# Patient Record
Sex: FEMALE | Race: WHITE | HISPANIC OR LATINO | Employment: OTHER | URBAN - METROPOLITAN AREA
[De-identification: names, ages, dates, MRNs, and addresses within clinical notes are randomized per-mention and may not be internally consistent; named-entity substitution may affect disease eponyms.]

---

## 2017-01-04 DIAGNOSIS — I25.10 CORONARY ARTERY DISEASE INVOLVING NATIVE CORONARY ARTERY WITHOUT ANGINA PECTORIS, UNSPECIFIED WHETHER NATIVE OR TRANSPLANTED HEART: ICD-10-CM

## 2017-01-04 DIAGNOSIS — E78.5 HYPERLIPIDEMIA LDL GOAL <70: ICD-10-CM

## 2017-01-04 DIAGNOSIS — Z95.1 S/P CABG X 1: ICD-10-CM

## 2017-01-04 DIAGNOSIS — I77.9 PERIPHERAL ARTERIAL OCCLUSIVE DISEASE: ICD-10-CM

## 2017-01-04 DIAGNOSIS — I10 ESSENTIAL HYPERTENSION: ICD-10-CM

## 2017-01-04 NOTE — TELEPHONE ENCOUNTER
----- Message from Blaire Arias sent at 1/4/2017  3:21 PM CST -----  Contact: [phone]/ self   Patient needs a refill of furosemide (LASIX) 40 MG tablet sent to [facility] on [address]. Please advise.

## 2017-01-05 RX ORDER — FUROSEMIDE 40 MG/1
40 TABLET ORAL DAILY
Qty: 90 TABLET | Refills: 1 | Status: SHIPPED | OUTPATIENT
Start: 2017-01-05 | End: 2017-01-06 | Stop reason: SDUPTHER

## 2017-01-06 DIAGNOSIS — Z95.1 S/P CABG X 1: ICD-10-CM

## 2017-01-06 DIAGNOSIS — I25.10 CORONARY ARTERY DISEASE INVOLVING NATIVE CORONARY ARTERY WITHOUT ANGINA PECTORIS, UNSPECIFIED WHETHER NATIVE OR TRANSPLANTED HEART: ICD-10-CM

## 2017-01-06 DIAGNOSIS — I10 ESSENTIAL HYPERTENSION: ICD-10-CM

## 2017-01-06 DIAGNOSIS — I77.9 PERIPHERAL ARTERIAL OCCLUSIVE DISEASE: ICD-10-CM

## 2017-01-06 DIAGNOSIS — E78.5 HYPERLIPIDEMIA LDL GOAL <70: ICD-10-CM

## 2017-01-06 RX ORDER — FUROSEMIDE 40 MG/1
40 TABLET ORAL DAILY
Qty: 30 TABLET | Refills: 0 | Status: SHIPPED | OUTPATIENT
Start: 2017-01-06 | End: 2017-03-23 | Stop reason: SDUPTHER

## 2017-03-13 ENCOUNTER — HOSPITAL ENCOUNTER (OUTPATIENT)
Dept: RADIOLOGY | Facility: HOSPITAL | Age: 82
Discharge: HOME | End: 2017-03-13
Attending: NURSE PRACTITIONER
Payer: MEDICARE

## 2017-03-13 DIAGNOSIS — R05.9 COUGH: ICD-10-CM

## 2017-03-13 DIAGNOSIS — R06.02 SOB (SHORTNESS OF BREATH): ICD-10-CM

## 2017-03-13 DIAGNOSIS — N18.6 END STAGE RENAL DISEASE: ICD-10-CM

## 2017-03-13 PROCEDURE — 71020 XR CHEST PA AND LATERAL: CPT | Mod: TC

## 2017-03-13 PROCEDURE — 71020 XR CHEST PA AND LATERAL: CPT | Mod: 26,,, | Performed by: RADIOLOGY

## 2017-03-23 ENCOUNTER — OFFICE VISIT (OUTPATIENT)
Dept: CARDIOLOGY | Facility: CLINIC | Age: 82
End: 2017-03-23
Payer: MEDICARE

## 2017-03-23 VITALS
HEIGHT: 64 IN | SYSTOLIC BLOOD PRESSURE: 127 MMHG | BODY MASS INDEX: 19.29 KG/M2 | WEIGHT: 113 LBS | HEART RATE: 56 BPM | DIASTOLIC BLOOD PRESSURE: 56 MMHG

## 2017-03-23 DIAGNOSIS — I77.9 PERIPHERAL ARTERIAL OCCLUSIVE DISEASE: ICD-10-CM

## 2017-03-23 DIAGNOSIS — E78.5 HYPERLIPIDEMIA LDL GOAL <70: ICD-10-CM

## 2017-03-23 DIAGNOSIS — I25.10 CORONARY ARTERY DISEASE INVOLVING NATIVE CORONARY ARTERY WITHOUT ANGINA PECTORIS, UNSPECIFIED WHETHER NATIVE OR TRANSPLANTED HEART: ICD-10-CM

## 2017-03-23 DIAGNOSIS — I10 ESSENTIAL HYPERTENSION: ICD-10-CM

## 2017-03-23 DIAGNOSIS — N18.6 ESRD (END STAGE RENAL DISEASE): Primary | ICD-10-CM

## 2017-03-23 DIAGNOSIS — Z95.1 S/P CABG X 1: ICD-10-CM

## 2017-03-23 PROCEDURE — 1160F RVW MEDS BY RX/DR IN RCRD: CPT | Mod: S$GLB,,, | Performed by: INTERNAL MEDICINE

## 2017-03-23 PROCEDURE — 1157F ADVNC CARE PLAN IN RCRD: CPT | Mod: S$GLB,,, | Performed by: INTERNAL MEDICINE

## 2017-03-23 PROCEDURE — 1159F MED LIST DOCD IN RCRD: CPT | Mod: S$GLB,,, | Performed by: INTERNAL MEDICINE

## 2017-03-23 PROCEDURE — 1126F AMNT PAIN NOTED NONE PRSNT: CPT | Mod: S$GLB,,, | Performed by: INTERNAL MEDICINE

## 2017-03-23 PROCEDURE — 99213 OFFICE O/P EST LOW 20 MIN: CPT | Mod: S$GLB,,, | Performed by: INTERNAL MEDICINE

## 2017-03-23 PROCEDURE — 99999 PR PBB SHADOW E&M-EST. PATIENT-LVL II: CPT | Mod: PBBFAC,,, | Performed by: INTERNAL MEDICINE

## 2017-03-23 RX ORDER — AMLODIPINE BESYLATE 10 MG/1
10 TABLET ORAL DAILY
Qty: 90 TABLET | Refills: 3 | Status: SHIPPED | OUTPATIENT
Start: 2017-03-23 | End: 2018-06-14 | Stop reason: SDUPTHER

## 2017-03-23 RX ORDER — FUROSEMIDE 40 MG/1
40 TABLET ORAL DAILY
Qty: 90 TABLET | Refills: 3 | Status: SHIPPED | OUTPATIENT
Start: 2017-03-23 | End: 2018-03-23

## 2017-03-23 RX ORDER — ATORVASTATIN CALCIUM 40 MG/1
40 TABLET, FILM COATED ORAL DAILY
COMMUNITY
End: 2017-03-23 | Stop reason: SDUPTHER

## 2017-03-23 RX ORDER — ISOSORBIDE MONONITRATE 120 MG/1
120 TABLET, EXTENDED RELEASE ORAL DAILY
Qty: 90 TABLET | Refills: 3 | Status: SHIPPED | OUTPATIENT
Start: 2017-03-23 | End: 2018-04-19 | Stop reason: SDUPTHER

## 2017-03-23 RX ORDER — CARVEDILOL 25 MG/1
25 TABLET ORAL 2 TIMES DAILY
Qty: 180 TABLET | Refills: 3 | Status: SHIPPED | OUTPATIENT
Start: 2017-03-23 | End: 2018-05-10 | Stop reason: SDUPTHER

## 2017-03-23 RX ORDER — ATORVASTATIN CALCIUM 40 MG/1
40 TABLET, FILM COATED ORAL DAILY
Qty: 90 TABLET | Refills: 3 | Status: SHIPPED | OUTPATIENT
Start: 2017-03-23 | End: 2022-06-27 | Stop reason: SDUPTHER

## 2017-03-23 NOTE — PROGRESS NOTES
Subjective:   Patient ID:  Erin Clark is a 81 y.o. female who presents for follow up of Coronary Artery Disease; Hyperlipidemia; Hypertension; and s/p CABG      HPI:         Erin Clark 81 y.o. female is here follow up and feeling well without any new complaints. She was diagnosed with 3 vessel CAD with an abnormal EF requiring surgical intervention. She had a CABG with 3 grafts without revascularization of RCA . Her EF post surgically normalized to 55%. PET scan thereafter in 2015 and 2015 revealed no ischemia. She is on HD on . HD via L AV shunt. She has PAD without claudication.           Patient Active Problem List    Diagnosis Date Noted    ESRD (end stage renal disease) 12/10/2014    Hyperlipidemia LDL goal <70 12/10/2014    Hemodialysis catheter malfunction 10/16/2014    Dyspnea 2014    S/P CABG x 1 2014    Anemia 2014    Type 2 diabetes mellitus 02/10/2014    Peripheral arterial occlusive disease 02/10/2014    Hypertension 02/10/2014    CAD (coronary artery disease) 02/10/2014     S/p CABG 14 LIMA-D1, SVG-LAD, SVG-OM1 for NSTEMI with non revascularized RCA , normalized EF      Chronic diastolic congestive heart failure 02/10/2014           Right Arm BP - Sittin/56  Reason for not completing BP on both arms: AV shunt        LABS    LAST HbA1c  Lab Results   Component Value Date    HGBA1C 6.6 (H) 2014       Lipid panel  Lab Results   Component Value Date    CHOL 154 2015    CHOL 184 2014    CHOL 192 02/10/2014     Lab Results   Component Value Date    HDL 66 2015    HDL 36 (L) 2014    HDL 38 (L) 02/10/2014     Lab Results   Component Value Date    LDLCALC 60.4 (L) 2015    LDLCALC 98.8 2014    LDLCALC 108.0 02/10/2014     Lab Results   Component Value Date    TRIG 138 2015    TRIG 246 (H) 2014    TRIG 230 (H) 02/10/2014     Lab Results   Component Value Date    CHOLHDL 42.9 2015     CHOLHDL 19.6 (L) 02/11/2014    CHOLHDL 19.8 (L) 02/10/2014            Review of Systems   Constitution: Negative for diaphoresis, weakness, night sweats, weight gain and weight loss.   HENT: Negative for congestion.    Eyes: Negative for blurred vision, discharge and double vision.   Cardiovascular: Negative for chest pain, claudication, cyanosis, dyspnea on exertion, irregular heartbeat, leg swelling, near-syncope, orthopnea, palpitations, paroxysmal nocturnal dyspnea and syncope.   Respiratory: Negative for cough, shortness of breath and wheezing.    Endocrine: Negative for cold intolerance, heat intolerance and polyphagia.   Hematologic/Lymphatic: Negative for adenopathy and bleeding problem. Does not bruise/bleed easily.   Skin: Negative for dry skin and nail changes.   Musculoskeletal: Negative for arthritis, back pain, falls, joint pain, myalgias and neck pain.   Gastrointestinal: Negative for bloating, abdominal pain, change in bowel habit and constipation.   Genitourinary: Negative for bladder incontinence, dysuria, flank pain, genital sores and missed menses.   Neurological: Negative for aphonia, brief paralysis, difficulty with concentration and dizziness.   Psychiatric/Behavioral: Negative for altered mental status and memory loss. The patient does not have insomnia.    Allergic/Immunologic: Negative for environmental allergies.       Objective:   Physical Exam   Constitutional: She is oriented to person, place, and time. She appears well-developed and well-nourished. She is not intubated.   HENT:   Head: Normocephalic and atraumatic.   Right Ear: External ear normal.   Left Ear: External ear normal.   Mouth/Throat: Oropharynx is clear and moist.   Eyes: Conjunctivae and EOM are normal. Pupils are equal, round, and reactive to light. Right eye exhibits no discharge. Left eye exhibits no discharge. No scleral icterus.   Neck: Normal range of motion. Neck supple. Normal carotid pulses, no hepatojugular  reflux and no JVD present. Carotid bruit is not present. No tracheal deviation present. No thyromegaly present.   Cardiovascular: Normal rate, regular rhythm, S1 normal and S2 normal.   No extrasystoles are present. PMI is not displaced.  Exam reveals no gallop, no S3, no distant heart sounds, no friction rub and no midsystolic click.    Murmur heard.   Systolic murmur is present  at the upper right sternal border  Pulses:       Carotid pulses are 2+ on the right side, and 2+ on the left side.       Radial pulses are 2+ on the right side, and 2+ on the left side.        Femoral pulses are 2+ on the right side, and 2+ on the left side.       Popliteal pulses are 2+ on the right side, and 2+ on the left side.        Dorsalis pedis pulses are 1+ on the right side, and 1+ on the left side.        Posterior tibial pulses are 1+ on the right side, and 1+ on the left side.       LUE AVF with palpable thrill    Pulmonary/Chest: Effort normal and breath sounds normal. No accessory muscle usage or stridor. No apnea, no tachypnea and no bradypnea. She is not intubated. No respiratory distress. She has no decreased breath sounds. She has no wheezes. She has no rales. She exhibits no tenderness and no bony tenderness.   Abdominal: She exhibits no distension, no pulsatile liver, no abdominal bruit, no ascites, no pulsatile midline mass and no mass. There is no tenderness. There is no rebound and no guarding.   Musculoskeletal: Normal range of motion. She exhibits no edema or tenderness.   Lymphadenopathy:     She has no cervical adenopathy.   Neurological: She is alert and oriented to person, place, and time. She has normal reflexes. No cranial nerve deficit. Coordination normal.   Skin: Skin is warm. No rash noted. No erythema. No pallor.   Psychiatric: She has a normal mood and affect. Her behavior is normal. Judgment and thought content normal.       Assessment:     1. ESRD (end stage renal disease)    2. Coronary artery  disease involving native coronary artery without angina pectoris, unspecified whether native or transplanted heart    3. Essential hypertension    4. Peripheral arterial occlusive disease    5. S/P CABG x 1    6. Hyperlipidemia LDL goal <70        Plan:         Continue with current medical plan and lifestyle changes.  Return sooner for concerns or questions. If symptoms persist go to the ED  I have reviewed all pertinent data on this patient         Medical therapy for CAD and PAD          Follow up as scheduled. Return sooner for concerns or questions  Follow up yearly          She and her son expressed verbal understanding and agreed with the plan        Patient's Medications   New Prescriptions    No medications on file   Previous Medications    ACYCLOVIR 5% (ZOVIRAX) 5 % OINTMENT    Apply topically 5 (five) times daily.    ASPIRIN (ECOTRIN) 81 MG EC TABLET    TAKE ONE TABLET BY MOUTH ONCE DAILY    ERGOCALCIFEROL (ERGOCALCIFEROL) 50,000 UNIT CAP    Take 1 capsule (50,000 Units total) by mouth every 7 days.    FAMOTIDINE (PEPCID) 20 MG TABLET    Take 1 tablet (20 mg total) by mouth once daily.    NITROGLYCERIN (NITROSTAT) 0.4 MG SL TABLET    Place 1 tablet (0.4 mg total) under the tongue every 5 (five) minutes as needed for Chest pain.   Modified Medications    Modified Medication Previous Medication    AMLODIPINE (NORVASC) 10 MG TABLET amlodipine (NORVASC) 10 MG tablet       Take 1 tablet (10 mg total) by mouth once daily.    Take 1 tablet (10 mg total) by mouth once daily.    ATORVASTATIN (LIPITOR) 40 MG TABLET atorvastatin (LIPITOR) 40 MG tablet       Take 1 tablet (40 mg total) by mouth once daily.    Take 40 mg by mouth once daily.    CARVEDILOL (COREG) 25 MG TABLET carvedilol (COREG) 25 MG tablet       Take 1 tablet (25 mg total) by mouth 2 (two) times daily.    Take 1 tablet (25 mg total) by mouth 2 (two) times daily.    FUROSEMIDE (LASIX) 40 MG TABLET furosemide (LASIX) 40 MG tablet       Take 1 tablet  (40 mg total) by mouth once daily.    Take 1 tablet (40 mg total) by mouth once daily.    ISOSORBIDE MONONITRATE (IMDUR) 120 MG 24 HR TABLET isosorbide mononitrate (IMDUR) 120 MG 24 hr tablet       Take 1 tablet (120 mg total) by mouth once daily.    Take 1 tablet (120 mg total) by mouth once daily.   Discontinued Medications

## 2017-03-23 NOTE — PATIENT INSTRUCTIONS
Heart Disease Education    The heart beats 60 to 100 times per minute, 24 hours a day. This equals almost 1000,000 times a day. It pumps blood with oxygen and nutrients to the tissues and organs of the body. But the heart is a muscle and needs its own supply of blood. Blood flow to the heart is supplied by the coronary arteries. Coronary artery disease (atherosclerosis) is a result of cholesterol, saturated fat, and calcium deposits (plaques) that build up inside the walls. This causes inflammation within the coronary arteries. These plaques narrow the artery and reduce blood flow to the heart muscle. The reduction in blood flow to the heart muscle decreases oxygen supply to the heart. If the narrowing is significant enough, the oxygen supply to one or more regions of the heart can be temporarily or permanently shut down. This can cause chest pain, and possibly death of heart tissue (heart attack).  Types of chest pain  Angina is the name for pain in the heart muscle. Angina is a warning sign of serious heart disease. When untreated it can lead to a heart attack, also known as acute myocardial infarction, or AMI. Angina occurs when there is not enough blood and oxygen flowing to the heart for the amount of work it is doing. This most often happens during physical exertion, when the heart is working hardest. It is usually relieved by rest or nitroglycerin. Angina may also occur after a large meal when extra blood is sent to the digestive organs and less goes to the heart. In the case of advanced or unstable heart disease, angina can occur at rest or awaken you from sleep. Angina usually lasts from a few minutes up to 20 minutes or more. When treated early, the effects of angina can be reversed without permanent damage to the heart. Angina is a serious condition and needs to be evaluated by a medical professional immediately.  There are two types of angina -- stable and unstable:  · Stable angina usually occurs  with a predictable level of activity. Being stable, its character, severity, and occurrence do not change much over time. It usually starts with activity, and resolves with rest or taking your medicine as instructed by your doctor. The symptoms usually do not last long.  · Unstable angina changes or gets worse over time. It is different from whatever you are used to. It may feel different or worse, begin without cause, occur with exercise or exertion, wake you up from sleep, and last longer. It may not respond in the same way as it does when you take your usual medicines for an attack. This type of angina can be a warning sign of an impending heart attack.     A heart attack is usually the result of a blood clot that suddenly forms in a coronary artery that has been narrowed with plaque. When this occurs, blood flow may be cut off to a part of the heart muscle, causing the cells to die. This weakens the pumping action of the heart, which affects the delivery of blood to all the other organs in the body including the brain. This damage is not reversible. However, early treatment can limit the amount of damage.  The pain you feel with angina and a heart attack may have a similar quality. However, it is usually different in intensity and duration. Here are some typical descriptions of a heart attack:  · It is most often experienced as a squeezing, crushing, pressure-like sensation in the center of the chest.  · It is sometimes described as something heavy sitting on my chest.  · It may feel more like a bad case of indigestion.  · The pain may spread from the chest to the arm, shoulder, throat or jaw.  · Sometimes the pain is not felt in the chest at all, but only in the arm, shoulder, throat or jaw.  · There may also be nausea, vomiting, dizziness or light-headedness, sweating and trouble breathing.  · Palpitations, or your heart beating rapidly  · A new, irregular heart beat  · Unexplained weakness  You may not be  "able to tell the difference between "bad" angina and a heart attack at home. Seek help if your symptoms are different than usual. Do not be in denial or just try to "tough it out."  Call 911  This is the fastest and safest way to get to the emergency department. The paramedics can also start treatment on the way to the hospital, saving valuable time for your heart.  · If the angina gets worse, if it continues, or if it stops and returns, call 911 immediately. Do not delay. You may be having a heart attack.  · After you call 911, take a second tablet or spray unless instructed otherwise. When repeating doses, sit down if possible, because it can make you feel lightheaded or dizzy. Wait another 5 minutes. If the angina still does not go away, take a third tablet or spray. Do not take more than 3 tablets or sprays within 15 minutes. Stay on the phone with 911 for further instruction.  · Your healthcare provider may give you slightly different instructions than those above. If so, follow them carefully.  Do not wait until symptoms become severe to call 911.  Other reasons to call 911 include:  · Trouble breathing  · Feeling lightheaded, faint, or dizzy  · Rapid heart beat  · Slower than usual heart rate compared to your normal  · Angina with weakness, dizziness, fainting, heavy sweating, nausea, or vomiting  · Extreme drowsiness, confusion  · Weakness of an arm or leg or one side of the face  · Difficulty with speech or vision  When to seek medical care  Remember, the signs and symptoms of a heart attack are not always like they are on TV. Sometimes they are not so obvious. You may only feel weak, or just not right. If it is not clear or if you have any doubt, call for advice.  · Seek help if there is a change in the type of pain, if it feels different, or if your symptoms are mild.  · Do not drive yourself. Have someone else drive you. If no one can drive, call 911.  · Do not delay. Fast diagnosis and treatment can " "prevent or limit the amount of heart damage during a heart attack.  · Do not go to your doctor's office or a clinic as they may not be able to provide all the testing and treatment required for this condition.  · If your doctor has given you medicine to take when symptoms occur, take them but don't delay getting help trying to locate medicines.  What happens in the emergency department  The emergency department is connected to your local emergency medical system (EMS) through 911. That's why during a cardiac emergency, calling 911 is the fastest way to get help. The goal of the emergency department is to rapidly screen, evaluate, and treat people.  Once you are there, an electrocardiogram (ECG or heart tracing) will be done. Blood samples may be taken to look for the presence of heart enzymes that leak from damaged heart cells and show if a heart attack is occurring. You will often be evaluated by a heart specialist (cardiologist) who decides the best course of action. In the case of severe angina or early heart attack, and depending on the circumstances, powerful "clot busting" medicines can be used to dissolve blood clots in the coronary artery. In other cases, you may be taken to a cardiac catheterization lab. Here, a tiny balloon-tipped catheter is advanced through blood vessels to the heart. There the balloon is inflated pushing open the blood vessel restoring blood flow.  Risk factors for heart disease  Risk factors for heart disease are a combination of genetic and lifestyle. Many risk factors work by either directly or indirectly damaging the blood vessels of the heart, or by increasing the risk of forming blood or cholesterol clots, which then clog up and block the arteries.     Examples of physical lifestyle risk factors:  · Cigarette smoking  · High blood pressure  · High blood cholesterol  · Use of stimulant drugs such as cocaine, crack, and amphetamines  · Eating a high-fat, high-cholesterol " meal  · Diabetes   · Obesity which increases risk for diabetes and high blood pressure  · Lack of regular physical activity     Examples of emotional lifestyle factors:  · Chronic high stress levels release stress hormones. These raise blood pressure and cholesterol level and makes blood clot more easily.  · Held-in anger, hostile or cynical attitude  · Social and emotional isolation, lack of intimacy  · Loss of relationship  · Depression  Other factors that increase the risk of heart attack that you cannot control :  · Age. The older you get beyond 40, the greater is your risk of significant coronary artery disease.  · Gender. More men than women get heart disease; but once past menopause, women who are not taking estrogen replacement have the same risk as men for a heart attack.  · Family history. If your mother, father, brother or sister has coronary artery disease, your risk of having it is higher than a person your age without this family history.  What can you do to decrease your risk  To reduce your risk of heart disease:  · Get regular checkups with your doctor.  · Take your medicines for blood pressure, cholesterol or diabetes as directed.  · Watch your diet. Eat a heart healthy diet choosing fresh foods, less salt, cholesterol, and fat  · Stop smoking. Get help if needed.  · Get regular exercise.  · Manage stress.  · Carry a list of medicines and doses in your wallet.  Date Last Reviewed: 12/30/2015  © 9942-7369 [employer] [address] [place] [address], [place] [address]. All rights reserved. This information is not intended as a substitute for professional medical care. Always follow your healthcare professional's instructions.

## 2017-11-30 PROBLEM — T82.898A HEMODIALYSIS AV FISTULA ANEURYSM: Status: ACTIVE | Noted: 2017-11-30

## 2018-01-22 ENCOUNTER — HOSPITAL ENCOUNTER (OUTPATIENT)
Dept: RADIOLOGY | Facility: HOSPITAL | Age: 83
Discharge: HOME | End: 2018-01-22
Attending: FAMILY MEDICINE
Payer: MEDICARE

## 2018-01-22 DIAGNOSIS — R05.9 COUGH: ICD-10-CM

## 2018-01-22 DIAGNOSIS — R05.9 COUGH: Primary | ICD-10-CM

## 2018-01-22 PROCEDURE — 71046 X-RAY EXAM CHEST 2 VIEWS: CPT | Mod: TC,FY

## 2018-01-22 PROCEDURE — 71046 X-RAY EXAM CHEST 2 VIEWS: CPT | Mod: 26,,, | Performed by: RADIOLOGY

## 2018-02-23 DIAGNOSIS — E78.5 HYPERLIPIDEMIA LDL GOAL <70: ICD-10-CM

## 2018-02-23 DIAGNOSIS — I10 ESSENTIAL HYPERTENSION: ICD-10-CM

## 2018-02-23 DIAGNOSIS — I77.9 PERIPHERAL ARTERIAL OCCLUSIVE DISEASE: ICD-10-CM

## 2018-02-23 DIAGNOSIS — I25.10 CORONARY ARTERY DISEASE INVOLVING NATIVE CORONARY ARTERY WITHOUT ANGINA PECTORIS, UNSPECIFIED WHETHER NATIVE OR TRANSPLANTED HEART: ICD-10-CM

## 2018-02-23 DIAGNOSIS — Z95.1 S/P CABG X 1: ICD-10-CM

## 2018-02-23 RX ORDER — FUROSEMIDE 40 MG/1
TABLET ORAL
Qty: 90 TABLET | Refills: 1 | Status: SHIPPED | OUTPATIENT
Start: 2018-02-23 | End: 2019-05-17 | Stop reason: SDUPTHER

## 2018-04-19 DIAGNOSIS — I77.9 PERIPHERAL ARTERIAL OCCLUSIVE DISEASE: ICD-10-CM

## 2018-04-19 DIAGNOSIS — E78.5 HYPERLIPIDEMIA LDL GOAL <70: ICD-10-CM

## 2018-04-19 DIAGNOSIS — I10 ESSENTIAL HYPERTENSION: ICD-10-CM

## 2018-04-19 DIAGNOSIS — I25.10 CORONARY ARTERY DISEASE INVOLVING NATIVE CORONARY ARTERY WITHOUT ANGINA PECTORIS, UNSPECIFIED WHETHER NATIVE OR TRANSPLANTED HEART: ICD-10-CM

## 2018-04-19 DIAGNOSIS — Z95.1 S/P CABG X 1: ICD-10-CM

## 2018-04-19 RX ORDER — ISOSORBIDE MONONITRATE 120 MG/1
TABLET, EXTENDED RELEASE ORAL
Qty: 90 TABLET | Refills: 3 | Status: SHIPPED | OUTPATIENT
Start: 2018-04-19 | End: 2019-07-08 | Stop reason: SDUPTHER

## 2018-05-10 DIAGNOSIS — I77.9 PERIPHERAL ARTERIAL OCCLUSIVE DISEASE: ICD-10-CM

## 2018-05-10 DIAGNOSIS — Z95.1 S/P CABG X 1: ICD-10-CM

## 2018-05-10 DIAGNOSIS — E78.5 HYPERLIPIDEMIA LDL GOAL <70: ICD-10-CM

## 2018-05-10 DIAGNOSIS — I10 ESSENTIAL HYPERTENSION: ICD-10-CM

## 2018-05-10 DIAGNOSIS — I25.10 CORONARY ARTERY DISEASE INVOLVING NATIVE CORONARY ARTERY WITHOUT ANGINA PECTORIS, UNSPECIFIED WHETHER NATIVE OR TRANSPLANTED HEART: ICD-10-CM

## 2018-05-10 RX ORDER — CARVEDILOL 25 MG/1
TABLET ORAL
Qty: 180 TABLET | Refills: 3 | Status: SHIPPED | OUTPATIENT
Start: 2018-05-10 | End: 2019-10-21 | Stop reason: SDUPTHER

## 2018-06-14 DIAGNOSIS — I10 ESSENTIAL HYPERTENSION: ICD-10-CM

## 2018-06-14 DIAGNOSIS — I25.10 CORONARY ARTERY DISEASE INVOLVING NATIVE CORONARY ARTERY WITHOUT ANGINA PECTORIS, UNSPECIFIED WHETHER NATIVE OR TRANSPLANTED HEART: ICD-10-CM

## 2018-06-14 DIAGNOSIS — I77.9 PERIPHERAL ARTERIAL OCCLUSIVE DISEASE: ICD-10-CM

## 2018-06-14 DIAGNOSIS — E78.5 HYPERLIPIDEMIA LDL GOAL <70: ICD-10-CM

## 2018-06-14 DIAGNOSIS — Z95.1 S/P CABG X 1: ICD-10-CM

## 2018-06-14 RX ORDER — AMLODIPINE BESYLATE 10 MG/1
TABLET ORAL
Qty: 90 TABLET | Refills: 3 | Status: SHIPPED | OUTPATIENT
Start: 2018-06-14 | End: 2019-08-30 | Stop reason: SDUPTHER

## 2018-07-15 LAB — HBA1C MFR BLD: 5.7 % (ref 4–6)

## 2018-09-19 LAB
CHOLEST SERPL-MSCNC: 223 MG/DL (ref 0–200)
HDLC SERPL-MCNC: 52 MG/DL (ref 35–70)
LDLC SERPL CALC-MCNC: 120 MG/DL (ref 0–100)
TRIGL SERPL-MCNC: 356 MG/DL (ref 40–160)

## 2018-11-14 LAB
MICROALBUMIN URINE RANDOM: 82
MICROALBUMIN/CREATININE RATIO: 122 (ref 20–275)

## 2019-03-06 ENCOUNTER — CLINICAL SUPPORT (OUTPATIENT)
Dept: FAMILY MEDICINE | Facility: CLINIC | Age: 84
End: 2019-03-06
Attending: FAMILY MEDICINE
Payer: MEDICARE

## 2019-03-06 ENCOUNTER — OFFICE VISIT (OUTPATIENT)
Dept: FAMILY MEDICINE | Facility: CLINIC | Age: 84
End: 2019-03-06
Payer: MEDICARE

## 2019-03-06 ENCOUNTER — TELEPHONE (OUTPATIENT)
Dept: ADMINISTRATIVE | Facility: HOSPITAL | Age: 84
End: 2019-03-06

## 2019-03-06 ENCOUNTER — LAB VISIT (OUTPATIENT)
Dept: LAB | Facility: HOSPITAL | Age: 84
End: 2019-03-06
Attending: FAMILY MEDICINE
Payer: MEDICARE

## 2019-03-06 VITALS
HEIGHT: 59 IN | DIASTOLIC BLOOD PRESSURE: 60 MMHG | OXYGEN SATURATION: 95 % | BODY MASS INDEX: 24.18 KG/M2 | WEIGHT: 119.94 LBS | SYSTOLIC BLOOD PRESSURE: 136 MMHG | HEART RATE: 63 BPM

## 2019-03-06 DIAGNOSIS — E11.22 TYPE 2 DIABETES MELLITUS WITH STAGE 3 CHRONIC KIDNEY DISEASE, WITH LONG-TERM CURRENT USE OF INSULIN: ICD-10-CM

## 2019-03-06 DIAGNOSIS — N18.30 TYPE 2 DIABETES MELLITUS WITH STAGE 3 CHRONIC KIDNEY DISEASE, WITH LONG-TERM CURRENT USE OF INSULIN: ICD-10-CM

## 2019-03-06 DIAGNOSIS — Z00.00 ANNUAL PHYSICAL EXAM: Primary | ICD-10-CM

## 2019-03-06 DIAGNOSIS — Z23 NEED FOR VACCINATION AGAINST STREPTOCOCCUS PNEUMONIAE: ICD-10-CM

## 2019-03-06 DIAGNOSIS — I25.10 CORONARY ARTERY DISEASE INVOLVING NATIVE CORONARY ARTERY WITHOUT ANGINA PECTORIS, UNSPECIFIED WHETHER NATIVE OR TRANSPLANTED HEART: ICD-10-CM

## 2019-03-06 DIAGNOSIS — Z79.4 TYPE 2 DIABETES MELLITUS WITH STAGE 3 CHRONIC KIDNEY DISEASE, WITH LONG-TERM CURRENT USE OF INSULIN: ICD-10-CM

## 2019-03-06 DIAGNOSIS — E78.5 HYPERLIPIDEMIA LDL GOAL <70: ICD-10-CM

## 2019-03-06 DIAGNOSIS — Z01.419 ENCOUNTER FOR GYNECOLOGICAL EXAMINATION WITHOUT ABNORMAL FINDING: ICD-10-CM

## 2019-03-06 DIAGNOSIS — N18.6 ESRD (END STAGE RENAL DISEASE): ICD-10-CM

## 2019-03-06 DIAGNOSIS — I10 ESSENTIAL HYPERTENSION: ICD-10-CM

## 2019-03-06 LAB
ESTIMATED AVG GLUCOSE: 128 MG/DL
HBA1C MFR BLD HPLC: 6.1 %

## 2019-03-06 PROCEDURE — 36415 COLL VENOUS BLD VENIPUNCTURE: CPT | Mod: PO

## 2019-03-06 PROCEDURE — 83036 HEMOGLOBIN GLYCOSYLATED A1C: CPT

## 2019-03-06 PROCEDURE — 90670 PCV13 VACCINE IM: CPT | Mod: S$GLB,,, | Performed by: FAMILY MEDICINE

## 2019-03-06 PROCEDURE — G0009 ADMIN PNEUMOCOCCAL VACCINE: HCPCS | Mod: S$GLB,,, | Performed by: FAMILY MEDICINE

## 2019-03-06 PROCEDURE — 99999 PR PBB SHADOW E&M-EST. PATIENT-LVL IV: ICD-10-PCS | Mod: PBBFAC,,, | Performed by: FAMILY MEDICINE

## 2019-03-06 PROCEDURE — G0009 PNEUMOCOCCAL CONJUGATE VACCINE 13-VALENT LESS THAN 5YO & GREATER THAN: ICD-10-PCS | Mod: S$GLB,,, | Performed by: FAMILY MEDICINE

## 2019-03-06 PROCEDURE — 99204 OFFICE O/P NEW MOD 45 MIN: CPT | Mod: 25,S$GLB,, | Performed by: FAMILY MEDICINE

## 2019-03-06 PROCEDURE — 99999 PR PBB SHADOW E&M-EST. PATIENT-LVL IV: CPT | Mod: PBBFAC,,, | Performed by: FAMILY MEDICINE

## 2019-03-06 PROCEDURE — 99499 UNLISTED E&M SERVICE: CPT | Mod: S$GLB,,, | Performed by: FAMILY MEDICINE

## 2019-03-06 PROCEDURE — 90670 PNEUMOCOCCAL CONJUGATE VACCINE 13-VALENT LESS THAN 5YO & GREATER THAN: ICD-10-PCS | Mod: S$GLB,,, | Performed by: FAMILY MEDICINE

## 2019-03-06 PROCEDURE — 99499 RISK ADDL DX/OHS AUDIT: ICD-10-PCS | Mod: S$GLB,,, | Performed by: FAMILY MEDICINE

## 2019-03-06 PROCEDURE — 99204 PR OFFICE/OUTPT VISIT, NEW, LEVL IV, 45-59 MIN: ICD-10-PCS | Mod: 25,S$GLB,, | Performed by: FAMILY MEDICINE

## 2019-03-06 NOTE — PROGRESS NOTES
Patient Erin Clark, MRN [id number], was dependent on dialysis (ICD10 Z99.2) at the time of this visit on 3/6/19. This addendum is made to the medical record on 03/06/2019.

## 2019-03-06 NOTE — PROGRESS NOTES
Subjective:       Patient ID: Erin Clark is a 83 y.o. female.    Chief Complaint: [facility] Care    83 years old female came to the clinic for her physical examination .  Patient with chronic kidney disease currently on dialysis.  Patient on dialysis Monday and Friday.  Blood pressure today stable .  No chest pain, palpitation orthopnea or PND.  Patient previously diagnosed with coronary artery disease. Last A1c was stable .  No polyuria, polydipsia or polyphagia.      Review of Systems   Constitutional: Negative.    HENT: Negative.    Eyes: Negative.    Respiratory: Negative.    Cardiovascular: Negative.  Negative for chest pain, palpitations and leg swelling.   Gastrointestinal: Negative.    Endocrine: Negative for polydipsia, polyphagia and polyuria.   Genitourinary: Negative.    Musculoskeletal: Negative.    Skin: Negative.    Neurological: Negative.    Psychiatric/Behavioral: Negative.        Objective:      Physical Exam   Constitutional: She is oriented to person, place, and time. She appears well-developed and well-nourished. No distress.   HENT:   Head: Normocephalic and atraumatic.   Right Ear: External ear normal.   Left Ear: External ear normal.   Nose: Nose normal.   Mouth/Throat: Oropharynx is clear and moist. No oropharyngeal exudate.   Eyes: Conjunctivae and EOM are normal. Pupils are equal, round, and reactive to light. Right eye exhibits no discharge. Left eye exhibits no discharge. No scleral icterus.   Neck: Normal range of motion. Neck supple. No JVD present. No tracheal deviation present. No thyromegaly present.   Cardiovascular: Normal rate, regular rhythm, normal heart sounds and intact distal pulses. Exam reveals no gallop and no friction rub.   No murmur heard.  Pulmonary/Chest: Effort normal and breath sounds normal. No stridor. No respiratory distress. She has no wheezes. She has no rales. She exhibits no tenderness.   Abdominal: Soft. Bowel sounds are normal. She exhibits no  distension and no mass. There is no tenderness. There is no rebound and no guarding.   Musculoskeletal: Normal range of motion. She exhibits no edema or tenderness.   Feet:   Right Foot:   Protective Sensation: 10 sites tested. 10 sites sensed.   Skin Integrity: Negative for ulcer, blister, skin breakdown, erythema, warmth, callus or dry skin.   Left Foot:   Protective Sensation: 10 sites tested. 10 sites sensed.   Skin Integrity: Negative for ulcer, blister, skin breakdown, erythema, warmth, callus or dry skin.   Lymphadenopathy:     She has no cervical adenopathy.   Neurological: She is alert and oriented to person, place, and time. She has normal reflexes. No cranial nerve deficit. She exhibits normal muscle tone. Coordination normal.   Skin: Skin is warm and dry. No rash noted. She is not diaphoretic. No erythema. No pallor.   Psychiatric: She has a normal mood and affect. Her behavior is normal. Judgment and thought content normal.       Assessment:       1. Annual physical exam    2. ESRD (end stage renal disease)    3. Hyperlipidemia LDL goal <70    4. Essential hypertension    5. Coronary artery disease involving native coronary artery without angina pectoris, unspecified whether native or transplanted heart    6. Need for vaccination against Streptococcus pneumoniae    7. Type 2 diabetes mellitus with stage 3 chronic kidney disease, with long-term current use of insulin    8. Encounter for gynecological examination without abnormal finding        Plan:         Erin was seen today for establish care.    Diagnoses and all orders for this visit:    Annual physical exam    ESRD (end stage renal disease)    Hyperlipidemia LDL goal <70    Essential hypertension    Coronary artery disease involving native coronary artery without angina pectoris, unspecified whether native or transplanted heart    Need for vaccination against Streptococcus pneumoniae  -     (In Office Administered) Pneumococcal Conjugate Vaccine  (13 Valent) (IM)    Type 2 diabetes mellitus with stage 3 chronic kidney disease, with long-term current use of insulin  -     Diabetic Eye Screening Photo; Future  -     Hemoglobin A1c; Future    Encounter for gynecological examination without abnormal finding  -     Ambulatory referral to Obstetrics / Gynecology    Continue monitoring blood pressure at home, low sodium diet.  Continue monitoring blood sugar at home,ADA diet.

## 2019-03-21 ENCOUNTER — OFFICE VISIT (OUTPATIENT)
Dept: CARDIOLOGY | Facility: CLINIC | Age: 84
End: 2019-03-21
Payer: MEDICARE

## 2019-03-21 VITALS
HEIGHT: 64 IN | BODY MASS INDEX: 20.32 KG/M2 | SYSTOLIC BLOOD PRESSURE: 120 MMHG | WEIGHT: 119 LBS | DIASTOLIC BLOOD PRESSURE: 60 MMHG | HEART RATE: 62 BPM

## 2019-03-21 DIAGNOSIS — I25.10 CORONARY ARTERY DISEASE WITHOUT ANGINA PECTORIS, UNSPECIFIED VESSEL OR LESION TYPE, UNSPECIFIED WHETHER NATIVE OR TRANSPLANTED HEART: Primary | ICD-10-CM

## 2019-03-21 DIAGNOSIS — Z79.4 TYPE 2 DIABETES MELLITUS WITH STAGE 3 CHRONIC KIDNEY DISEASE, WITH LONG-TERM CURRENT USE OF INSULIN: ICD-10-CM

## 2019-03-21 DIAGNOSIS — I50.32 CHRONIC DIASTOLIC CONGESTIVE HEART FAILURE: ICD-10-CM

## 2019-03-21 DIAGNOSIS — N18.30 TYPE 2 DIABETES MELLITUS WITH STAGE 3 CHRONIC KIDNEY DISEASE, WITH LONG-TERM CURRENT USE OF INSULIN: ICD-10-CM

## 2019-03-21 DIAGNOSIS — E11.22 TYPE 2 DIABETES MELLITUS WITH STAGE 3 CHRONIC KIDNEY DISEASE, WITH LONG-TERM CURRENT USE OF INSULIN: ICD-10-CM

## 2019-03-21 DIAGNOSIS — N18.6 ESRD (END STAGE RENAL DISEASE): ICD-10-CM

## 2019-03-21 DIAGNOSIS — E78.5 HYPERLIPIDEMIA LDL GOAL <70: ICD-10-CM

## 2019-03-21 DIAGNOSIS — I10 ESSENTIAL HYPERTENSION: ICD-10-CM

## 2019-03-21 DIAGNOSIS — I77.9 PERIPHERAL ARTERIAL OCCLUSIVE DISEASE: ICD-10-CM

## 2019-03-21 PROCEDURE — 99999 PR PBB SHADOW E&M-EST. PATIENT-LVL III: CPT | Mod: PBBFAC,,, | Performed by: INTERNAL MEDICINE

## 2019-03-21 PROCEDURE — 93000 ELECTROCARDIOGRAM COMPLETE: CPT | Mod: S$GLB,,, | Performed by: INTERNAL MEDICINE

## 2019-03-21 PROCEDURE — 99499 RISK ADDL DX/OHS AUDIT: ICD-10-PCS | Mod: S$GLB,,, | Performed by: INTERNAL MEDICINE

## 2019-03-21 PROCEDURE — 99499 UNLISTED E&M SERVICE: CPT | Mod: S$GLB,,, | Performed by: INTERNAL MEDICINE

## 2019-03-21 PROCEDURE — 1101F PR PT FALLS ASSESS DOC 0-1 FALLS W/OUT INJ PAST YR: ICD-10-PCS | Mod: CPTII,S$GLB,, | Performed by: INTERNAL MEDICINE

## 2019-03-21 PROCEDURE — 99999 PR PBB SHADOW E&M-EST. PATIENT-LVL III: ICD-10-PCS | Mod: PBBFAC,,, | Performed by: INTERNAL MEDICINE

## 2019-03-21 PROCEDURE — 93000 EKG 12-LEAD: ICD-10-PCS | Mod: S$GLB,,, | Performed by: INTERNAL MEDICINE

## 2019-03-21 PROCEDURE — 99215 OFFICE O/P EST HI 40 MIN: CPT | Mod: S$GLB,,, | Performed by: INTERNAL MEDICINE

## 2019-03-21 PROCEDURE — 99215 PR OFFICE/OUTPT VISIT, EST, LEVL V, 40-54 MIN: ICD-10-PCS | Mod: S$GLB,,, | Performed by: INTERNAL MEDICINE

## 2019-03-21 PROCEDURE — 1101F PT FALLS ASSESS-DOCD LE1/YR: CPT | Mod: CPTII,S$GLB,, | Performed by: INTERNAL MEDICINE

## 2019-03-21 NOTE — PROGRESS NOTES
Subjective:   Patient ID:  Erin Clark is a 83 y.o. female who presents for follow up of Coronary Artery Disease; s/p CABG; Hypertension; and Hyperlipidemia      HPI:         Erin Clark 83 y.o. female is here follow up and feeling well without any new complaints. She was diagnosed with 3 vessel CAD with an abnormal EF requiring surgical intervention. She had a CABG with 3 grafts without revascularization of RCA . Her EF post surgically normalized to 55%. PET scan thereafter in 2015 and 2015 revealed no ischemia. She is on HD on . HD via L AV shunt. She has PAD without claudication.         ECG 3/2019: NSR with anterior infarct      LUE AVF with aneurysmal changes but no issues with HD.         Patient Active Problem List    Diagnosis Date Noted    Hypoxia 2019    Chest pain     Cough     Hypervolemia     Pneumonia of both lungs due to infectious organism     Hemodialysis AV fistula aneurysm 2017    ESRD (end stage renal disease) 12/10/2014    Hyperlipidemia LDL goal <70 12/10/2014    Dyspnea 2014    S/P CABG x 1 2014    Anemia 2014    Type 2 diabetes mellitus 02/10/2014    Peripheral arterial occlusive disease 02/10/2014    Hypertension 02/10/2014    CAD (coronary artery disease) 02/10/2014     S/p CABG 14 LIMA-D1, SVG-LAD, SVG-OM1 for NSTEMI with non revascularized RCA , normalized EF      Chronic diastolic congestive heart failure 02/10/2014           Right Arm BP - Sittin/60  Reason for not completing BP on both arms: AV shunt        LABS    LAST HbA1c  Lab Results   Component Value Date    HGBA1C 5.7 (H) 2019       Lipid panel  Lab Results   Component Value Date    CHOL 223 (A) 2018    CHOL 154 2015    CHOL 184 2014     Lab Results   Component Value Date    HDL 52 2018    HDL 66 2015    HDL 36 (L) 2014     Lab Results   Component Value Date    LDLCALC 120 (A) 2018    LDLCALC 60.4  (L) 12/08/2015    LDLCALC 98.8 02/11/2014     Lab Results   Component Value Date    TRIG 356 (A) 09/19/2018    TRIG 138 12/08/2015    TRIG 246 (H) 02/11/2014     Lab Results   Component Value Date    CHOLHDL 42.9 12/08/2015    CHOLHDL 19.6 (L) 02/11/2014    CHOLHDL 19.8 (L) 02/10/2014            Review of Systems   Constitution: Negative for diaphoresis, weakness, night sweats, weight gain and weight loss.   HENT: Negative for congestion.    Eyes: Negative for blurred vision, discharge and double vision.   Cardiovascular: Negative for chest pain, claudication, cyanosis, dyspnea on exertion, irregular heartbeat, leg swelling, near-syncope, orthopnea, palpitations, paroxysmal nocturnal dyspnea and syncope.   Respiratory: Negative for cough, shortness of breath and wheezing.    Endocrine: Negative for cold intolerance, heat intolerance and polyphagia.   Hematologic/Lymphatic: Negative for adenopathy and bleeding problem. Does not bruise/bleed easily.   Skin: Negative for dry skin and nail changes.   Musculoskeletal: Negative for arthritis, back pain, falls, joint pain, myalgias and neck pain.   Gastrointestinal: Negative for bloating, abdominal pain, change in bowel habit and constipation.   Genitourinary: Negative for bladder incontinence, dysuria, flank pain, genital sores and missed menses.   Neurological: Negative for aphonia, brief paralysis, difficulty with concentration and dizziness.   Psychiatric/Behavioral: Negative for altered mental status and memory loss. The patient does not have insomnia.    Allergic/Immunologic: Negative for environmental allergies.       Objective:   Physical Exam   Constitutional: She is oriented to person, place, and time. She appears well-developed and well-nourished. She is not intubated.   HENT:   Head: Normocephalic and atraumatic.   Right Ear: External ear normal.   Left Ear: External ear normal.   Mouth/Throat: Oropharynx is clear and moist.   Eyes: Conjunctivae and EOM are  normal. Pupils are equal, round, and reactive to light. Right eye exhibits no discharge. Left eye exhibits no discharge. No scleral icterus.   Neck: Normal range of motion. Neck supple. Normal carotid pulses, no hepatojugular reflux and no JVD present. Carotid bruit is not present. No tracheal deviation present. No thyromegaly present.   Cardiovascular: Normal rate, regular rhythm, S1 normal and S2 normal.  No extrasystoles are present. PMI is not displaced. Exam reveals no gallop, no S3, no distant heart sounds, no friction rub and no midsystolic click.   Murmur heard.   Systolic murmur is present at the upper right sternal border.  Pulses:       Carotid pulses are 2+ on the right side, and 2+ on the left side.       Radial pulses are 2+ on the right side, and 2+ on the left side.        Femoral pulses are 2+ on the right side, and 2+ on the left side.       Popliteal pulses are 2+ on the right side, and 2+ on the left side.        Dorsalis pedis pulses are 1+ on the right side, and 1+ on the left side.        Posterior tibial pulses are 1+ on the right side, and 1+ on the left side.       LUE AVF with palpable thrill    Pulmonary/Chest: Effort normal and breath sounds normal. No accessory muscle usage or stridor. No apnea, no tachypnea and no bradypnea. She is not intubated. No respiratory distress. She has no decreased breath sounds. She has no wheezes. She has no rales. She exhibits no tenderness and no bony tenderness.   Abdominal: She exhibits no distension, no pulsatile liver, no abdominal bruit, no ascites, no pulsatile midline mass and no mass. There is no tenderness. There is no rebound and no guarding.   Musculoskeletal: Normal range of motion. She exhibits no edema or tenderness.   Lymphadenopathy:     She has no cervical adenopathy.   Neurological: She is alert and oriented to person, place, and time. She has normal reflexes. No cranial nerve deficit. Coordination normal.   Skin: Skin is warm. No rash  noted. No erythema. No pallor.   Psychiatric: She has a normal mood and affect. Her behavior is normal. Judgment and thought content normal.       Assessment:     1. Coronary artery disease without angina pectoris, unspecified vessel or lesion type, unspecified whether native or transplanted heart    2. Chronic diastolic congestive heart failure    3. Hyperlipidemia LDL goal <70    4. ESRD (end stage renal disease)    5. Type 2 diabetes mellitus with stage 3 chronic kidney disease, with long-term current use of insulin    6. Peripheral arterial occlusive disease    7. Essential hypertension        Plan:         If interested we can do fistula evaluation with CO2  To assess and treat any central venous stenosis then deal with aneurysmal changes  As of now she does not have any issues with HD        Continue with current medical plan and lifestyle changes.  Return sooner for concerns or questions. If symptoms persist go to the ED  I have reviewed all pertinent data on this patient         Medical therapy for CAD and PAD  Encouraged compliance with medications            Follow up as scheduled. Return sooner for concerns or questions  Follow up yearly          She and her son expressed verbal understanding and agreed with the plan        I have reviewed the patient's medical history in detail and updated the computerized patient record.    Orders Placed This Encounter   Procedures    Lipid panel     Standing Status:   Future     Standing Expiration Date:   5/19/2020    IN OFFICE EKG 12-LEAD (to Muse)     Order Specific Question:   Diagnosis     Answer:   CAD (coronary artery disease) [655125]       Follow up as scheduled. Return sooner for concerns or questions              Patient's Medications   New Prescriptions    LEVOFLOXACIN (LEVAQUIN) 500 MG TABLET    Take 1 tablet (500 mg total) by mouth every 48 hours. for 4 doses    VITAMIN RENAL FORMULA, B-COMPLEX-VITAMIN C-FOLIC ACID, (NEPHROCAP) 1 MG CAP    Take 1  capsule by mouth once daily.   Previous Medications    AMLODIPINE (NORVASC) 10 MG TABLET    TAKE ONE TABLET BY MOUTH ONCE DAILY    ASPIRIN (ECOTRIN) 81 MG EC TABLET    TAKE ONE TABLET BY MOUTH ONCE DAILY    ATORVASTATIN (LIPITOR) 40 MG TABLET    Take 1 tablet (40 mg total) by mouth once daily.    CARVEDILOL (COREG) 25 MG TABLET    TAKE ONE TABLET BY MOUTH TWICE DAILY    ERGOCALCIFEROL (ERGOCALCIFEROL) 50,000 UNIT CAP    Take 1 capsule (50,000 Units total) by mouth every 7 days.    FAMOTIDINE (PEPCID) 20 MG TABLET    Take 1 tablet (20 mg total) by mouth once daily.    FUROSEMIDE (LASIX) 40 MG TABLET    TAKE ONE TABLET BY MOUTH ONCE DAILY    ISOSORBIDE MONONITRATE (IMDUR) 120 MG 24 HR TABLET    TAKE ONE TABLET BY MOUTH ONCE DAILY    NITROGLYCERIN (NITROSTAT) 0.4 MG SL TABLET    Place 1 tablet (0.4 mg total) under the tongue every 5 (five) minutes as needed for Chest pain.   Modified Medications    No medications on file   Discontinued Medications    No medications on file

## 2019-04-05 ENCOUNTER — HOSPITAL ENCOUNTER (INPATIENT)
Facility: HOSPITAL | Age: 84
LOS: 1 days | Discharge: HOME | DRG: 189 | End: 2019-04-06
Attending: EMERGENCY MEDICINE | Admitting: INTERNAL MEDICINE
Payer: MEDICARE

## 2019-04-05 DIAGNOSIS — E87.70 HYPERVOLEMIA, UNSPECIFIED HYPERVOLEMIA TYPE: ICD-10-CM

## 2019-04-05 DIAGNOSIS — R09.02 HYPOXIA: Primary | ICD-10-CM

## 2019-04-05 DIAGNOSIS — R06.02 SOB (SHORTNESS OF BREATH): ICD-10-CM

## 2019-04-05 DIAGNOSIS — J18.9 PNEUMONIA OF BOTH LUNGS DUE TO INFECTIOUS ORGANISM, UNSPECIFIED PART OF LUNG: ICD-10-CM

## 2019-04-05 DIAGNOSIS — N18.6 ANEMIA DUE TO CHRONIC KIDNEY DISEASE, ON CHRONIC DIALYSIS: ICD-10-CM

## 2019-04-05 DIAGNOSIS — Z95.1 S/P CABG X 1: ICD-10-CM

## 2019-04-05 DIAGNOSIS — N18.6 ESRD (END STAGE RENAL DISEASE): ICD-10-CM

## 2019-04-05 DIAGNOSIS — R07.9 CHEST PAIN: ICD-10-CM

## 2019-04-05 DIAGNOSIS — R05.9 COUGH: ICD-10-CM

## 2019-04-05 DIAGNOSIS — R07.9 CHEST PAIN, UNSPECIFIED TYPE: ICD-10-CM

## 2019-04-05 DIAGNOSIS — D63.1 ANEMIA DUE TO CHRONIC KIDNEY DISEASE, ON CHRONIC DIALYSIS: ICD-10-CM

## 2019-04-05 DIAGNOSIS — Z99.2 ANEMIA DUE TO CHRONIC KIDNEY DISEASE, ON CHRONIC DIALYSIS: ICD-10-CM

## 2019-04-05 LAB
ALBUMIN SERPL BCP-MCNC: 3.8 G/DL (ref 3.5–5.2)
ALP SERPL-CCNC: 94 U/L (ref 55–135)
ALT SERPL W/O P-5'-P-CCNC: 11 U/L (ref 10–44)
ANION GAP SERPL CALC-SCNC: 14 MMOL/L (ref 8–16)
AORTIC ROOT ANNULUS: 3.1 CM
AST SERPL-CCNC: 18 U/L (ref 10–40)
AV INDEX (PROSTH): 0.64
AV MEAN GRADIENT: 8.49 MMHG
AV PEAK GRADIENT: 12.67 MMHG
AV VALVE AREA: 1.41 CM2
AV VELOCITY RATIO: 0.7
BACTERIA #/AREA URNS HPF: ABNORMAL /HPF
BASOPHILS # BLD AUTO: 0.02 K/UL (ref 0–0.2)
BASOPHILS NFR BLD: 0.1 % (ref 0–1.9)
BILIRUB SERPL-MCNC: 1.4 MG/DL (ref 0.1–1)
BILIRUB UR QL STRIP: NEGATIVE
BNP SERPL-MCNC: 1204 PG/ML (ref 0–99)
BSA FOR ECHO PROCEDURE: 1.56 M2
BUN SERPL-MCNC: 29 MG/DL (ref 8–23)
CALCIUM SERPL-MCNC: 9.1 MG/DL (ref 8.7–10.5)
CHLORIDE SERPL-SCNC: 100 MMOL/L (ref 95–110)
CLARITY UR: CLEAR
CO2 SERPL-SCNC: 23 MMOL/L (ref 23–29)
COLOR UR: YELLOW
CREAT SERPL-MCNC: 2.3 MG/DL (ref 0.5–1.4)
CV ECHO LV RWT: 0.37 CM
DIFFERENTIAL METHOD: ABNORMAL
DOP CALC AO PEAK VEL: 1.78 M/S
DOP CALC AO VTI: 47.45 CM
DOP CALC LVOT AREA: 2.19 CM2
DOP CALC LVOT DIAMETER: 1.67 CM
DOP CALC LVOT PEAK VEL: 1.24 M/S
DOP CALC LVOT STROKE VOLUME: 66.77 CM3
DOP CALCLVOT PEAK VEL VTI: 30.5 CM
E WAVE DECELERATION TIME: 186.2 MSEC
E/A RATIO: 1.4
ECHO LV POSTERIOR WALL: 0.89 CM (ref 0.6–1.1)
EOSINOPHIL # BLD AUTO: 0.2 K/UL (ref 0–0.5)
EOSINOPHIL NFR BLD: 1.4 % (ref 0–8)
ERYTHROCYTE [DISTWIDTH] IN BLOOD BY AUTOMATED COUNT: 13.3 % (ref 11.5–14.5)
EST. GFR  (AFRICAN AMERICAN): 22 ML/MIN/1.73 M^2
EST. GFR  (NON AFRICAN AMERICAN): 19 ML/MIN/1.73 M^2
ESTIMATED AVG GLUCOSE: 117 MG/DL (ref 68–131)
FERRITIN SERPL-MCNC: 2053 NG/ML (ref 20–300)
FOLATE SERPL-MCNC: 19.7 NG/ML (ref 4–24)
FRACTIONAL SHORTENING: 36 % (ref 28–44)
GLUCOSE SERPL-MCNC: 230 MG/DL (ref 70–110)
GLUCOSE UR QL STRIP: NEGATIVE
HBA1C MFR BLD HPLC: 5.7 % (ref 4–5.6)
HCT VFR BLD AUTO: 31.8 % (ref 37–48.5)
HGB BLD-MCNC: 10.4 G/DL (ref 12–16)
HGB UR QL STRIP: NEGATIVE
HYALINE CASTS #/AREA URNS LPF: 0 /LPF
INTERVENTRICULAR SEPTUM: 0.9 CM (ref 0.6–1.1)
IRON SERPL-MCNC: 22 UG/DL (ref 30–160)
KETONES UR QL STRIP: NEGATIVE
LA MAJOR: 4.89 CM
LA MINOR: 4.56 CM
LA WIDTH: 3.51 CM
LACTATE SERPL-SCNC: 1.5 MMOL/L (ref 0.5–2.2)
LEFT ATRIUM SIZE: 4.42 CM
LEFT ATRIUM VOLUME INDEX: 39.7 ML/M2
LEFT ATRIUM VOLUME: 62.23 CM3
LEFT INTERNAL DIMENSION IN SYSTOLE: 3.09 CM (ref 2.1–4)
LEFT VENTRICLE DIASTOLIC VOLUME INDEX: 70.18 ML/M2
LEFT VENTRICLE DIASTOLIC VOLUME: 110.09 ML
LEFT VENTRICLE MASS INDEX: 95.1 G/M2
LEFT VENTRICLE SYSTOLIC VOLUME INDEX: 24 ML/M2
LEFT VENTRICLE SYSTOLIC VOLUME: 37.63 ML
LEFT VENTRICULAR INTERNAL DIMENSION IN DIASTOLE: 4.85 CM (ref 3.5–6)
LEFT VENTRICULAR MASS: 149.25 G
LEUKOCYTE ESTERASE UR QL STRIP: ABNORMAL
LV LATERAL E/E' RATIO: 26
LYMPHOCYTES # BLD AUTO: 1.1 K/UL (ref 1–4.8)
LYMPHOCYTES NFR BLD: 6.6 % (ref 18–48)
MAGNESIUM SERPL-MCNC: 1.9 MG/DL (ref 1.6–2.6)
MCH RBC QN AUTO: 29.6 PG (ref 27–31)
MCHC RBC AUTO-ENTMCNC: 32.7 G/DL (ref 32–36)
MCV RBC AUTO: 91 FL (ref 82–98)
MICROSCOPIC COMMENT: ABNORMAL
MONOCYTES # BLD AUTO: 0.9 K/UL (ref 0.3–1)
MONOCYTES NFR BLD: 5.8 % (ref 4–15)
MV PEAK A VEL: 1.3 M/S
MV PEAK E VEL: 1.82 M/S
NEUTROPHILS # BLD AUTO: 13.8 K/UL (ref 1.8–7.7)
NEUTROPHILS NFR BLD: 85.8 % (ref 38–73)
NITRITE UR QL STRIP: POSITIVE
PH UR STRIP: 7 [PH] (ref 5–8)
PHOSPHATE SERPL-MCNC: 3.3 MG/DL (ref 2.7–4.5)
PISA TR MAX VEL: 3.36 M/S
PLATELET # BLD AUTO: 311 K/UL (ref 150–350)
PMV BLD AUTO: 8.8 FL (ref 9.2–12.9)
POCT GLUCOSE: 168 MG/DL (ref 70–110)
POCT GLUCOSE: 77 MG/DL (ref 70–110)
POTASSIUM SERPL-SCNC: 3.8 MMOL/L (ref 3.5–5.1)
PROCALCITONIN SERPL IA-MCNC: 0.04 NG/ML
PROT SERPL-MCNC: 7.6 G/DL (ref 6–8.4)
PROT UR QL STRIP: ABNORMAL
PULM VEIN S/D RATIO: 0.47
PV PEAK D VEL: 0.75 M/S
PV PEAK S VEL: 0.35 M/S
RA MAJOR: 4.25 CM
RA PRESSURE: 8 MMHG
RBC # BLD AUTO: 3.51 M/UL (ref 4–5.4)
RBC #/AREA URNS HPF: 0 /HPF (ref 0–4)
RIGHT VENTRICULAR END-DIASTOLIC DIMENSION: 2.64 CM
SATURATED IRON: 10 % (ref 20–50)
SODIUM SERPL-SCNC: 137 MMOL/L (ref 136–145)
SP GR UR STRIP: 1.01 (ref 1–1.03)
SQUAMOUS #/AREA URNS HPF: 0 /HPF
TDI LATERAL: 0.07
TOTAL IRON BINDING CAPACITY: 221 UG/DL (ref 250–450)
TR MAX PG: 45.16 MMHG
TRANSFERRIN SERPL-MCNC: 149 MG/DL (ref 200–375)
TROPONIN I SERPL DL<=0.01 NG/ML-MCNC: 0.03 NG/ML (ref 0–0.03)
TROPONIN I SERPL DL<=0.01 NG/ML-MCNC: 0.08 NG/ML (ref 0–0.03)
TROPONIN I SERPL DL<=0.01 NG/ML-MCNC: 0.33 NG/ML (ref 0–0.03)
TROPONIN I SERPL DL<=0.01 NG/ML-MCNC: 0.37 NG/ML (ref 0–0.03)
TSH SERPL DL<=0.005 MIU/L-ACNC: 1.92 UIU/ML (ref 0.4–4)
TV REST PULMONARY ARTERY PRESSURE: 53 MMHG
URN SPEC COLLECT METH UR: ABNORMAL
UROBILINOGEN UR STRIP-ACNC: 1 EU/DL
VIT B12 SERPL-MCNC: 1316 PG/ML (ref 210–950)
WBC # BLD AUTO: 16.12 K/UL (ref 3.9–12.7)
WBC #/AREA URNS HPF: 7 /HPF (ref 0–5)
YEAST URNS QL MICRO: ABNORMAL

## 2019-04-05 PROCEDURE — 11000001 HC ACUTE MED/SURG PRIVATE ROOM

## 2019-04-05 PROCEDURE — 63600175 PHARM REV CODE 636 W HCPCS: Performed by: INTERNAL MEDICINE

## 2019-04-05 PROCEDURE — 87899 AGENT NOS ASSAY W/OPTIC: CPT

## 2019-04-05 PROCEDURE — 93005 ELECTROCARDIOGRAM TRACING: CPT

## 2019-04-05 PROCEDURE — 83540 ASSAY OF IRON: CPT

## 2019-04-05 PROCEDURE — 80053 COMPREHEN METABOLIC PANEL: CPT

## 2019-04-05 PROCEDURE — 84484 ASSAY OF TROPONIN QUANT: CPT | Mod: 91

## 2019-04-05 PROCEDURE — 25000003 PHARM REV CODE 250: Performed by: INTERNAL MEDICINE

## 2019-04-05 PROCEDURE — 87449 NOS EACH ORGANISM AG IA: CPT

## 2019-04-05 PROCEDURE — 36415 COLL VENOUS BLD VENIPUNCTURE: CPT

## 2019-04-05 PROCEDURE — 81000 URINALYSIS NONAUTO W/SCOPE: CPT

## 2019-04-05 PROCEDURE — 96365 THER/PROPH/DIAG IV INF INIT: CPT

## 2019-04-05 PROCEDURE — 82728 ASSAY OF FERRITIN: CPT

## 2019-04-05 PROCEDURE — 25000003 PHARM REV CODE 250: Performed by: STUDENT IN AN ORGANIZED HEALTH CARE EDUCATION/TRAINING PROGRAM

## 2019-04-05 PROCEDURE — 82607 VITAMIN B-12: CPT

## 2019-04-05 PROCEDURE — 96367 TX/PROPH/DG ADDL SEQ IV INF: CPT

## 2019-04-05 PROCEDURE — 96376 TX/PRO/DX INJ SAME DRUG ADON: CPT

## 2019-04-05 PROCEDURE — 83036 HEMOGLOBIN GLYCOSYLATED A1C: CPT

## 2019-04-05 PROCEDURE — 80100016 HC MAINTENANCE HEMODIALYSIS

## 2019-04-05 PROCEDURE — 99285 EMERGENCY DEPT VISIT HI MDM: CPT | Mod: 25

## 2019-04-05 PROCEDURE — 87040 BLOOD CULTURE FOR BACTERIA: CPT

## 2019-04-05 PROCEDURE — 83605 ASSAY OF LACTIC ACID: CPT

## 2019-04-05 PROCEDURE — 84100 ASSAY OF PHOSPHORUS: CPT

## 2019-04-05 PROCEDURE — 96375 TX/PRO/DX INJ NEW DRUG ADDON: CPT

## 2019-04-05 PROCEDURE — 84443 ASSAY THYROID STIM HORMONE: CPT

## 2019-04-05 PROCEDURE — 83735 ASSAY OF MAGNESIUM: CPT

## 2019-04-05 PROCEDURE — 94761 N-INVAS EAR/PLS OXIMETRY MLT: CPT

## 2019-04-05 PROCEDURE — 27000221 HC OXYGEN, UP TO 24 HOURS

## 2019-04-05 PROCEDURE — 84484 ASSAY OF TROPONIN QUANT: CPT

## 2019-04-05 PROCEDURE — 82746 ASSAY OF FOLIC ACID SERUM: CPT

## 2019-04-05 PROCEDURE — 83880 ASSAY OF NATRIURETIC PEPTIDE: CPT

## 2019-04-05 PROCEDURE — 25000003 PHARM REV CODE 250: Performed by: EMERGENCY MEDICINE

## 2019-04-05 PROCEDURE — 63600175 PHARM REV CODE 636 W HCPCS: Performed by: EMERGENCY MEDICINE

## 2019-04-05 PROCEDURE — 84145 PROCALCITONIN (PCT): CPT

## 2019-04-05 PROCEDURE — 85025 COMPLETE CBC W/AUTO DIFF WBC: CPT

## 2019-04-05 RX ORDER — ASPIRIN 81 MG/1
81 TABLET ORAL DAILY
Status: DISCONTINUED | OUTPATIENT
Start: 2019-04-05 | End: 2019-04-06 | Stop reason: HOSPADM

## 2019-04-05 RX ORDER — SODIUM CHLORIDE 9 MG/ML
INJECTION, SOLUTION INTRAVENOUS ONCE
Status: DISCONTINUED | OUTPATIENT
Start: 2019-04-05 | End: 2019-04-06 | Stop reason: HOSPADM

## 2019-04-05 RX ORDER — SODIUM CHLORIDE 9 MG/ML
INJECTION, SOLUTION INTRAVENOUS
Status: DISCONTINUED | OUTPATIENT
Start: 2019-04-05 | End: 2019-04-05 | Stop reason: SDUPTHER

## 2019-04-05 RX ORDER — GLUCAGON 1 MG
1 KIT INJECTION
Status: DISCONTINUED | OUTPATIENT
Start: 2019-04-05 | End: 2019-04-06 | Stop reason: HOSPADM

## 2019-04-05 RX ORDER — ISOSORBIDE MONONITRATE 60 MG/1
120 TABLET, EXTENDED RELEASE ORAL DAILY
Status: DISCONTINUED | OUTPATIENT
Start: 2019-04-05 | End: 2019-04-06 | Stop reason: HOSPADM

## 2019-04-05 RX ORDER — IBUPROFEN 200 MG
24 TABLET ORAL
Status: DISCONTINUED | OUTPATIENT
Start: 2019-04-05 | End: 2019-04-06 | Stop reason: HOSPADM

## 2019-04-05 RX ORDER — SODIUM CHLORIDE 0.9 % (FLUSH) 0.9 %
10 SYRINGE (ML) INJECTION
Status: DISCONTINUED | OUTPATIENT
Start: 2019-04-05 | End: 2019-04-06 | Stop reason: HOSPADM

## 2019-04-05 RX ORDER — FUROSEMIDE 10 MG/ML
80 INJECTION INTRAMUSCULAR; INTRAVENOUS 2 TIMES DAILY
Status: DISCONTINUED | OUTPATIENT
Start: 2019-04-05 | End: 2019-04-06 | Stop reason: HOSPADM

## 2019-04-05 RX ORDER — DEXTROSE 50 % IN WATER (D50W) INTRAVENOUS SYRINGE
12.5
Status: DISCONTINUED | OUTPATIENT
Start: 2019-04-05 | End: 2019-04-06 | Stop reason: HOSPADM

## 2019-04-05 RX ORDER — LEVOFLOXACIN 5 MG/ML
750 INJECTION, SOLUTION INTRAVENOUS
Status: DISCONTINUED | OUTPATIENT
Start: 2019-04-05 | End: 2019-04-05

## 2019-04-05 RX ORDER — OSELTAMIVIR PHOSPHATE 30 MG/1
30 CAPSULE ORAL ONCE
Status: COMPLETED | OUTPATIENT
Start: 2019-04-05 | End: 2019-04-05

## 2019-04-05 RX ORDER — CODEINE PHOSPHATE AND GUAIFENESIN 10; 100 MG/5ML; MG/5ML
5 SOLUTION ORAL EVERY 4 HOURS PRN
Status: DISCONTINUED | OUTPATIENT
Start: 2019-04-05 | End: 2019-04-06 | Stop reason: HOSPADM

## 2019-04-05 RX ORDER — FUROSEMIDE 10 MG/ML
60 INJECTION INTRAMUSCULAR; INTRAVENOUS
Status: COMPLETED | OUTPATIENT
Start: 2019-04-05 | End: 2019-04-05

## 2019-04-05 RX ORDER — ALBUTEROL SULFATE 2.5 MG/.5ML
2.5 SOLUTION RESPIRATORY (INHALATION) EVERY 4 HOURS PRN
Status: DISCONTINUED | OUTPATIENT
Start: 2019-04-05 | End: 2019-04-06 | Stop reason: HOSPADM

## 2019-04-05 RX ORDER — IBUPROFEN 200 MG
16 TABLET ORAL
Status: DISCONTINUED | OUTPATIENT
Start: 2019-04-05 | End: 2019-04-06 | Stop reason: HOSPADM

## 2019-04-05 RX ORDER — HEPARIN SODIUM 5000 [USP'U]/ML
5000 INJECTION, SOLUTION INTRAVENOUS; SUBCUTANEOUS EVERY 12 HOURS
Status: DISCONTINUED | OUTPATIENT
Start: 2019-04-05 | End: 2019-04-06 | Stop reason: HOSPADM

## 2019-04-05 RX ORDER — CARVEDILOL 25 MG/1
25 TABLET ORAL 2 TIMES DAILY
Status: DISCONTINUED | OUTPATIENT
Start: 2019-04-05 | End: 2019-04-06 | Stop reason: HOSPADM

## 2019-04-05 RX ORDER — AMLODIPINE BESYLATE 5 MG/1
10 TABLET ORAL DAILY
Status: DISCONTINUED | OUTPATIENT
Start: 2019-04-05 | End: 2019-04-06 | Stop reason: HOSPADM

## 2019-04-05 RX ORDER — ATORVASTATIN CALCIUM 40 MG/1
40 TABLET, FILM COATED ORAL DAILY
Status: DISCONTINUED | OUTPATIENT
Start: 2019-04-05 | End: 2019-04-06 | Stop reason: HOSPADM

## 2019-04-05 RX ORDER — DEXTROSE 50 % IN WATER (D50W) INTRAVENOUS SYRINGE
25
Status: DISCONTINUED | OUTPATIENT
Start: 2019-04-05 | End: 2019-04-06 | Stop reason: HOSPADM

## 2019-04-05 RX ADMIN — CEFTRIAXONE 1 G: 1 INJECTION, SOLUTION INTRAVENOUS at 04:04

## 2019-04-05 RX ADMIN — HEPARIN SODIUM 5000 UNITS: 5000 INJECTION, SOLUTION INTRAVENOUS; SUBCUTANEOUS at 08:04

## 2019-04-05 RX ADMIN — FUROSEMIDE 80 MG: 10 INJECTION, SOLUTION INTRAVENOUS at 08:04

## 2019-04-05 RX ADMIN — FUROSEMIDE 60 MG: 10 INJECTION, SOLUTION INTRAMUSCULAR; INTRAVENOUS at 04:04

## 2019-04-05 RX ADMIN — OSELTAMIVIR PHOSPHATE 30 MG: 30 CAPSULE ORAL at 04:04

## 2019-04-05 RX ADMIN — AZITHROMYCIN MONOHYDRATE 500 MG: 500 INJECTION, POWDER, LYOPHILIZED, FOR SOLUTION INTRAVENOUS at 05:04

## 2019-04-05 RX ADMIN — NITROGLYCERIN 1 INCH: 20 OINTMENT TOPICAL at 02:04

## 2019-04-05 RX ADMIN — FUROSEMIDE 80 MG: 10 INJECTION, SOLUTION INTRAVENOUS at 07:04

## 2019-04-05 RX ADMIN — CARVEDILOL 25 MG: 25 TABLET, FILM COATED ORAL at 08:04

## 2019-04-05 RX ADMIN — GUAIFENESIN AND CODEINE PHOSPHATE 5 ML: 100; 10 SOLUTION ORAL at 01:04

## 2019-04-05 NOTE — ED NOTES
Spoke to Dr. Mercer about pt's chest pain. MD ordered to give Imdur and hold other morning meds until after dialysis.

## 2019-04-05 NOTE — H&P
"[facility] Medicine H&P Note     Admitting Team: [facility] Hospitalist Team B  Attending Physician: Yasmine Kidd MD  Resident: Sylvie  Intern: Kit     Date of Admit: 4/5/2019    Chief Complaint     SOB and chest pain for 4 hours    Subjective:      History of Present Illness:  Erin Clark is a 83 year old female, with a PMHx of CAD (3v CABG in 2014), as well as ESRD (HD MF), HTN, HLD, and GERD, who presented to [facility] ED on 4/5/2019 with the primary complaint of shortness of breath and chest pain for 4 hours.    Ms. Clark was in her USOH - lives with , ambulates without assistance, independent ADLs, can move around the house without dyspnea - until 11 days ago when patient began experiencing a persistent dry cough. The cough progressively worsened until night PTA when she also began to experience an "aching" left-sided chest pain associated with shortness of breath. She denies any radiation to shoulders, back, arm, or jaw. There was no associated nausea, diaphoresis, or dizziness. The pain was constant and patient could not catch her breath so she reported to ED. Sometime before arrival, her chest pain spontaneously resolved without medication but she remained dyspneic.    The cough has remained non-productive. She denies any fevers, chills, N/V, abdominal pain, focal weakness or numbness, palpitations, orthopnea or LE swelling. She reports a "cold-like" illness in the family recently, but she denies any sore throat, rhinorrhea, or myalgias. She has been compliant with her prescribed medication and HD. She was last dialyzed on Monday, 4/1.     Past Medical History:  Past Medical History:   Diagnosis Date    CHF (congestive heart failure)     Coronary artery disease     Diabetes mellitus     Encounter for blood transfusion     ESRD (end stage renal disease) 03/2014    dialysis M-F    GERD (gastroesophageal reflux disease)     High cholesterol     Hypertension        Past Surgical " History:  Past Surgical History:   Procedure Laterality Date    AV FISTULA PLACEMENT Left 9/2014    bilateral fem-pop      CENTRAL VENOUS CATHETER TUNNELED INSERTION DOUBLE LUMEN Right 2/2014    [id number]-EM Left 8/12/2014    Per[facility] at [facility] OR    CORONARY ARTERY BYPASS GRAFT  2/14/2014     x 3 vessels    CORONARY ARTERY BYPASS GRAFT (CABG) N/A 2/14/2014    Performed by Sergio Odonnell III, MD at [facility] OR    INSERTION-CATHETER-PERM-A-CATH N/A 4/21/2014    Performed by George Wesley MD at [facility] OR    REMOVAL-CATHETER Right 10/23/2014    Performed by [facility]is Mary [facility] at [facility] OR    VASCULAR SURGERY         Allergies:  Review of patient's allergies indicates:   Allergen Reactions    Aspirin Nausea Only and Swelling     Able to tolerated in small doses         Home Medications:  Prior to Admission medications    Medication Sig Start Date End Date Taking? Authorizing Provider   amLODIPine (NORVASC) 10 MG tablet TAKE ONE TABLET BY MOUTH ONCE DAILY 6/14/18   Eulogio Ware MD   aspirin (ECOTRIN) 81 MG EC tablet TAKE ONE TABLET BY MOUTH ONCE DAILY 4/2/15   AUBREE Moreno ANP   atorvastatin (LIPITOR) 40 MG tablet Take 1 tablet (40 mg total) by mouth once daily. 3/23/17   Eulogio Ware MD   carvedilol (COREG) 25 MG tablet TAKE ONE TABLET BY MOUTH TWICE DAILY 5/10/18   Eulogio Ware MD   ergocalciferol (ERGOCALCIFEROL) 50,000 unit Cap Take 1 capsule (50,000 Units total) by mouth every 7 days. 3/7/14   AUBREE Moreno ANP   famotidine (PEPCID) 20 MG tablet Take 1 tablet (20 mg total) by mouth once daily. 3/7/14 3/6/19  AUBREE Moreno ANP   furosemide (LASIX) 40 MG tablet TAKE ONE TABLET BY MOUTH ONCE DAILY 2/23/18   Eulogio Ware MD   isosorbide mononitrate (IMDUR) 120 MG 24 hr tablet TAKE ONE TABLET BY MOUTH ONCE DAILY 4/19/18   Eulogio Ware MD   nitroGLYCERIN (NITROSTAT) 0.4 MG SL tablet Place 1 tablet (0.4 mg total) under the tongue  "every 5 (five) minutes as needed for Chest pain. 12/3/15 1/2/16  Eulogio Ware MD       Family History:  Family History   Problem Relation Age of Onset    Hypertension Mother     Heart disease Mother     Heart attack Mother        Social History:  Social History     Tobacco Use    Smoking status: Never Smoker    Smokeless tobacco: Never Used   Substance Use Topics    Alcohol use: No    Drug use: No       Review of Systems:  Pertinent items are noted in HPI. All other systems are reviewed and are negative.    Health Maintaince :   Primary Care Physician: Dr. Giang  Cardiologist Dr. Ware  Nephrologist Dr. Cohen    Immunizations:   TDap unsure  Flu UTD   Pna UTD    Cancer Screening:  PAP: Not UTD but scheduled  MMG: UTD  Colonoscopy: UTD     Objective:   Last 24 Hour Vital Signs:  BP  Min: 156/98  Max: 194/72  Temp  Av.8 °F (37.1 °C)  Min: 98.6 °F (37 °C)  Max: 98.9 °F (37.2 °C)  Pulse  Av.3  Min: 84  Max: 93  Resp  Av.2  Min: 26  Max: 38  SpO2  Av.5 %  Min: 86 %  Max: 98 %  Height  Av' 4" (162.6 cm)  Min: 5' 4" (162.6 cm)  Max: 5' 4" (162.6 cm)  Weight  Av kg (119 lb)  Min: 54 kg (119 lb)  Max: 54 kg (119 lb)  Body mass index is 20.43 kg/m².  No intake/output data recorded.    Physical Examination:  General: no acute distress, calm, cooperative, appears stated age  Head: normocephalic, atraumatic   Eyes: EOMI, PERRL, anicteric sclera  Ears, Nose, Throat: no rhinorrhea, oropharynx clear without erythema or exudate, moist/pale mucous membranes   Neck: supple, FROM without pain or stiffness, trachea midline, no thyromegaly, no carotid bruits, JVP ~ 10cm  Cardiovascular: regular rate, regular rhythm, audible S1/S2 without murmurs, rubs, or extra heart sounds, radial, DP, and PT pulses 1+ and symmetric  Pulmonary: normal work of breathing without accessory muscle use, symmetric chest rise, coarse inspiratory breath sounds in middle/lower lung fields, L>R, frequent " cough  Abdomen: soft, non-tender, non-distended, no rebound or guarding, normoactive BS  MSKL: full passive and active ROM  Lymphatic: no LAD appreciated  Skin: without cyanosis, clubbing, or edema   Neurologic: A&Ox4, no focal deficits, CNII-XII grossly intact, SILT throughout, 5/5 strength in BUE/BLE    Laboratory:  Most Recent Data:  CBC:   Lab Results   Component Value Date    WBC 16.12 (H) 04/05/2019    HGB 10.4 (L) 04/05/2019    HCT 31.8 (L) 04/05/2019     04/05/2019    MCV 91 04/05/2019    RDW 13.3 04/05/2019     WBC Differential: 85.8 % N, 0 % Bands, 6.6 % L, 5.8 % M, 1.4 % Eo, 0.1 % Baso    BMP:   Lab Results   Component Value Date     04/05/2019    K 3.8 04/05/2019     04/05/2019    CO2 23 04/05/2019    BUN 29 (H) 04/05/2019    CREATININE 2.3 (H) 04/05/2019     (H) 04/05/2019    CALCIUM 9.1 04/05/2019    MG 2.5 04/25/2014    PHOS 4.2 04/25/2014     LFTs:   Lab Results   Component Value Date    PROT 7.6 04/05/2019    ALBUMIN 3.8 04/05/2019    BILITOT 1.4 (H) 04/05/2019    AST 18 04/05/2019    ALKPHOS 94 04/05/2019    ALT 11 04/05/2019     Coags:   Lab Results   Component Value Date    INR 1.2 03/09/2014     FLP:   Lab Results   Component Value Date    CHOL 223 (A) 09/19/2018    HDL 52 09/19/2018    LDLCALC 120 (A) 09/19/2018    TRIG 356 (A) 09/19/2018    CHOLHDL 42.9 12/08/2015     DM:   Lab Results   Component Value Date    HGBA1C 6.1 (H) 03/06/2019    HGBA1C 5.7 07/15/2018    HGBA1C 6.6 (H) 02/13/2014    LDLCALC 120 (A) 09/19/2018    CREATININE 2.3 (H) 04/05/2019     Thyroid:   Lab Results   Component Value Date    TSH 2.539 03/01/2014     Anemia:   Lab Results   Component Value Date    IRON 34 03/20/2014    TIBC 207 (L) 03/20/2014    FERRITIN 552 (H) 03/20/2014    [id number] 644 03/07/2014    FOLATE 12.3 03/07/2014     Cardiac:   Lab Results   Component Value Date    TROPONINI 0.030 (H) 04/05/2019    BNP 1,204 (H) 04/05/2019     Urinalysis:   Lab Results   Component Value Date     LABURIN ESCHERICHIA COLI  10,000 - 49,999 cfu/ml 04/15/2014    COLORU Yellow 04/15/2014    SPECGRAV 1.015 04/15/2014    NITRITE Negative 04/15/2014    KETONESU Negative 04/15/2014    UROBILINOGEN Negative 04/15/2014    WBCUA 100 (H) 04/15/2014       Trended Lab Data:  Recent Labs   Lab 04/05/19  0237   WBC 16.12*   HGB 10.4*   HCT 31.8*      MCV 91   RDW 13.3      K 3.8      CO2 23   BUN 29*   CREATININE 2.3*   *   PROT 7.6   ALBUMIN 3.8   BILITOT 1.4*   AST 18   ALKPHOS 94   ALT 11       Trended Cardiac Data:  Recent Labs   Lab 04/05/19 0237   TROPONINI 0.030*   BNP 1,204*       Microbiology Data:  4/5   Bcx pending    Other Results:  EKG (my interpretation):   SR with 1st degree AV block  ST depression in inferior leads    Radiology:  Imaging Results          X-Ray Chest PA And Lateral (Final result)  Result time 04/05/19 03:28:04    Final result by Ladarius Flores MD (04/05/19 03:28:04)                 Impression:      Coarse prominent bilateral interstitial markings which appear increased in extent and conspicuity compared to prior examinations.  Additionally, there are new superimposed patchy alveolar opacities projecting over the right midlung zone and bilateral lung bases.  Findings are nonspecific although could relate to multifocal infection in the appropriate clinical settings.  Additional considerations would include interstitial edema.  Clinical correlation with patient history and symptoms is advised.  Continued imaging follow-up is recommended to ensure resolution.      Electronically signed by: Ladarius Flores MD  Date:    04/05/2019  Time:    03:28             Narrative:    EXAMINATION:  XR CHEST PA AND LATERAL    CLINICAL HISTORY:  Shortness of breath    TECHNIQUE:  PA and lateral views of the chest were performed.    COMPARISON:  Chest radiograph 01/22/2018, 03/13/2017    FINDINGS:  There is postsurgical change of prior median sternotomy.  Cardiomediastinal  "silhouette is mildly enlarged, similar to prior examination.  There is atherosclerotic calcification of the thoracic aorta.  Lungs demonstrate prominent bilateral interstitial markings which appear somewhat increased in extent in conspicuity compared to prior examinations.  There are additional patchy opacities projecting over the right midlung zone as well as the bilateral lung bases, right greater than left.  No large pleural effusion identified.  No evidence of pneumothorax.  The osseous structures demonstrate stable degenerative changes.                                 Assessment:     Erin Clark is a 83 female, with a PMHx of CAD (CABG 2014), ESRD (HD on MW), HTN, HLD, and GERD, who presented with worsening cough, SOB and chest pain for 4 hours PTA. Patient admitted with concern for possible pneumonia and fluid overload to be dialyzed and treated with empiric antibiotics.     Plan:     #Acute hypoxic respiratory failure 2/2 to PNA vs fluid overload  Patient presented to ED with several hours of worsening dry cough and shortness of breath. On arrival, SpO2 mid 80s on RA, which improved to mid 90s on 3L. CXR with prominent b/l interstitial markings with patchy opacities over bilateral lung bases.   - WBC 16k, LA WNL, procalcitonin 0.04, BNP 1204   - etiology likely mostly fluid overload with possible viral vs bacterial pneumonia  - given rocephin, azithromycin and lasix 60 mg IV in ED  - admit to floor, continue with CAP coverage with lasix 80 mg IV daily   - f/u blood cultures  - nephrology consulted for possible HD while IP today  - wean supplemental O2 as tolerated  - no recent ECHO in chart  TTE ordered for today    #Atypical chest pain  Constant left sided "achey" chest pain that began at minimal exertion and spontaneously resolved.  - ECG with SR and 1st AV block and ST depressions in inferior leads, will trend  - Trop 0.03, will trend x1, but likely due to ESRD  - admit to tele    #CKD5 on HD M&F  -ESRD " since 2014, receives HD on Monday and Friday  -Cr 2.3 BUN 29 which is baseline  -K+ 3.8 on admit  -AV fistula with palpable thrill  -nephrology consulted    #CAD  3 vessel CABG in 2014  - Follows with Dr. Ware  - Continue lipitor 40 mg, ASA 81 mg    #HTN  SBP 190s on arrival  - Low suspicion for possible sepsis, as picture more c/w volume overload  - resume home meds Norvasc, coreg, and imdur    #HLD  Lipid panel ordered for AM  - cont lipitor as above    #GERD  No acute issues  - cont home pepcid    Glucose intolerance  A1c from 1 month ago 6.1  - SSI while IP  - needs better glucose control s/p discharge    Normocytic anemia  H/H 10.4/31.8 MCV 91  - Likely AoCI and ESRD  - Iron profile, ferritin, B12 and folate pending    #HCM  - f/u lipids, TSH, Anemia panel, A1c    Code: full  PPX: SQH  Diet: Renal    Dispo: Home pending improvement in respiratory status and infectious workup.      Abimael Cortes MD  [facility] Internal Medicine HO-1    [facility] Medicine Hospitalist Pager numbers:   [facility] Hospitalist Medicine Team A (Marti/Bruno): [phone]  [facility] Hospitalist Medicine Team B (Davion/Areli):  [phone]

## 2019-04-05 NOTE — ED TRIAGE NOTES
Patient presents to the ED with complaints of a mostly dry cough for 11 days, SOB and left sided chest pain that started tonight. Patient is a dialysis patient. Her schedule is M and  and she is compliant with sessions. Family members have recently been sick. Patient denies N/V/D or fever at home. Patient was told she is on some medications that could be making her cough. Patients O2 sat is 89% on room air. She denies home oxygen.   and son at bedside.     Review of patient's allergies indicates:   Allergen Reactions    Aspirin Nausea Only and Swelling     Able to tolerated in small doses          Patient has verified the spelling of their name and  on armband.   APPEARANCE: Patient is alert, calm, oriented x 4, and does not appear distressed.  SKIN: Skin is normal for race, warm, and dry. Normal skin turgor and mucous membranes moist. +dialysis graft noted to left upper arm. Pulse and thrill present  CARDIAC: Normal rate and rhythm, no murmur heard. +left sided chest pain that is felt at rest. Pain was a 7/10 and is now a 4/10 before interventions  RESPIRATORY:Normal rate and effort. Breath sounds clear bilaterally throughout chest. +dry cough +SOB , Patient is 98% on 3L NC  GASTRO: Bowel sounds normal, abdomen is soft, no tenderness, and no abdominal distention.  MUSCLE: Full ROM. No bony tenderness or soft tissue tenderness. No obvious deformity. Patient able to ambulate independently but does have a walker at home. No recent falls  PERIPHERAL VASCULAR: peripheral pulses present. Normal cap refill. No edema. Warm to touch.  EYE: PERRL, both eyes: pupils brisk and reactive to light. Normal size.  ENT: EARS: no obvious drainage. NOSE: no active bleeding. THROAT: no redness or swelling.

## 2019-04-05 NOTE — ED PROVIDER NOTES
Encounter Date: 4/5/2019    SCRIBE #1 NOTE: I, Live Hamilton, am scribing for, and in the presence of,  Dr. Graham. I have scribed the entire note.     I, Dr. Candace Graham MD, personally performed the services described in this documentation. All medical record entries made by the scribe were at my direction and in my presence.  I have reviewed the chart and agree that the record reflects my personal performance and is accurate and complete. Candace Graham MD.    History     Chief Complaint   Patient presents with    Cough     Patient reports having a productive cough x 11 days. States tonight having left sided chest pain with shortness of breath and continued cough.     Chest Pain    Shortness of Breath     CHIEF COMPLAINT: Patient presents with: Shortness of breath    HISTORY OF PRESENT ILLNESS: Erin Clark who is a 83 y.o. presents to the emergency department today with complaint of dry cough with onset x 11 days. The patient reports left sided chest pain and shortness breath with onset yesterday. She denies fever, nausea, or vomiting. Goes to dialysis 2 times per week (M/F) and she reports compliance with her dialysis sessions.      ALLERGIES REVIEWED  MEDICATIONS REVIEWED  PMH/PSH/SOC/FH REVIEWED     The history is provided by the patient.    Nursing/Ancillary staff note reviewed.            Review of patient's allergies indicates:   Allergen Reactions    Aspirin Nausea Only and Swelling     Able to tolerated in small doses       Past Medical History:   Diagnosis Date    CHF (congestive heart failure)     Coronary artery disease     Diabetes mellitus     Encounter for blood transfusion     ESRD (end stage renal disease) 03/2014    dialysis M-F    GERD (gastroesophageal reflux disease)     High cholesterol     Hypertension      Past Surgical History:   Procedure Laterality Date    AV FISTULA PLACEMENT Left 9/2014    bilateral fem-pop      CENTRAL VENOUS CATHETER TUNNELED INSERTION DOUBLE  LUMEN Right 2/2014    [id number] Left 8/12/2014    Performed by [facility]is [facility] at [facility] OR    CORONARY ARTERY BYPASS GRAFT  2/14/2014     x 3 vessels    CORONARY ARTERY BYPASS GRAFT (CABG) N/A 2/14/2014    Performed by Sergio Odonnell III, MD at [facility] OR    INSERTION-CATHETER-PERM-A-CATH N/A 4/21/2014    Performed by George Wesley MD at [facility] OR    REMOVAL-CATHETER Right 10/23/2014    Performed by Doris Mary [facility] at [facility] OR    VASCULAR SURGERY       Family History   Problem Relation Age of Onset    Hypertension Mother     Heart disease Mother     Heart attack Mother      Social History     Tobacco Use    Smoking status: Never Smoker    Smokeless tobacco: Never Used   Substance Use Topics    Alcohol use: No    Drug use: No     Review of Systems   Constitutional: Negative for activity change, appetite change, chills, diaphoresis and fever.   HENT: Negative for congestion, drooling, ear pain, mouth sores, rhinorrhea, sinus pain, sore throat and trouble swallowing.    Eyes: Negative for pain and discharge.   Respiratory: Positive for cough and shortness of breath. Negative for chest tightness, wheezing and stridor.    Cardiovascular: Positive for chest pain. Negative for palpitations and leg swelling.   Gastrointestinal: Negative for abdominal distention, abdominal pain, blood in stool, constipation, diarrhea, nausea and vomiting.   Genitourinary: Negative for difficulty urinating, dysuria, flank pain, frequency, hematuria and urgency.   Musculoskeletal: Negative for arthralgias, back pain and myalgias.   Skin: Negative for pallor, rash and wound.   Neurological: Negative for dizziness, syncope, weakness, light-headedness and numbness.   All other systems reviewed and are negative.      Physical Exam     Initial Vitals [04/05/19 0208]   BP Pulse Resp Temp SpO2   (!) 194/72 93 (!) 26 98.9 °F (37.2 °C) (!) 86 %      MAP       --         Physical Exam    Nursing note and vitals  reviewed.  Constitutional: She appears well-developed and well-nourished.   Speaking in short sentences   HENT:   Head: Normocephalic and atraumatic.   Right Ear: External ear normal.   Left Ear: External ear normal.   Nose: Nose normal.   Mouth/Throat: Oropharynx is clear and moist.   Eyes: Conjunctivae and EOM are normal. Pupils are equal, round, and reactive to light. No scleral icterus.   Neck: Normal range of motion. Neck supple. No JVD present.   Cardiovascular: Normal rate, regular rhythm, normal heart sounds and intact distal pulses. Exam reveals no gallop and no friction rub.    No murmur heard.  Positive thrill   Pulmonary/Chest: No stridor. She is in respiratory distress. She has wheezes (at bases and coarse BS ). She exhibits no tenderness.   Increased work of breathing      Abdominal: Soft. Bowel sounds are normal. She exhibits no distension and no mass. There is no tenderness. There is no rebound and no guarding.   Musculoskeletal: Normal range of motion. She exhibits no edema or tenderness.   Back is nontender to palpation.   Dialysis site with +thrill.   Neurological: She is alert and oriented to person, place, and time. She has normal strength. No cranial nerve deficit.   Skin: Skin is warm and dry. Capillary refill takes less than 2 seconds. No rash noted. No pallor.   Dialysis access in left arm   Psychiatric: She has a normal mood and affect. Thought content normal.         ED Course   Procedures  Labs Reviewed   CBC W/ AUTO DIFFERENTIAL - Abnormal; Notable for the following components:       Result Value    WBC 16.12 (*)     RBC 3.51 (*)     Hemoglobin 10.4 (*)     Hematocrit 31.8 (*)     MPV 8.8 (*)     Gran # (ANC) 13.8 (*)     Gran% 85.8 (*)     Lymph% 6.6 (*)     All other components within normal limits   COMPREHENSIVE METABOLIC PANEL - Abnormal; Notable for the following components:    Glucose 230 (*)     BUN, Bld 29 (*)     Creatinine 2.3 (*)     Total Bilirubin 1.4 (*)     eGFR if   22 (*)     eGFR if non  19 (*)     All other components within normal limits   TROPONIN I - Abnormal; Notable for the following components:    Troponin I 0.030 (*)     All other components within normal limits   B-TYPE NATRIURETIC PEPTIDE - Abnormal; Notable for the following components:    BNP 1,204 (*)     All other components within normal limits   CULTURE, BLOOD   CULTURE, BLOOD   LACTIC ACID, PLASMA   PROCALCITONIN   PROCALCITONIN     EKG Readings: (Independently Interpreted)   Rhythm: Normal Sinus Rhythm. Heart Rate: 88.   No ST elevations  Normal Axis       Imaging Results          X-Ray Chest PA And Lateral (Final result)  Result time 04/05/19 03:28:04    Final result by Ladarius Flores MD (04/05/19 03:28:04)                 Impression:      Coarse prominent bilateral interstitial markings which appear increased in extent and conspicuity compared to prior examinations.  Additionally, there are new superimposed patchy alveolar opacities projecting over the right midlung zone and bilateral lung bases.  Findings are nonspecific although could relate to multifocal infection in the appropriate clinical settings.  Additional considerations would include interstitial edema.  Clinical correlation with patient history and symptoms is advised.  Continued imaging follow-up is recommended to ensure resolution.      Electronically signed by: Ladarius Flores MD  Date:    04/05/2019  Time:    03:28             Narrative:    EXAMINATION:  XR CHEST PA AND LATERAL    CLINICAL HISTORY:  Shortness of breath    TECHNIQUE:  PA and lateral views of the chest were performed.    COMPARISON:  Chest radiograph 01/22/2018, 03/13/2017    FINDINGS:  There is postsurgical change of prior median sternotomy.  Cardiomediastinal silhouette is mildly enlarged, similar to prior examination.  There is atherosclerotic calcification of the thoracic aorta.  Lungs demonstrate prominent bilateral interstitial  markings which appear somewhat increased in extent in conspicuity compared to prior examinations.  There are additional patchy opacities projecting over the right midlung zone as well as the bilateral lung bases, right greater than left.  No large pleural effusion identified.  No evidence of pneumothorax.  The osseous structures demonstrate stable degenerative changes.                                 Medical Decision Making:   History:   Old Medical Records: I decided to obtain old medical records.  Initial Assessment:    Erin Clark 83 y.o. presents to the ED today with shortness of breath. She was afebrile on presentation did not appear septic. Given the high likelihood of pneumonia with her presentation blood cultures and lactic acid drawn. No hypotensive no need for IVFs.   Will obtain labs, EKG, CXR to further evaluate. Treat symptoms appropriately and reassess.     Differential Diagnosis:   Pulmonary infectious process, COPD, asthma, pulmonary embolus and congestive heart failure.  Independently Interpreted Test(s):   I have ordered and independently interpreted EKG Reading(s) - see prior notes  Clinical Tests:   Lab Tests: Ordered and Reviewed  Radiological Study: Ordered and Reviewed  Medical Tests: Ordered and Reviewed  ED Management:  The pt presents with signs and symptoms of pneumonia, hypoxia, she does indeed have pneumonia. She will be admitted for IV ABX and further management.                    ED Course as of Apr 05 0540 Fri Apr 05, 2019   0525 I spoke with [facility] re: pts presentation and workup and they accept for admit.     [JA]      ED Course User Index  [JA] Candace Graham MD     Clinical Impression:       ICD-10-CM ICD-9-CM   1. Chest pain R07.9 786.50   2. Cough R05 786.2   3. SOB (shortness of breath) R06.02 786.05   4. Pneumonia of both lungs due to infectious organism, unspecified part of lung J18.9 483.8       Disposition:   Disposition: Admitted  Condition:  Makayla Graham MD  04/09/19 0132

## 2019-04-05 NOTE — PT/OT/SLP PROGRESS
Physical Therapy  Missed Visit Note      Patient Name:  Erin Clark   MRN:  [id number]    Patient not seen today secondary to patient being off the floor at HD at this time  . Will follow-up as available.    Isabella Lewis, PT

## 2019-04-05 NOTE — PT/OT/SLP PROGRESS
Occupational Therapy      Patient Name:  Erin Clark   MRN:  [id number]    Patient not seen today secondary to (3 attempts made by OT. Pt was in HD in AM/Pm and on 3rd attempt,  family deferred OT eval until tomorrow 22/ pt fatigued after HD and did not eat much since yesterday. ). Will follow-up 4/6.    Kayley Forman OT  4/5/2019

## 2019-04-05 NOTE — ED NOTES
At this time pt reports chest pain 4/10. Reports more SOB than chest pain at this time. Refused Imdur at this time. Pt is on 3L oxygen via nasal cannula. ECHO at bedside. Pt is slightly tachypnic but NAD. will continue to monitor.

## 2019-04-05 NOTE — CONSULTS
Progress Note  Nephrology      Consult Requested By: Yasmine Kidd MD      SUBJECTIVE:     Overnight events  Patient is a 83 y.o. female     Patient Active Problem List   Diagnosis    Type 2 diabetes mellitus    Peripheral arterial occlusive disease    Hypertension    CAD (coronary artery disease)    Chronic diastolic congestive heart failure    Anemia    S/P CABG x 1    Dyspnea    ESRD (end stage renal disease)    Hyperlipidemia LDL goal <70    Hemodialysis AV fistula aneurysm    Hypoxia    Chest pain    Cough    Hypervolemia    Pneumonia of both lungs due to infectious organism     Past Medical History:   Diagnosis Date    CHF (congestive heart failure)     Coronary artery disease     Diabetes mellitus     Encounter for blood transfusion     ESRD (end stage renal disease) 03/2014    dialysis M-F    GERD (gastroesophageal reflux disease)     High cholesterol     Hypertension               OBJECTIVE:     Vitals:    04/05/19 1115 04/05/19 1130 04/05/19 1145 04/05/19 1200   BP: 109/80 132/86 (!) 169/94 (!) 148/78   BP Location:       Patient Position:       Pulse: 81 85 82 62   Resp:       Temp:       TempSrc:       SpO2:       Weight:       Height:           Temp: 97.7 °F (36.5 °C) (04/05/19 0934)  Pulse: 62 (04/05/19 1200)  Resp: (!) 22 (04/05/19 0934)  BP: (!) 148/78 (04/05/19 1200)  SpO2: 97 % (04/05/19 0746)    Date 04/05/19 0700 - 04/06/19 0659   Shift 1329-0086 2780-8012 1182-6468 24 Hour Total   INTAKE   Shift Total(mL/kg)       OUTPUT   Urine(mL/kg/hr) 125   125   Shift Total(mL/kg) 125(2.3)   125(2.3)   Weight (kg) 54 54 54 54             Medications:   sodium chloride 0.9%   Intravenous Once    sodium chloride 0.9%   Intravenous Once    amLODIPine  10 mg Oral Daily    aspirin  81 mg Oral Daily    atorvastatin  40 mg Oral Daily    [START ON 4/6/2019] azithromycin  500 mg Intravenous Q24H    carvedilol  25 mg Oral BID    [START ON 4/6/2019] cefTRIAXone (ROCEPHIN) IVPB  1 g  Intravenous Q24H    furosemide  80 mg Intravenous BID    heparin (porcine)  5,000 Units Subcutaneous Q12H    isosorbide mononitrate  120 mg Oral Daily                 Physical Exam:  General appearance: NAD  SOB  Cough  No CP  Fever  Chills  No nausea  No vomiting  No diarrhea  No abdominal pain  Poor intake  Lungs: diminished breath sounds  Heart: pulse 62  Abdomen: soft  Extremities: edema  Skin: dry  No results found for this or any previous visit (from the past 336 hour(s)).  Recent Results (from the past 336 hour(s))   CBC auto differential    Collection Time: 04/05/19  2:37 AM   Result Value Ref Range    WBC 16.12 (H) 3.90 - 12.70 K/uL    Hemoglobin 10.4 (L) 12.0 - 16.0 g/dL    Hematocrit 31.8 (L) 37.0 - 48.5 %    Platelets 311 150 - 350 K/uL     Urinalysis  Recent Labs   Lab 04/05/19  0714   COLORU Yellow   SPECGRAV 1.010   PHUR 7.0   PROTEINUA 2+*   BACTERIA None   NITRITE Positive*   LEUKOCYTESUR 2+*   UROBILINOGEN 1.0   HYALINECASTS 0       Diagnostic Results:  X-Ray: Reviewed  US: Reviewed  Echo: Reviewed  ACCESS    ASSESSMENT/PLAN:   Pneumonia  CXR         Coarse prominent bilateral interstitial markings which appear increased in extent and conspicuity compared to prior examinations.  Additionally, there are new superimposed patchy alveolar opacities projecting over the right midlung zone and bilateral lung bases.  Findings are nonspecific although could relate to multifocal infection in the appropriate clinical settings.  Additional considerations would include interstitial edema.   Antibiotics.  ESRD.  Metabolic bone disease.  Anemia Hb 10.4.  Low iron.  Hypertension.  /86.  Dialysis in progress.  Weight pre and post HD.  Repeat CXR tomorrow.  Evaluate for dialysis tomorrow.

## 2019-04-06 VITALS
HEART RATE: 76 BPM | TEMPERATURE: 98 F | BODY MASS INDEX: 19.79 KG/M2 | OXYGEN SATURATION: 96 % | WEIGHT: 115.94 LBS | DIASTOLIC BLOOD PRESSURE: 58 MMHG | SYSTOLIC BLOOD PRESSURE: 111 MMHG | HEIGHT: 64 IN | RESPIRATION RATE: 16 BRPM

## 2019-04-06 LAB
ALBUMIN SERPL BCP-MCNC: 3.4 G/DL (ref 3.5–5.2)
ALP SERPL-CCNC: 80 U/L (ref 55–135)
ALT SERPL W/O P-5'-P-CCNC: 9 U/L (ref 10–44)
ANION GAP SERPL CALC-SCNC: 10 MMOL/L (ref 8–16)
AST SERPL-CCNC: 18 U/L (ref 10–40)
BASOPHILS # BLD AUTO: 0.02 K/UL (ref 0–0.2)
BASOPHILS NFR BLD: 0.2 % (ref 0–1.9)
BILIRUB SERPL-MCNC: 1.6 MG/DL (ref 0.1–1)
BUN SERPL-MCNC: 20 MG/DL (ref 8–23)
CALCIUM SERPL-MCNC: 9.8 MG/DL (ref 8.7–10.5)
CHLORIDE SERPL-SCNC: 102 MMOL/L (ref 95–110)
CO2 SERPL-SCNC: 22 MMOL/L (ref 23–29)
CREAT SERPL-MCNC: 2.3 MG/DL (ref 0.5–1.4)
DIFFERENTIAL METHOD: ABNORMAL
EOSINOPHIL # BLD AUTO: 0.3 K/UL (ref 0–0.5)
EOSINOPHIL NFR BLD: 2.8 % (ref 0–8)
ERYTHROCYTE [DISTWIDTH] IN BLOOD BY AUTOMATED COUNT: 13.4 % (ref 11.5–14.5)
EST. GFR  (AFRICAN AMERICAN): 22 ML/MIN/1.73 M^2
EST. GFR  (NON AFRICAN AMERICAN): 19 ML/MIN/1.73 M^2
GLUCOSE SERPL-MCNC: 101 MG/DL (ref 70–110)
HCT VFR BLD AUTO: 31.1 % (ref 37–48.5)
HGB BLD-MCNC: 10.3 G/DL (ref 12–16)
LYMPHOCYTES # BLD AUTO: 2 K/UL (ref 1–4.8)
LYMPHOCYTES NFR BLD: 18.2 % (ref 18–48)
MCH RBC QN AUTO: 30.3 PG (ref 27–31)
MCHC RBC AUTO-ENTMCNC: 33.1 G/DL (ref 32–36)
MCV RBC AUTO: 92 FL (ref 82–98)
MONOCYTES # BLD AUTO: 1 K/UL (ref 0.3–1)
MONOCYTES NFR BLD: 9.6 % (ref 4–15)
NEUTROPHILS # BLD AUTO: 7.4 K/UL (ref 1.8–7.7)
NEUTROPHILS NFR BLD: 68.5 % (ref 38–73)
PLATELET # BLD AUTO: 287 K/UL (ref 150–350)
PMV BLD AUTO: 8.9 FL (ref 9.2–12.9)
POCT GLUCOSE: 103 MG/DL (ref 70–110)
POTASSIUM SERPL-SCNC: 4.4 MMOL/L (ref 3.5–5.1)
PROT SERPL-MCNC: 7.8 G/DL (ref 6–8.4)
RBC # BLD AUTO: 3.4 M/UL (ref 4–5.4)
SODIUM SERPL-SCNC: 134 MMOL/L (ref 136–145)
TROPONIN I SERPL DL<=0.01 NG/ML-MCNC: 0.2 NG/ML (ref 0–0.03)
WBC # BLD AUTO: 10.77 K/UL (ref 3.9–12.7)

## 2019-04-06 PROCEDURE — 25000003 PHARM REV CODE 250: Performed by: INTERNAL MEDICINE

## 2019-04-06 PROCEDURE — 36415 COLL VENOUS BLD VENIPUNCTURE: CPT

## 2019-04-06 PROCEDURE — 63600175 PHARM REV CODE 636 W HCPCS: Mod: JG | Performed by: INTERNAL MEDICINE

## 2019-04-06 PROCEDURE — 80053 COMPREHEN METABOLIC PANEL: CPT

## 2019-04-06 PROCEDURE — 97116 GAIT TRAINING THERAPY: CPT

## 2019-04-06 PROCEDURE — 27000221 HC OXYGEN, UP TO 24 HOURS

## 2019-04-06 PROCEDURE — 84484 ASSAY OF TROPONIN QUANT: CPT

## 2019-04-06 PROCEDURE — 97161 PT EVAL LOW COMPLEX 20 MIN: CPT

## 2019-04-06 PROCEDURE — 85025 COMPLETE CBC W/AUTO DIFF WBC: CPT

## 2019-04-06 PROCEDURE — P9047 ALBUMIN (HUMAN), 25%, 50ML: HCPCS | Mod: JG | Performed by: INTERNAL MEDICINE

## 2019-04-06 PROCEDURE — 94761 N-INVAS EAR/PLS OXIMETRY MLT: CPT

## 2019-04-06 PROCEDURE — 63600175 PHARM REV CODE 636 W HCPCS: Performed by: INTERNAL MEDICINE

## 2019-04-06 PROCEDURE — 90935 HEMODIALYSIS ONE EVALUATION: CPT

## 2019-04-06 RX ORDER — SODIUM CHLORIDE 9 MG/ML
INJECTION, SOLUTION INTRAVENOUS
Status: DISCONTINUED | OUTPATIENT
Start: 2019-04-06 | End: 2019-04-06 | Stop reason: HOSPADM

## 2019-04-06 RX ORDER — LEVOFLOXACIN 750 MG/1
750 TABLET ORAL
Status: DISCONTINUED | OUTPATIENT
Start: 2019-04-06 | End: 2019-04-06 | Stop reason: HOSPADM

## 2019-04-06 RX ORDER — ALBUMIN HUMAN 250 G/1000ML
12.5 SOLUTION INTRAVENOUS ONCE
Status: COMPLETED | OUTPATIENT
Start: 2019-04-06 | End: 2019-04-06

## 2019-04-06 RX ORDER — CODEINE PHOSPHATE AND GUAIFENESIN 10; 100 MG/5ML; MG/5ML
5 SOLUTION ORAL EVERY 4 HOURS PRN
Qty: 1 BOTTLE | Refills: 0 | Status: SHIPPED | OUTPATIENT
Start: 2019-04-06 | End: 2019-04-06 | Stop reason: HOSPADM

## 2019-04-06 RX ORDER — CODEINE PHOSPHATE AND GUAIFENESIN 10; 100 MG/5ML; MG/5ML
5 SOLUTION ORAL
Status: DISCONTINUED | OUTPATIENT
Start: 2019-04-06 | End: 2019-04-06 | Stop reason: HOSPADM

## 2019-04-06 RX ORDER — LEVOFLOXACIN 500 MG/1
500 TABLET, FILM COATED ORAL
Qty: 4 TABLET | Refills: 0 | Status: SHIPPED | OUTPATIENT
Start: 2019-04-06 | End: 2019-04-13

## 2019-04-06 RX ORDER — SODIUM CHLORIDE 9 MG/ML
INJECTION, SOLUTION INTRAVENOUS ONCE
Status: COMPLETED | OUTPATIENT
Start: 2019-04-06 | End: 2019-04-06

## 2019-04-06 RX ADMIN — ATORVASTATIN CALCIUM 40 MG: 40 TABLET, FILM COATED ORAL at 09:04

## 2019-04-06 RX ADMIN — ERYTHROPOIETIN 5000 UNITS: 10000 INJECTION, SOLUTION INTRAVENOUS; SUBCUTANEOUS at 12:04

## 2019-04-06 RX ADMIN — GUAIFENESIN AND CODEINE PHOSPHATE 5 ML: 100; 10 SOLUTION ORAL at 12:04

## 2019-04-06 RX ADMIN — ASPIRIN 81 MG: 81 TABLET, COATED ORAL at 09:04

## 2019-04-06 RX ADMIN — NEPHROCAP 1 CAPSULE: 1 CAP ORAL at 09:04

## 2019-04-06 RX ADMIN — AZITHROMYCIN MONOHYDRATE 500 MG: 500 INJECTION, POWDER, LYOPHILIZED, FOR SOLUTION INTRAVENOUS at 06:04

## 2019-04-06 RX ADMIN — ISOSORBIDE MONONITRATE 120 MG: 60 TABLET, EXTENDED RELEASE ORAL at 09:04

## 2019-04-06 RX ADMIN — FUROSEMIDE 80 MG: 10 INJECTION, SOLUTION INTRAVENOUS at 09:04

## 2019-04-06 RX ADMIN — SODIUM CHLORIDE: 0.9 INJECTION, SOLUTION INTRAVENOUS at 02:04

## 2019-04-06 RX ADMIN — CARVEDILOL 25 MG: 25 TABLET, FILM COATED ORAL at 09:04

## 2019-04-06 RX ADMIN — AMLODIPINE BESYLATE 10 MG: 5 TABLET ORAL at 09:04

## 2019-04-06 RX ADMIN — CEFTRIAXONE 1 G: 1 INJECTION, SOLUTION INTRAVENOUS at 06:04

## 2019-04-06 RX ADMIN — HEPARIN SODIUM 5000 UNITS: 5000 INJECTION, SOLUTION INTRAVENOUS; SUBCUTANEOUS at 09:04

## 2019-04-06 RX ADMIN — ALBUMIN HUMAN 12.5 G: 0.25 SOLUTION INTRAVENOUS at 02:04

## 2019-04-06 NOTE — PLAN OF CARE
"Problem: Physical Therapy Goal  Goal: Physical Therapy Goal  Outcome: Outcome(s) achieved Date Met: 04/06/19  Pt does not demonstrate any acute PT needs at this time; education performed to improve physical activity as tolerated.   PT recommended HHPT; however pt declined stating she exercises 'okay" and performs physical activity in the home.      "

## 2019-04-06 NOTE — PROGRESS NOTES
[facility] Medicine Resident HO-I Progress Note    Subjective:      Erin Clark is a 83 female, with a PMHx of CAD (CABG ), ESRD (HD on MW), HTN, HLD, and GERD, who presented with acute hypoxic respiratory failure 2/2 fluid overload and concern for CAP.     Patient lying comfortably in bed this morning, says she feels much improved since her HD yesterday. She says she is scheduled for another session today. Denies fever, chest pain, SOB, nausea or vomiting. Tolerating po intake. Per nursing, one fever to 100.9F overnight.    Pt was on .5L NC satting 96% --> 93% on room air.      Objective:   Last 24 Hour Vital Signs:  BP  Min: 109/80  Max: 169/94  Temp  Av °F (37.2 °C)  Min: 97.7 °F (36.5 °C)  Max: 100.9 °F (38.3 °C)  Pulse  Av.1  Min: 62  Max: 95  Resp  Av  Min: 16  Max: 22  SpO2  Av.3 %  Min: 96 %  Max: 99 %  Weight  Av.6 kg (115 lb 15.4 oz)  Min: 52.6 kg (115 lb 15.4 oz)  Max: 52.6 kg (115 lb 15.4 oz)  I/O last 3 completed shifts:  In: 500 [Other:500]  Out: 3025 [Urine:225; Other:2800]    Physical Examination:  General: no acute distress, calm, cooperative, appears stated age, pleasant on interview   HEET: normocephalic, atraumatic, EOMI, PERRL, anicteric sclera, MMM   Neck: supple, FROM without pain or stiffness, trachea midline, no thyromegaly, no carotid bruits  Cardiovascular: normal rate, regular rhythm, audible S1/S2 without murmurs, rubs, or extra heart sounds, radial, DP, and PT pulses 1+ and symmetric  Pulmonary: normal work of breathing without accessory muscle use, symmetric chest rise, coarse breath sounds lower lung fields, comfortable on room air   Abdomen: soft, non-tender, non-distended, no rebound or guarding, normoactive BS  MSK: full passive and active ROM  Skin: without cyanosis, clubbing, or edema   Neurologic: A&Ox4, no focal deficits, GCS 15    Laboratory:  Laboratory Data Reviewed: yes  Pertinent Findings:  Recent Labs   Lab 19  0237 19  0439    WBC 16.12* 10.77   HGB 10.4* 10.3*   HCT 31.8* 31.1*    287   MCV 91 92   RDW 13.3 13.4    134*   K 3.8 4.4    102   CO2 23 22*   BUN 29* 20   CREATININE 2.3* 2.3*   * 101   PROT 7.6 7.8   ALBUMIN 3.8 3.4*   BILITOT 1.4* 1.6*   AST 18 18   ALKPHOS 94 80   ALT 11 9*       Microbiology Data Reviewed: yes  Pertinent Findings:  4/5 BCx - NGTD     Other Results:  EKG (my interpretation): no new     Radiology Data Reviewed: yes  Pertinent Findings:  4/5 CXR - Coarse prominent bilateral interstitial markings which appear increased in extent and conspicuity compared to prior examinations.  Additionally, there are new superimposed patchy alveolar opacities projecting over the right midlung zone and bilateral lung bases.      Current Medications:     Infusions:       Scheduled:   sodium chloride 0.9%   Intravenous Once    sodium chloride 0.9%   Intravenous Once    amLODIPine  10 mg Oral Daily    aspirin  81 mg Oral Daily    atorvastatin  40 mg Oral Daily    azithromycin  500 mg Intravenous Q24H    carvedilol  25 mg Oral BID    cefTRIAXone (ROCEPHIN) IVPB  1 g Intravenous Q24H    furosemide  80 mg Intravenous BID    heparin (porcine)  5,000 Units Subcutaneous Q12H    isosorbide mononitrate  120 mg Oral Daily    vitamin renal formula (B-complex-vitamin c-folic acid)  1 capsule Oral Daily        PRN:  albuterol sulfate, dextrose 50 % in water (D50W), dextrose 50 % in water (D50W), glucagon (human recombinant), glucose, glucose, guaifenesin-codeine 100-10 mg/5 ml, sodium chloride 0.9%    Antibiotics and Day Number of Therapy:  Azithromycin 500 qd, started 4/6  Rocephin 1g qd, started 4/6   tamiflu 30 qd, started 4/5    Lines and Day Number of Therapy:  PIV     Assessment:     Erin Clark is a 83 female, with a PMHx of CAD (CABG 2014), ESRD (HD on MW), HTN, HLD, and GERD, who presented with acute hypoxic respiratory failure 2/2 fluid overload and concern for CAP.     Plan:     Acute hypoxic  "respiratory failure 2/2 to PNA vs fluid overload  Patient presented to ED with several hours of worsening dry cough and shortness of breath. On arrival, SpO2 mid 80s on RA, which improved to mid 90s on 3L. CXR with prominent b/l interstitial markings with patchy opacities over bilateral lung bases.   - WBC 16k, LA WNL, procalcitonin 0.04, BNP 1204   - etiology likely mostly fluid overload with possible viral vs bacterial pneumonia  - given rocephin, azithromycin and lasix 60 mg IV in ED  - continue with CAP and influenza coverage: Rocephin, Azithromycin and Tamiflu  BCx: NGTD   - will also continue diuresis with lasix 80 mg IV, net - 2.6L. Nephrology consulted, dialyzed 4/5 and plan for 4/6  - wean supplemental O2 as tolerated, pt currently >93% on room air  - 4/5 TTE: normal left ventricular systolic function (EF 55%), no wall motion abnormalities, mild LVH, grade II diastolic dysfunction     Atypical chest pain, improved  Constant left sided "achey" chest pain that began at minimal exertion and spontaneously resolved.  - ECG with SR and 1st AV block and ST depressions in inferior leads, will trend  - Initial Trop 0.03 --> trended up to 0.369  - admit to tele     CKD5 on HD M&F  -ESRD since 2014, receives HD on Monday and Friday  -Cr 2.3 BUN 29 which is baseline  -K+ 3.8 on admit  -AV fistula with palpable thrill  -nephrology consulted, dialyzed 4/5 and plan for 4/6     CAD  3 vessel CABG in 2014  - Follows with Dr. 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 Follow up micro. Possible discharge today after HD vs. Tomorrow.        [facility] Medicine Hospitalist Pager numbers:   [facility] Hospitalist Medicine Team A (Marti/Bruno): [phone]  [facility] Hospitalist Medicine Team B (Davion/Areli):  [phone]

## 2019-04-06 NOTE — PT/OT/SLP EVAL
Physical Therapy Evaluation and Discharge Note    Patient Name:  Erin Clark   MRN:  [id number]    Recommendations:     Discharge Recommendations:  home   Discharge Equipment Recommendations: none   Barriers to discharge: None    Assessment:     Erin Clark is a 83 y.o. female admitted with a medical diagnosis of Hypoxia. .  At this time, patient is functioning at their prior level of function and does not require further acute PT services. Pt does not demonstrate any acute PT needs at this time; education performed to improve physical activity as tolerated. Pt is SBA <> SPV c/ functional bed mobility and gait for 45ft in room c/ RW.     Recent Surgery: * No surgery found *      Plan:     During this hospitalization, patient does not require further acute PT services.  Please re-consult if situation changes.      Subjective     Chief Complaint: NA  Patient/Family Comments/goals: Agreed to PT Eval  Pain/Comfort:  · Pain Rating 1: 0/10  · Pain Rating Post-Intervention 1: 0/10    Patients cultural, spiritual, Druze conflicts given the current situation:      Living Environment:  Lives c/ spouse and her brother in 1SH; no step to enter;  stays c/ patient at home during the day and is able to assist as needed.  Prior to admission, patients level of function was Mod I; ambulates c/ RW PRN; per brother, pt will hold on to the cart/spouse in the mall to steady herself when walking; pt states she likes to works in her garden.  Equipment used at home: none, walker, rolling, wheelchair(Does not use WC; RW PRN).  DME owned (not currently used): wheelchair.  Upon discharge, patient will have assistance from family.    Objective:     Communicated with RN prior to session.  Patient found supine with peripheral IV, telemetry, bed alarm upon PT entry to room.    General Precautions: Standard, fall   Orthopedic Precautions:N/A   Braces: N/A     Exams:  · Cognitive Exam:  Patient is oriented to Person, Place, Time and  Situation  · Gross Motor Coordination:  WFL  · Postural Exam:  Patient presented with the following abnormalities:    · -       Rounded shoulders  · -       Forward head  · -       Posterior pelvic tilt  · Skin Integrity/Edema:      · -       Skin integrity: LUE fistula  · RLE ROM: WFL  · RLE Strength: WFL  · LLE ROM: WFL  · LLE Strength: WFL    Functional Mobility:  · Bed Mobility:     · Rolling Right: SPV  · Scooting: SPV  · Supine to Sit: SPV  · Transfers:     · Sit to Stand:  stand by assistance with rolling walker  · Bed to Chair: stand by assistance with  rolling walker  using  Stand Pivot  · Gait: 45ft c/ RW at A  · Balance: dynamic gait: fair+    AM-PAC 6 CLICK MOBILITY  Total Score:18       Therapeutic Activities and Exercises:  Pt Eval completed; Pt instructed in functional gait training in room on 0.5L02 at [facility] c/ RW; pt demo reciprocal gait pattern and slow soham; educated pt on need to use RW at home to improve functional independence and safety. Transferred to chair c/ chair alarm and RN notified.    AM-PAC 6 CLICK MOBILITY  Total Score:18     Patient left up in chair with all lines intact, call button in reach and chair alarm on.    GOALS:   Multidisciplinary Problems     Physical Therapy Goals     Not on file          Multidisciplinary Problems (Resolved)        Problem: Physical Therapy Goal    Goal Priority Disciplines Outcome Goal Variances Interventions   Physical Therapy Goal   (Resolved)     PT, PT/OT Outcome(s) achieved                     History:     Past Medical History:   Diagnosis Date    CHF (congestive heart failure)     Coronary artery disease     Diabetes mellitus     Encounter for blood transfusion     ESRD (end stage renal disease) 03/2014    dialysis M-F    GERD (gastroesophageal reflux disease)     High cholesterol     Hypertension        Past Surgical History:   Procedure Laterality Date    AV FISTULA PLACEMENT Left 9/2014    bilateral fem-pop      CENTRAL VENOUS  CATHETER TUNNELED INSERTION DOUBLE LUMEN Right 2/2014    RNFUSCGNOVVR-BHRPXGH-UG Left 8/12/2014    [facility]    CORONARY ARTERY BYPASS GRAFT  2/14/2014     x 3 vessels    CORONARY ARTERY BYPASS GRAFT (CABG) N/A 2/14/2014    Performed by Sergio Odonnell III, MD at [facility] OR    INSERTION-CATHETER-PERM-A-CATH N/A 4/21/2014    Performed by George Wesley MD at [facility] OR    REMOVAL-CATHETER Right 10/23/2014    [facility]formed by [facility] at [facility] OR    VASCULAR SURGERY         Time Tracking:     PT Received On: 04/06/19  PT Start Time: 1018     PT Stop Time: 1043  PT Total Time (min): 25 min     Billable Minutes: Evaluation 10 and Gait Training 15      Isabella Lewis, PT  04/06/2019

## 2019-04-06 NOTE — PLAN OF CARE
Patient to d/c after HD,  Progress notes reviewed. No HOme oxygen indicated at this time.  No HH or DME orders noted.

## 2019-04-06 NOTE — PROGRESS NOTES
Progress Note  Nephrology      Consult Requested By: Yasmine Kidd MD      SUBJECTIVE:     Overnight events  Patient is a 83 y.o. female     Patient Active Problem List   Diagnosis    Type 2 diabetes mellitus    Peripheral arterial occlusive disease    Hypertension    CAD (coronary artery disease)    Chronic diastolic congestive heart failure    Anemia    S/P CABG x 1    Dyspnea    ESRD (end stage renal disease)    Hyperlipidemia LDL goal <70    Hemodialysis AV fistula aneurysm    Hypoxia    Chest pain    Cough    Hypervolemia    Pneumonia of both lungs due to infectious organism     Past Medical History:   Diagnosis Date    CHF (congestive heart failure)     Coronary artery disease     Diabetes mellitus     Encounter for blood transfusion     ESRD (end stage renal disease) 03/2014    dialysis M-F    GERD (gastroesophageal reflux disease)     High cholesterol     Hypertension               OBJECTIVE:     Vitals:    04/06/19 0425 04/06/19 0800 04/06/19 0821 04/06/19 0910   BP: (!) 142/67  (!) 132/58    BP Location:       Patient Position: Lying      Pulse: 68 73 66    Resp: 18  16    Temp: 98.2 °F (36.8 °C)  98.3 °F (36.8 °C)    TempSrc: Oral      SpO2: 96%   97%   Weight: 52.6 kg (115 lb 15.4 oz)      Height:           Temp: 98.3 °F (36.8 °C) (04/06/19 0821)  Pulse: 66 (04/06/19 0821)  Resp: 16 (04/06/19 0821)  BP: (!) 132/58 (04/06/19 0821)  SpO2: 97 % (04/06/19 0910)              Medications:   sodium chloride 0.9%   Intravenous Once    sodium chloride 0.9%   Intravenous Once    sodium chloride 0.9%   Intravenous Once    amLODIPine  10 mg Oral Daily    aspirin  81 mg Oral Daily    atorvastatin  40 mg Oral Daily    azithromycin  500 mg Intravenous Q24H    carvedilol  25 mg Oral BID    cefTRIAXone (ROCEPHIN) IVPB  1 g Intravenous Q24H    epoetin roberto (PROCRIT) injection  5,000 Units Intravenous Once    furosemide  80 mg Intravenous BID    heparin (porcine)  5,000 Units  Subcutaneous Q12H    isosorbide mononitrate  120 mg Oral Daily    vitamin renal formula (B-complex-vitamin c-folic acid)  1 capsule Oral Daily                 Physical Exam:  Cough  Weak  Lungs: diminished breath sounds  Heart: pulse 65  Abdomen: soft  Extremities: no edema  Skin: dry      Laboratory:  ABG  Labs reviewed  No results found for this or any previous visit (from the past 336 hour(s)).  Recent Results (from the past 336 hour(s))   CBC with Automated Differential    Collection Time: 04/06/19  4:38 AM   Result Value Ref Range    WBC 10.77 3.90 - 12.70 K/uL    Hemoglobin 10.3 (L) 12.0 - 16.0 g/dL    Hematocrit 31.1 (L) 37.0 - 48.5 %    Platelets 287 150 - 350 K/uL   CBC auto differential    Collection Time: 04/05/19  2:37 AM   Result Value Ref Range    WBC 16.12 (H) 3.90 - 12.70 K/uL    Hemoglobin 10.4 (L) 12.0 - 16.0 g/dL    Hematocrit 31.8 (L) 37.0 - 48.5 %    Platelets 311 150 - 350 K/uL     Urinalysis  No results for input(s): COLORU, CLARITYU, SPECGRAV, PHUR, PROTEINUA, GLUCOSEU, BILIRUBINCON, BLOODU, WBCU, RBCU, BACTERIA, MUCUS, NITRITE, LEUKOCYTESUR, UROBILINOGEN, HYALINECASTS in the last 24 hours.    Diagnostic Results:  X-Ray: Reviewed  US: Reviewed  Echo: Reviewed  ACCESS    ASSESSMENT/PLAN:   Pneumonia  ESRD  Metabolic bone disease  Anemia  Poor nutrition  BP 98/51  Dialysis in progress

## 2019-04-06 NOTE — PT/OT/SLP PROGRESS
Occupational Therapy  Missed Visit    Patient Name:  Erin Clark   MRN:  [id number]    Patient not seen today secondary to 1st attempt BS testing in progress; 2nd attempt JEM in HD Will follow-up .    Parth Sanchez OT  4/6/2019

## 2019-04-06 NOTE — PLAN OF CARE
Problem: Adult Inpatient Plan of Care  Goal: Plan of Care Review  Outcome: Outcome(s) achieved Date Met: 04/06/19 04/06/19 1605   Plan of Care Review   Plan of Care Reviewed With patient;family   Progress improving   Patient AAO x3, VSS. On tele, no ectopy on tele. HD today tolerated well. Blood glucose monitored ac/hs and covered per sliding scale.Heparin VTE..Patient able to turn self in bed. Bed alarm remains on, call light in reach, non skid socks when out of bed.Family at the bedside.

## 2019-04-06 NOTE — DISCHARGE SUMMARY
"[facility] Medicine Discharge Summary    Primary Team: [facility]   Attending Physician: Yasmine Kidd MD  Resident: Robbin  Intern: Soraida    Date of Admit: 4/5/2019  Date of Discharge: 4/6/2019    Discharge to: home  Condition: stable     Discharge Diagnoses     Patient Active Problem List   Diagnosis    Type 2 diabetes mellitus    Peripheral arterial occlusive disease    Hypertension    CAD (coronary artery disease)    Chronic diastolic congestive heart failure    Anemia    S/P CABG x 1    Dyspnea    ESRD (end stage renal disease)    Hyperlipidemia LDL goal <70    Hemodialysis AV fistula aneurysm    Hypoxia    Chest pain    Cough    Hypervolemia    Pneumonia of both lungs due to infectious organism       Consultants and Procedures     Consultants:  O-Nephrology    Procedures:   HD     Brief History of Present Illness      Erin Clark is a 83 year old female, with a PMHx of CAD (3v CABG in 2014), as well as ESRD (HD MF), HTN, HLD, and GERD, who presented to [facility] ED on 4/5/2019 with the primary complaint of shortness of breath and chest pain for 4 hours.     Ms. Clark was in her USOH - lives with , ambulates without assistance, independent ADLs, can move around the house without dyspnea - until 11 days ago when patient began experiencing a persistent dry cough. The cough progressively worsened until night PTA when she also began to experience an "aching" left-sided chest pain associated with shortness of breath. She denies any radiation to shoulders, back, arm, or jaw. There was no associated nausea, diaphoresis, or dizziness. The pain was constant and patient could not catch her breath so she reported to ED. Sometime before arrival, her chest pain spontaneously resolved without medication but she remained dyspneic.     The cough has remained non-productive. She denies any fevers, chills, N/V, abdominal pain, focal weakness or numbness, " "palpitations, orthopnea or LE swelling. She reports a "cold-like" illness in the family recently, but she denies any sore throat, rhinorrhea, or myalgias. She has been compliant with her prescribed medication and HD. She was last dialyzed on Monday, 4/1.     For the full HPI please refer to the History & Physical from this admission.    Hospital Course By Problem with Pertinent Findings     Acute hypoxic respiratory failure 2/2 to PNA vs fluid overload  Patient presented to ED with several hours of worsening dry cough and shortness of breath. On arrival, SpO2 mid 80s on RA, which improved to mid 90s on 3L. CXR with prominent b/l interstitial markings with patchy opacities over bilateral lung bases.   - WBC 16k, LA WNL, procalcitonin 0.04, BNP 1204   - etiology likely mostly fluid overload with possible viral vs bacterial pneumonia  - given rocephin, azithromycin and lasix 60 mg IV in ED  - continue with CAP and influenza coverage: Rocephin, Azithromycin and Tamiflu  BCx: NGTD, will discharge with Levofloxacin to finish abx course.  - will also continue diuresis with lasix 80 mg IV, net - 2.6L. Nephrology consulted, dialyzed 4/5 and 4/6.  - pt currently >93% on room air; will not require supplemental oxygen on discharge  - 4/5 TTE: normal left ventricular systolic function (EF 55%), no wall motion abnormalities, mild LVH, grade II diastolic dysfunction     Atypical chest pain, improved  Constant left sided "achey" chest pain that began at minimal exertion and spontaneously resolved.  - ECG with SR and 1st AV block and ST depressions in inferior leads, will trend  - Initial Trop 0.03 --> trended up to 0.369  - admit to tele     CKD5 on HD M&F  -ESRD since 2014, receives HD on Monday and Friday  -Cr 2.3 BUN 29 which is baseline  -K+ 3.8 on admit  -AV fistula with palpable thrill  -nephrology consulted, dialyzed 4/5 and plan for 4/6     CAD  3 vessel CABG in 2014  - Follows with Dr. 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 for 4 doses     vitamin renal formula (B-complex-vitamin c-folic acid) 1 mg Cap  Commonly known as:  NEPHROCAP  Take 1 capsule by mouth once daily.  Start taking on:  4/7/2019        CONTINUE taking these medications    amLODIPine 10 MG tablet  Commonly known as:  NORVASC  TAKE ONE TABLET BY MOUTH ONCE DAILY     aspirin 81 MG EC tablet  Commonly known as:  ECOTRIN  TAKE ONE TABLET BY MOUTH ONCE DAILY     atorvastatin 40 MG tablet  Commonly known as:  LIPITOR  Take 1 tablet (40 mg total) by mouth once daily.     carvedilol 25 MG tablet  Commonly known as:  COREG  TAKE ONE TABLET BY MOUTH TWICE DAILY     ergocalciferol 50,000 unit Cap  Commonly known as:  ERGOCALCIFEROL  Take 1 capsule (50,000 Units total) by mouth every 7 days.     famotidine 20 MG tablet  Commonly known as:  PEPCID  Take 1 tablet (20 mg total) by mouth once daily.     furosemide 40 MG tablet  Commonly known as:  LASIX  TAKE ONE TABLET BY MOUTH ONCE DAILY     isosorbide mononitrate 120 MG 24 hr tablet  Commonly known as:  IMDUR  TAKE ONE TABLET BY MOUTH ONCE DAILY     nitroGLYCERIN 0.4 MG SL tablet  Commonly known as:  NITROSTAT  Place 1 tablet (0.4 mg total) under the tongue every 5 (five) minutes as needed for Chest pain.           Where to Get Your  Medications      These medications were sent to [id number] - [place] - [address]  [address] [address]    Phone:  [phone]   · levoFLOXacin 500 MG tablet  · vitamin renal formula (B-complex-vitamin c-folic acid) 1 mg Cap         Discharge Information:   Diet:  Renal - low potassium, low phosphorous    Physical Activity:  Regular, as tolerated    Instructions:  1. Take all medications as prescribed  2. Keep all follow-up appointments  3. Return to the hospital or call your primary care physicians if any worsening symptoms such as shortness of breath, cough, LE swelling, chest pain occur.    Follow-Up Appointments:  Dr. Golden   [address]  [place] [address]  In 5 weeks      Edwina Veronica  [facility] Internal Medicine, [facility]

## 2019-04-07 ENCOUNTER — NURSE TRIAGE (OUTPATIENT)
Dept: ADMINISTRATIVE | Facility: CLINIC | Age: 84
End: 2019-04-07

## 2019-04-07 NOTE — TELEPHONE ENCOUNTER
Pt daughter wanted to know which cough medication was given to her mother yesterday at the hospital, I told her what it was and stated that it was a controlled substance. She wanted to know which medication she could take over the counter for cough, I advised her to contact a local pharmacist and he would be able to tall her which medication would be best for a dialysis pt, caller agreed

## 2019-04-08 LAB — L PNEUMO AG UR QL IA: NOT DETECTED

## 2019-04-09 LAB — S PNEUM AG UR QL: NOT DETECTED

## 2019-04-09 NOTE — PATIENT INSTRUCTIONS
Heart Disease Education    The heart beats 60 to 100 times per minute, 24 hours a day. This equals almost 1000,000 times a day. It pumps blood with oxygen and nutrients to the tissues and organs of the body. But the heart is a muscle and needs its own supply of blood. Blood flow to the heart is supplied by the coronary arteries. Coronary artery disease (atherosclerosis) is a result of cholesterol, saturated fat, and calcium deposits (plaques) that build up inside the walls. This causes inflammation within the coronary arteries. These plaques narrow the artery and reduce blood flow to the heart muscle. The reduction in blood flow to the heart muscle decreases oxygen supply to the heart. If the narrowing is significant enough, the oxygen supply to one or more regions of the heart can be temporarily or permanently shut down. This can cause chest pain, and possibly death of heart tissue (heart attack).  Types of chest pain  Angina is the name for pain in the heart muscle. Angina is a warning sign of serious heart disease. When untreated it can lead to a heart attack, also known as acute myocardial infarction, or AMI. Angina occurs when there is not enough blood and oxygen flowing to the heart for the amount of work it is doing. This most often happens during physical exertion, when the heart is working hardest. It is usually relieved by rest or nitroglycerin. Angina may also occur after a large meal when extra blood is sent to the digestive organs and less goes to the heart. In the case of advanced or unstable heart disease, angina can occur at rest or awaken you from sleep. Angina usually lasts from a few minutes up to 20 minutes or more. When treated early, the effects of angina can be reversed without permanent damage to the heart. Angina is a serious condition and needs to be evaluated by a medical professional immediately.  There are two types of angina -- stable and unstable:  · Stable angina usually occurs  with a predictable level of activity. Being stable, its character, severity, and occurrence do not change much over time. It usually starts with activity, and resolves with rest or taking your medicine as instructed by your doctor. The symptoms usually do not last long.  · Unstable angina changes or gets worse over time. It is different from whatever you are used to. It may feel different or worse, begin without cause, occur with exercise or exertion, wake you up from sleep, and last longer. It may not respond in the same way as it does when you take your usual medicines for an attack. This type of angina can be a warning sign of an impending heart attack.     A heart attack is usually the result of a blood clot that suddenly forms in a coronary artery that has been narrowed with plaque. When this occurs, blood flow may be cut off to a part of the heart muscle, causing the cells to die. This weakens the pumping action of the heart, which affects the delivery of blood to all the other organs in the body including the brain. This damage is not reversible. However, early treatment can limit the amount of damage.  The pain you feel with angina and a heart attack may have a similar quality. However, it is usually different in intensity and duration. Here are some typical descriptions of a heart attack:  · It is most often experienced as a squeezing, crushing, pressure-like sensation in the center of the chest.  · It is sometimes described as something heavy sitting on my chest.  · It may feel more like a bad case of indigestion.  · The pain may spread from the chest to the arm, shoulder, throat or jaw.  · Sometimes the pain is not felt in the chest at all, but only in the arm, shoulder, throat or jaw.  · There may also be nausea, vomiting, dizziness or light-headedness, sweating and trouble breathing.  · Palpitations, or your heart beating rapidly  · A new, irregular heart beat  · Unexplained weakness  You may not be  "able to tell the difference between "bad" angina and a heart attack at home. Seek help if your symptoms are different than usual. Do not be in denial or just try to "tough it out."  Call 911  This is the fastest and safest way to get to the emergency department. The paramedics can also start treatment on the way to the hospital, saving valuable time for your heart.  · If the angina gets worse, if it continues, or if it stops and returns, call 911 immediately. Do not delay. You may be having a heart attack.  · After you call 911, take a second tablet or spray unless instructed otherwise. When repeating doses, sit down if possible, because it can make you feel lightheaded or dizzy. Wait another 5 minutes. If the angina still does not go away, take a third tablet or spray. Do not take more than 3 tablets or sprays within 15 minutes. Stay on the phone with 911 for further instruction.  · Your healthcare provider may give you slightly different instructions than those above. If so, follow them carefully.  Do not wait until symptoms become severe to call 911.  Other reasons to call 911 include:  · Trouble breathing  · Feeling lightheaded, faint, or dizzy  · Rapid heart beat  · Slower than usual heart rate compared to your normal  · Angina with weakness, dizziness, fainting, heavy sweating, nausea, or vomiting  · Extreme drowsiness, confusion  · Weakness of an arm or leg or one side of the face  · Difficulty with speech or vision  When to seek medical care  Remember, the signs and symptoms of a heart attack are not always like they are on TV. Sometimes they are not so obvious. You may only feel weak, or just not right. If it is not clear or if you have any doubt, call for advice.  · Seek help if there is a change in the type of pain, if it feels different, or if your symptoms are mild.  · Do not drive yourself. Have someone else drive you. If no one can drive, call 911.  · Do not delay. Fast diagnosis and treatment can " "prevent or limit the amount of heart damage during a heart attack.  · Do not go to your doctor's office or a clinic as they may not be able to provide all the testing and treatment required for this condition.  · If your doctor has given you medicine to take when symptoms occur, take them but don't delay getting help trying to locate medicines.  What happens in the emergency department  The emergency department is connected to your local emergency medical system (EMS) through 911. That's why during a cardiac emergency, calling 911 is the fastest way to get help. The goal of the emergency department is to rapidly screen, evaluate, and treat people.  Once you are there, an electrocardiogram (ECG or heart tracing) will be done. Blood samples may be taken to look for the presence of heart enzymes that leak from damaged heart cells and show if a heart attack is occurring. You will often be evaluated by a heart specialist (cardiologist) who decides the best course of action. In the case of severe angina or early heart attack, and depending on the circumstances, powerful "clot busting" medicines can be used to dissolve blood clots in the coronary artery. In other cases, you may be taken to a cardiac catheterization lab. Here, a tiny balloon-tipped catheter is advanced through blood vessels to the heart. There the balloon is inflated pushing open the blood vessel restoring blood flow.  Risk factors for heart disease  Risk factors for heart disease are a combination of genetic and lifestyle. Many risk factors work by either directly or indirectly damaging the blood vessels of the heart, or by increasing the risk of forming blood or cholesterol clots, which then clog up and block the arteries.     Examples of physical lifestyle risk factors:  · Cigarette smoking  · High blood pressure  · High blood cholesterol  · Use of stimulant drugs such as cocaine, crack, and amphetamines  · Eating a high-fat, high-cholesterol " meal  · Diabetes   · Obesity which increases risk for diabetes and high blood pressure  · Lack of regular physical activity     Examples of emotional lifestyle factors:  · Chronic high stress levels release stress hormones. These raise blood pressure and cholesterol level and makes blood clot more easily.  · Held-in anger, hostile or cynical attitude  · Social and emotional isolation, lack of intimacy  · Loss of relationship  · Depression  Other factors that increase the risk of heart attack that you cannot control :  · Age. The older you get beyond 40, the greater is your risk of significant coronary artery disease.  · Gender. More men than women get heart disease; but once past menopause, women who are not taking estrogen replacement have the same risk as men for a heart attack.  · Family history. If your mother, father, brother or sister has coronary artery disease, your risk of having it is higher than a person your age without this family history.  What can you do to decrease your risk  To reduce your risk of heart disease:  · Get regular checkups with your doctor.  · Take your medicines for blood pressure, cholesterol or diabetes as directed.  · Watch your diet. Eat a heart healthy diet choosing fresh foods, less salt, cholesterol, and fat  · Stop smoking. Get help if needed.  · Get regular exercise.  · Manage stress.  · Carry a list of medicines and doses in your wallet.  Date Last Reviewed: 12/30/2015  © 6116-6910 [employer]. [address] [place] [address], [place] [address]. All rights reserved. This information is not intended as a substitute for professional medical care. Always follow your healthcare professional's instructions.

## 2019-04-10 LAB
BACTERIA BLD CULT: NORMAL
BACTERIA BLD CULT: NORMAL

## 2019-04-11 NOTE — PHYSICIAN QUERY
"PT Name: Erin Clark  MR #: [id number]    Physician Query Form - Heart  Condition Clarification     CDS/: Sandra Liu               Contact information:[email or web address]  This form is a permanent document in the medical record.     Query Date: April 11, 2019    By submitting this query, we are merely seeking further clarification of documentation. Please utilize your independent clinical judgment when addressing the question(s) below.    The medical record contains the following   Indicators     Supporting Clinical Findings Location in Medical Record   x BNP BNP = 1204   Labs 4/5   x EF · The estimated ejection fraction is 55%   Echo report 4/5   x Radiology findings Coarse prominent bilateral interstitial markings which appear increased in extent and conspicuity compared to prior examinations.  Additionally, there are new superimposed patchy alveolar opacities projecting over the right midlung zone and bilateral lung bases.  Findings are nonspecific although could relate to multifocal infection in the appropriate clinical settings.  Additional considerations would include interstitial edema.  Clinical correlation with patient history and symptoms is advised.  Continued imaging follow-up is recommended to ensure resolution.   CXR 4/5   x Echo Results · Normal left ventricular systolic function. The estimated ejection fraction is 55%  · No wall motion abnormalities.  · Mild left ventricular enlargement.  · Eccentric left ventricular hypertrophy.  · Normal right ventricular systolic function.  · Grade II (moderate) left ventricular diastolic dysfunction consistent with pseudonormalization.  · Elevated left atrial pressure.  · Mild left atrial enlargement.  · Mild mitral regurgitation.  · Mild tricuspid regurgitation.  · Pulmonary hypertension present.  · The estimated PA systolic pressure is 53 mm Hg  · Intermediate central venous pressure (8 mm Hg). Echo report 4/5    "Ascites" documented     x " ""SOB" or "QUEZADA" documented SOB and chest pain for 4 hours    H&P 4/5   x "Hypoxia" documented Acute Hypoxic Respiratory Failure Secondary to Pneumonia and Volume Overload Secondary to CKD V    H&P 4/5   x Heart Failure documented Chronic diastolic congestive heart failure    Nephro CN 4/5   x "Edema" documented Skin: without cyanosis, clubbing, or edema    H&P 4/5   x Diuretics/Meds furosemide (LASIX) 40 MG tablet TAKE ONE TABLET BY MOUTH ONCE DAILY     Furosemide IV H&P (Home Med List)      MAR 4/5-4/6    Treatment:      Other:      Heart failure (HF) can be acute, chronic or both. It is generally further specificed as systolic, diastolic, or combined. Lastly, it is important to identify an underlying etiology if known or suspected.     Common clues to acute exacerbation:  Rapidly progressive symptoms (w/in 2 weeks of presentation), using IV diuretics to treat, using supplemental O2, pulmonary edema on Xray, MI w/in 4 weeks, and/or BNP >500    Systolic Heart Failure: is defined as chart documentation of a left ventricular ejection fraction (LVEF) less than 40%     Diastolic Heart Failure: is defined as a left ventricular ejection fraction (LVEF) greater than 40%   +      Evidence of diastolic dysfunction on echocardiography OR    Right heart catheterization wedge pressure above 12 mm Hg OR    Left heart catheterization left ventricular end diastolic pressure 18 mm Hg or above.    References: *American Heart Association    The clinical guidelines noted below are only system guidelines, and do not replace the providers clinical judgment.     Provider, please specify the diagnosis associated with above clinical findings  [  X ] Acute on Chronic Diastolic Heart Failure -    Pre-existing diastoic HF diagnosis.  EF > 40%  and acute HF symptoms documented                                 [   ] Chronic Diastolic Heart Failure - Pre-existing diastolic HF diagnosis.  EF > 40%  without  acute HF symptoms documented  [   ] " Other Type of Heart Failure (please specify type): _________________________  [   ] Other (please specify): ___________________________________  [   ] Clinically Undetermined                          Please document in your progress notes daily for the duration of treatment until resolved and include in your discharge summary.

## 2019-04-17 ENCOUNTER — OFFICE VISIT (OUTPATIENT)
Dept: OBSTETRICS AND GYNECOLOGY | Facility: CLINIC | Age: 84
End: 2019-04-17
Payer: MEDICARE

## 2019-04-17 VITALS
WEIGHT: 117.75 LBS | SYSTOLIC BLOOD PRESSURE: 158 MMHG | BODY MASS INDEX: 20.21 KG/M2 | DIASTOLIC BLOOD PRESSURE: 50 MMHG

## 2019-04-17 DIAGNOSIS — Z01.419 ENCOUNTER FOR GYNECOLOGICAL EXAMINATION WITHOUT ABNORMAL FINDING: Primary | ICD-10-CM

## 2019-04-17 PROCEDURE — G0101 CA SCREEN;PELVIC/BREAST EXAM: HCPCS | Mod: S$GLB,,, | Performed by: OBSTETRICS & GYNECOLOGY

## 2019-04-17 PROCEDURE — 99999 PR PBB SHADOW E&M-EST. PATIENT-LVL III: ICD-10-PCS | Mod: PBBFAC,,, | Performed by: OBSTETRICS & GYNECOLOGY

## 2019-04-17 PROCEDURE — G0101 PR CA SCREEN;PELVIC/BREAST EXAM: ICD-10-PCS | Mod: S$GLB,,, | Performed by: OBSTETRICS & GYNECOLOGY

## 2019-04-17 PROCEDURE — 99999 PR PBB SHADOW E&M-EST. PATIENT-LVL III: CPT | Mod: PBBFAC,,, | Performed by: OBSTETRICS & GYNECOLOGY

## 2019-04-17 NOTE — PROGRESS NOTES
82 yo postmenopausal Kinyarwanda speaking female who presents for routine gyn visit.  No gyn complaints.  No postmenopausal bleeding.  No h/o abl pap smears.  Reports having pap with Dr. Lehman last year and pap was normal.  No h/o abl mammograms.  Per patient, completes mammograms at [facility].  Reports colonoscopy in 2015 wnl  DEXA in 2018 wnl    ROS:  GENERAL: Denies weight gain or weight loss. Feeling well overall.   SKIN: Denies rash or lesions.   CHEST: Denies chest pain or shortness of breath.   CARDIOVASCULAR: Denies palpitations or left sided chest pain.   ABDOMEN: No abdominal pain, constipation, diarrhea, nausea, vomiting or rectal bleeding.   URINARY: No frequency, dysuria, hematuria, or burning on urination.  REPRODUCTIVE: no complaints   BREASTS: denies pain, lumps, or nipple discharge.   HEMATOLOGIC: No easy bruisability or excessive bleeding.   MUSCULOSKELETAL: Denies joint pain or swelling.   NEUROLOGIC: Denies syncope or weakness.   PSYCHIATRIC: Denies depression, anxiety or mood swings.         PE:   Vitals: BP (!) 158/50   Wt 53.4 kg (117 lb 11.6 oz)   BMI 20.21 kg/m²   APPEARANCE: Well nourished, well developed, in no acute distress.  SKIN: Normal skin turgor, no lesions.  CHEST: Lungs clear to auscultation. Well healed vertical scar from previous cardiac surgery  HEART: Regular rate and rhythm, 2/6 MAXIMO murmurs, no rubs or gallops.  ABDOMEN: Soft. No tenderness or masses. No hepatosplenomegaly. No hernias.  BREASTS: Symmetrical, no skin changes or visible lesions. No palpable masses, nipple discharge or adenopathy bilaterally.  PELVIC: Normal external female genitalia without lesions. Normal hair distribution. Adequate perineal body, normal urethral meatus. Atrophic vagina  without lesions or discharge. Cervix pink and without lesions. No significant cystocele or rectocele. Bimanual exam showed uterus normal size, shape, position, mobile and nontender. Adnexa without masses or tenderness. Urethra and  bladder normal.    AP  Routine gyn  -s/p normal breast exam: mammogram ordered with [facility]  -s/p normal pelvic exam:   -Pap not indicated  -STD testing: declined  -colonoscopy: uptodate  -DEXA: nataliia Elizondo MD

## 2019-05-17 DIAGNOSIS — I10 ESSENTIAL HYPERTENSION: ICD-10-CM

## 2019-05-17 DIAGNOSIS — I25.10 CORONARY ARTERY DISEASE INVOLVING NATIVE CORONARY ARTERY WITHOUT ANGINA PECTORIS, UNSPECIFIED WHETHER NATIVE OR TRANSPLANTED HEART: ICD-10-CM

## 2019-05-17 DIAGNOSIS — Z95.1 S/P CABG X 1: ICD-10-CM

## 2019-05-17 DIAGNOSIS — E78.5 HYPERLIPIDEMIA LDL GOAL <70: ICD-10-CM

## 2019-05-17 DIAGNOSIS — I77.9 PERIPHERAL ARTERIAL OCCLUSIVE DISEASE: ICD-10-CM

## 2019-05-17 RX ORDER — FUROSEMIDE 40 MG/1
TABLET ORAL
Qty: 90 TABLET | Refills: 3 | Status: SHIPPED | OUTPATIENT
Start: 2019-05-17 | End: 2019-05-17 | Stop reason: SDUPTHER

## 2019-05-17 RX ORDER — FUROSEMIDE 40 MG/1
40 TABLET ORAL DAILY
Qty: 90 TABLET | Refills: 4 | Status: SHIPPED | OUTPATIENT
Start: 2019-05-17 | End: 2020-08-12

## 2019-05-17 NOTE — TELEPHONE ENCOUNTER
----- Message from Marilyn Lehman sent at 5/17/2019  2:38 PM CDT -----  Contact: Leif (son)  Type:  RX Refill Request    Who Called: Leif  Refill or New Rx: refill  RX Name and Strength: furosemide (LASIX) 40 MG tablet  How is the patient currently taking it? (ex. 1XDay): 1Xday  Is this a 30 day or 90 day RX:  90 day  Preferred Pharmacy with phone number: [id number] - [place], [place] - [address] [place] [address]  Local or Mail Order: Local  Ordering Provider: Jeanie  Would the patient rather a call back or a response via [facility]? Call back  Best Call Back Number: [phone]  Additional Information: Patient would like refills put on file with the pharmacy for this maintenance medication

## 2019-07-08 DIAGNOSIS — E78.5 HYPERLIPIDEMIA LDL GOAL <70: ICD-10-CM

## 2019-07-08 DIAGNOSIS — I77.9 PERIPHERAL ARTERIAL OCCLUSIVE DISEASE: ICD-10-CM

## 2019-07-08 DIAGNOSIS — I25.10 CORONARY ARTERY DISEASE INVOLVING NATIVE CORONARY ARTERY WITHOUT ANGINA PECTORIS, UNSPECIFIED WHETHER NATIVE OR TRANSPLANTED HEART: ICD-10-CM

## 2019-07-08 DIAGNOSIS — I10 ESSENTIAL HYPERTENSION: ICD-10-CM

## 2019-07-08 DIAGNOSIS — Z95.1 S/P CABG X 1: ICD-10-CM

## 2019-07-08 RX ORDER — ISOSORBIDE MONONITRATE 120 MG/1
TABLET, EXTENDED RELEASE ORAL
Qty: 90 TABLET | Refills: 3 | Status: SHIPPED | OUTPATIENT
Start: 2019-07-08 | End: 2020-12-18

## 2019-08-30 DIAGNOSIS — I77.9 PERIPHERAL ARTERIAL OCCLUSIVE DISEASE: ICD-10-CM

## 2019-08-30 DIAGNOSIS — Z95.1 S/P CABG X 1: ICD-10-CM

## 2019-08-30 DIAGNOSIS — E78.5 HYPERLIPIDEMIA LDL GOAL <70: ICD-10-CM

## 2019-08-30 DIAGNOSIS — I10 ESSENTIAL HYPERTENSION: ICD-10-CM

## 2019-08-30 DIAGNOSIS — I25.10 CORONARY ARTERY DISEASE INVOLVING NATIVE CORONARY ARTERY WITHOUT ANGINA PECTORIS, UNSPECIFIED WHETHER NATIVE OR TRANSPLANTED HEART: ICD-10-CM

## 2019-08-30 RX ORDER — AMLODIPINE BESYLATE 10 MG/1
TABLET ORAL
Qty: 90 TABLET | Refills: 3 | Status: ON HOLD | OUTPATIENT
Start: 2019-08-30 | End: 2021-05-31 | Stop reason: CLARIF

## 2019-09-12 ENCOUNTER — OFFICE VISIT (OUTPATIENT)
Dept: FAMILY MEDICINE | Facility: CLINIC | Age: 84
End: 2019-09-12
Payer: MEDICARE

## 2019-09-12 VITALS
OXYGEN SATURATION: 98 % | BODY MASS INDEX: 20.4 KG/M2 | SYSTOLIC BLOOD PRESSURE: 136 MMHG | DIASTOLIC BLOOD PRESSURE: 58 MMHG | HEIGHT: 64 IN | HEART RATE: 57 BPM | WEIGHT: 119.5 LBS

## 2019-09-12 DIAGNOSIS — E11.22 TYPE 2 DIABETES MELLITUS WITH CHRONIC KIDNEY DISEASE ON CHRONIC DIALYSIS, WITH LONG-TERM CURRENT USE OF INSULIN: Primary | ICD-10-CM

## 2019-09-12 DIAGNOSIS — Z79.4 TYPE 2 DIABETES MELLITUS WITH CHRONIC KIDNEY DISEASE ON CHRONIC DIALYSIS, WITH LONG-TERM CURRENT USE OF INSULIN: Primary | ICD-10-CM

## 2019-09-12 DIAGNOSIS — N18.6 TYPE 2 DIABETES MELLITUS WITH CHRONIC KIDNEY DISEASE ON CHRONIC DIALYSIS, WITH LONG-TERM CURRENT USE OF INSULIN: Primary | ICD-10-CM

## 2019-09-12 DIAGNOSIS — Z99.2 TYPE 2 DIABETES MELLITUS WITH CHRONIC KIDNEY DISEASE ON CHRONIC DIALYSIS, WITH LONG-TERM CURRENT USE OF INSULIN: Primary | ICD-10-CM

## 2019-09-12 DIAGNOSIS — R05.8 ALLERGIC COUGH: ICD-10-CM

## 2019-09-12 PROCEDURE — 1101F PR PT FALLS ASSESS DOC 0-1 FALLS W/OUT INJ PAST YR: ICD-10-PCS | Mod: CPTII,S$GLB,, | Performed by: FAMILY MEDICINE

## 2019-09-12 PROCEDURE — 96372 PR INJECTION,THERAP/PROPH/DIAG2ST, IM OR SUBCUT: ICD-10-PCS | Mod: S$GLB,,, | Performed by: FAMILY MEDICINE

## 2019-09-12 PROCEDURE — 1101F PT FALLS ASSESS-DOCD LE1/YR: CPT | Mod: CPTII,S$GLB,, | Performed by: FAMILY MEDICINE

## 2019-09-12 PROCEDURE — 99499 RISK ADDL DX/OHS AUDIT: ICD-10-PCS | Mod: S$GLB,,, | Performed by: FAMILY MEDICINE

## 2019-09-12 PROCEDURE — 99999 PR PBB SHADOW E&M-EST. PATIENT-LVL III: CPT | Mod: PBBFAC,,, | Performed by: FAMILY MEDICINE

## 2019-09-12 PROCEDURE — 99214 OFFICE O/P EST MOD 30 MIN: CPT | Mod: 25,S$GLB,, | Performed by: FAMILY MEDICINE

## 2019-09-12 PROCEDURE — 99499 UNLISTED E&M SERVICE: CPT | Mod: S$GLB,,, | Performed by: FAMILY MEDICINE

## 2019-09-12 PROCEDURE — 99214 PR OFFICE/OUTPT VISIT, EST, LEVL IV, 30-39 MIN: ICD-10-PCS | Mod: 25,S$GLB,, | Performed by: FAMILY MEDICINE

## 2019-09-12 PROCEDURE — 99999 PR PBB SHADOW E&M-EST. PATIENT-LVL III: ICD-10-PCS | Mod: PBBFAC,,, | Performed by: FAMILY MEDICINE

## 2019-09-12 PROCEDURE — 96372 THER/PROPH/DIAG INJ SC/IM: CPT | Mod: S$GLB,,, | Performed by: FAMILY MEDICINE

## 2019-09-12 RX ORDER — TRIAMCINOLONE ACETONIDE 40 MG/ML
40 INJECTION, SUSPENSION INTRA-ARTICULAR; INTRAMUSCULAR
Status: COMPLETED | OUTPATIENT
Start: 2019-09-12 | End: 2019-09-12

## 2019-09-12 RX ORDER — PROMETHAZINE HYDROCHLORIDE AND DEXTROMETHORPHAN HYDROBROMIDE 6.25; 15 MG/5ML; MG/5ML
5 SYRUP ORAL EVERY 8 HOURS PRN
Qty: 240 ML | Refills: 0 | Status: SHIPPED | OUTPATIENT
Start: 2019-09-12 | End: 2019-09-22

## 2019-09-12 RX ADMIN — TRIAMCINOLONE ACETONIDE 40 MG: 40 INJECTION, SUSPENSION INTRA-ARTICULAR; INTRAMUSCULAR at 03:09

## 2019-09-12 NOTE — PROGRESS NOTES
Subjective:       Patient ID: Erin Clark is a 84 y.o. female.    Chief Complaint: Follow-up and Hypertension    84 years old female previously diagnosed with chronic kidney disease currently on dialysis.  Patient is doing fine.  Last A1c was normal.  No polyuria, polydipsia or polyphagia .  Patient with cough associated with allergies for the last year.  Patient requests a medicine to help her with the cough.  She was using Tessalon Perles with no significant improvement.    Review of Systems   Constitutional: Negative.    HENT: Negative.    Eyes: Negative.    Respiratory: Negative.    Cardiovascular: Negative.  Negative for chest pain, palpitations and leg swelling.   Gastrointestinal: Negative.    Endocrine: Negative for polydipsia, polyphagia and polyuria.   Genitourinary: Negative.    Musculoskeletal: Negative.    Skin: Negative.    Neurological: Negative.    Psychiatric/Behavioral: Negative.        Objective:      Physical Exam   Constitutional: She is oriented to person, place, and time. She appears well-developed and well-nourished. No distress.   HENT:   Head: Normocephalic and atraumatic.   Right Ear: External ear normal.   Left Ear: External ear normal.   Nose: Nose normal.   Mouth/Throat: Oropharynx is clear and moist. No oropharyngeal exudate.   Eyes: Pupils are equal, round, and reactive to light. Conjunctivae and EOM are normal. Right eye exhibits no discharge. Left eye exhibits no discharge. No scleral icterus.   Neck: Normal range of motion. Neck supple. No JVD present. No tracheal deviation present. No thyromegaly present.   Cardiovascular: Normal rate, regular rhythm, normal heart sounds and intact distal pulses. Exam reveals no gallop and no friction rub.   No murmur heard.  Pulmonary/Chest: Effort normal and breath sounds normal. No stridor. No respiratory distress. She has no wheezes. She has no rales. She exhibits no tenderness.   Abdominal: Soft. Bowel sounds are normal. She exhibits no  distension and no mass. There is no tenderness. There is no rebound and no guarding.   Musculoskeletal: Normal range of motion. She exhibits no edema or tenderness.   Lymphadenopathy:     She has no cervical adenopathy.   Neurological: She is alert and oriented to person, place, and time. She has normal reflexes. No cranial nerve deficit. She exhibits normal muscle tone. Coordination normal.   Skin: Skin is warm and dry. No rash noted. She is not diaphoretic. No erythema. No pallor.   Psychiatric: She has a normal mood and affect. Her behavior is normal. Judgment and thought content normal.       Assessment:       1.    2.        Plan:         Erin was seen today for follow-up and hypertension.    Diagnoses and all orders for this visit:    Type 2 diabetes mellitus with chronic kidney disease on chronic dialysis, with long-term current use of insulin    Allergic cough  -     promethazine-dextromethorphan (PROMETHAZINE-DM) 6.25-15 mg/5 mL Syrp; Take 5 mLs by mouth every 8 (eight) hours as needed.  -     triamcinolone acetonide injection 40 mg    Continue monitoring blood sugar at home,ADA diet.

## 2019-10-21 DIAGNOSIS — Z95.1 S/P CABG X 1: ICD-10-CM

## 2019-10-21 DIAGNOSIS — E78.5 HYPERLIPIDEMIA LDL GOAL <70: ICD-10-CM

## 2019-10-21 DIAGNOSIS — I77.9 PERIPHERAL ARTERIAL OCCLUSIVE DISEASE: ICD-10-CM

## 2019-10-21 DIAGNOSIS — I25.10 CORONARY ARTERY DISEASE INVOLVING NATIVE CORONARY ARTERY WITHOUT ANGINA PECTORIS, UNSPECIFIED WHETHER NATIVE OR TRANSPLANTED HEART: ICD-10-CM

## 2019-10-21 DIAGNOSIS — I10 ESSENTIAL HYPERTENSION: ICD-10-CM

## 2019-10-22 RX ORDER — CARVEDILOL 25 MG/1
TABLET ORAL
Qty: 180 TABLET | Refills: 3 | Status: ON HOLD | OUTPATIENT
Start: 2019-10-22 | End: 2021-05-31 | Stop reason: HOSPADM

## 2019-12-16 ENCOUNTER — HOSPITAL ENCOUNTER (OUTPATIENT)
Dept: RADIOLOGY | Facility: HOSPITAL | Age: 84
Discharge: HOME | End: 2019-12-16
Attending: NURSE PRACTITIONER
Payer: MEDICARE

## 2019-12-16 DIAGNOSIS — R05.9 COUGH: ICD-10-CM

## 2019-12-16 PROCEDURE — 71046 X-RAY EXAM CHEST 2 VIEWS: CPT | Mod: TC,FY

## 2019-12-16 PROCEDURE — 71046 X-RAY EXAM CHEST 2 VIEWS: CPT | Mod: 26,,, | Performed by: RADIOLOGY

## 2019-12-16 PROCEDURE — 71046 XR CHEST PA AND LATERAL: ICD-10-PCS | Mod: 26,,, | Performed by: RADIOLOGY

## 2020-03-20 ENCOUNTER — TELEPHONE (OUTPATIENT)
Dept: FAMILY MEDICINE | Facility: CLINIC | Age: 85
End: 2020-03-20

## 2020-03-20 DIAGNOSIS — R05.9 COUGH: Primary | ICD-10-CM

## 2020-03-20 DIAGNOSIS — J30.1 SEASONAL ALLERGIC RHINITIS DUE TO POLLEN: ICD-10-CM

## 2020-03-20 RX ORDER — PROMETHAZINE HYDROCHLORIDE AND DEXTROMETHORPHAN HYDROBROMIDE 6.25; 15 MG/5ML; MG/5ML
5 SYRUP ORAL 4 TIMES DAILY PRN
Qty: 180 ML | Refills: 0 | Status: SHIPPED | OUTPATIENT
Start: 2020-03-20 | End: 2020-04-30

## 2020-03-20 RX ORDER — PREDNISONE 5 MG/1
5 TABLET ORAL 2 TIMES DAILY
Qty: 6 TABLET | Refills: 0 | Status: SHIPPED | OUTPATIENT
Start: 2020-03-20 | End: 2020-03-23

## 2020-03-20 NOTE — TELEPHONE ENCOUNTER
Spoke to patient and advised to go to urgent care, patient complain of cough , sob, and swollen legs, denied fever, and that she had the cough since 2 months ago, and she said she wanted to see MD.

## 2020-03-20 NOTE — TELEPHONE ENCOUNTER
----- Message from Rosemarie Haq sent at 3/20/2020 12:06 PM CDT -----  Contact: PT  Pt needs a call back ASAP about setting up appointment. Pt son called saying she has been out of breath and foot has been swollen   # [phone]

## 2020-04-30 DIAGNOSIS — R05.9 COUGH: ICD-10-CM

## 2020-04-30 RX ORDER — PROMETHAZINE HYDROCHLORIDE AND DEXTROMETHORPHAN HYDROBROMIDE 6.25; 15 MG/5ML; MG/5ML
SYRUP ORAL
Qty: 180 ML | Refills: 0 | Status: SHIPPED | OUTPATIENT
Start: 2020-04-30 | End: 2020-05-13

## 2020-05-01 NOTE — PLAN OF CARE
Spoke to patient son, she will come on Monday between [id number] at [facility] for covid screening.

## 2020-05-04 ENCOUNTER — LAB VISIT (OUTPATIENT)
Dept: LAB | Facility: HOSPITAL | Age: 85
End: 2020-05-04
Attending: SURGERY
Payer: MEDICARE

## 2020-05-04 DIAGNOSIS — N18.6 ESRD (END STAGE RENAL DISEASE): ICD-10-CM

## 2020-05-04 PROCEDURE — U0002 COVID-19 LAB TEST NON-CDC: HCPCS

## 2020-05-05 ENCOUNTER — ANESTHESIA (OUTPATIENT)
Dept: SURGERY | Facility: HOSPITAL | Age: 85
End: 2020-05-05
Payer: MEDICARE

## 2020-05-05 ENCOUNTER — TELEPHONE (OUTPATIENT)
Dept: FAMILY MEDICINE | Facility: CLINIC | Age: 85
End: 2020-05-05

## 2020-05-05 ENCOUNTER — ANESTHESIA EVENT (OUTPATIENT)
Dept: SURGERY | Facility: HOSPITAL | Age: 85
End: 2020-05-05
Payer: MEDICARE

## 2020-05-05 ENCOUNTER — HOSPITAL ENCOUNTER (OUTPATIENT)
Facility: HOSPITAL | Age: 85
Discharge: HOME | End: 2020-05-05
Attending: SURGERY | Admitting: SURGERY
Payer: MEDICARE

## 2020-05-05 VITALS — DIASTOLIC BLOOD PRESSURE: 58 MMHG | SYSTOLIC BLOOD PRESSURE: 117 MMHG | OXYGEN SATURATION: 100 % | HEART RATE: 92 BPM

## 2020-05-05 VITALS
BODY MASS INDEX: 20.49 KG/M2 | TEMPERATURE: 98 F | DIASTOLIC BLOOD PRESSURE: 67 MMHG | HEART RATE: 67 BPM | HEIGHT: 64 IN | WEIGHT: 120 LBS | SYSTOLIC BLOOD PRESSURE: 137 MMHG | RESPIRATION RATE: 16 BRPM | OXYGEN SATURATION: 95 %

## 2020-05-05 DIAGNOSIS — I25.10 CORONARY ARTERY DISEASE INVOLVING NATIVE CORONARY ARTERY WITHOUT ANGINA PECTORIS, UNSPECIFIED WHETHER NATIVE OR TRANSPLANTED HEART: ICD-10-CM

## 2020-05-05 DIAGNOSIS — Z79.4 TYPE 2 DIABETES MELLITUS WITH CHRONIC KIDNEY DISEASE ON CHRONIC DIALYSIS, WITH LONG-TERM CURRENT USE OF INSULIN: ICD-10-CM

## 2020-05-05 DIAGNOSIS — N18.6 TYPE 2 DIABETES MELLITUS WITH CHRONIC KIDNEY DISEASE ON CHRONIC DIALYSIS, WITH LONG-TERM CURRENT USE OF INSULIN: ICD-10-CM

## 2020-05-05 DIAGNOSIS — N18.6 ESRD (END STAGE RENAL DISEASE): ICD-10-CM

## 2020-05-05 DIAGNOSIS — Z99.2 TYPE 2 DIABETES MELLITUS WITH CHRONIC KIDNEY DISEASE ON CHRONIC DIALYSIS, WITH LONG-TERM CURRENT USE OF INSULIN: ICD-10-CM

## 2020-05-05 DIAGNOSIS — I10 ESSENTIAL HYPERTENSION: ICD-10-CM

## 2020-05-05 DIAGNOSIS — T82.898A ANEURYSM OF ARTERIOVENOUS DIALYSIS FISTULA, INITIAL ENCOUNTER: Primary | ICD-10-CM

## 2020-05-05 DIAGNOSIS — E11.22 TYPE 2 DIABETES MELLITUS WITH CHRONIC KIDNEY DISEASE ON CHRONIC DIALYSIS, WITH LONG-TERM CURRENT USE OF INSULIN: ICD-10-CM

## 2020-05-05 DIAGNOSIS — I50.32 CHRONIC DIASTOLIC CONGESTIVE HEART FAILURE: ICD-10-CM

## 2020-05-05 DIAGNOSIS — I10 HYPERTENSION: ICD-10-CM

## 2020-05-05 DIAGNOSIS — I50.42 CHRONIC COMBINED SYSTOLIC AND DIASTOLIC CONGESTIVE HEART FAILURE: ICD-10-CM

## 2020-05-05 DIAGNOSIS — R06.02 SOB (SHORTNESS OF BREATH): Primary | ICD-10-CM

## 2020-05-05 LAB
ANION GAP SERPL CALC-SCNC: 15 MMOL/L (ref 8–16)
BUN SERPL-MCNC: 14 MG/DL (ref 8–23)
CALCIUM SERPL-MCNC: 9.3 MG/DL (ref 8.7–10.5)
CHLORIDE SERPL-SCNC: 99 MMOL/L (ref 95–110)
CO2 SERPL-SCNC: 25 MMOL/L (ref 23–29)
CREAT SERPL-MCNC: 2.1 MG/DL (ref 0.5–1.4)
EST. GFR  (AFRICAN AMERICAN): 25 ML/MIN/1.73 M^2
EST. GFR  (NON AFRICAN AMERICAN): 21 ML/MIN/1.73 M^2
GLUCOSE SERPL-MCNC: 154 MG/DL (ref 70–110)
POTASSIUM SERPL-SCNC: 3.2 MMOL/L (ref 3.5–5.1)
SARS-COV-2 RNA RESP QL NAA+PROBE: NOT DETECTED
SODIUM SERPL-SCNC: 139 MMOL/L (ref 136–145)

## 2020-05-05 PROCEDURE — 36000707: Performed by: SURGERY

## 2020-05-05 PROCEDURE — 76942 ECHO GUIDE FOR BIOPSY: CPT | Performed by: HEALTH CARE PROVIDER

## 2020-05-05 PROCEDURE — 37000008 HC ANESTHESIA 1ST 15 MINUTES: Performed by: SURGERY

## 2020-05-05 PROCEDURE — 63600175 PHARM REV CODE 636 W HCPCS: Performed by: HEALTH CARE PROVIDER

## 2020-05-05 PROCEDURE — 63600175 PHARM REV CODE 636 W HCPCS: Performed by: STUDENT IN AN ORGANIZED HEALTH CARE EDUCATION/TRAINING PROGRAM

## 2020-05-05 PROCEDURE — 36000706: Performed by: SURGERY

## 2020-05-05 PROCEDURE — 93010 EKG 12-LEAD: ICD-10-PCS | Mod: ,,, | Performed by: INTERNAL MEDICINE

## 2020-05-05 PROCEDURE — 25000003 PHARM REV CODE 250: Performed by: STUDENT IN AN ORGANIZED HEALTH CARE EDUCATION/TRAINING PROGRAM

## 2020-05-05 PROCEDURE — 93005 ELECTROCARDIOGRAM TRACING: CPT

## 2020-05-05 PROCEDURE — 88304 TISSUE EXAM BY PATHOLOGIST: CPT | Mod: 26,,, | Performed by: PATHOLOGY

## 2020-05-05 PROCEDURE — C1757 CATH, THROMBECTOMY/EMBOLECT: HCPCS | Performed by: SURGERY

## 2020-05-05 PROCEDURE — 71000015 HC POSTOP RECOV 1ST HR: Performed by: SURGERY

## 2020-05-05 PROCEDURE — 93010 ELECTROCARDIOGRAM REPORT: CPT | Mod: ,,, | Performed by: INTERNAL MEDICINE

## 2020-05-05 PROCEDURE — 63600175 PHARM REV CODE 636 W HCPCS: Performed by: SURGERY

## 2020-05-05 PROCEDURE — 64415 NJX AA&/STRD BRCH PLXS IMG: CPT | Performed by: HEALTH CARE PROVIDER

## 2020-05-05 PROCEDURE — 71000039 HC RECOVERY, EACH ADD'L HOUR: Performed by: SURGERY

## 2020-05-05 PROCEDURE — 88304 PR  SURG PATH,LEVEL III: ICD-10-PCS | Mod: 26,,, | Performed by: PATHOLOGY

## 2020-05-05 PROCEDURE — 88304 TISSUE EXAM BY PATHOLOGIST: CPT | Performed by: PATHOLOGY

## 2020-05-05 PROCEDURE — 80048 BASIC METABOLIC PNL TOTAL CA: CPT

## 2020-05-05 PROCEDURE — 25000003 PHARM REV CODE 250: Performed by: SURGERY

## 2020-05-05 PROCEDURE — 36415 COLL VENOUS BLD VENIPUNCTURE: CPT

## 2020-05-05 PROCEDURE — 37000009 HC ANESTHESIA EA ADD 15 MINS: Performed by: SURGERY

## 2020-05-05 PROCEDURE — 71000033 HC RECOVERY, INTIAL HOUR: Performed by: SURGERY

## 2020-05-05 RX ORDER — MIDAZOLAM HYDROCHLORIDE 1 MG/ML
INJECTION, SOLUTION INTRAMUSCULAR; INTRAVENOUS
Status: DISCONTINUED | OUTPATIENT
Start: 2020-05-05 | End: 2020-05-05

## 2020-05-05 RX ORDER — LIDOCAINE HCL/PF 100 MG/5ML
SYRINGE (ML) INTRAVENOUS
Status: DISCONTINUED | OUTPATIENT
Start: 2020-05-05 | End: 2020-05-05

## 2020-05-05 RX ORDER — CEFAZOLIN SODIUM 1 G/3ML
INJECTION, POWDER, FOR SOLUTION INTRAMUSCULAR; INTRAVENOUS
Status: DISCONTINUED | OUTPATIENT
Start: 2020-05-05 | End: 2020-05-05

## 2020-05-05 RX ORDER — PROPOFOL 10 MG/ML
INJECTION, EMULSION INTRAVENOUS CONTINUOUS PRN
Status: DISCONTINUED | OUTPATIENT
Start: 2020-05-05 | End: 2020-05-05

## 2020-05-05 RX ORDER — SODIUM CHLORIDE 9 MG/ML
INJECTION, SOLUTION INTRAVENOUS CONTINUOUS PRN
Status: DISCONTINUED | OUTPATIENT
Start: 2020-05-05 | End: 2020-05-05

## 2020-05-05 RX ORDER — HYDROCODONE BITARTRATE AND ACETAMINOPHEN 5; 325 MG/1; MG/1
1 TABLET ORAL EVERY 4 HOURS PRN
Status: DISCONTINUED | OUTPATIENT
Start: 2020-05-05 | End: 2020-05-05 | Stop reason: HOSPADM

## 2020-05-05 RX ORDER — HEPARIN SODIUM 5000 [USP'U]/ML
INJECTION, SOLUTION INTRAVENOUS; SUBCUTANEOUS
Status: DISCONTINUED | OUTPATIENT
Start: 2020-05-05 | End: 2020-05-05 | Stop reason: HOSPADM

## 2020-05-05 RX ORDER — BACITRACIN 50000 [IU]/1
INJECTION, POWDER, FOR SOLUTION INTRAMUSCULAR
Status: DISCONTINUED | OUTPATIENT
Start: 2020-05-05 | End: 2020-05-05 | Stop reason: HOSPADM

## 2020-05-05 RX ORDER — PHENYLEPHRINE HYDROCHLORIDE 10 MG/ML
INJECTION INTRAVENOUS
Status: DISCONTINUED | OUTPATIENT
Start: 2020-05-05 | End: 2020-05-05

## 2020-05-05 RX ORDER — ACETAMINOPHEN 10 MG/ML
INJECTION, SOLUTION INTRAVENOUS
Status: DISCONTINUED | OUTPATIENT
Start: 2020-05-05 | End: 2020-05-05

## 2020-05-05 RX ORDER — HYDROCODONE BITARTRATE AND ACETAMINOPHEN 5; 325 MG/1; MG/1
1 TABLET ORAL EVERY 6 HOURS PRN
Qty: 20 TABLET | Refills: 0 | Status: ON HOLD | OUTPATIENT
Start: 2020-05-05 | End: 2020-07-27 | Stop reason: HOSPADM

## 2020-05-05 RX ORDER — FENTANYL CITRATE 50 UG/ML
INJECTION, SOLUTION INTRAMUSCULAR; INTRAVENOUS
Status: DISCONTINUED | OUTPATIENT
Start: 2020-05-05 | End: 2020-05-05

## 2020-05-05 RX ORDER — MUPIROCIN 20 MG/G
OINTMENT TOPICAL
Status: DISCONTINUED | OUTPATIENT
Start: 2020-05-05 | End: 2020-05-05 | Stop reason: HOSPADM

## 2020-05-05 RX ADMIN — LIDOCAINE HYDROCHLORIDE 100 MG: 20 INJECTION, SOLUTION INTRAVENOUS at 11:05

## 2020-05-05 RX ADMIN — FENTANYL CITRATE 25 MCG: 50 INJECTION, SOLUTION INTRAMUSCULAR; INTRAVENOUS at 11:05

## 2020-05-05 RX ADMIN — PHENYLEPHRINE HYDROCHLORIDE 50 MCG: 10 INJECTION INTRAVENOUS at 12:05

## 2020-05-05 RX ADMIN — PHENYLEPHRINE HYDROCHLORIDE 100 MCG: 10 INJECTION INTRAVENOUS at 12:05

## 2020-05-05 RX ADMIN — SODIUM CHLORIDE: 0.9 INJECTION, SOLUTION INTRAVENOUS at 11:05

## 2020-05-05 RX ADMIN — PROPOFOL 50 MCG/KG/MIN: 10 INJECTION, EMULSION INTRAVENOUS at 11:05

## 2020-05-05 RX ADMIN — MIDAZOLAM 1 MG: 1 INJECTION INTRAMUSCULAR; INTRAVENOUS at 10:05

## 2020-05-05 RX ADMIN — CEFAZOLIN 2 G: 330 INJECTION, POWDER, FOR SOLUTION INTRAMUSCULAR; INTRAVENOUS at 11:05

## 2020-05-05 RX ADMIN — ACETAMINOPHEN 1000 MG: 10 INJECTION, SOLUTION INTRAVENOUS at 11:05

## 2020-05-05 NOTE — DISCHARGE INSTRUCTIONS
Follow up with Dr. Mary in 1 week     Keep dressing clean, dry and intact for 2 days. Dressing can be removed in 2 days.     May resume dialysis on schedule.    ANESTHESIA  -For the first 24 hours after surgery:  Do not drive, use heavy equipment, make important decisions, or drink alcohol  -It is normal to feel sleepy for several hours.  Rest until you are more awake.  -Have someone stay with you, if needed.  They can watch for problems and help keep you safe.  -Some possible post anesthesia side effects include: nausea and vomiting, sore throat and hoarseness, sleepiness, and dizziness.    PAIN  -If you have pain after surgery, pain medicine will help you feel better.  Take it as directed, before pain becomes severe.  Most pain relievers taken by mouth need at least 20-30 minutes to start working.  -Do not drive or drink alcohol while taking pain medicine.  -Pain medication can upset your stomach.  Taking them with a little food may help.  -Other ways to help control pain: elevation, ice, and relaxation  -Call your surgeon if still having unmanageable pain an hour after taking pain medicine.  -Pain medicine can cause constipation.  Taking an over-the counter stool softener while on prescription pain medicine and drinking plenty of fluids can prevent this side effect.  -Call your surgeon if you have severe side effects like: breathing problems, trouble waking up, dizziness, confusion, or severe constipation.    NAUSEA  -Some people have nausea after surgery.  This is often because of anesthesia, pain, pain medicine, or the stress of surgery.  -Do not push yourself to eat.  Start off with clear liquids and soup.  Slowly move to solid foods.  Don't eat fatty, rich, spicy foods at first.  Eat smaller amounts.  -If you develop persistent nausea and vomiting please notify your surgeon immediately.    BLEEDING  -Different types of surgery require different types of care and dressing changes.  It is important to follow  all instructions and advice from your surgeon.  Change dressing as directed.  Call your surgeon for any concerns regarding postop bleeding.    SIGNS OF INFECTION  -Signs of infection include: fever, swelling, drainage, and redness  -Notify your surgeon if you have a fever of 100.4 F (38.0 C) or higher.  -Notify your surgeon if you notice redness, swelling, increased pain, pus, or a foul smell at the incision site.

## 2020-05-05 NOTE — ANESTHESIA PROCEDURE NOTES
Peripheral Block    Patient location during procedure: pre-op   Block not for primary anesthetic.  Reason for block: at surgeon's request and post-op pain management   Post-op Pain Location: left arm  Start time: 5/5/2020 10:39 AM  Timeout: 5/5/2020 10:27 AM   End time: 5/5/2020 10:43 AM    Staffing  Authorizing Provider: Eloy Castillo MD  Performing Provider: Lele Esposito MD    Preanesthetic Checklist  Completed: patient identified, site marked, surgical consent, pre-op evaluation, timeout performed, IV checked, risks and benefits discussed and monitors and equipment checked  Peripheral Block  Patient position: supine  Prep: ChloraPrep  Patient monitoring: heart rate, cardiac monitor, continuous pulse ox, continuous capnometry and frequent blood pressure checks  Block type: supraclavicular  Laterality: left  Injection technique: single shot  Needle  Needle type: Stimuplex   Needle gauge: 22 G  Needle length: 2 in  Needle localization: anatomical landmarks and ultrasound guidance   -ultrasound image captured on disc.  Assessment  Injection assessment: negative aspiration, negative parasthesia and local visualized surrounding nerve  Paresthesia pain: none  Heart rate change: no  Slow fractionated injection: yes  Additional Notes  VSS.  [facility] RN monitoring vitals throughout procedure.  Patient tolerated procedure well.     30CC 1.5%lidocaine with 1:200,000 epi

## 2020-05-05 NOTE — TRANSFER OF CARE
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 Transported from OR on 2-3 L/min O2 by NC with adequate spontaneous ventilation    Post pain: adequate analgesia    Post assessment: no apparent anesthetic complications    Post vital signs: stable    Level of consciousness: awake, alert and oriented    Nausea/Vomiting: no nausea/vomiting    Complications: none    Transfer of care protocol was followed      Last vitals:   Visit Vitals  /65   Pulse 94   Temp 37 °C (98.6 °F) (Skin)   Resp 16   Ht 5' 4" (1.626 m)   Wt 54.4 kg (120 lb)   SpO2 97%   Breastfeeding? No   BMI 20.60 kg/m²     "

## 2020-05-05 NOTE — PLAN OF CARE
1027: timeout competed with Dr. Castillo and Dr. Esposito for left supraclavicular nerve block; consents x 2 verified: 1mg versed ivp given by Dr. Castillo    1039: nerve block started; vss    1043: nerve block finished; vss; pt tolerated well

## 2020-05-05 NOTE — TELEPHONE ENCOUNTER
Shortness of breath could be from multiple factors including pulmonary problems or her current CHF.      I ordered a follow-up with Cardiology for possible optimization of the CHF treatment.    Follow-up with pulmonary specialist for qualification for oxygen.    Please contact the patient with appointments.    Schedule follow-up with me.

## 2020-05-05 NOTE — ANESTHESIA POSTPROCEDURE EVALUATION
Anesthesia Post Evaluation    Patient: Erin Clark    Procedure(s) Performed: Procedure(s) (LRB):  PTA, Repair left upper arm anuerysm AV FISTULA (Left)    Final Anesthesia Type: MAC    Patient location during evaluation: PACU  Patient participation: Yes- Able to Participate  Level of consciousness: awake and alert  Post-procedure vital signs: reviewed and stable  Pain management: adequate  Airway patency: patent    PONV status at discharge: No PONV  Anesthetic complications: no      Cardiovascular status: blood pressure returned to baseline  Respiratory status: unassisted  Hydration status: euvolemic            Vitals Value Taken Time   /56 5/5/2020  1:31 PM   Temp 37 °C (98.6 °F) 5/5/2020  9:04 AM   Pulse 62 5/5/2020  1:31 PM   Resp 24 5/5/2020  1:31 PM   SpO2 98 % 5/5/2020  1:31 PM   Vitals shown include unvalidated device data.      No case tracking events are documented in the log.      Pain/Mauro Score: Mauro Score: 9 (5/5/2020 12:50 PM)

## 2020-05-05 NOTE — TELEPHONE ENCOUNTER
----- Message from Mariah Miranda sent at 5/5/2020  4:12 PM CDT -----  Contact: [phone]/ Daughter, Michelle  Patient requesting to speak with you regarding getting the patient a oxygen tank because she has to gasp for air during the night. Please advise.

## 2020-05-05 NOTE — H&P
History of Present Illness:  Patient is a 84 y.o. female presents with            Chief Complaint   Patient presents with    Vascular Access Problem       pt c/o possible aneursym s/p left upper arm AV Fistula; pt being dialyzed M, F                Review of patient's allergies indicates:   Allergen Reactions    Aspirin Nausea Only and Swelling       Able to tolerated in small doses            Current Medications          Current Outpatient Medications   Medication Sig Dispense Refill    amLODIPine (NORVASC) 10 MG tablet TAKE 1 TABLET BY MOUTH ONCE DAILY 90 tablet 3    aspirin (ECOTRIN) 81 MG EC tablet TAKE ONE TABLET BY MOUTH ONCE DAILY 30 tablet 6    atorvastatin (LIPITOR) 40 MG tablet Take 1 tablet (40 mg total) by mouth once daily. 90 tablet 3    benzonatate (TESSALON) 100 MG capsule Take 100 mg by mouth 3 (three) times daily as needed.        carvedilol (COREG) 25 MG tablet TAKE 1 TABLET BY MOUTH TWICE DAILY 180 tablet 3    ergocalciferol (ERGOCALCIFEROL) 50,000 unit Cap Take 1 capsule (50,000 Units total) by mouth every 7 days. 30 capsule 3    famotidine (PEPCID) 20 MG tablet Take 1 tablet (20 mg total) by mouth once daily. 30 tablet 11    furosemide (LASIX) 40 MG tablet Take 1 tablet (40 mg total) by mouth once daily. 90 tablet 4    isosorbide mononitrate (IMDUR) 120 MG 24 hr tablet TAKE 1 TABLET BY MOUTH ONCE DAILY 90 tablet 3    nitroGLYCERIN (NITROSTAT) 0.4 MG SL tablet Place 1 tablet (0.4 mg total) under the tongue every 5 (five) minutes as needed for Chest pain. 20 tablet 12    vitamin renal formula, B-complex-vitamin c-folic acid, (NEPHROCAP) 1 mg Cap Take 1 capsule by mouth once daily. 30 capsule 3      No current facility-administered medications for this visit.             Past Medical History:   Diagnosis Date    CHF (congestive heart failure)      Coronary artery disease      Diabetes mellitus      Encounter for blood transfusion      ESRD (end stage renal disease) 03/2014      "dialysis M-F    GERD (gastroesophageal reflux disease)      High cholesterol      Hypertension              Past Surgical History:   Procedure Laterality Date    AV FISTULA PLACEMENT Left 9/2014    bilateral fem-pop        CENTRAL VENOUS CATHETER TUNNELED INSERTION DOUBLE LUMEN Right 2/2014    CORONARY ARTERY BYPASS GRAFT   2/14/2014      x 3 vessels    VASCULAR SURGERY                Family History   Problem Relation Age of Onset    Hypertension Mother      Heart disease Mother      Heart attack Mother        Social History           Tobacco Use    Smoking status: Never Smoker    Smokeless tobacco: Never Used   Substance Use Topics    Alcohol use: No    Drug use: No         Review of Systems:     Review of Systems   Constitutional: Negative.    HENT: Negative.    Eyes: Negative.    Respiratory: Negative.    Cardiovascular: Negative.    Gastrointestinal: Negative.    Genitourinary: Negative.    Musculoskeletal: Negative.    Skin: Negative.    Neurological: Negative.    Psychiatric/Behavioral: Negative.          OBJECTIVE:      Vital Signs (Most Recent)  Pulse: 91 (04/20/20 1419)  Resp: 17 (04/20/20 1419)  BP: (!) 176/79 (04/20/20 1419)  5' 4" (1.626 m)  54 kg (119 lb)      Physical Exam:     Physical Exam   Constitutional: She is oriented to person, place, and time. She appears well-developed and well-nourished.   HENT:   Head: Normocephalic.   Eyes: Pupils are equal, round, and reactive to light. Conjunctivae and EOM are normal.   Neck: Normal range of motion. Neck supple.   Cardiovascular: Normal rate, regular rhythm, normal heart sounds and intact distal pulses.   Pulmonary/Chest: Effort normal and breath sounds normal.   Abdominal: Soft. Bowel sounds are normal.   Musculoskeletal: Normal range of motion.   Neurological: She is alert and oriented to person, place, and time. She has normal reflexes.   Skin: Skin is warm and dry.         Laboratory        Diagnostic Results:        ASSESSMENT/PLAN: "      Av fistula aneurysm , crf, htn, dm      PLAN:Plan   Repair av fistula left arm today.

## 2020-05-05 NOTE — TELEPHONE ENCOUNTER
Spoke with daughter shwetha and she reports mother is having breathing problems at night and throughout the day. Daughter is requesting home O2. Informed daughter that mother will have to have test done PFT in order to qualify for home O2 with insurance. Informed will send message to the Dr and call back with recommendations. Daughter voices understanding.

## 2020-05-05 NOTE — ANESTHESIA PREPROCEDURE EVALUATION
05/05/2020  Erin Clark is a 84 y.o., female ESRD, CHF DMII, GERD, HTn presents for AV fitula left arm reconstruction. Pt currently on HD.         Chief Complaint   Patient presents with    Vascular Access Problem       pt c/o possible aneursym s/p left upper arm AV Fistula; pt being dialyzed M, F                    Review of patient's allergies indicates:   Allergen Reactions    Aspirin Nausea Only and Swelling       Able to tolerated in small doses            Current Medications               Current Outpatient Medications   Medication Sig Dispense Refill    amLODIPine (NORVASC) 10 MG tablet TAKE 1 TABLET BY MOUTH ONCE DAILY 90 tablet 3    aspirin (ECOTRIN) 81 MG EC tablet TAKE ONE TABLET BY MOUTH ONCE DAILY 30 tablet 6    atorvastatin (LIPITOR) 40 MG tablet Take 1 tablet (40 mg total) by mouth once daily. 90 tablet 3    benzonatate (TESSALON) 100 MG capsule Take 100 mg by mouth 3 (three) times daily as needed.        carvedilol (COREG) 25 MG tablet TAKE 1 TABLET BY MOUTH TWICE DAILY 180 tablet 3    ergocalciferol (ERGOCALCIFEROL) 50,000 unit Cap Take 1 capsule (50,000 Units total) by mouth every 7 days. 30 capsule 3    famotidine (PEPCID) 20 MG tablet Take 1 tablet (20 mg total) by mouth once daily. 30 tablet 11    furosemide (LASIX) 40 MG tablet Take 1 tablet (40 mg total) by mouth once daily. 90 tablet 4    isosorbide mononitrate (IMDUR) 120 MG 24 hr tablet TAKE 1 TABLET BY MOUTH ONCE DAILY 90 tablet 3    nitroGLYCERIN (NITROSTAT) 0.4 MG SL tablet Place 1 tablet (0.4 mg total) under the tongue every 5 (five) minutes as needed for Chest pain. 20 tablet 12    vitamin renal formula, B-complex-vitamin c-folic acid, (NEPHROCAP) 1 mg Cap Take 1 capsule by mouth once daily. 30 capsule 3      No current facility-administered medications for this visit.                  Past Medical History:    Diagnosis Date    CHF (congestive heart failure)      Coronary artery disease      Diabetes mellitus      Encounter for blood transfusion      ESRD (end stage renal disease) 03/2014     dialysis M-F    GERD (gastroesophageal reflux disease)      High cholesterol      Hypertension                  Past Surgical History:   Procedure Laterality Date    AV FISTULA PLACEMENT Left 9/2014    bilateral fem-pop        CENTRAL VENOUS CATHETER TUNNELED INSERTION DOUBLE LUMEN Right 2/2014    CORONARY ARTERY BYPASS GRAFT   2/14/2014      x 3 vessels    VASCULAR SURGERY                    Family History   Problem Relation Age of Onset    Hypertension Mother      Heart disease Mother      Heart attack Mother        Social History              Tobacco Use    Smoking status: Never Smoker    Smokeless tobacco: Never Used   Substance Use Topics    Alcohol use: No    Drug use: No        Anesthesia Evaluation    I have reviewed the Patient Summary Reports.    I have reviewed the Nursing Notes.   I have reviewed the Medications.     Review of Systems  Anesthesia Hx:  No problems with previous Anesthesia  History of prior surgery of interest to airway management or planning: Denies Family Hx of Anesthesia complications.   Denies Personal Hx of Anesthesia complications.   Hematology/Oncology:  Hematology Normal   Oncology Normal     EENT/Dental:EENT/Dental Normal   Cardiovascular:   Exercise tolerance: good Hypertension CAD   CHF    Pulmonary:   Shortness of breath    Renal/:   Chronic Renal Disease, ESRD    Hepatic/GI:   GERD    Musculoskeletal:  Musculoskeletal Normal    Neurological:  Neurology Normal    Endocrine:   Diabetes    Dermatological:  Skin Normal    Psych:  Psychiatric Normal              Anesthesia Plan  Type of Anesthesia, risks & benefits discussed:  Anesthesia Type:  general, MAC, regional  Patient's Preference:   Intra-op Monitoring Plan:   Intra-op Monitoring Plan Comments:   Post Op Pain Control  Plan: multimodal analgesia  Post Op Pain Control Plan Comments:   Induction:   IV  Beta Blocker:  Patient is on a Beta-Blocker and has received one dose within the past 24 hours (No further documentation required).       Informed Consent: Patient understands risks and agrees with Anesthesia plan.  Questions answered. Anesthesia consent signed with patient.  ASA Score: 3     Day of Surgery Review of History & Physical:  There are no significant changes.      Anesthesia Plan Notes: Pending same day labs.         Ready For Surgery From Anesthesia Perspective.

## 2020-05-05 NOTE — BRIEF OP NOTE
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]  Chronic diastolic congestive heart failure [I50.32]  ESRD (end stage renal disease) [N18.6]  Type 2 diabetes mellitus with chronic kidney disease on chronic dialysis, with long-term current use of insulin [E11.22, N18.6, Z99.2, Z79.4]    Post-op Diagnosis: Post-Op Diagnosis Codes:     * Essential hypertension [I10]     * Aneurysm of arteriovenous dialysis fistula, initial encounter [T82.747U]     * Chronic diastolic congestive heart failure [I50.32]     * ESRD (end stage renal disease) [N18.6]     * Type 2 diabetes mellitus with chronic kidney disease on chronic dialysis, with long-term current use of insulin [E11.22, N18.6, Z99.2, Z79.4]    Procedure(s) (LRB):  PTA, Repair left upper arm anuerysm AV FISTULA (Left)    Anesthesia: Regional    Procedure in Detail/Findings:  After satisfactory IV sedation and regional block left arm was prepped and draped normal sterile manner using ChloraPrep stockinette was applied time-out was called patient was properly recognized extremity was confirmed incision was made at the area of the AV fistula junction at the takeoff of in up and down fashion was taken down to the deep subcutaneous tissue subcutaneous bleeders were clamped and bovied dissections were carried down deep and proximal control of the AV fistula was performed at the arterial anastomosis then incision was carried down on top of the aneurysm and outflow of the AV fistula was performed excess wall of the AV fistula aneurysm was then excised repair was accomplished using a running 5 0 Prolene suture hemostasis was perfectly maintained wound was thoroughly irrigated antibiotic solution patient had good preop to completion of procedure wound was then closed using running 3 0 Vicryl  subcutaneous tissue skin was closed using running 4 nylon suture Xeroform 4 x 4 Kerlix and Ace bandage dressing was applied instrument counts sponge count counts correct patient tolerated well estimated blood loss was 100 cc specimen removed was AV fistula aneurysm wall no intraop complication patient had good distal pulses was sent to recovery room in stable condition.    Estimated Blood Loss:  100 cc         Specimens (From admission, onward)     Start     Ordered    05/05/20 1219  Specimen to Pathology, Surgery General Surgery  Once     Question:  Procedure Type:  Answer:  General Surgery    05/05/20 1222              Implants: * No implants in log *           Disposition: PACU - hemodynamically stable.           Condition: Good    Attestation:  I performed the procedure.           Discharge Note    Admit Date: 5/5/2020    Attending Physician: Doris Mary MD     Discharge Physician: Doris Mary MD    Final Diagnosis: Post-Op Diagnosis Codes:     * Essential hypertension [I10]     * Aneurysm of arteriovenous dialysis fistula, initial encounter [T82.898A]     * Chronic diastolic congestive heart failure [I50.32]     * ESRD (end stage renal disease) [N18.6]     * Type 2 diabetes mellitus with chronic kidney disease on chronic dialysis, with long-term current use of insulin [E11.22, N18.6, Z99.2, Z79.4]    Disposition: Home or Self Care       Patient Instructions:   Current Discharge Medication List      START taking these medications    Details   HYDROcodone-acetaminophen (NORCO) 5-325 mg per tablet Take 1 tablet by mouth every 6 (six) hours as needed for Pain.  Qty: 20 tablet, Refills: 0    Comments: Quantity prescribed more than 7 day supply? Yes, quantity medically necessary         CONTINUE these medications which have NOT CHANGED    Details   amLODIPine (NORVASC) 10 MG tablet TAKE 1 TABLET BY MOUTH ONCE DAILY  Qty: 90 tablet, Refills: 3    Associated Diagnoses: Coronary artery disease  involving native coronary artery without angina pectoris, unspecified whether native or transplanted heart; Essential hypertension; Peripheral arterial occlusive disease; S/P CABG x 1; Hyperlipidemia LDL goal <70      carvedilol (COREG) 25 MG tablet TAKE 1 TABLET BY MOUTH TWICE DAILY  Qty: 180 tablet, Refills: 3    Associated Diagnoses: Coronary artery disease involving native coronary artery without angina pectoris, unspecified whether native or transplanted heart; Essential hypertension; Peripheral arterial occlusive disease; S/P CABG x 1; Hyperlipidemia LDL goal <70      aspirin (ECOTRIN) 81 MG EC tablet TAKE ONE TABLET BY MOUTH ONCE DAILY  Qty: 30 tablet, Refills: 6      atorvastatin (LIPITOR) 40 MG tablet Take 1 tablet (40 mg total) by mouth once daily.  Qty: 90 tablet, Refills: 3      benzonatate (TESSALON) 100 MG capsule Take 100 mg by mouth 3 (three) times daily as needed.      ergocalciferol (ERGOCALCIFEROL) 50,000 unit Cap Take 1 capsule (50,000 Units total) by mouth every 7 days.  Qty: 30 capsule, Refills: 3      famotidine (PEPCID) 20 MG tablet Take 1 tablet (20 mg total) by mouth once daily.  Qty: 30 tablet, Refills: 11      furosemide (LASIX) 40 MG tablet Take 1 tablet (40 mg total) by mouth once daily.  Qty: 90 tablet, Refills: 4    Associated Diagnoses: Coronary artery disease involving native coronary artery without angina pectoris, unspecified whether native or transplanted heart; Essential hypertension; Peripheral arterial occlusive disease; S/P CABG x 1; Hyperlipidemia LDL goal <70      isosorbide mononitrate (IMDUR) 120 MG 24 hr tablet TAKE 1 TABLET BY MOUTH ONCE DAILY  Qty: 90 tablet, Refills: 3    Associated Diagnoses: Coronary artery disease involving native coronary artery without angina pectoris, unspecified whether native or transplanted heart; Essential hypertension; Peripheral arterial occlusive disease; S/P CABG x 1; Hyperlipidemia LDL goal <70      nitroGLYCERIN (NITROSTAT) 0.4 MG SL  tablet Place 1 tablet (0.4 mg total) under the tongue every 5 (five) minutes as needed for Chest pain.  Qty: 20 tablet, Refills: 12      promethazine-dextromethorphan (PROMETHAZINE-DM) 6.25-15 mg/5 mL Syrp TAKE 5 ML BY MOUTH  4 TIMES DAILY AS NEEDED  Qty: 180 mL, Refills: 0    Associated Diagnoses: Cough      vitamin renal formula, B-complex-vitamin c-folic acid, (NEPHROCAP) 1 mg Cap Take 1 capsule by mouth once daily.  Qty: 30 capsule, Refills: 3             Discharge Procedure Orders (renal diet, no heavy lifting , keep dressing dry    Discharge Procedure Orders (must include Diet, Follow-up, Activity)   COVID-19 Routine Screening   Standing Status: Future Number of Occurrences: 1 Standing Exp. Date: 06/30/21     Order Specific Question Answer Comments   Is the patient symptomatic? No      Diet general     Keep surgical extremity elevated     Lifting restrictions     Call MD for:  temperature >100.4     Call MD for:  persistent nausea and vomiting     Call MD for:  severe uncontrolled pain     Call MD for:  redness, tenderness, or signs of infection (pain, swelling, redness, odor or green/yellow discharge around incision site)     Leave dressing on - Keep it clean, dry, and intact until clinic visit        Discharge Date: 5/5/2020

## 2020-05-06 ENCOUNTER — TELEPHONE (OUTPATIENT)
Dept: FAMILY MEDICINE | Facility: CLINIC | Age: 85
End: 2020-05-06

## 2020-05-06 NOTE — TELEPHONE ENCOUNTER
Attempted to call Michelle with appointments, no answer left detailed voicemail to call the office back

## 2020-05-06 NOTE — TELEPHONE ENCOUNTER
appoitnemts were scheduled, attempted to reach mrs. Martinez, no answer left voicemail to call us back.

## 2020-05-06 NOTE — TELEPHONE ENCOUNTER
----- Message from Reggie Elizondo sent at 5/6/2020  2:40 PM CDT -----  Contact: [phone]/ Daughter, Michelle  Patient is returning a phone call.  Who left a message for the patient: Ivana Draper  Does patient know what this is regarding:  Appoimtment  Comments:

## 2020-05-06 NOTE — TELEPHONE ENCOUNTER
----- Message from Brianda Lynch sent at 5/6/2020 12:35 PM CDT -----  Contact: [phone]/ Daughter, Michelle  States she called 3 times yesterday, spoke with you and was supposed to get a call back this morning and hasn't. Please advise.

## 2020-05-07 ENCOUNTER — OFFICE VISIT (OUTPATIENT)
Dept: PULMONOLOGY | Facility: CLINIC | Age: 85
End: 2020-05-07
Payer: MEDICARE

## 2020-05-07 DIAGNOSIS — G47.30 SLEEP APNEA, UNSPECIFIED TYPE: Primary | ICD-10-CM

## 2020-05-07 DIAGNOSIS — R06.02 SOB (SHORTNESS OF BREATH): ICD-10-CM

## 2020-05-07 LAB
FINAL PATHOLOGIC DIAGNOSIS: NORMAL
GROSS: NORMAL

## 2020-05-07 PROCEDURE — 99443 PR PHYSICIAN TELEPHONE EVALUATION 21-30 MIN: CPT | Mod: 95,,, | Performed by: NURSE PRACTITIONER

## 2020-05-07 PROCEDURE — 99443 PR PHYSICIAN TELEPHONE EVALUATION 21-30 MIN: ICD-10-PCS | Mod: 95,,, | Performed by: NURSE PRACTITIONER

## 2020-05-07 NOTE — PROGRESS NOTES
Established Patient - Audio Only Telehealth Visit     The patient location is: Home in [place]  The chief complaint leading to consultation is: shortness of breath  Visit type: Virtual visit with audio only (telephone)  Total time spent with patient: 21 mins       The reason for the audio only service rather than synchronous audio and video virtual visit was related to technical difficulties or patient preference/necessity.     Each patient to whom I provide medical services by telemedicine is:  (1) informed of the relationship between the physician and patient and the respective role of any other health care provider with respect to management of the patient; and (2) notified that they may decline to receive medical services by telemedicine and may withdraw from such care at any time. Patient verbally consented to receive this service via voice-only telephone call.    HPI:    Ms. Clark is 85-year-old female with history of end-stage renal disease on dialysis Mondays and Fridays, chronic diastolic congestive heart failure, hypertension, type 2 diabetes and status post CABG presenting for audio visit in relation to shortness of breath.  Patient was referred to Pulmonary Services by PCP.  Patient's daughter was also on the audio visit as she is a caregiver for her mother.  She denies past medical history of asthma or COPD.  She denies smoking history.  Discloses her shortness of breath has been present for months to years.  She denies cough or wheezing.  Denies fever or chills.  Daughter reports witnessed apnea and snoring.  Patient endorses excessive daytime fatigue. Has appt with nephrology tomorrow. Explains has appt with Cariology on Tuesday.      Assessment and plan:      Shortness of breath:    Etiology of patient's shortness of breath is unclear at this time.  This is a chronic condition. Differentials include related to heart disease, kidney disease, sleep apnea, deconditioning, lung disease or multifactorial  component. After discussion with both patient and daughter, they would like to follow with both Nephrology and Cardiology before coming into pulmonary lab to have further workup for lung disease.  I agree with their decision because of her history of chronic diastolic heart failure and end-stage renal disease.  Will place ambulatory referral to sleep medicine for possible home sleep study evaluation.    Patient's daughter is to follow-up in 1 week after following with Nephrology and Cardiology.  At that time, we will further evaluate possible workup for lung disease.    Sleep Apnea:      Daughter reports witnessed apnea and snoring.  Patient explains having excessive daytime sleepiness.  Patient has history of chronic diastolic heart disease.    - Ambulatory referral to sleep medicine for possible home sleep study.     This service was not originating from a related E/M service provided within the previous 7 days nor will  to an E/M service or procedure within the next 24 hours or my soonest available appointment.  Prevailing standard of care was able to be met in this audio-only visit.

## 2020-05-08 ENCOUNTER — PATIENT OUTREACH (OUTPATIENT)
Dept: ADMINISTRATIVE | Facility: OTHER | Age: 85
End: 2020-05-08

## 2020-05-08 DIAGNOSIS — E11.9 DIABETES MELLITUS WITHOUT COMPLICATION: Primary | ICD-10-CM

## 2020-05-08 NOTE — PROGRESS NOTES
Patient's chart was reviewed.   Requested updates within Care Everywhere.  Immunizations reconciled.    Order placed for A1c.  Health Maintenance was updated.

## 2020-05-12 ENCOUNTER — OFFICE VISIT (OUTPATIENT)
Dept: CARDIOLOGY | Facility: CLINIC | Age: 85
End: 2020-05-12
Payer: MEDICARE

## 2020-05-12 DIAGNOSIS — T82.898S ANEURYSM OF ARTERIOVENOUS DIALYSIS FISTULA, SEQUELA: ICD-10-CM

## 2020-05-12 DIAGNOSIS — I10 ESSENTIAL HYPERTENSION: ICD-10-CM

## 2020-05-12 DIAGNOSIS — D63.1 ANEMIA DUE TO CHRONIC KIDNEY DISEASE, ON CHRONIC DIALYSIS: ICD-10-CM

## 2020-05-12 DIAGNOSIS — R07.9 CHEST PAIN, UNSPECIFIED TYPE: ICD-10-CM

## 2020-05-12 DIAGNOSIS — Z99.2 TYPE 2 DIABETES MELLITUS WITH CHRONIC KIDNEY DISEASE ON CHRONIC DIALYSIS, WITH LONG-TERM CURRENT USE OF INSULIN: ICD-10-CM

## 2020-05-12 DIAGNOSIS — E78.5 HYPERLIPIDEMIA LDL GOAL <70: ICD-10-CM

## 2020-05-12 DIAGNOSIS — N18.6 ANEMIA DUE TO CHRONIC KIDNEY DISEASE, ON CHRONIC DIALYSIS: ICD-10-CM

## 2020-05-12 DIAGNOSIS — Z99.2 ANEMIA DUE TO CHRONIC KIDNEY DISEASE, ON CHRONIC DIALYSIS: ICD-10-CM

## 2020-05-12 DIAGNOSIS — Z79.4 TYPE 2 DIABETES MELLITUS WITH CHRONIC KIDNEY DISEASE ON CHRONIC DIALYSIS, WITH LONG-TERM CURRENT USE OF INSULIN: ICD-10-CM

## 2020-05-12 DIAGNOSIS — E11.22 TYPE 2 DIABETES MELLITUS WITH CHRONIC KIDNEY DISEASE ON CHRONIC DIALYSIS, WITH LONG-TERM CURRENT USE OF INSULIN: ICD-10-CM

## 2020-05-12 DIAGNOSIS — R07.9 CHRONIC CHEST PAIN: ICD-10-CM

## 2020-05-12 DIAGNOSIS — R05.9 COUGH: ICD-10-CM

## 2020-05-12 DIAGNOSIS — T82.898D ANEURYSM OF ARTERIOVENOUS DIALYSIS FISTULA, SUBSEQUENT ENCOUNTER: ICD-10-CM

## 2020-05-12 DIAGNOSIS — N18.6 TYPE 2 DIABETES MELLITUS WITH CHRONIC KIDNEY DISEASE ON CHRONIC DIALYSIS, WITH LONG-TERM CURRENT USE OF INSULIN: ICD-10-CM

## 2020-05-12 DIAGNOSIS — Z95.1 S/P CABG X 1: ICD-10-CM

## 2020-05-12 DIAGNOSIS — I25.10 CORONARY ARTERY DISEASE INVOLVING NATIVE HEART, ANGINA PRESENCE UNSPECIFIED, UNSPECIFIED VESSEL OR LESION TYPE: ICD-10-CM

## 2020-05-12 DIAGNOSIS — G47.30 SLEEP APNEA, UNSPECIFIED TYPE: ICD-10-CM

## 2020-05-12 DIAGNOSIS — I50.32 CHRONIC DIASTOLIC CONGESTIVE HEART FAILURE: ICD-10-CM

## 2020-05-12 DIAGNOSIS — I77.9 PERIPHERAL ARTERIAL OCCLUSIVE DISEASE: ICD-10-CM

## 2020-05-12 DIAGNOSIS — N18.6 ESRD (END STAGE RENAL DISEASE): ICD-10-CM

## 2020-05-12 DIAGNOSIS — I50.42 CHRONIC COMBINED SYSTOLIC AND DIASTOLIC CONGESTIVE HEART FAILURE: Primary | ICD-10-CM

## 2020-05-12 DIAGNOSIS — G89.29 CHRONIC CHEST PAIN: ICD-10-CM

## 2020-05-12 PROCEDURE — 1101F PT FALLS ASSESS-DOCD LE1/YR: CPT | Mod: CPTII,95,, | Performed by: INTERNAL MEDICINE

## 2020-05-12 PROCEDURE — 1159F PR MEDICATION LIST DOCUMENTED IN MEDICAL RECORD: ICD-10-PCS | Mod: 95,,, | Performed by: INTERNAL MEDICINE

## 2020-05-12 PROCEDURE — 99214 PR OFFICE/OUTPT VISIT, EST, LEVL IV, 30-39 MIN: ICD-10-PCS | Mod: 95,,, | Performed by: INTERNAL MEDICINE

## 2020-05-12 PROCEDURE — 1101F PR PT FALLS ASSESS DOC 0-1 FALLS W/OUT INJ PAST YR: ICD-10-PCS | Mod: CPTII,95,, | Performed by: INTERNAL MEDICINE

## 2020-05-12 PROCEDURE — 1159F MED LIST DOCD IN RCRD: CPT | Mod: 95,,, | Performed by: INTERNAL MEDICINE

## 2020-05-12 PROCEDURE — 99214 OFFICE O/P EST MOD 30 MIN: CPT | Mod: 95,,, | Performed by: INTERNAL MEDICINE

## 2020-05-12 PROCEDURE — 99499 UNLISTED E&M SERVICE: CPT | Mod: 95,,, | Performed by: INTERNAL MEDICINE

## 2020-05-12 PROCEDURE — 99499 RISK ADDL DX/OHS AUDIT: ICD-10-PCS | Mod: 95,,, | Performed by: INTERNAL MEDICINE

## 2020-05-12 NOTE — LETTER
May 20, 2020      Bayron Golden MD  [address] [facility] [address]           [place] - Cardiology  [address], [facility] [address]  [facility] [address]  Phone: [phone]          Patient: Erin Clark   MR Number: [id number]   YOB: 1935   Date of Visit: 5/12/2020       Dear Dr. Bayron Golden:    Thank you for referring Erin Clark to me for evaluation. Attached you will find relevant portions of my assessment and plan of care.    If you have questions, please do not hesitate to call me. I look forward to following Erin Clark along with you.    Sincerely,    Brian Sanchez MD    Enclosure  CC:  No Recipients    If you would like to receive this communication electronically, please contact [email or web address] or [phone] to request more information on [facility] access.    For providers and/or their staff who would like to refer a patient to [facility], please contact us through our one-stop-shop provider referral line, [facility], at [phone].    If you feel you have received this communication in error or would no longer like to receive these types of communications, please e-mail [email or web address]

## 2020-05-13 ENCOUNTER — OFFICE VISIT (OUTPATIENT)
Dept: FAMILY MEDICINE | Facility: CLINIC | Age: 85
End: 2020-05-13
Payer: MEDICARE

## 2020-05-13 DIAGNOSIS — Z79.4 TYPE 2 DIABETES MELLITUS WITH CHRONIC KIDNEY DISEASE ON CHRONIC DIALYSIS, WITH LONG-TERM CURRENT USE OF INSULIN: Primary | ICD-10-CM

## 2020-05-13 DIAGNOSIS — E11.22 TYPE 2 DIABETES MELLITUS WITH CHRONIC KIDNEY DISEASE ON CHRONIC DIALYSIS, WITH LONG-TERM CURRENT USE OF INSULIN: Primary | ICD-10-CM

## 2020-05-13 DIAGNOSIS — Z99.2 TYPE 2 DIABETES MELLITUS WITH CHRONIC KIDNEY DISEASE ON CHRONIC DIALYSIS, WITH LONG-TERM CURRENT USE OF INSULIN: Primary | ICD-10-CM

## 2020-05-13 DIAGNOSIS — N18.6 ESRD (END STAGE RENAL DISEASE): ICD-10-CM

## 2020-05-13 DIAGNOSIS — I50.42 CHRONIC COMBINED SYSTOLIC AND DIASTOLIC CONGESTIVE HEART FAILURE: ICD-10-CM

## 2020-05-13 DIAGNOSIS — N18.6 TYPE 2 DIABETES MELLITUS WITH CHRONIC KIDNEY DISEASE ON CHRONIC DIALYSIS, WITH LONG-TERM CURRENT USE OF INSULIN: Primary | ICD-10-CM

## 2020-05-13 PROCEDURE — 99442 PR PHYSICIAN TELEPHONE EVALUATION 11-20 MIN: CPT | Mod: 95,,, | Performed by: FAMILY MEDICINE

## 2020-05-13 PROCEDURE — 99442 PR PHYSICIAN TELEPHONE EVALUATION 11-20 MIN: ICD-10-PCS | Mod: 95,,, | Performed by: FAMILY MEDICINE

## 2020-05-13 NOTE — PROGRESS NOTES
Established Patient - Audio Only Telehealth Visit     The patient location is:  [place]  The chief complaint leading to consultation is:  Diabetes/hypertension/kidney disease  Visit type: Virtual visit with audio only (telephone)  Total time spent with patient: 11 min       The reason for the audio only service rather than synchronous audio and video virtual visit was related to technical difficulties or patient preference/necessity.     Each patient to whom I provide medical services by telemedicine is:  (1) informed of the relationship between the physician and patient and the respective role of any other health care provider with respect to management of the patient; and (2) notified that they may decline to receive medical services by telemedicine and may withdraw from such care at any time. Patient verbally consented to receive this service via voice-only telephone call.       HPI:  85 years old female who was evaluated by telemedicine.  Patient currently in hemodialysis.  Blood sugar and blood pressure is being good during hemodialysis.  No heart failure flare ups.  No chest pain, palpitation, orthopnea or PND.  Patient with good compliance with medical regimen.     Assessment and plan:  Diagnoses and all orders for this visit:    Type 2 diabetes mellitus with chronic kidney disease on chronic dialysis, with long-term current use of insulin    Chronic combined systolic and diastolic congestive heart failure    ESRD (end stage renal disease)       Continue monitoring blood sugar at home,ADA diet.  Renal diet.                     This service was not originating from a related E/M service provided within the previous 7 days nor will  to an E/M service or procedure within the next 24 hours or my soonest available appointment.  Prevailing standard of care was able to be met in this audio-only visit.

## 2020-05-14 NOTE — PROGRESS NOTES
Subjective:    Patient ID:  Erin Clark is a 85 y.o. female who presents for evaluation of Coronary Artery Disease and Congestive Heart Failure      HPI    The patient location is: [place], home  The chief complaint leading to consultation is: Chest pain, SOB  Visit type: audiovisual  Total time spent with patient: 45 mins  Each patient to whom he or she provides medical services by telemedicine is:  (1) informed of the relationship between the physician and patient and the respective role of any other health care provider with respect to management of the patient; and (2) notified that he or she may decline to receive medical services by telemedicine and may withdraw from such care at any time.    Notes:      84 y/o Hungarian speaking female who has seen Dr Ware in the past. She has a hx of 3V CAD with abnomal EF s/p CABG x 3 with RCA , normalization of EF to 55%, PET with no ischemia 2015, ESRD on HD, PAD without claudication. Has been having substernal CP, non radiating, intermittently related to exertion. Intermittent QUEZADA. Denies orthopnea, PND, syncope, palps, LE edema,claudication. Compliant with meds.     Review of Systems   Constitution: Negative for malaise/fatigue.   HENT: Negative for congestion.    Eyes: Negative for blurred vision.   Cardiovascular: Positive for chest pain and dyspnea on exertion. Negative for claudication, cyanosis, irregular heartbeat, leg swelling, near-syncope, orthopnea, palpitations, paroxysmal nocturnal dyspnea and syncope.   Respiratory: Positive for shortness of breath.    Endocrine: Negative for polyuria.   Hematologic/Lymphatic: Negative for bleeding problem.   Skin: Negative for itching and rash.   Musculoskeletal: Negative for joint swelling, muscle cramps and muscle weakness.   Gastrointestinal: Negative for abdominal pain, hematemesis, hematochezia, melena, nausea and vomiting.   Genitourinary: Negative for dysuria and hematuria.   Neurological: Negative for dizziness,  focal weakness, headaches, light-headedness, loss of balance and weakness.   Psychiatric/Behavioral: Negative for depression. The patient is not nervous/anxious.         Objective:    Physical Exam   Constitutional: She is oriented to person, place, and time. She appears well-developed and well-nourished.   HENT:   Head: Normocephalic and atraumatic.   Eyes: EOM are normal.   Neck: No JVD present.   Pulmonary/Chest: Effort normal.   Neurological: She is alert and oriented to person, place, and time.   Psychiatric: She has a normal mood and affect. Her behavior is normal. Judgment and thought content normal.         Assessment:       1. Chronic combined systolic and diastolic congestive heart failure    2. Chronic chest pain    3. Coronary artery disease involving native heart, angina presence unspecified, unspecified vessel or lesion type    4. Chest pain, unspecified type    5. S/P CABG x 1    6. Essential hypertension    7. Hyperlipidemia LDL goal <70    8. Peripheral arterial occlusive disease    9. Aneurysm of arteriovenous dialysis fistula, subsequent encounter    10. Chronic diastolic congestive heart failure    11. Aneurysm of arteriovenous dialysis fistula, sequela    12. Cough    13. ESRD (end stage renal disease)    14. Anemia due to chronic kidney disease, on chronic dialysis    15. 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 Sleep apnea, unspecified type      85 year old patient with hx and presentation as above. Has risk factors for having CAD and will evaluate symptoms with noninvasive cardiac stress imaging and 2DE. Recommended that if CP worsens present to ED for evaluation. Risk factor modification. Discussed the etiology, evaluation, and management of chest pain, CAD, CABG, HTN, HLD, PAD, CHF, ESRD. Discussed the importance of med compliance, heart healthy diet, and regular exercise.        Plan:       -Nuclear SPECT  -2DE  with CFD  -f/u in 1 month

## 2020-06-17 ENCOUNTER — OFFICE VISIT (OUTPATIENT)
Dept: FAMILY MEDICINE | Facility: CLINIC | Age: 85
End: 2020-06-17
Payer: MEDICARE

## 2020-06-17 DIAGNOSIS — Z20.822 SUSPECTED COVID-19 VIRUS INFECTION: ICD-10-CM

## 2020-06-17 DIAGNOSIS — J41.1 MUCOPURULENT CHRONIC BRONCHITIS: ICD-10-CM

## 2020-06-17 DIAGNOSIS — R05.9 COUGH: ICD-10-CM

## 2020-06-17 DIAGNOSIS — N18.6 ESRD (END STAGE RENAL DISEASE): Primary | ICD-10-CM

## 2020-06-17 PROCEDURE — 99442 PR PHYSICIAN TELEPHONE EVALUATION 11-20 MIN: ICD-10-PCS | Mod: 95,,, | Performed by: FAMILY MEDICINE

## 2020-06-17 PROCEDURE — 99442 PR PHYSICIAN TELEPHONE EVALUATION 11-20 MIN: CPT | Mod: 95,,, | Performed by: FAMILY MEDICINE

## 2020-06-17 RX ORDER — ALBUTEROL SULFATE 90 UG/1
2 AEROSOL, METERED RESPIRATORY (INHALATION) EVERY 6 HOURS PRN
Qty: 18 G | Refills: 0 | Status: SHIPPED | OUTPATIENT
Start: 2020-06-17 | End: 2020-06-18

## 2020-06-17 RX ORDER — PROMETHAZINE HYDROCHLORIDE AND DEXTROMETHORPHAN HYDROBROMIDE 6.25; 15 MG/5ML; MG/5ML
5 SYRUP ORAL 4 TIMES DAILY PRN
Qty: 180 ML | Refills: 0 | Status: SHIPPED | OUTPATIENT
Start: 2020-06-17 | End: 2021-02-11

## 2020-06-17 RX ORDER — MOXIFLOXACIN HYDROCHLORIDE 400 MG/1
400 TABLET ORAL DAILY
Qty: 5 TABLET | Refills: 0 | Status: SHIPPED | OUTPATIENT
Start: 2020-06-17 | End: 2020-06-22

## 2020-06-17 NOTE — PROGRESS NOTES
Established Patient - Audio Only Telehealth Visit     The patient location is:  [place]  The chief complaint leading to consultation is:  Cough and wheezing for the last week.  Visit type: Virtual visit with audio only (telephone)  Total time spent with patient: 12 min       The reason for the audio only service rather than synchronous audio and video virtual visit was related to technical difficulties or patient preference/necessity.     Each patient to whom I provide medical services by telemedicine is:  (1) informed of the relationship between the physician and patient and the respective role of any other health care provider with respect to management of the patient; and (2) notified that they may decline to receive medical services by telemedicine and may withdraw from such care at any time. Patient verbally consented to receive this service via voice-only telephone call.       HPI:  85 years old female who was evaluated by telemedicine with cough and congestion for the last week.  The cough is productive with yellowish sputum associated with episodic wheezing.  No fever or chills.  No shortness of breath.  Patient currently on hemodialysis Monday and Friday.     Assessment and plan:  Diagnoses and all orders for this visit:    ESRD (end stage renal disease)    Cough  -     promethazine-dextromethorphan (PROMETHAZINE-DM) 6.25-15 mg/5 mL Syrp; Take 5 mLs by mouth 4 (four) times daily as needed.  -     albuterol (VENTOLIN HFA) 90 mcg/actuation inhaler; Inhale 2 puffs into the lungs every 6 (six) hours as needed for Wheezing. Rescue    Mucopurulent chronic bronchitis  -     moxifloxacin (AVELOX) 400 mg tablet; Take 1 tablet (400 mg total) by mouth once daily. for 5 days  -     X-Ray Chest PA And Lateral; Future  -     albuterol (VENTOLIN HFA) 90 mcg/actuation inhaler; Inhale 2 puffs into the lungs every 6 (six) hours as needed for Wheezing. Rescue    Suspected Covid-19 Virus Infection  -     COVID-19 Routine  Screening; Future                            This service was not originating from a related E/M service provided within the previous 7 days nor will  to an E/M service or procedure within the next 24 hours or my soonest available appointment.  Prevailing standard of care was able to be met in this audio-only visit.

## 2020-06-18 ENCOUNTER — TELEPHONE (OUTPATIENT)
Dept: FAMILY MEDICINE | Facility: CLINIC | Age: 85
End: 2020-06-18

## 2020-06-18 ENCOUNTER — LAB VISIT (OUTPATIENT)
Dept: FAMILY MEDICINE | Facility: CLINIC | Age: 85
End: 2020-06-18
Payer: MEDICARE

## 2020-06-18 ENCOUNTER — HOSPITAL ENCOUNTER (OUTPATIENT)
Dept: RADIOLOGY | Facility: HOSPITAL | Age: 85
Discharge: HOME | End: 2020-06-18
Attending: FAMILY MEDICINE
Payer: MEDICARE

## 2020-06-18 DIAGNOSIS — J41.1 MUCOPURULENT CHRONIC BRONCHITIS: ICD-10-CM

## 2020-06-18 DIAGNOSIS — Z20.822 SUSPECTED COVID-19 VIRUS INFECTION: ICD-10-CM

## 2020-06-18 DIAGNOSIS — J41.1 MUCOPURULENT CHRONIC BRONCHITIS: Primary | ICD-10-CM

## 2020-06-18 PROCEDURE — 71046 XR CHEST PA AND LATERAL: ICD-10-PCS | Mod: 26,,, | Performed by: RADIOLOGY

## 2020-06-18 PROCEDURE — 71046 X-RAY EXAM CHEST 2 VIEWS: CPT | Mod: TC,FY

## 2020-06-18 PROCEDURE — 71046 X-RAY EXAM CHEST 2 VIEWS: CPT | Mod: 26,,, | Performed by: RADIOLOGY

## 2020-06-18 PROCEDURE — U0003 INFECTIOUS AGENT DETECTION BY NUCLEIC ACID (DNA OR RNA); SEVERE ACUTE RESPIRATORY SYNDROME CORONAVIRUS 2 (SARS-COV-2) (CORONAVIRUS DISEASE [COVID-19]), AMPLIFIED PROBE TECHNIQUE, MAKING USE OF HIGH THROUGHPUT TECHNOLOGIES AS DESCRIBED BY CMS-2020-01-R: HCPCS

## 2020-06-18 RX ORDER — ALBUTEROL SULFATE 90 UG/1
2 AEROSOL, METERED RESPIRATORY (INHALATION) EVERY 6 HOURS PRN
Qty: 18 G | Refills: 0 | Status: SHIPPED | OUTPATIENT
Start: 2020-06-18

## 2020-06-18 NOTE — TELEPHONE ENCOUNTER
----- Message from Mariah Miranda sent at 6/18/2020  9:28 AM CDT -----  Regarding: Medication Substitute  Contact: Vinita with [facility]  Vinita with [facility] called in regards to patient medication albuterol (VENTOLIN HFA) 90 mcg/actuation inhaler. She says it is not covered by the patient insurance and wanted to know if they could dispense the Pro Air instead. Their number is [phone]. Please advise.

## 2020-06-18 NOTE — TELEPHONE ENCOUNTER
I spoke to patients brother and let him know that patient needed a covid test and a chest xray do to a cough. The brother said that patient and  were not in and that he would relay the message to the patient but he said that she really did not need those test since she had done a covid test 3 weeks ago.He said he would try to get the patient to call me back.

## 2020-06-19 LAB — SARS-COV-2 RNA RESP QL NAA+PROBE: NOT DETECTED

## 2020-07-23 ENCOUNTER — TELEPHONE (OUTPATIENT)
Dept: FAMILY MEDICINE | Facility: CLINIC | Age: 85
End: 2020-07-23

## 2020-07-23 NOTE — TELEPHONE ENCOUNTER
Patient complain of having abdominal pain with sob.i advised to go to urgent care or emergency room. Patient voices understanding.

## 2020-07-24 ENCOUNTER — HOSPITAL ENCOUNTER (INPATIENT)
Facility: HOSPITAL | Age: 85
LOS: 4 days | Discharge: HOME | DRG: 683 | End: 2020-07-28
Attending: EMERGENCY MEDICINE | Admitting: INTERNAL MEDICINE
Payer: MEDICARE

## 2020-07-24 ENCOUNTER — TELEPHONE (OUTPATIENT)
Dept: FAMILY MEDICINE | Facility: CLINIC | Age: 85
End: 2020-07-24

## 2020-07-24 DIAGNOSIS — R09.02 HYPOXIA: ICD-10-CM

## 2020-07-24 DIAGNOSIS — N18.6 ESRD (END STAGE RENAL DISEASE): ICD-10-CM

## 2020-07-24 DIAGNOSIS — R06.02 SOB (SHORTNESS OF BREATH): Primary | ICD-10-CM

## 2020-07-24 DIAGNOSIS — I50.32 CHRONIC DIASTOLIC CONGESTIVE HEART FAILURE: ICD-10-CM

## 2020-07-24 DIAGNOSIS — J18.9 PNEUMONIA OF BOTH LUNGS DUE TO INFECTIOUS ORGANISM, UNSPECIFIED PART OF LUNG: ICD-10-CM

## 2020-07-24 DIAGNOSIS — I25.10 CORONARY ARTERY DISEASE INVOLVING NATIVE CORONARY ARTERY WITHOUT ANGINA PECTORIS, UNSPECIFIED WHETHER NATIVE OR TRANSPLANTED HEART: ICD-10-CM

## 2020-07-24 DIAGNOSIS — R07.9 CHEST PAIN: ICD-10-CM

## 2020-07-24 LAB
ALBUMIN SERPL BCP-MCNC: 3.5 G/DL (ref 3.5–5.2)
ALP SERPL-CCNC: 110 U/L (ref 55–135)
ALT SERPL W/O P-5'-P-CCNC: 12 U/L (ref 10–44)
ANION GAP SERPL CALC-SCNC: 11 MMOL/L (ref 8–16)
AST SERPL-CCNC: 20 U/L (ref 10–40)
BACTERIA #/AREA URNS HPF: NORMAL /HPF
BASOPHILS # BLD AUTO: 0.04 K/UL (ref 0–0.2)
BASOPHILS NFR BLD: 0.4 % (ref 0–1.9)
BILIRUB SERPL-MCNC: 1.3 MG/DL (ref 0.1–1)
BILIRUB UR QL STRIP: NEGATIVE
BNP SERPL-MCNC: 1702 PG/ML (ref 0–99)
BUN SERPL-MCNC: 31 MG/DL (ref 8–23)
CALCIUM SERPL-MCNC: 9.5 MG/DL (ref 8.7–10.5)
CHLORIDE SERPL-SCNC: 105 MMOL/L (ref 95–110)
CLARITY UR: CLEAR
CO2 SERPL-SCNC: 22 MMOL/L (ref 23–29)
COLOR UR: YELLOW
CREAT SERPL-MCNC: 2.9 MG/DL (ref 0.5–1.4)
CRP SERPL-MCNC: 52.2 MG/L (ref 0–8.2)
DIFFERENTIAL METHOD: ABNORMAL
EOSINOPHIL # BLD AUTO: 0.2 K/UL (ref 0–0.5)
EOSINOPHIL NFR BLD: 1.3 % (ref 0–8)
ERYTHROCYTE [DISTWIDTH] IN BLOOD BY AUTOMATED COUNT: 14.3 % (ref 11.5–14.5)
EST. GFR  (AFRICAN AMERICAN): 17 ML/MIN/1.73 M^2
EST. GFR  (NON AFRICAN AMERICAN): 14 ML/MIN/1.73 M^2
GLUCOSE SERPL-MCNC: 142 MG/DL (ref 70–110)
GLUCOSE UR QL STRIP: ABNORMAL
HCT VFR BLD AUTO: 35.6 % (ref 37–48.5)
HGB BLD-MCNC: 11.3 G/DL (ref 12–16)
HGB UR QL STRIP: ABNORMAL
HYALINE CASTS #/AREA URNS LPF: 0 /LPF
IMM GRANULOCYTES # BLD AUTO: 0.04 K/UL (ref 0–0.04)
IMM GRANULOCYTES NFR BLD AUTO: 0.4 % (ref 0–0.5)
KETONES UR QL STRIP: NEGATIVE
LDH SERPL L TO P-CCNC: 234 U/L (ref 110–260)
LEUKOCYTE ESTERASE UR QL STRIP: NEGATIVE
LIPASE SERPL-CCNC: 28 U/L (ref 4–60)
LYMPHOCYTES # BLD AUTO: 1 K/UL (ref 1–4.8)
LYMPHOCYTES NFR BLD: 8.5 % (ref 18–48)
MCH RBC QN AUTO: 27.8 PG (ref 27–31)
MCHC RBC AUTO-ENTMCNC: 31.7 G/DL (ref 32–36)
MCV RBC AUTO: 88 FL (ref 82–98)
MICROSCOPIC COMMENT: NORMAL
MONOCYTES # BLD AUTO: 0.5 K/UL (ref 0.3–1)
MONOCYTES NFR BLD: 4.8 % (ref 4–15)
NEUTROPHILS # BLD AUTO: 9.4 K/UL (ref 1.8–7.7)
NEUTROPHILS NFR BLD: 84.6 % (ref 38–73)
NITRITE UR QL STRIP: NEGATIVE
NRBC BLD-RTO: 0 /100 WBC
PH UR STRIP: 6 [PH] (ref 5–8)
PLATELET # BLD AUTO: 342 K/UL (ref 150–350)
PMV BLD AUTO: 9 FL (ref 9.2–12.9)
POCT GLUCOSE: 174 MG/DL (ref 70–110)
POTASSIUM SERPL-SCNC: 4 MMOL/L (ref 3.5–5.1)
PROT SERPL-MCNC: 7.9 G/DL (ref 6–8.4)
PROT UR QL STRIP: ABNORMAL
RBC # BLD AUTO: 4.06 M/UL (ref 4–5.4)
RBC #/AREA URNS HPF: 3 /HPF (ref 0–4)
SARS-COV-2 RDRP RESP QL NAA+PROBE: NEGATIVE
SODIUM SERPL-SCNC: 138 MMOL/L (ref 136–145)
SP GR UR STRIP: 1.01 (ref 1–1.03)
SQUAMOUS #/AREA URNS HPF: 3 /HPF
TROPONIN I SERPL DL<=0.01 NG/ML-MCNC: 0.03 NG/ML (ref 0–0.03)
TROPONIN I SERPL DL<=0.01 NG/ML-MCNC: 0.12 NG/ML (ref 0–0.03)
URN SPEC COLLECT METH UR: ABNORMAL
UROBILINOGEN UR STRIP-ACNC: NEGATIVE EU/DL
WBC # BLD AUTO: 11.14 K/UL (ref 3.9–12.7)
WBC #/AREA URNS HPF: 2 /HPF (ref 0–5)

## 2020-07-24 PROCEDURE — 90935 HEMODIALYSIS ONE EVALUATION: CPT

## 2020-07-24 PROCEDURE — 86140 C-REACTIVE PROTEIN: CPT

## 2020-07-24 PROCEDURE — 85652 RBC SED RATE AUTOMATED: CPT

## 2020-07-24 PROCEDURE — 63600175 PHARM REV CODE 636 W HCPCS: Performed by: STUDENT IN AN ORGANIZED HEALTH CARE EDUCATION/TRAINING PROGRAM

## 2020-07-24 PROCEDURE — 85730 THROMBOPLASTIN TIME PARTIAL: CPT

## 2020-07-24 PROCEDURE — 84145 PROCALCITONIN (PCT): CPT

## 2020-07-24 PROCEDURE — 25000242 PHARM REV CODE 250 ALT 637 W/ HCPCS: Performed by: EMERGENCY MEDICINE

## 2020-07-24 PROCEDURE — 11000001 HC ACUTE MED/SURG PRIVATE ROOM

## 2020-07-24 PROCEDURE — G0378 HOSPITAL OBSERVATION PER HR: HCPCS

## 2020-07-24 PROCEDURE — 25500020 PHARM REV CODE 255: Performed by: EMERGENCY MEDICINE

## 2020-07-24 PROCEDURE — 83880 ASSAY OF NATRIURETIC PEPTIDE: CPT

## 2020-07-24 PROCEDURE — 36415 COLL VENOUS BLD VENIPUNCTURE: CPT

## 2020-07-24 PROCEDURE — G0257 UNSCHED DIALYSIS ESRD PT HOS: HCPCS

## 2020-07-24 PROCEDURE — 86706 HEP B SURFACE ANTIBODY: CPT

## 2020-07-24 PROCEDURE — 83036 HEMOGLOBIN GLYCOSYLATED A1C: CPT

## 2020-07-24 PROCEDURE — 85610 PROTHROMBIN TIME: CPT

## 2020-07-24 PROCEDURE — 93005 ELECTROCARDIOGRAM TRACING: CPT

## 2020-07-24 PROCEDURE — 80053 COMPREHEN METABOLIC PANEL: CPT

## 2020-07-24 PROCEDURE — 63600175 PHARM REV CODE 636 W HCPCS

## 2020-07-24 PROCEDURE — 99900035 HC TECH TIME PER 15 MIN (STAT)

## 2020-07-24 PROCEDURE — 99285 EMERGENCY DEPT VISIT HI MDM: CPT | Mod: 25

## 2020-07-24 PROCEDURE — 84484 ASSAY OF TROPONIN QUANT: CPT | Mod: 91

## 2020-07-24 PROCEDURE — 94640 AIRWAY INHALATION TREATMENT: CPT

## 2020-07-24 PROCEDURE — 87340 HEPATITIS B SURFACE AG IA: CPT

## 2020-07-24 PROCEDURE — 83690 ASSAY OF LIPASE: CPT

## 2020-07-24 PROCEDURE — 85379 FIBRIN DEGRADATION QUANT: CPT

## 2020-07-24 PROCEDURE — 84484 ASSAY OF TROPONIN QUANT: CPT

## 2020-07-24 PROCEDURE — 96372 THER/PROPH/DIAG INJ SC/IM: CPT

## 2020-07-24 PROCEDURE — 83615 LACTATE (LD) (LDH) ENZYME: CPT

## 2020-07-24 PROCEDURE — 86704 HEP B CORE ANTIBODY TOTAL: CPT

## 2020-07-24 PROCEDURE — 25000003 PHARM REV CODE 250: Performed by: INTERNAL MEDICINE

## 2020-07-24 PROCEDURE — 81000 URINALYSIS NONAUTO W/SCOPE: CPT

## 2020-07-24 PROCEDURE — 27000221 HC OXYGEN, UP TO 24 HOURS

## 2020-07-24 PROCEDURE — 82728 ASSAY OF FERRITIN: CPT

## 2020-07-24 PROCEDURE — 83540 ASSAY OF IRON: CPT

## 2020-07-24 PROCEDURE — 84443 ASSAY THYROID STIM HORMONE: CPT

## 2020-07-24 PROCEDURE — U0002 COVID-19 LAB TEST NON-CDC: HCPCS

## 2020-07-24 PROCEDURE — 25000003 PHARM REV CODE 250: Performed by: STUDENT IN AN ORGANIZED HEALTH CARE EDUCATION/TRAINING PROGRAM

## 2020-07-24 PROCEDURE — 85025 COMPLETE CBC W/AUTO DIFF WBC: CPT

## 2020-07-24 RX ORDER — ATORVASTATIN CALCIUM 40 MG/1
40 TABLET, FILM COATED ORAL DAILY
Status: DISCONTINUED | OUTPATIENT
Start: 2020-07-24 | End: 2020-07-28 | Stop reason: HOSPADM

## 2020-07-24 RX ORDER — SODIUM CHLORIDE 9 MG/ML
INJECTION, SOLUTION INTRAVENOUS
Status: DISCONTINUED | OUTPATIENT
Start: 2020-07-24 | End: 2020-07-28 | Stop reason: HOSPADM

## 2020-07-24 RX ORDER — CARVEDILOL 25 MG/1
25 TABLET ORAL 2 TIMES DAILY
Status: DISCONTINUED | OUTPATIENT
Start: 2020-07-24 | End: 2020-07-28 | Stop reason: HOSPADM

## 2020-07-24 RX ORDER — SODIUM CHLORIDE 9 MG/ML
INJECTION, SOLUTION INTRAVENOUS ONCE
Status: DISCONTINUED | OUTPATIENT
Start: 2020-07-24 | End: 2020-07-28 | Stop reason: HOSPADM

## 2020-07-24 RX ORDER — ISOSORBIDE MONONITRATE 30 MG/1
120 TABLET, EXTENDED RELEASE ORAL DAILY
Status: DISCONTINUED | OUTPATIENT
Start: 2020-07-24 | End: 2020-07-28 | Stop reason: HOSPADM

## 2020-07-24 RX ORDER — HEPARIN SODIUM 5000 [USP'U]/ML
5000 INJECTION, SOLUTION INTRAVENOUS; SUBCUTANEOUS EVERY 8 HOURS
Status: DISCONTINUED | OUTPATIENT
Start: 2020-07-24 | End: 2020-07-28 | Stop reason: HOSPADM

## 2020-07-24 RX ORDER — FUROSEMIDE 10 MG/ML
INJECTION INTRAMUSCULAR; INTRAVENOUS
Status: COMPLETED
Start: 2020-07-24 | End: 2020-07-24

## 2020-07-24 RX ORDER — MUPIROCIN 20 MG/G
OINTMENT TOPICAL 2 TIMES DAILY
Status: DISCONTINUED | OUTPATIENT
Start: 2020-07-24 | End: 2020-07-28 | Stop reason: HOSPADM

## 2020-07-24 RX ORDER — ASPIRIN 81 MG/1
81 TABLET ORAL DAILY
Status: DISCONTINUED | OUTPATIENT
Start: 2020-07-24 | End: 2020-07-28 | Stop reason: HOSPADM

## 2020-07-24 RX ORDER — FAMOTIDINE 20 MG/1
20 TABLET, FILM COATED ORAL DAILY
Status: DISCONTINUED | OUTPATIENT
Start: 2020-07-24 | End: 2020-07-28 | Stop reason: HOSPADM

## 2020-07-24 RX ORDER — SODIUM CHLORIDE 0.9 % (FLUSH) 0.9 %
10 SYRINGE (ML) INJECTION
Status: DISCONTINUED | OUTPATIENT
Start: 2020-07-24 | End: 2020-07-28 | Stop reason: HOSPADM

## 2020-07-24 RX ORDER — FUROSEMIDE 40 MG/1
40 TABLET ORAL DAILY
Status: DISCONTINUED | OUTPATIENT
Start: 2020-07-24 | End: 2020-07-28 | Stop reason: HOSPADM

## 2020-07-24 RX ORDER — IPRATROPIUM BROMIDE AND ALBUTEROL SULFATE 2.5; .5 MG/3ML; MG/3ML
3 SOLUTION RESPIRATORY (INHALATION)
Status: COMPLETED | OUTPATIENT
Start: 2020-07-24 | End: 2020-07-24

## 2020-07-24 RX ORDER — ERGOCALCIFEROL 1.25 MG/1
50000 CAPSULE ORAL
Status: DISCONTINUED | OUTPATIENT
Start: 2020-07-25 | End: 2020-07-28 | Stop reason: HOSPADM

## 2020-07-24 RX ORDER — AMLODIPINE BESYLATE 5 MG/1
10 TABLET ORAL DAILY
Status: DISCONTINUED | OUTPATIENT
Start: 2020-07-24 | End: 2020-07-25

## 2020-07-24 RX ADMIN — FUROSEMIDE 40 MG: 40 TABLET ORAL at 09:07

## 2020-07-24 RX ADMIN — HEPARIN SODIUM 5000 UNITS: 5000 INJECTION INTRAVENOUS; SUBCUTANEOUS at 09:07

## 2020-07-24 RX ADMIN — ASPIRIN 81 MG: 81 TABLET, COATED ORAL at 09:07

## 2020-07-24 RX ADMIN — CARVEDILOL 25 MG: 25 TABLET, FILM COATED ORAL at 09:07

## 2020-07-24 RX ADMIN — FUROSEMIDE 80 MG: 10 INJECTION, SOLUTION INTRAVENOUS at 03:07

## 2020-07-24 RX ADMIN — MUPIROCIN: 20 OINTMENT TOPICAL at 09:07

## 2020-07-24 RX ADMIN — ATORVASTATIN CALCIUM 40 MG: 40 TABLET, FILM COATED ORAL at 09:07

## 2020-07-24 RX ADMIN — IOHEXOL 100 ML: 350 INJECTION, SOLUTION INTRAVENOUS at 02:07

## 2020-07-24 RX ADMIN — AMLODIPINE BESYLATE 10 MG: 5 TABLET ORAL at 09:07

## 2020-07-24 RX ADMIN — FAMOTIDINE 20 MG: 20 TABLET ORAL at 09:07

## 2020-07-24 RX ADMIN — IPRATROPIUM BROMIDE AND ALBUTEROL SULFATE 3 ML: .5; 3 SOLUTION RESPIRATORY (INHALATION) at 02:07

## 2020-07-24 RX ADMIN — ISOSORBIDE MONONITRATE 120 MG: 30 TABLET, EXTENDED RELEASE ORAL at 09:07

## 2020-07-24 RX ADMIN — NEPHROCAP 1 CAPSULE: 1 CAP ORAL at 09:07

## 2020-07-24 RX ADMIN — NITROGLYCERIN 0.5 INCH: 20 OINTMENT TOPICAL at 03:07

## 2020-07-24 NOTE — ED PROVIDER NOTES
"Encounter Date: 7/24/2020    SCRIBE #1 NOTE: I, Susanne Miranda, am scribing for, and in the presence of,  Dr. Mobley . I have scribed the entire note.       History     Chief Complaint   Patient presents with    Chest Pain     chest pain with sob since last night , denies radiation, n/v or abd pain.pt broke out in sweat.    Shortness of Breath     Time seen by provider: 11:16 AM    This is a 85 y.o. female with a past medical history of hypertension, diabetes and CHF who presents with two days of constant substernal mid-epigastric chest discomfort. No aggravating or alleviating symptoms mentioned. Pt denies any chest pain. Sx's are described as a "weird" sensation that starts in the middle of her chest and radiates into her upper abdomen. She mentions associated shortness of breath and diaphoresis but denies any nausea, vomiting or abdominal pain at this time. Sx's are resolved at this time. Patient currently on dialysis and receives treatments on Monday/Friday. Last dialysis treatment was 7/20/2020. Pt states she did not go today due to current symptoms. Of note pt is followed by Dr. Cohen in nephrology.     The history is provided by the patient.     Review of patient's allergies indicates:   Allergen Reactions    Aspirin Nausea Only and Swelling     Able to tolerated in small doses       Past Medical History:   Diagnosis Date    CHF (congestive heart failure)     Colon polyps 06/03/2013    Coronary artery disease     Diabetes mellitus     Encounter for blood transfusion     ESRD (end stage renal disease) 03/2014    dialysis M-F    GERD (gastroesophageal reflux disease)     High cholesterol     Hypertension      Past Surgical History:   Procedure Laterality Date    AV FISTULA PLACEMENT Left 9/2014    bilateral fem-pop      CENTRAL VENOUS CATHETER TUNNELED INSERTION DOUBLE LUMEN Right 2/2014    CORONARY ARTERY BYPASS GRAFT  2/14/2014     x 3 vessels    PERCUTANEOUS TRANSLUMINAL ANGIOPLASTY OF " ARTERIOVENOUS FISTULA Left 5/5/2020    Procedure: PTA, Repair left upper arm anuerysm AV FISTULA;  Surgeon: Doris Mary MD;  Location: [facility];  Service: General;  Laterality: Left;    VASCULAR SURGERY       Family History   Problem Relation Age of Onset    Hypertension Mother     Heart disease Mother     Heart attack Mother      Social History     Tobacco Use    Smoking status: Never Smoker    Smokeless tobacco: Never Used   Substance Use Topics    Alcohol use: No    Drug use: No     Review of Systems   Constitutional: Positive for diaphoresis.   Respiratory: Positive for shortness of breath.    Cardiovascular: Positive for chest pain.   All other systems reviewed and are negative.      Physical Exam     Initial Vitals [07/24/20 0950]   BP Pulse Resp Temp SpO2   (!) 185/82 102 16 98.1 °F (36.7 °C) (!) 84 %      MAP       --         Physical Exam    Nursing note and vitals reviewed.  Constitutional: She appears well-developed and well-nourished. She is not diaphoretic. No distress.   HENT:   Head: Normocephalic and atraumatic.   Mouth/Throat: Oropharynx is clear and moist.   Eyes: EOM are normal. Pupils are equal, round, and reactive to light.   Neck: No tracheal deviation present.   Cardiovascular: Normal rate, regular rhythm, normal heart sounds and intact distal pulses.   Pulmonary/Chest: Breath sounds normal. No stridor. No respiratory distress. She has no wheezes.   Abdominal: Soft. Bowel sounds are normal. She exhibits no distension and no mass. There is no abdominal tenderness.   Musculoskeletal: Normal range of motion. No edema.      Comments: AV fistula noted to the LUE with positive thrill    Neurological: She is alert and oriented to person, place, and time. She has normal strength. No cranial nerve deficit or sensory deficit.   Skin: Skin is warm and dry. Capillary refill takes less than 2 seconds. No pallor.   Psychiatric: She has a normal mood and affect. Her behavior is normal.  Thought content normal.         ED Course   Procedures  Labs Reviewed   CBC W/ AUTO DIFFERENTIAL - Abnormal; Notable for the following components:       Result Value    Hemoglobin 11.3 (*)     Hematocrit 35.6 (*)     Mean Corpuscular Hemoglobin Conc 31.7 (*)     MPV 9.0 (*)     Gran # (ANC) 9.4 (*)     Gran% 84.6 (*)     Lymph% 8.5 (*)     All other components within normal limits   COMPREHENSIVE METABOLIC PANEL - Abnormal; Notable for the following components:    CO2 22 (*)     Glucose 142 (*)     BUN, Bld 31 (*)     Creatinine 2.9 (*)     Total Bilirubin 1.3 (*)     eGFR if  17 (*)     eGFR if non  14 (*)     All other components within normal limits   TROPONIN I - Abnormal; Notable for the following components:    Troponin I 0.033 (*)     All other components within normal limits   B-TYPE NATRIURETIC PEPTIDE - Abnormal; Notable for the following components:    BNP 1,702 (*)     All other components within normal limits   SARS-COV-2 RNA AMPLIFICATION, QUAL     EKG Readings: (Independently Interpreted)   Sinus Tachycardia at a rate of 103 bpm. Occasional PAC's. Anterior septal Q-wave. QTC of 495.       ECG Results          EKG 12-lead (In process)  Result time 07/24/20 10:29:11    In process by Interface, Lab In [facility] (07/24/20 10:29:11)                 Narrative:    Test Reason : R07.9,    Vent. Rate : 103 BPM     Atrial Rate : 103 BPM     P-R Int : 168 ms          QRS Dur : 076 ms      QT Int : 378 ms       P-R-T Axes : 045 087 093 degrees     QTc Int : 495 ms    Sinus tachycardia with Premature atrial complexes  Anterior infarct ,age undetermined  Abnormal ECG  When compared with ECG of 05-MAY-2020 09:03,  T wave inversion no longer evident in Anterior-lateral leads    Referred By: System System           Confirmed By:                             Imaging Results          X-Ray Chest AP Portable (Final result)  Result time 07/24/20 11:37:16    Final result by Omari Hatfield MD  (07/24/20 11:37:16)                 Impression:      Moderate diffuse lung opacification, possible cardiogenic/ noncardiogenic pulmonary edema, pneumonia, or aspiration.    Small pleural effusions.      Electronically signed by: Omari Hatfield MD  Date:    07/24/2020  Time:    11:37             Narrative:    EXAMINATION:  XR CHEST AP PORTABLE    CLINICAL HISTORY:  Chest Pain;    TECHNIQUE:  Single frontal view of the chest was performed.    COMPARISON:  06/18/2020    FINDINGS:  Intact median sternotomy wires.  There is moderate diffuse lung opacification with indistinct pulmonary vascularity.  No gross pneumothorax.  Probable small pleural effusions.  Heart size stable.                                 Medical Decision Making:   Independently Interpreted Test(s):   I have ordered and independently interpreted X-rays - see prior notes.  I have ordered and independently interpreted EKG Reading(s) - see prior notes  Clinical Tests:   Lab Tests: Ordered and Reviewed  Radiological Study: Ordered and Reviewed  Medical Tests: Ordered and Reviewed  ED Management:  Patient will be admitted for further investigation of her CP and dyspnea complaint. Nephrology to be consulted for arrangement o f dialysis.                    ED Course as of Jul 25 2315 Fri Jul 24, 2020   1309 Spoke with Dr. Singleton who agrees with plan at this time. Will order CT of chest including abdomen to rule out PE and abdominal process per her pain. Will arrange for patient to receive dialysis later today.     [AJ]   1323 Spoke with Dr. Haq, who will arrange for patient to receive dialysis.     [AJ]      ED Course User Index  [AJ] Susanne Miranda                Clinical Impression:       ICD-10-CM ICD-9-CM   1. SOB (shortness of breath)  R06.02 786.05     J18.9 483.8     R09.02 799.02   4. Chest pain  R07.9 786.50   5. ESRD (end stage renal disease)  N18.6 585.6   6. Coronary artery disease involving native coronary artery without angina pectoris,  unspecified whether native or transplanted heart  I25.10 414.01   7. Chronic diastolic congestive heart failure  I50.32 428.32     428.0       I, Dr. Chandrakant Mobley, personally performed the services described in this documentation. All medical record entries made by the scribe were at my direction and in my presence.  I have reviewed the chart and agree that the record reflects my personal performance and is accurate and complete    Disposition:   Disposition: Admitted  Condition: Stable     ED Disposition Condition    Admit                           Chandrakant Mobley MD  07/25/20 5446

## 2020-07-24 NOTE — ED NOTES
Present to ED with SOB and epigastric pain. Reports pain started last night while lying down. Denies n/v or abd pain. No acute distress.

## 2020-07-24 NOTE — H&P
[facility] Medicine H&P Note     Admitting Team: [facility] Hospitalist Team B  Attending Physician: Santy Singleton MD  Resident: Debbie  Intern: Jonas     Date of Admit: 7/24/2020    Chief Complaint     Shortness of breath for 2 days     Subjective:      History of Present Illness:  86 yo female with history of CHF (EF 55 2019), DM2, ESRD on HD MF, GERD, HTN, PAD, HLD complains of shortness of breath that started yesterday.    She was laying down when it started and is not affected by activity. She denies chest pain, abdominal pain, cough , sore throat, ageusia, anosmia, leg pain. Denies history of blood clots. She has normal BMs.   Endorses leg swelling     Initially, she said this was the first time she had ever experienced anything like this, but upon further questioning, she mentions that she feels short of breath after dialysis. She has been receiving dialysis for 6 years and almost always experiences dyspnea after. Albuterol has been prescribed to her, but she does not have it available to use yet.  She says she took all of her meds for today and missed today's dialysis because she came to the ED  Dialysis Monday and Friday. Occasionally Wednesdays if needed. Furosemide bid 40mg on days she doesn't go to dialysis. She produces urine.     Patient lives with  and son. Per son, patient was dyspneic and complained of abdominal pain. They were concerned because she usually only becomes short of breath on days she receives dialysis. She is not on home oxygen, but the family would like for her to have it since she gets SOB frequently.     Past Medical History:  Past Medical History:   Diagnosis Date    CHF (congestive heart failure)     Colon polyps 06/03/2013    Coronary artery disease     Diabetes mellitus     Encounter for blood transfusion     ESRD (end stage renal disease) 03/2014    dialysis M-F    GERD (gastroesophageal reflux disease)     High cholesterol     Hypertension        Past Surgical  History:  Past Surgical History:   Procedure Laterality Date    AV FISTULA PLACEMENT Left 9/2014    bilateral fem-pop      CENTRAL VENOUS CATHETER TUNNELED INSERTION DOUBLE LUMEN Right 2/2014    CORONARY ARTERY BYPASS GRAFT  2/14/2014     x 3 vessels    PERCUTANEOUS TRANSLUMINAL ANGIOPLASTY OF ARTERIOVENOUS FISTULA Left 5/5/2020    Procedure: PTA, Repair left upper arm anuerysm AV FISTULA;  Surgeon: Doris Mary MD;  Location: [facility];  Service: General;  Laterality: Left;    VASCULAR SURGERY         Allergies:  Review of patient's allergies indicates:   Allergen Reactions    Aspirin Nausea Only and Swelling     Able to tolerated in small doses         Home Medications:  Prior to Admission medications    Medication Sig Start Date End Date Taking? Authorizing Provider   albuterol (PROAIR HFA) 90 mcg/actuation inhaler Inhale 2 puffs into the lungs every 6 (six) hours as needed for Wheezing. Rescue 6/18/20   Bayron Golden MD   amLODIPine (NORVASC) 10 MG tablet TAKE 1 TABLET BY MOUTH ONCE DAILY 8/30/19   AUBREE Moreno ANP   amlodipine-benazepril 2.5-10 mg (LOTREL) 2.5-10 mg per capsule  5/11/20   Historical Provider, MD   aspirin (ECOTRIN) 81 MG EC tablet TAKE ONE TABLET BY MOUTH ONCE DAILY 4/2/15   AUBREE Moreno ANP   atorvastatin (LIPITOR) 40 MG tablet Take 1 tablet (40 mg total) by mouth once daily. 3/23/17   Eulogio Ware MD   benzonatate (TESSALON) 100 MG capsule Take 100 mg by mouth 3 (three) times daily as needed. 1/17/20   Historical Provider, MD   carvedilol (COREG) 25 MG tablet TAKE 1 TABLET BY MOUTH TWICE DAILY 10/22/19   Eulogio Ware MD   ergocalciferol (ERGOCALCIFEROL) 50,000 unit Cap Take 1 capsule (50,000 Units total) by mouth every 7 days. 3/7/14   AUBREE Moreno ANP   famotidine (PEPCID) 20 MG tablet Take 1 tablet (20 mg total) by mouth once daily. 3/7/14 9/12/19  AUBREE Moreno ANP   furosemide (LASIX) 40 MG tablet Take 1 tablet (40 mg  total) by mouth once daily. 19   Eulogio Ware MD   furosemide (LASIX) 40 MG tablet  20   Historical Provider, MD   HYDROcodone-acetaminophen (NORCO) 5-325 mg per tablet Take 1 tablet by mouth every 6 (six) hours as needed for Pain. 20   Doris Mary MD   HYDROcodone-acetaminophen (NORCO) 5-325 mg per tablet  20   Historical Provider, MD   isosorbide mononitrate (IMDUR) 120 MG 24 hr tablet TAKE 1 TABLET BY MOUTH ONCE DAILY 19   Eulogio Ware MD   nitroGLYCERIN (NITROSTAT) 0.4 MG SL tablet Place 1 tablet (0.4 mg total) under the tongue every 5 (five) minutes as needed for Chest pain. 12/3/15 9/12/19  Eulogio Ware MD   predniSONE (DELTASONE) 5 MG tablet  3/20/20   Historical Provider, MD   vitamin renal formula, B-complex-vitamin c-folic acid, (NEPHROCAP) 1 mg Cap Take 1 capsule by mouth once daily. 19   Edwina Veronica MD     amlodipine 10, atorvastatin 40, imdur 120, lasix 40 qd (bid on days she doesn't get HD)  Asprin 81  Nephrocaps renal vitamins  Vit D  Coreg 25 bid. Held sometimes for hypotension  Albuterol inhaler - doesn't have yet  Famotidine prn for GERD  Per son    Family History:  Family History   Problem Relation Age of Onset    Hypertension Mother     Heart disease Mother     Heart attack Mother        Social History:  Social History     Tobacco Use    Smoking status: Never Smoker    Smokeless tobacco: Never Used   Substance Use Topics    Alcohol use: No    Drug use: No       Review of Systems:  Negative except above    Health Maintaince :   Primary Care Physician: Dr. Golden    Immunizations:   TDap UTD    Flu UTD  Pna UTD    Cancer Screening:  Colonoscopy: never per patient     Objective:   Last 24 Hour Vital Signs:  BP  Min: 183/81  Max: 229/150  Temp  Av.1 °F (36.7 °C)  Min: 98.1 °F (36.7 °C)  Max: 98.1 °F (36.7 °C)  Pulse  Av.4  Min: 95  Max: 135  Resp  Av.5  Min: 16  Max: 34  SpO2  Av.6 %  Min: 75 %  Max: 97 %  Height   Av' (152.4 cm)  Min: 5' (152.4 cm)  Max: 5' (152.4 cm)  Weight  Av.6 kg (116 lb)  Min: 52.6 kg (116 lb)  Max: 52.6 kg (116 lb)  Body mass index is 22.65 kg/m².  No intake/output data recorded.    Physical Examination:  General: Alert and oriented, well nourished, no acute distress.  Eye: PERRL, EOMI  Neck: Supple, non-tender, no JVD, trachea midline  Lungs: crackles primarily in the R lower lobe, labored respiration  Heart: Normal rate, regular rhythm, systolic murmur 2/6, gallop or edema. JVP 12   Abdomen: Soft, non-tender, non-distended, normal bowel sounds, no masses.  Extremities: 2+ pitting edema left leg. 1+ right leg, distal pulses 2+, fistula left arm  Skin: Skin is warm, dry and pink, no rashes or lesions  Neurologic: Awake, alert, and oriented X3, upper and lower extremity strength 5/5  Psychiatric: Cooperative, appropriate mood and affect.      Laboratory:  Most Recent Data:  CBC:   Lab Results   Component Value Date    WBC 11.14 2020    HGB 11.3 (L) 2020    HCT 35.6 (L) 2020     2020    MCV 88 2020    RDW 14.3 2020       BMP:   Lab Results   Component Value Date     2020    K 4.0 2020     2020    CO2 22 (L) 2020    BUN 31 (H) 2020    CREATININE 2.9 (H) 2020     (H) 2020    CALCIUM 9.5 2020    MG 1.9 2019    PHOS 3.3 2019     LFTs:   Lab Results   Component Value Date    PROT 7.9 2020    ALBUMIN 3.5 2020    BILITOT 1.3 (H) 2020    AST 20 2020    ALKPHOS 110 2020    ALT 12 2020     Coags:   Lab Results   Component Value Date    INR 1.2 2014     FLP:   Lab Results   Component Value Date    CHOL 223 (A) 2018    HDL 52 2018    LDLCALC 120 (A) 2018    TRIG 356 (A) 2018    CHOLHDL 42.9 2015     DM:   Lab Results   Component Value Date    HGBA1C 5.7 (H) 2019    HGBA1C 6.1 (H) 2019    HGBA1C 5.7  07/15/2018    LDLCALC 120 (A) 09/19/2018    CREATININE 2.9 (H) 07/24/2020     Thyroid:   Lab Results   Component Value Date    TSH 1.917 04/05/2019     Anemia:   Lab Results   Component Value Date    IRON 22 (L) 04/05/2019    TIBC 221 (L) 04/05/2019    FERRITIN 2,053 (H) 04/05/2019    IVIMDCGM80 1316 (H) 04/05/2019    FOLATE 19.7 04/05/2019     Cardiac:   Lab Results   Component Value Date    TROPONINI 0.033 (H) 07/24/2020    BNP 1,702 (H) 07/24/2020     Urinalysis:   Lab Results   Component Value Date    LABURIN ESCHERICHIA COLI  10,000 - 49,999 cfu/ml 04/15/2014    COLORU Yellow 04/05/2019    SPECGRAV 1.010 04/05/2019    NITRITE Positive (A) 04/05/2019    KETONESU Negative 04/05/2019    UROBILINOGEN 1.0 04/05/2019    WBCUA 7 (H) 04/05/2019       Trended Lab Data:  Recent Labs   Lab 07/24/20  1024   WBC 11.14   HGB 11.3*   HCT 35.6*      MCV 88   RDW 14.3      K 4.0      CO2 22*   BUN 31*   CREATININE 2.9*   *   PROT 7.9   ALBUMIN 3.5   BILITOT 1.3*   AST 20   ALKPHOS 110   ALT 12       Trended Cardiac Data:  Recent Labs   Lab 07/24/20  1024   TROPONINI 0.033*   BNP 1,702*       Other Results:  EKG (my interpretation):  sinus rhythm with PACs. Normal axis. LVH     Radiology:  Imaging Results          CT Abdomen Pelvis With Contrast (Final result)  Result time 07/24/20 14:46:07    Final result by Calvin Joseph DO (07/24/20 14:46:07)                 Impression:      CTA chest: No evidence for central or proximal segmental pulmonary embolism.  Mid to distal segmental pulmonary arteries limited by motion.    Multifocal somewhat nodular consolidative opacities within the lungs bilaterally concerning for inflammatory/infectious process with differential to include COVID-19.  Neoplasm felt less likely.    There is superimposed patchy ground-glass mosaic attenuation lungs with enlarged arterial to bronchus ratio in the upper lobes, reflux of contrast into the hepatic veins and bilateral pleural  effusions concerning for component of vascular congestion and edema.    Please note there is prominent to enlarged mediastinal and hilar lymph nodes    CT abdomen pelvis: No acute intra-abdominal findings allowing for motion.  Colonic diverticulosis without definite diverticulitis.  Extensive vascular calcification of the aorta.  Small caliber kidneys concerning for renal atrophy with slight volume loss and hypoattenuation in the kidneys bilaterally which may represent scarring though indeterminate and distorted by motion.  Follow-up dedicated renal imaging advised.      Electronically signed by: Calvin Joseph DO  Date:    07/24/2020  Time:    14:46             Narrative:    EXAMINATION:  CTA CHEST NON CORONARY; CT ABDOMEN PELVIS WITH CONTRAST    CLINICAL HISTORY:  PE suspected, high pretest prob;; Epigastric pain;Abdominal pain, acute, nonlocalized;    TECHNIQUE:  Low dose axial images, sagittal and coronal reformations were obtained from the thoracic inlet to the lung bases following the IV administration of 100 mL of Omnipaque 350.  Contrast timing was optimized to evaluate the pulmonary arteries.  MIP images were performed.    COMPARISON:  Chest x-ray 07/24/2020    FINDINGS:  CT chest: There is multifocal patchy and confluent opacities throughout the lungs bilaterally with superimposed ground-glass attenuation.  There is bilateral pleural effusions.  Lung opacities are nonspecific concerning for possible inflammatory/infectious process with differential to include COVID-19 pneumonia underlying neoplasm felt less likely but not excluded..  Superimposed component of vascular congestion and edema to be considered with reflux of contrast into the hepatic veins and enlarged arterial to bronchus ratio in the upper lobes.    Clinical correlation and follow-up recommended.  There is no intraluminal filling defect within the central or proximal segmental pulmonary arteries to suggest pulmonary embolism.  Mid to distal  segmental pulmonary arteries distorted by motion.    There are prominent to enlarged right paratracheal and prevascular lymph nodes with prevascular space node measuring 1.7 cm.  No pneumothorax.    Remote operative change from sternotomy with coronary artery bypass grafting.    CT abdomen pelvis: Study distorted by motion.  Liver, spleen, pancreas are grossly normal in size without focal lesion allowing for motion limitation.  No calcified gallstones in the gallbladder by CT criteria    The adrenal glands are within normal limits bilaterally.    The kidneys are small in caliber measuring 7-8 cm in length bilaterally.  Multifocal regions of hypoattenuation in the kidneys with slight volume loss concerning for scarring although evaluation is overall limited by motion.    Atherosclerotic plaquing of the aorta without evidence for aneurysmal dilatation.    There is colonic diverticulosis without definite acute diverticulitis.  No signs of appendicitis with normal caliber appendix identified.  Heavy calcified plaque at the origin of the celiac axis and SMA with the inferior mesenteric artery not well seen.    No free intraperitoneal gas or fluid.  No focal dilated loop of bowel to suggest bowel obstruction although limited by lack of oral contrast.    .    Probable surgical clips left groin incidental                               CTA Chest Non-Coronary (PE Study) (Final result)  Result time 07/24/20 14:46:07    Final result by Calvin Joseph DO (07/24/20 14:46:07)                 Impression:      CTA chest: No evidence for central or proximal segmental pulmonary embolism.  Mid to distal segmental pulmonary arteries limited by motion.    Multifocal somewhat nodular consolidative opacities within the lungs bilaterally concerning for inflammatory/infectious process with differential to include COVID-19.  Neoplasm felt less likely.    There is superimposed patchy ground-glass mosaic attenuation lungs with enlarged  arterial to bronchus ratio in the upper lobes, reflux of contrast into the hepatic veins and bilateral pleural effusions concerning for component of vascular congestion and edema.    Please note there is prominent to enlarged mediastinal and hilar lymph nodes    CT abdomen pelvis: No acute intra-abdominal findings allowing for motion.  Colonic diverticulosis without definite diverticulitis.  Extensive vascular calcification of the aorta.  Small caliber kidneys concerning for renal atrophy with slight volume loss and hypoattenuation in the kidneys bilaterally which may represent scarring though indeterminate and distorted by motion.  Follow-up dedicated renal imaging advised.      Electronically signed by: Calvin Joseph DO  Date:    07/24/2020  Time:    14:46             Narrative:    EXAMINATION:  CTA CHEST NON CORONARY; CT ABDOMEN PELVIS WITH CONTRAST    CLINICAL HISTORY:  PE suspected, high pretest prob;; Epigastric pain;Abdominal pain, acute, nonlocalized;    TECHNIQUE:  Low dose axial images, sagittal and coronal reformations were obtained from the thoracic inlet to the lung bases following the IV administration of 100 mL of Omnipaque 350.  Contrast timing was optimized to evaluate the pulmonary arteries.  MIP images were performed.    COMPARISON:  Chest x-ray 07/24/2020    FINDINGS:  CT chest: There is multifocal patchy and confluent opacities throughout the lungs bilaterally with superimposed ground-glass attenuation.  There is bilateral pleural effusions.  Lung opacities are nonspecific concerning for possible inflammatory/infectious process with differential to include COVID-19 pneumonia underlying neoplasm felt less likely but not excluded..  Superimposed component of vascular congestion and edema to be considered with reflux of contrast into the hepatic veins and enlarged arterial to bronchus ratio in the upper lobes.    Clinical correlation and follow-up recommended.  There is no intraluminal filling  defect within the central or proximal segmental pulmonary arteries to suggest pulmonary embolism.  Mid to distal segmental pulmonary arteries distorted by motion.    There are prominent to enlarged right paratracheal and prevascular lymph nodes with prevascular space node measuring 1.7 cm.  No pneumothorax.    Remote operative change from sternotomy with coronary artery bypass grafting.    CT abdomen pelvis: Study distorted by motion.  Liver, spleen, pancreas are grossly normal in size without focal lesion allowing for motion limitation.  No calcified gallstones in the gallbladder by CT criteria    The adrenal glands are within normal limits bilaterally.    The kidneys are small in caliber measuring 7-8 cm in length bilaterally.  Multifocal regions of hypoattenuation in the kidneys with slight volume loss concerning for scarring although evaluation is overall limited by motion.    Atherosclerotic plaquing of the aorta without evidence for aneurysmal dilatation.    There is colonic diverticulosis without definite acute diverticulitis.  No signs of appendicitis with normal caliber appendix identified.  Heavy calcified plaque at the origin of the celiac axis and SMA with the inferior mesenteric artery not well seen.    No free intraperitoneal gas or fluid.  No focal dilated loop of bowel to suggest bowel obstruction although limited by lack of oral contrast.    .    Probable surgical clips left groin incidental                               X-Ray Chest AP Portable (Final result)  Result time 07/24/20 11:37:16    Final result by Omari Hatfield MD (07/24/20 11:37:16)                 Impression:      Moderate diffuse lung opacification, possible cardiogenic/ noncardiogenic pulmonary edema, pneumonia, or aspiration.    Small pleural effusions.      Electronically signed by: Omari Hatfield MD  Date:    07/24/2020  Time:    11:37             Narrative:    EXAMINATION:  XR CHEST AP PORTABLE    CLINICAL HISTORY:  Chest  Pain;    TECHNIQUE:  Single frontal view of the chest was performed.    COMPARISON:  06/18/2020    FINDINGS:  Intact median sternotomy wires.  There is moderate diffuse lung opacification with indistinct pulmonary vascularity.  No gross pneumothorax.  Probable small pleural effusions.  Heart size stable.                                  Assessment and Plan:   86 yo female with history of CHF (EF 55 2019), DM2, ESRD on HD MF, GERD, HTN, PAD, HLD complains of shortness of breath that started yesterday.     Dyspnea - unknown etiology  PE unlikely based on CTA.   Increased JVP. Crackles on exam. Likely fluid overloaded from missing dialysis. COVID negative, but could be possible.   - CXR small pleural effusion, moderate diffuse lung opacification  - CTA chest shows no evidence of PE. prominent enlarged mediastinal, hilar lymph nodes  -lower extremity doppler pending  - COVID negative x3. CRP, lactate, pro calcitonin, DDimer, LDH, ESR ordered  - CT abdomen: No acute intra-abdominal findings allowing for motion.  Colonic diverticulosis without definite diverticulitis  - due for F/U with pulmonary outpatient. Will schedule on discharge    ESRD on HD MF  Missed today's dialysis to come to the ER.   - HD today  - nephrology consulted  - checking CMP, mg and phos     Combined Systolic and Diastolic CHF  55% EF mild tricupsid and mitral regurgitation. PA systolic pressure 53 mmHg on Echo from 2019  - BNP 1204 and troponin 0.030 in 2019. BNP 1702 and troponin 0.033 on admission. Close to baseline  - echo pending  -due for F/U with Dr. Sanchez outpatient. Will schedule on discharge    Chronic Normocytic Anemia  - Hgb 11.3. 10.3 in 2019.   -iron study ordered    DM type 2  A1C 5.7 in 2019  - checking A1C  - no home meds for diabetes  - sliding scale  Code Status:     Full    Disposition: admit to floor. HD today  Diet: renal  DVT Prophylaxis: heparin    Reji Mcdaniel MD  [facility]    [facility]  Hospitalist Pager numbers:   [facility] Hospitalist Medicine Team A (Marti/Bruno): [phone]  [facility] Hospitalist Medicine Team B (Davion/Areli):  [phone]

## 2020-07-24 NOTE — ED NOTES
Pt up to bedside commode. SOB with exertion and audile wheezing O2 stats 74% oxygen increased to 5L O2 stats up to 84%. /150. Dr. Mobley notified.

## 2020-07-24 NOTE — TELEPHONE ENCOUNTER
Spoke with pt. Who informed me she has been having a hard time breathing . Patient has complaints of chest pain,patient did not want to go to Emergency room. Patient had son Leif Clark aside her ,who informed me his mother has been having a hard time breathing since yesterday. I advised patient son to take patient to Urgent care . Patient son voices understanding

## 2020-07-24 NOTE — TELEPHONE ENCOUNTER
----- Message from Itzel Galaes sent at 7/24/2020  8:53 AM CDT -----  Name of Who is Calling: Leif - son    What is the request in detail: Would like to schedule patient to be seen today for heartburn. Patient's son declined next available and any other provider. Please contact to further discuss and advise      Can the clinic reply by [facility]: no    What Number to Call Back if not in [facility]: [phone]

## 2020-07-24 NOTE — NURSING
O2 decreased by RT to 10L/min per high flow NC. Sats @ 100%. Appears much more comfortable. Able to rest quietly @ this time. Dialysis progressing.

## 2020-07-24 NOTE — CONSULTS
NEPHROLOGY CONSULT NOTE    HPI & INTERVAL HISTORY:    Past Medical History:   Diagnosis Date    CHF (congestive heart failure)     Colon polyps 06/03/2013    Coronary artery disease     Diabetes mellitus     Encounter for blood transfusion     ESRD (end stage renal disease) 03/2014    dialysis M-F    GERD (gastroesophageal reflux disease)     High cholesterol     Hypertension       Past Surgical History:   Procedure Laterality Date    AV FISTULA PLACEMENT Left 9/2014    bilateral fem-pop      CENTRAL VENOUS CATHETER TUNNELED INSERTION DOUBLE LUMEN Right 2/2014    CORONARY ARTERY BYPASS GRAFT  2/14/2014     x 3 vessels    PERCUTANEOUS TRANSLUMINAL ANGIOPLASTY OF ARTERIOVENOUS FISTULA Left 5/5/2020    Procedure: PTA, Repair left upper arm anuerysm AV FISTULA;  Surgeon: Doris Mary MD;  Location: [facility];  Service: General;  Laterality: Left;    VASCULAR SURGERY        Review of patient's allergies indicates:   Allergen Reactions    Aspirin Nausea Only and Swelling     Able to tolerated in small doses        (Not in a hospital admission)      Social History     Socioeconomic History    Marital status:      Spouse name: Not on file    Number of children: Not on file    Years of education: Not on file    Highest education level: Not on file   Occupational History    Not on file   Social Needs    Financial resource strain: Not on file    Food insecurity     Worry: Not on file     Inability: Not on file    Transportation needs     Medical: Not on file     Non-medical: Not on file   Tobacco Use    Smoking status: Never Smoker    Smokeless tobacco: Never Used   Substance and Sexual Activity    Alcohol use: No    Drug use: No    Sexual activity: Never   Lifestyle    Physical activity     Days per week: Not on file     Minutes per session: Not on file    Stress: Not on file   Relationships    Social connections     Talks on phone: Not on file     Gets together: Not on file      Attends Yazidism service: Not on file     Active member of club or organization: Not on file     Attends meetings of clubs or organizations: Not on file     Relationship status: Not on file   Other Topics Concern    Not on file   Social History Narrative    Not on file        MEDS   lidocaine (PF) 10 mg/ml (1%)  1 mL Intradermal Once               CONTINOUS INFUSIONS:    No intake or output data in the 24 hours ending 07/24/20 1402     HEMODYNAMICS:    Temp:  [98.1 °F (36.7 °C)] 98.1 °F (36.7 °C)  Pulse:  [] 98  Resp:  [16-24] 20  SpO2:  [84 %-97 %] 97 %  BP: (183-192)/(81-86) 187/83   Gen:   SOB  CP  No nausea  No vomiting  No diarrhea  Cards:Pulse 95  Pulmonary : coarse breath sounds,crackles bibasilar  Abdomen soft   Extremities no edema   Skin: dry   LABS   Lab Results   Component Value Date    WBC 11.14 07/24/2020    HGB 11.3 (L) 07/24/2020    HCT 35.6 (L) 07/24/2020    MCV 88 07/24/2020     07/24/2020        Recent Labs   Lab 07/24/20  1024   *   CALCIUM 9.5   ALBUMIN 3.5   PROT 7.9      K 4.0   CO2 22*      BUN 31*   CREATININE 2.9*   ALKPHOS 110   ALT 12   AST 20   BILITOT 1.3*      Lab Results   Component Value Date     (H) 02/23/2014    CALCIUM 9.5 07/24/2020    CAION 1.17 03/11/2014    PHOS 3.3 04/05/2019      Lab Results   Component Value Date    IRON 22 (L) 04/05/2019    TIBC 221 (L) 04/05/2019    FERRITIN 2,053 (H) 04/05/2019        ABG  No results for input(s): PH, PO2, PCO2, HCO3, BE in the last 168 hours.      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  Mid to distal segmental pulmonary arteries limited by motion.     Multifocal somewhat nodular consolidative opacities within the lungs bilaterally concerning for inflammatory/infectious  process with differential to include COVID-19.  Neoplasm felt less likely.     There is superimposed patchy ground-glass mosaic attenuation lungs with enlarged arterial to bronchus ratio in the upper lobes, reflux of contrast into the hepatic veins and bilateral pleural effusions concerning for component of vascular congestion and edema.     Please note there is prominent to enlarged mediastinal and hilar lymph nodes     CT abdomen pelvis: No acute intra-abdominal findings allowing for motion.  Colonic diverticulosis without definite diverticulitis.  Extensive vascular calcification of the aorta.  Small caliber kidneys concerning for renal atrophy with slight volume loss and hypoattenuation in the kidneys bilaterally which may represent scarring though indeterminate and distorted by motio  Dialysis ASAP

## 2020-07-25 LAB
ALBUMIN SERPL BCP-MCNC: 2.8 G/DL (ref 3.5–5.2)
ALP SERPL-CCNC: 91 U/L (ref 55–135)
ALT SERPL W/O P-5'-P-CCNC: 12 U/L (ref 10–44)
ANION GAP SERPL CALC-SCNC: 6 MMOL/L (ref 8–16)
AORTIC ROOT ANNULUS: 2.83 CM
APTT BLDCRRT: 39.3 SEC (ref 21–32)
AST SERPL-CCNC: 22 U/L (ref 10–40)
BASOPHILS # BLD AUTO: 0.03 K/UL (ref 0–0.2)
BASOPHILS NFR BLD: 0.2 % (ref 0–1.9)
BILIRUB SERPL-MCNC: 1.3 MG/DL (ref 0.1–1)
BSA FOR ECHO PROCEDURE: 1.49 M2
BUN SERPL-MCNC: 25 MG/DL (ref 8–23)
CALCIUM SERPL-MCNC: 8.6 MG/DL (ref 8.7–10.5)
CHLORIDE SERPL-SCNC: 105 MMOL/L (ref 95–110)
CHOLEST SERPL-MCNC: 129 MG/DL (ref 120–199)
CHOLEST/HDLC SERPL: 2 {RATIO} (ref 2–5)
CO2 SERPL-SCNC: 23 MMOL/L (ref 23–29)
CREAT SERPL-MCNC: 2.7 MG/DL (ref 0.5–1.4)
CV ECHO LV RWT: 0.33 CM
D DIMER PPP IA.FEU-MCNC: 4.41 MG/L FEU
DIFFERENTIAL METHOD: ABNORMAL
DOP CALC LVOT AREA: 2.1 CM2
DOP CALC LVOT DIAMETER: 1.64 CM
E WAVE DECELERATION TIME: 225.97 MSEC
E/A RATIO: 1.48
ECHO LV POSTERIOR WALL: 0.9 CM (ref 0.6–1.1)
EOSINOPHIL # BLD AUTO: 0 K/UL (ref 0–0.5)
EOSINOPHIL NFR BLD: 0.3 % (ref 0–8)
ERYTHROCYTE [DISTWIDTH] IN BLOOD BY AUTOMATED COUNT: 14.6 % (ref 11.5–14.5)
ERYTHROCYTE [SEDIMENTATION RATE] IN BLOOD BY WESTERGREN METHOD: 73 MM/HR (ref 0–20)
EST. GFR  (AFRICAN AMERICAN): 18 ML/MIN/1.73 M^2
EST. GFR  (NON AFRICAN AMERICAN): 16 ML/MIN/1.73 M^2
ESTIMATED AVG GLUCOSE: 103 MG/DL (ref 68–131)
FERRITIN SERPL-MCNC: 2003 NG/ML (ref 20–300)
FRACTIONAL SHORTENING: 37 % (ref 28–44)
GLUCOSE SERPL-MCNC: 165 MG/DL (ref 70–110)
HBA1C MFR BLD HPLC: 5.2 % (ref 4–5.6)
HCT VFR BLD AUTO: 30.7 % (ref 37–48.5)
HDLC SERPL-MCNC: 63 MG/DL (ref 40–75)
HDLC SERPL: 48.8 % (ref 20–50)
HGB BLD-MCNC: 9.6 G/DL (ref 12–16)
IMM GRANULOCYTES # BLD AUTO: 0.06 K/UL (ref 0–0.04)
IMM GRANULOCYTES NFR BLD AUTO: 0.4 % (ref 0–0.5)
INR PPP: 1.2 (ref 0.8–1.2)
INTERVENTRICULAR SEPTUM: 0.7 CM (ref 0.6–1.1)
IRON SERPL-MCNC: 12 UG/DL (ref 30–160)
IVRT: 62.8 MSEC
LA MAJOR: 5.18 CM
LA MINOR: 4.43 CM
LA WIDTH: 4.16 CM
LDLC SERPL CALC-MCNC: 43.8 MG/DL (ref 63–159)
LEFT ATRIUM SIZE: 4.9 CM
LEFT ATRIUM VOLUME INDEX: 55.9 ML/M2
LEFT ATRIUM VOLUME: 82.75 CM3
LEFT INTERNAL DIMENSION IN SYSTOLE: 3.47 CM (ref 2.1–4)
LEFT VENTRICLE DIASTOLIC VOLUME INDEX: 76.96 ML/M2
LEFT VENTRICLE DIASTOLIC VOLUME: 113.85 ML
LEFT VENTRICLE MASS INDEX: 108 G/M2
LEFT VENTRICLE SYSTOLIC VOLUME INDEX: 33.7 ML/M2
LEFT VENTRICLE SYSTOLIC VOLUME: 49.86 ML
LEFT VENTRICULAR INTERNAL DIMENSION IN DIASTOLE: 5.5 CM (ref 3.5–6)
LEFT VENTRICULAR MASS: 159.96 G
LV LATERAL E/E' RATIO: 26.4 M/S
LYMPHOCYTES # BLD AUTO: 1 K/UL (ref 1–4.8)
LYMPHOCYTES NFR BLD: 6.6 % (ref 18–48)
MAGNESIUM SERPL-MCNC: 2.5 MG/DL (ref 1.6–2.6)
MCH RBC QN AUTO: 28.1 PG (ref 27–31)
MCHC RBC AUTO-ENTMCNC: 31.3 G/DL (ref 32–36)
MCV RBC AUTO: 90 FL (ref 82–98)
MONOCYTES # BLD AUTO: 0.7 K/UL (ref 0.3–1)
MONOCYTES NFR BLD: 4.9 % (ref 4–15)
MV PEAK A VEL: 0.89 M/S
MV PEAK E VEL: 1.32 M/S
NEUTROPHILS # BLD AUTO: 13 K/UL (ref 1.8–7.7)
NEUTROPHILS NFR BLD: 87.6 % (ref 38–73)
NONHDLC SERPL-MCNC: 66 MG/DL
NRBC BLD-RTO: 0 /100 WBC
PHOSPHATE SERPL-MCNC: 4.4 MG/DL (ref 2.7–4.5)
PISA MRMAX VEL: 0.04 M/S
PISA RADIUS: 0.22 CM
PISA TR VN NYQUIST: 0 M/S
PLATELET # BLD AUTO: 306 K/UL (ref 150–350)
PMV BLD AUTO: 9.2 FL (ref 9.2–12.9)
POCT GLUCOSE: 100 MG/DL (ref 70–110)
POCT GLUCOSE: 126 MG/DL (ref 70–110)
POCT GLUCOSE: 140 MG/DL (ref 70–110)
POCT GLUCOSE: 142 MG/DL (ref 70–110)
POTASSIUM SERPL-SCNC: 5.2 MMOL/L (ref 3.5–5.1)
PROCALCITONIN SERPL IA-MCNC: 4.88 NG/ML
PROT SERPL-MCNC: 6.6 G/DL (ref 6–8.4)
PROTHROMBIN TIME: 12.1 SEC (ref 9–12.5)
PULM VEIN S/D RATIO: 0.45
PV PEAK D VEL: 0.44 M/S
PV PEAK S VEL: 0.2 M/S
PV PEAK VELOCITY: 1.08 CM/S
RA MAJOR: 4.62 CM
RA PRESSURE: 3 MMHG
RBC # BLD AUTO: 3.42 M/UL (ref 4–5.4)
RIGHT VENTRICULAR END-DIASTOLIC DIMENSION: 1.66 CM
SATURATED IRON: 6 % (ref 20–50)
SODIUM SERPL-SCNC: 134 MMOL/L (ref 136–145)
TDI LATERAL: 0.05 M/S
TOTAL IRON BINDING CAPACITY: 186 UG/DL (ref 250–450)
TRANSFERRIN SERPL-MCNC: 126 MG/DL (ref 200–375)
TRIGL SERPL-MCNC: 111 MG/DL (ref 30–150)
TROPONIN I SERPL DL<=0.01 NG/ML-MCNC: 0.14 NG/ML (ref 0–0.03)
TROPONIN I SERPL DL<=0.01 NG/ML-MCNC: 0.18 NG/ML (ref 0–0.03)
TSH SERPL DL<=0.005 MIU/L-ACNC: 2.24 UIU/ML (ref 0.4–4)
WBC # BLD AUTO: 14.8 K/UL (ref 3.9–12.7)

## 2020-07-25 PROCEDURE — 63600175 PHARM REV CODE 636 W HCPCS: Performed by: STUDENT IN AN ORGANIZED HEALTH CARE EDUCATION/TRAINING PROGRAM

## 2020-07-25 PROCEDURE — 96372 THER/PROPH/DIAG INJ SC/IM: CPT | Mod: 59

## 2020-07-25 PROCEDURE — 99900035 HC TECH TIME PER 15 MIN (STAT)

## 2020-07-25 PROCEDURE — 36415 COLL VENOUS BLD VENIPUNCTURE: CPT

## 2020-07-25 PROCEDURE — 90935 HEMODIALYSIS ONE EVALUATION: CPT

## 2020-07-25 PROCEDURE — 27000221 HC OXYGEN, UP TO 24 HOURS

## 2020-07-25 PROCEDURE — 96374 THER/PROPH/DIAG INJ IV PUSH: CPT

## 2020-07-25 PROCEDURE — 85025 COMPLETE CBC W/AUTO DIFF WBC: CPT

## 2020-07-25 PROCEDURE — 94640 AIRWAY INHALATION TREATMENT: CPT

## 2020-07-25 PROCEDURE — 11000001 HC ACUTE MED/SURG PRIVATE ROOM

## 2020-07-25 PROCEDURE — 96375 TX/PRO/DX INJ NEW DRUG ADDON: CPT

## 2020-07-25 PROCEDURE — 25000003 PHARM REV CODE 250: Performed by: INTERNAL MEDICINE

## 2020-07-25 PROCEDURE — 25000003 PHARM REV CODE 250: Performed by: STUDENT IN AN ORGANIZED HEALTH CARE EDUCATION/TRAINING PROGRAM

## 2020-07-25 PROCEDURE — 84100 ASSAY OF PHOSPHORUS: CPT

## 2020-07-25 PROCEDURE — 80053 COMPREHEN METABOLIC PANEL: CPT

## 2020-07-25 PROCEDURE — 84484 ASSAY OF TROPONIN QUANT: CPT

## 2020-07-25 PROCEDURE — 93005 ELECTROCARDIOGRAM TRACING: CPT

## 2020-07-25 PROCEDURE — 94761 N-INVAS EAR/PLS OXIMETRY MLT: CPT

## 2020-07-25 PROCEDURE — 63700000 PHARM REV CODE 250 ALT 637 W/O HCPCS: Performed by: INTERNAL MEDICINE

## 2020-07-25 PROCEDURE — 25000242 PHARM REV CODE 250 ALT 637 W/ HCPCS: Performed by: STUDENT IN AN ORGANIZED HEALTH CARE EDUCATION/TRAINING PROGRAM

## 2020-07-25 PROCEDURE — 80061 LIPID PANEL: CPT

## 2020-07-25 PROCEDURE — 83735 ASSAY OF MAGNESIUM: CPT

## 2020-07-25 RX ORDER — AZITHROMYCIN 250 MG/1
500 TABLET, FILM COATED ORAL DAILY
Status: DISCONTINUED | OUTPATIENT
Start: 2020-07-25 | End: 2020-07-28 | Stop reason: HOSPADM

## 2020-07-25 RX ORDER — SODIUM CHLORIDE 9 MG/ML
INJECTION, SOLUTION INTRAVENOUS ONCE
Status: COMPLETED | OUTPATIENT
Start: 2020-07-25 | End: 2020-07-25

## 2020-07-25 RX ORDER — SODIUM CHLORIDE 9 MG/ML
INJECTION, SOLUTION INTRAVENOUS
Status: DISCONTINUED | OUTPATIENT
Start: 2020-07-25 | End: 2020-07-28 | Stop reason: HOSPADM

## 2020-07-25 RX ORDER — ALBUTEROL SULFATE 2.5 MG/.5ML
10 SOLUTION RESPIRATORY (INHALATION) ONCE
Status: COMPLETED | OUTPATIENT
Start: 2020-07-25 | End: 2020-07-25

## 2020-07-25 RX ADMIN — HEPARIN SODIUM 5000 UNITS: 5000 INJECTION INTRAVENOUS; SUBCUTANEOUS at 09:07

## 2020-07-25 RX ADMIN — ERGOCALCIFEROL 50000 UNITS: 1.25 CAPSULE ORAL at 08:07

## 2020-07-25 RX ADMIN — DEXTROSE MONOHYDRATE 25 G: 500 INJECTION PARENTERAL at 08:07

## 2020-07-25 RX ADMIN — AZITHROMYCIN MONOHYDRATE 500 MG: 250 TABLET ORAL at 12:07

## 2020-07-25 RX ADMIN — ISOSORBIDE MONONITRATE 120 MG: 30 TABLET, EXTENDED RELEASE ORAL at 10:07

## 2020-07-25 RX ADMIN — FAMOTIDINE 20 MG: 20 TABLET ORAL at 08:07

## 2020-07-25 RX ADMIN — ASPIRIN 81 MG: 81 TABLET, COATED ORAL at 08:07

## 2020-07-25 RX ADMIN — CARVEDILOL 25 MG: 25 TABLET, FILM COATED ORAL at 09:07

## 2020-07-25 RX ADMIN — MUPIROCIN: 20 OINTMENT TOPICAL at 08:07

## 2020-07-25 RX ADMIN — NEPHROCAP 1 CAPSULE: 1 CAP ORAL at 08:07

## 2020-07-25 RX ADMIN — CARVEDILOL 25 MG: 25 TABLET, FILM COATED ORAL at 10:07

## 2020-07-25 RX ADMIN — SODIUM CHLORIDE: 0.9 INJECTION, SOLUTION INTRAVENOUS at 01:07

## 2020-07-25 RX ADMIN — MUPIROCIN: 20 OINTMENT TOPICAL at 09:07

## 2020-07-25 RX ADMIN — HEPARIN SODIUM 5000 UNITS: 5000 INJECTION INTRAVENOUS; SUBCUTANEOUS at 03:07

## 2020-07-25 RX ADMIN — HEPARIN SODIUM 5000 UNITS: 5000 INJECTION INTRAVENOUS; SUBCUTANEOUS at 05:07

## 2020-07-25 RX ADMIN — FUROSEMIDE 40 MG: 40 TABLET ORAL at 10:07

## 2020-07-25 RX ADMIN — CEFTRIAXONE 1 G: 1 INJECTION, SOLUTION INTRAVENOUS at 03:07

## 2020-07-25 RX ADMIN — INSULIN HUMAN 5 UNITS: 100 INJECTION, SOLUTION PARENTERAL at 08:07

## 2020-07-25 RX ADMIN — ALBUTEROL SULFATE 10 MG: 2.5 SOLUTION RESPIRATORY (INHALATION) at 09:07

## 2020-07-25 RX ADMIN — ATORVASTATIN CALCIUM 40 MG: 40 TABLET, FILM COATED ORAL at 08:07

## 2020-07-25 NOTE — PLAN OF CARE
VN cued into room to complete admit assessment, VIP model introduced, VN working alongside bedside treatment team.  Plan of care reviewed with patient. Patient verbalized understanding. Patient informed of fall risk and fall precautions, call light within reach, 2x bed rails. Patient notified to ask staff for assistance and pt verbalized complete understanding. Time allowed for questions. Will continue to monitor and intervene as needed. Chart review done.

## 2020-07-25 NOTE — PT/OT/SLP PROGRESS
Occupational Therapy  Missed Eval    Patient Name:  Erin Clark   MRN:  [id number]    Patient not seen today secondary to CT/MRI, Dialysis.  Pt at CT at 830AM & JEM at HD at 1230PM.   Will follow-up SunVY Manzanares  7/25/2020

## 2020-07-25 NOTE — PT/OT/SLP PROGRESS
Physical Therapy visit Attempts      Patient Name:  Erin Clark   MRN:  [id number]    Patient not seen today secondary to Unavailable (Comment)(1st attempt at 9:30am-pt having US testing done in rrom. 2nd attempt 11:47: MD team in room. 3rd attempt: JEM for HD). Spoke to RANDOLPH Angeles. Will follow-up as available.    Isabella Lewis PT, DPT  7/25/2020

## 2020-07-25 NOTE — PLAN OF CARE
Care plan explained & understood by pt. NSR on Tele. On Oxygen 2L NC, Sats 96%. BP this morning 97/57, rechecked 115/58, MD informed, Amlodipine discontinued. Other Meds given as per MAR. Safety maintained at all times. Call bell within reach. Bed on low position. Bed alarm on. Will continue to monitor.

## 2020-07-25 NOTE — NURSING
Spoke with Dr. Mcdaniel that pt hasn't received most of medications from ED before transferring to dialysis, pt's current VS, /70, HR 96, asked if MD wants pt to received all the medications that is missed due, MD stated okay to give all.  Asked if MD wants to change her diet from NPO to cardiac, MD stated okay to change.

## 2020-07-25 NOTE — ASSESSMENT & PLAN NOTE
Likely multifocal in origin - patient has elevated PAP on prior Echo that is likely group 2 in nature with possible group 3 given she may have sleep apnea, but this is difficult to say for certain without a RHC, she missed 1x HD session and has GGO diffusely in her lungs on CT that may represent pulmonary edema  - given her rapid improvement in oxygenation, edema is favored - she is going for an extra 500cc of UF off today    Recommend  - reassess post-HD today to see if she is able to be liberated from O2  - do not recommend in-patient bronchoscopy - patient will need outpatient pulmonary follow up for enlarged mediastinal LNs and interstitial lung process  - consider medication reconciliation and assessment of patient's adherence to her lasix on non-HD days as she admits to intermittent leg swelling; reconciliation of her dry weight with her outpatient nephrologist  - consideration for patient's age and functional status in consideration for all invasive procedures and assessments

## 2020-07-25 NOTE — PLAN OF CARE
Pt on RA with documented sats. 95. The proper method of use, as well as anticipated side effects, of this aerosol treatment are discussed and demonstrated to the patient.

## 2020-07-25 NOTE — HPI
Ms. Erin Clark is an 86 yo female-bodied patient with PMHx of CHF (EF 55% w/ G2DD, high PAP), CAD, T2DM, ESRD on HD M history of CHF (EF 55 2019), DM2, ESRD on HD MF, GERD, HTN, PAD, HLD who presented to the ED with SOB that started the day prior to admission. She reported that she was recumbent when it started and was not related to exertion. She denied any CP but reports recent nasal congestion. She denied any other constitutional symptoms such as fever, sore throat and reports no abdominal pain. She notes she does have intermittent LE edema. Ms. Clark does report that she has intermittent SOB and per her son and family she is normally SOB on HD days. She did miss her HD session because of presentation to the ED. Additionally she takes lasix 40mg BID on her non-HD days as she produces urine still.    Ms. Clark was bradycardic and requiring 10L O2 initially on presentation to the ED; however, she was weaned down to RA with SpO2 in the low 90s. Her chemistry panel was largely unremarkable given her ESRD status. CBC showed leukocytosis. COVID testing was negative.She had an CXR done which showed diffuse bilateral opacifications and pleural effusions bilaterally. CTA done showed multifocal consolidative opacities that were somewhat nodular, GGO in b/l lungs with b/l pleural effusions and findings consistent with vascular congestion and edema, there was noted reflux of contrast into the hepatic veins and prominent to enlarged mediastinal and hilar lymph nodes. The patient received HD yesterday for 2L UF and repeat CXR done showed improvement.      We are consulted for question of in-patient bronchoscopy. The patient was seen by pulmonary medicine in May 2020 for chronic SOB. She was recommended to follow up with cardiology and nephrology at that time first as well as obtain a sleep study given she has had witnessed hypopneic events and does snore at night per her family prior to further lung testing.

## 2020-07-25 NOTE — PROGRESS NOTES
IV azithromycin currently restricted to pre-operative antimicrobial prophylaxis and patients requiring mechanical ventilation. Order converted to oral azithromycin (dose, frequency, and duration unchanged) per [facility] P&T Committee.

## 2020-07-25 NOTE — CONSULTS
Pulm/CC Fellow Consult Note    Attending Physician: Santy Singleton MD      Date of Admit: 7/24/2020    Chief Complaint     Subjective:      History of Present Illness:  Ms. Erin Clark is an 84 yo female-bodied patient with PMHx of CHF (EF 55% w/ G2DD, high PAP), T2DM, ESRD on HD M history of CHF (EF 55 2019), DM2, ESRD on HD MF, GERD, HTN, PAD, HLD who presented to the ED with SOB that started the day prior to admission. She reported that she was recumbent when it started and was not related to exertion. She denied any CP but reports recent nasal congestion. She denied any other constitutional symptoms such as fever, sore throat and reports no abdominal pain. She notes she does have intermittent LE edema. Ms. Clark does report that she has intermittent SOB and per her son and family she is normally SOB on HD days. She did miss her HD session because of presentation to the ED. Additionally she takes lasix 40mg BID on her non-HD days as she produces urine still.    Ms. Clark was bradycardic and requiring 10L O2 initially on presentation to the ED; however, she was weaned down to RA with SpO2 in the low 90s. Her chemistry panel was largely unremarkable given her ESRD status. CBC showed leukocytosis. COVID testing was negative.She had an CXR done which showed diffuse bilateral opacifications and pleural effusions bilaterally. CTA done showed multifocal consolidative opacities that were somewhat nodular, GGO in b/l lungs with b/l pleural effusions and findings consistent with vascular congestion and edema, there was noted reflux of contrast into the hepatic veins and prominent to enlarged mediastinal and hilar lymph nodes. The patient received HD yesterday for 2L UF and repeat CXR done showed improvement.      We are consulted for question of in-patient bronchoscopy. The patient was seen by pulmonary medicine in May 2020 for chronic SOB. She was recommended to follow up with cardiology and nephrology at that time  first as well as obtain a sleep study given she has had witnessed hypopneic events and does snore at night per her family prior to further lung testing.       Past Medical History:  Past Medical History:   Diagnosis Date    CHF (congestive heart failure)     Colon polyps 06/03/2013    Coronary artery disease     Diabetes mellitus     Encounter for blood transfusion     ESRD (end stage renal disease) 03/2014    dialysis M-F    GERD (gastroesophageal reflux disease)     High cholesterol     Hypertension        Past Surgical History:  Past Surgical History:   Procedure Laterality Date    AV FISTULA PLACEMENT Left 9/2014    bilateral fem-pop      CENTRAL VENOUS CATHETER TUNNELED INSERTION DOUBLE LUMEN Right 2/2014    CORONARY ARTERY BYPASS GRAFT  2/14/2014     x 3 vessels    PERCUTANEOUS TRANSLUMINAL ANGIOPLASTY OF ARTERIOVENOUS FISTULA Left 5/5/2020    Procedure: PTA, Repair left upper arm anuerysm AV FISTULA;  Surgeon: Doris Mary MD;  Location: [facility];  Service: General;  Laterality: Left;    VASCULAR SURGERY         Allergies:  Review of patient's allergies indicates:   Allergen Reactions    Aspirin Nausea Only and Swelling     Able to tolerated in small doses           Family History:  Family History   Problem Relation Age of Onset    Hypertension Mother     Heart disease Mother     Heart attack Mother        Social History:  Social History     Tobacco Use    Smoking status: Never Smoker    Smokeless tobacco: Never Used   Substance Use Topics    Alcohol use: No    Drug use: No       Review of Systems:  Review of Systems   Constitutional: Negative for chills and fever.   HENT: Positive for congestion. Negative for sinus pain and sore throat.    Respiratory: Positive for shortness of breath. Negative for cough, sputum production and wheezing.    Cardiovascular: Positive for leg swelling. Negative for chest pain.   Gastrointestinal: Negative for abdominal pain, diarrhea, nausea and  vomiting.          Objective:   Last 24 Hour Vital Signs:  BP  Min: 97/57  Max: 229/150  Temp  Av.8 °F (36.6 °C)  Min: 96.5 °F (35.8 °C)  Max: 99.3 °F (37.4 °C)  Pulse  Av.3  Min: 51  Max: 135  Resp  Av.6  Min: 16  Max: 34  SpO2  Av.3 %  Min: 87 %  Max: 100 %  Weight  Av.9 kg (114 lb 6.7 oz)  Min: 51.3 kg (113 lb 1.5 oz)  Max: 52.5 kg (115 lb 11.9 oz)  Body mass index is 22.6 kg/m².  I/O last 3 completed shifts:  In: 525 [P.O.:125; Other:400]  Out: 2600 [Urine:300; Other:2300]    Physical Exam:  General: Alert and awake in NAD  HENT:  NCAT; anicteric sclera; OP clear with MMM, nasal cannula at 2L in place  Cardio:  Regular rate and rhythm with normal S1 and S2; (+)systolic murmur  Resp:  (+)rales in bases of b/l lungs, no wheezing, no fine crackles or   inspiratory crackles,  Abdom: Soft, symmetric and non-distedned  Extrem: WWP with no clubbing, cyanosis or edema  Pulses: 2+ and symmetric distally  Neuro:  AAOx3; cooperative and pleasant with no focal deficits      Assessment & Plan:     * SOB (shortness of breath)  Likely multifocal in origin - patient has elevated PAP on prior Echo that is likely group 2 in nature with possible group 3 given she may have sleep apnea, but this is difficult to say for certain without a RHC, she missed 1x HD session and has GGO diffusely in her lungs on CT that may represent pulmonary edema  - given her rapid improvement in oxygenation, edema is favored - she is going for an extra 500cc of UF off today    Recommend  - reassess post-HD today to see if she is able to be liberated from O2  - do not recommend in-patient bronchoscopy - patient will need outpatient pulmonary follow up for enlarged mediastinal LNs and interstitial lung process  - consider medication reconciliation and assessment of patient's adherence to her lasix on non-HD days as she admits to intermittent leg swelling; reconciliation of her dry weight with her outpatient nephrologist  -  consideration for patient's age and functional status in consideration for all invasive procedures and assessments      Code Status:     Full      Daylin Marshall  Fellow  Pulmonary & CCM

## 2020-07-25 NOTE — H&P
[facility] Medicine Progress Note    Primary Team: [facility] Hospitalist Team B  Attending Physician: Santy Singleton MD  Resident: Debbie  Intern: Jonas    Subjective:      States she is feeling better. Only complaint is that she is still struggling to breath. She started to feel better after HD last night. Denies fevers, chills, headache, dizziness, chest pain, N/V, diarrhea, constipation, dysuria     Objective:     Last 24 Hour Vital Signs:  BP  Min: 105/52  Max: 229/150  Temp  Av.9 °F (36.6 °C)  Min: 96.5 °F (35.8 °C)  Max: 99.3 °F (37.4 °C)  Pulse  Av.6  Min: 51  Max: 135  Resp  Av.4  Min: 16  Max: 40  SpO2  Av.6 %  Min: 75 %  Max: 100 %  Height  Av' (152.4 cm)  Min: 5' (152.4 cm)  Max: 5' (152.4 cm)  Weight  Av.1 kg (114 lb 15.1 oz)  Min: 51.3 kg (113 lb 1.5 oz)  Max: 52.6 kg (116 lb)  I/O last 3 completed shifts:  In: 525 [P.O.:125; Other:400]  Out: 2600 [Urine:300; Other:2300]    Physical Examination:  General: Alert and oriented, well nourished, no acute distress.  Eye: PERRL  Neck: Supple, non-tender  trachea midline  Lungs: clear to auscultation bilaterally, mildly labored respiration, improved from yesterday.   Heart: Normal rate, regular rhythm, systolic murmur 1/6, gallop or edema. JVP 9  Abdomen: Soft, non-tender, non-distended, normal bowel sounds, no masses.  Extremities: no pitting edema bilaterally, distal pulses 2+, fistula left arm - thrills present  Skin: Skin is warm, dry and pink, no rashes or lesions  Neurologic: Awake, alert, and oriented X3, upper and lower extremity strength 5/5  Psychiatric: Cooperative, appropriate mood and affect.      Laboratory:  Laboratory Data Reviewed: yes  Pertinent Findings:  Recent Labs   Lab 20  1024 20  0357   WBC 11.14 14.80*   HGB 11.3* 9.6*   HCT 35.6* 30.7*    306    134*   K 4.0 5.2*    105   CREATININE 2.9* 2.7*   BUN 31* 25*   CO2 22* 23   ALT 12 12   AST 20 22       Microbiology Data Reviewed:  yes  Pertinent Findings:  Microbiology Results (last 7 days)     ** No results found for the last 168 hours. **          Other Results:  EKG (my interpretation): sinus rhythm with PACs. Normal axis. LVH     Radiology Data Reviewed: yes  Pertinent Findings:  Imaging Results          US Lower Extremity Veins Bilateral (Final result)  Result time 07/24/20 23:43:19    Final result by Gissel Flores MD (07/24/20 23:43:19)                 Impression:      No evidence of deep venous thrombosis in either lower extremity.      Electronically signed by: Gissel Flores MD  Date:    07/24/2020  Time:    23:43             Narrative:    EXAMINATION:  US LOWER EXTREMITY VEINS BILATERAL    CLINICAL HISTORY:  concern for dvt; Shortness of breath    TECHNIQUE:  Duplex and color flow Doppler and dynamic compression was performed of the bilateral lower extremity veins was performed.  Please note examination is slightly limited due to artifact from arterial vascular calcifications.    COMPARISON:  Lower extremity ultrasound 02/11/2014    FINDINGS:  Right thigh veins: The common femoral, femoral, popliteal, upper greater saphenous, and deep femoral veins are patent and free of thrombus. The veins are normally compressible and have normal phasic flow and augmentation response.    Right calf veins: The visualized calf veins are patent.    Left thigh veins: The common femoral, femoral, popliteal, upper greater saphenous, and deep femoral veins are patent and free of thrombus. The veins are normally compressible and have normal phasic flow and augmentation response.    Left calf veins: The visualized calf veins are patent.    Miscellaneous: None                               CT Abdomen Pelvis With Contrast (Final result)  Result time 07/24/20 14:46:07    Final result by Calvin Joseph DO (07/24/20 14:46:07)                 Impression:      CTA chest: No evidence for central or proximal segmental pulmonary embolism.  Mid to distal segmental  pulmonary arteries limited by motion.    Multifocal somewhat nodular consolidative opacities within the lungs bilaterally concerning for inflammatory/infectious process with differential to include COVID-19.  Neoplasm felt less likely.    There is superimposed patchy ground-glass mosaic attenuation lungs with enlarged arterial to bronchus ratio in the upper lobes, reflux of contrast into the hepatic veins and bilateral pleural effusions concerning for component of vascular congestion and edema.    Please note there is prominent to enlarged mediastinal and hilar lymph nodes    CT abdomen pelvis: No acute intra-abdominal findings allowing for motion.  Colonic diverticulosis without definite diverticulitis.  Extensive vascular calcification of the aorta.  Small caliber kidneys concerning for renal atrophy with slight volume loss and hypoattenuation in the kidneys bilaterally which may represent scarring though indeterminate and distorted by motion.  Follow-up dedicated renal imaging advised.      Electronically signed by: Calvin Joseph DO  Date:    07/24/2020  Time:    14:46             Narrative:    EXAMINATION:  CTA CHEST NON CORONARY; CT ABDOMEN PELVIS WITH CONTRAST    CLINICAL HISTORY:  PE suspected, high pretest prob;; Epigastric pain;Abdominal pain, acute, nonlocalized;    TECHNIQUE:  Low dose axial images, sagittal and coronal reformations were obtained from the thoracic inlet to the lung bases following the IV administration of 100 mL of Omnipaque 350.  Contrast timing was optimized to evaluate the pulmonary arteries.  MIP images were performed.    COMPARISON:  Chest x-ray 07/24/2020    FINDINGS:  CT chest: There is multifocal patchy and confluent opacities throughout the lungs bilaterally with superimposed ground-glass attenuation.  There is bilateral pleural effusions.  Lung opacities are nonspecific concerning for possible inflammatory/infectious process with differential to include COVID-19 pneumonia  underlying neoplasm felt less likely but not excluded..  Superimposed component of vascular congestion and edema to be considered with reflux of contrast into the hepatic veins and enlarged arterial to bronchus ratio in the upper lobes.    Clinical correlation and follow-up recommended.  There is no intraluminal filling defect within the central or proximal segmental pulmonary arteries to suggest pulmonary embolism.  Mid to distal segmental pulmonary arteries distorted by motion.    There are prominent to enlarged right paratracheal and prevascular lymph nodes with prevascular space node measuring 1.7 cm.  No pneumothorax.    Remote operative change from sternotomy with coronary artery bypass grafting.    CT abdomen pelvis: Study distorted by motion.  Liver, spleen, pancreas are grossly normal in size without focal lesion allowing for motion limitation.  No calcified gallstones in the gallbladder by CT criteria    The adrenal glands are within normal limits bilaterally.    The kidneys are small in caliber measuring 7-8 cm in length bilaterally.  Multifocal regions of hypoattenuation in the kidneys with slight volume loss concerning for scarring although evaluation is overall limited by motion.    Atherosclerotic plaquing of the aorta without evidence for aneurysmal dilatation.    There is colonic diverticulosis without definite acute diverticulitis.  No signs of appendicitis with normal caliber appendix identified.  Heavy calcified plaque at the origin of the celiac axis and SMA with the inferior mesenteric artery not well seen.    No free intraperitoneal gas or fluid.  No focal dilated loop of bowel to suggest bowel obstruction although limited by lack of oral contrast.    .    Probable surgical clips left groin incidental                               CTA Chest Non-Coronary (PE Study) (Final result)  Result time 07/24/20 14:46:07    Final result by Calvin Joseph DO (07/24/20 14:46:07)                  Impression:      CTA chest: No evidence for central or proximal segmental pulmonary embolism.  Mid to distal segmental pulmonary arteries limited by motion.    Multifocal somewhat nodular consolidative opacities within the lungs bilaterally concerning for inflammatory/infectious process with differential to include COVID-19.  Neoplasm felt less likely.    There is superimposed patchy ground-glass mosaic attenuation lungs with enlarged arterial to bronchus ratio in the upper lobes, reflux of contrast into the hepatic veins and bilateral pleural effusions concerning for component of vascular congestion and edema.    Please note there is prominent to enlarged mediastinal and hilar lymph nodes    CT abdomen pelvis: No acute intra-abdominal findings allowing for motion.  Colonic diverticulosis without definite diverticulitis.  Extensive vascular calcification of the aorta.  Small caliber kidneys concerning for renal atrophy with slight volume loss and hypoattenuation in the kidneys bilaterally which may represent scarring though indeterminate and distorted by motion.  Follow-up dedicated renal imaging advised.      Electronically signed by: Calvin Joseph DO  Date:    07/24/2020  Time:    14:46             Narrative:    EXAMINATION:  CTA CHEST NON CORONARY; CT ABDOMEN PELVIS WITH CONTRAST    CLINICAL HISTORY:  PE suspected, high pretest prob;; Epigastric pain;Abdominal pain, acute, nonlocalized;    TECHNIQUE:  Low dose axial images, sagittal and coronal reformations were obtained from the thoracic inlet to the lung bases following the IV administration of 100 mL of Omnipaque 350.  Contrast timing was optimized to evaluate the pulmonary arteries.  MIP images were performed.    COMPARISON:  Chest x-ray 07/24/2020    FINDINGS:  CT chest: There is multifocal patchy and confluent opacities throughout the lungs bilaterally with superimposed ground-glass attenuation.  There is bilateral pleural effusions.  Lung opacities are  nonspecific concerning for possible inflammatory/infectious process with differential to include COVID-19 pneumonia underlying neoplasm felt less likely but not excluded..  Superimposed component of vascular congestion and edema to be considered with reflux of contrast into the hepatic veins and enlarged arterial to bronchus ratio in the upper lobes.    Clinical correlation and follow-up recommended.  There is no intraluminal filling defect within the central or proximal segmental pulmonary arteries to suggest pulmonary embolism.  Mid to distal segmental pulmonary arteries distorted by motion.    There are prominent to enlarged right paratracheal and prevascular lymph nodes with prevascular space node measuring 1.7 cm.  No pneumothorax.    Remote operative change from sternotomy with coronary artery bypass grafting.    CT abdomen pelvis: Study distorted by motion.  Liver, spleen, pancreas are grossly normal in size without focal lesion allowing for motion limitation.  No calcified gallstones in the gallbladder by CT criteria    The adrenal glands are within normal limits bilaterally.    The kidneys are small in caliber measuring 7-8 cm in length bilaterally.  Multifocal regions of hypoattenuation in the kidneys with slight volume loss concerning for scarring although evaluation is overall limited by motion.    Atherosclerotic plaquing of the aorta without evidence for aneurysmal dilatation.    There is colonic diverticulosis without definite acute diverticulitis.  No signs of appendicitis with normal caliber appendix identified.  Heavy calcified plaque at the origin of the celiac axis and SMA with the inferior mesenteric artery not well seen.    No free intraperitoneal gas or fluid.  No focal dilated loop of bowel to suggest bowel obstruction although limited by lack of oral contrast.    .    Probable surgical clips left groin incidental                               X-Ray Chest AP Portable (Final result)  Result  time 07/24/20 11:37:16    Final result by Omari Hatfield MD (07/24/20 11:37:16)                 Impression:      Moderate diffuse lung opacification, possible cardiogenic/ noncardiogenic pulmonary edema, pneumonia, or aspiration.    Small pleural effusions.      Electronically signed by: Omari Hatfield MD  Date:    07/24/2020  Time:    11:37             Narrative:    EXAMINATION:  XR CHEST AP PORTABLE    CLINICAL HISTORY:  Chest Pain;    TECHNIQUE:  Single frontal view of the chest was performed.    COMPARISON:  06/18/2020    FINDINGS:  Intact median sternotomy wires.  There is moderate diffuse lung opacification with indistinct pulmonary vascularity.  No gross pneumothorax.  Probable small pleural effusions.  Heart size stable.                                Current Medications:     Infusions:       Scheduled:   sodium chloride 0.9%   Intravenous Once    sodium chloride 0.9%   Intravenous Once    albuterol sulfate  10 mg Nebulization Once    amLODIPine  10 mg Oral Daily    aspirin  81 mg Oral Daily    atorvastatin  40 mg Oral Daily    carvediloL  25 mg Oral BID    dextrose 50%  25 g Intravenous Once    ergocalciferol  50,000 Units Oral Q7 Days    famotidine  20 mg Oral Daily    furosemide  40 mg Oral Daily    heparin (porcine)  5,000 Units Subcutaneous Q8H    insulin regular  5 Units Intravenous Once    isosorbide mononitrate  120 mg Oral Daily    mupirocin   Nasal BID    vitamin renal formula (B-complex-vitamin c-folic acid)  1 capsule Oral Daily        PRN:  sodium chloride 0.9%, sodium chloride 0.9%, sodium chloride 0.9%    Lines and Day Number of Therapy:  Left AV fistula, PIV    Assessment:     Erin Clark is a 85 y.o.female with  Patient Active Problem List    Diagnosis Date Noted    Sleep apnea 05/07/2020    Essential hypertension 05/05/2020    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    Hypoxia 04/05/2019    Chest pain     Cough     Hypervolemia     Pneumonia of both  lungs due to infectious organism     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    ESRD (end stage renal disease) 12/10/2014    Hyperlipidemia LDL goal <70 12/10/2014    SOB (shortness of breath) 05/05/2014    S/P CABG x 1 04/08/2014    Anemia 03/11/2014    Type 2 diabetes mellitus 02/10/2014    Peripheral arterial occlusive disease 02/10/2014    Hypertension 02/10/2014    CAD (coronary artery disease) 02/10/2014    Chronic diastolic congestive heart failure 02/10/2014        Plan:   86 yo female with history of CHF (EF 55 2019), DM2, ESRD on HD MF, GERD, HTN, PAD, HLD complains of shortness of breath that started the day before admission     Dyspnea - unknown etiology  PE unlikely based on CTA.   Increased JVP. Crackles on exam. Likely fluid overloaded from missing dialysis. COVID negative, but could be possible.   - CXR small pleural effusion, moderate diffuse lung opacification  - CTA chest shows no evidence of PE. prominent enlarged mediastinal, hilar lymph nodes  -lower extremity doppler negative for DVT  - COVID negative x3. CRP, lactate, pro calcitonin, DDimer, LDH, ESR ordered  - CT abdomen: No acute intra-abdominal findings allowing for motion.  Colonic diverticulosis without definite diverticulitis  - due for F/U with pulmonary outpatient. Will schedule on discharge  - reduced work of breathing today.     ESRD on HD MF  Missed today's dialysis to come to the ER.   - HD 7/24, 2300 mL taken off  - nephrology consulted  - checking CMP, mg and phos       Combined Systolic and Diastolic CHF  55% EF mild tricupsid and mitral regurgitation. PA systolic pressure 53 mmHg on Echo from 2019  - BNP 1204 and troponin 0.030 in 2019. BNP 1702 and troponin 0.033 on admission. Close to baseline  - echo pending  -due for F/U with Dr. Sanchez outpatient. Will schedule on discharge     Chronic Normocytic Anemia  - Hgb 11.3. 10.3 in 2019.   -iron study: iron 12, TIBC 186, transferrin 126, ferritin 2003     DM type  2  A1C 5.7 in 2019  - checking A1C - 5.2  - no home meds for diabetes  - sliding scale  Code Status:      Full     Disposition: admit to floor. HD today  Diet: renal  DVT Prophylaxis: heparin    Reji Mcdaniel MD  \[facility]\"" Internal Medicine HO-I    \[facility]\"" Medicine Hospitalist Pager numbers:   \[facility]\"" Hospitalist Medicine Team A (Marti/Bruno): [phone]  \[facility]\"" Hospitalist Medicine Team B (Davion/Areli):  [phone]

## 2020-07-25 NOTE — PROCEDURES
Pt seen and examined on HD, tolerating procedure well  Temp:  [96.5 °F (35.8 °C)-98.2 °F (36.8 °C)]   Pulse:  [51-69]   Resp:  [18-20]   BP: ()/(56-58)   SpO2:  [92 %-99 %]    goal  cc in 2 hours  Tolerated 1.9L net negative yesterday   SOB improving still on 2L O2

## 2020-07-25 NOTE — PLAN OF CARE
Pt came from dialysis, most of ED medications/labs weren't done at admit.  POC reviewed with the pt, verbalized understanding.  Slovenian speaking only.  VSS.  AAOx3.  No complaint of pain or any other distress noted throughout night.  PIV remained intact.  On high flow 10L oxygen via NC, O2 sat >96% throughout night.  On continuous telemetry monitor, SB to ST with HR in 50s-100s.  No ectopy noted.  Blood glucose monitored, no insulin needed.  Urine sample collected.  HD AVG access on left upper arm present bruit/thrill.  US BLE done, no DVT reported.  Echo will be done in am.  Encouraged to turn frequently.  Fall precaution explained.  Safety maintained, free of falls throughout shift.  Instructed to call for any assistance.  Will continue to monitor.

## 2020-07-26 LAB
ALBUMIN SERPL BCP-MCNC: 2.7 G/DL (ref 3.5–5.2)
ALP SERPL-CCNC: 86 U/L (ref 55–135)
ALT SERPL W/O P-5'-P-CCNC: 13 U/L (ref 10–44)
ANION GAP SERPL CALC-SCNC: 9 MMOL/L (ref 8–16)
AST SERPL-CCNC: 24 U/L (ref 10–40)
BASOPHILS # BLD AUTO: 0.05 K/UL (ref 0–0.2)
BASOPHILS NFR BLD: 0.5 % (ref 0–1.9)
BILIRUB SERPL-MCNC: 0.5 MG/DL (ref 0.1–1)
BUN SERPL-MCNC: 33 MG/DL (ref 8–23)
CALCIUM SERPL-MCNC: 8.4 MG/DL (ref 8.7–10.5)
CHLORIDE SERPL-SCNC: 103 MMOL/L (ref 95–110)
CO2 SERPL-SCNC: 19 MMOL/L (ref 23–29)
CREAT SERPL-MCNC: 3.9 MG/DL (ref 0.5–1.4)
DIFFERENTIAL METHOD: ABNORMAL
EOSINOPHIL # BLD AUTO: 0.4 K/UL (ref 0–0.5)
EOSINOPHIL NFR BLD: 4 % (ref 0–8)
ERYTHROCYTE [DISTWIDTH] IN BLOOD BY AUTOMATED COUNT: 14.5 % (ref 11.5–14.5)
EST. GFR  (AFRICAN AMERICAN): 12 ML/MIN/1.73 M^2
EST. GFR  (NON AFRICAN AMERICAN): 10 ML/MIN/1.73 M^2
GLUCOSE SERPL-MCNC: 122 MG/DL (ref 70–110)
HCT VFR BLD AUTO: 28.1 % (ref 37–48.5)
HGB BLD-MCNC: 8.7 G/DL (ref 12–16)
IMM GRANULOCYTES # BLD AUTO: 0.05 K/UL (ref 0–0.04)
IMM GRANULOCYTES NFR BLD AUTO: 0.5 % (ref 0–0.5)
LYMPHOCYTES # BLD AUTO: 1.5 K/UL (ref 1–4.8)
LYMPHOCYTES NFR BLD: 13.5 % (ref 18–48)
MAGNESIUM SERPL-MCNC: 2.3 MG/DL (ref 1.6–2.6)
MCH RBC QN AUTO: 27.6 PG (ref 27–31)
MCHC RBC AUTO-ENTMCNC: 31 G/DL (ref 32–36)
MCV RBC AUTO: 89 FL (ref 82–98)
MONOCYTES # BLD AUTO: 1 K/UL (ref 0.3–1)
MONOCYTES NFR BLD: 9.2 % (ref 4–15)
NEUTROPHILS # BLD AUTO: 7.9 K/UL (ref 1.8–7.7)
NEUTROPHILS NFR BLD: 72.3 % (ref 38–73)
NRBC BLD-RTO: 0 /100 WBC
PHOSPHATE SERPL-MCNC: 4 MG/DL (ref 2.7–4.5)
PLATELET # BLD AUTO: 271 K/UL (ref 150–350)
PMV BLD AUTO: 9.5 FL (ref 9.2–12.9)
POCT GLUCOSE: 121 MG/DL (ref 70–110)
POCT GLUCOSE: 131 MG/DL (ref 70–110)
POCT GLUCOSE: 156 MG/DL (ref 70–110)
POCT GLUCOSE: 167 MG/DL (ref 70–110)
POTASSIUM SERPL-SCNC: 4.7 MMOL/L (ref 3.5–5.1)
PROT SERPL-MCNC: 6.5 G/DL (ref 6–8.4)
RBC # BLD AUTO: 3.15 M/UL (ref 4–5.4)
SODIUM SERPL-SCNC: 131 MMOL/L (ref 136–145)
WBC # BLD AUTO: 10.84 K/UL (ref 3.9–12.7)

## 2020-07-26 PROCEDURE — 63700000 PHARM REV CODE 250 ALT 637 W/O HCPCS: Performed by: INTERNAL MEDICINE

## 2020-07-26 PROCEDURE — 85025 COMPLETE CBC W/AUTO DIFF WBC: CPT

## 2020-07-26 PROCEDURE — 63600175 PHARM REV CODE 636 W HCPCS: Performed by: STUDENT IN AN ORGANIZED HEALTH CARE EDUCATION/TRAINING PROGRAM

## 2020-07-26 PROCEDURE — 94761 N-INVAS EAR/PLS OXIMETRY MLT: CPT

## 2020-07-26 PROCEDURE — 27000221 HC OXYGEN, UP TO 24 HOURS

## 2020-07-26 PROCEDURE — 99900035 HC TECH TIME PER 15 MIN (STAT)

## 2020-07-26 PROCEDURE — 25000003 PHARM REV CODE 250: Performed by: STUDENT IN AN ORGANIZED HEALTH CARE EDUCATION/TRAINING PROGRAM

## 2020-07-26 PROCEDURE — 83735 ASSAY OF MAGNESIUM: CPT

## 2020-07-26 PROCEDURE — 84100 ASSAY OF PHOSPHORUS: CPT

## 2020-07-26 PROCEDURE — 36415 COLL VENOUS BLD VENIPUNCTURE: CPT

## 2020-07-26 PROCEDURE — 97162 PT EVAL MOD COMPLEX 30 MIN: CPT

## 2020-07-26 PROCEDURE — 11000001 HC ACUTE MED/SURG PRIVATE ROOM

## 2020-07-26 PROCEDURE — 80053 COMPREHEN METABOLIC PANEL: CPT

## 2020-07-26 RX ORDER — ACETAMINOPHEN 325 MG/1
650 TABLET ORAL EVERY 6 HOURS PRN
Status: DISCONTINUED | OUTPATIENT
Start: 2020-07-26 | End: 2020-07-28 | Stop reason: HOSPADM

## 2020-07-26 RX ORDER — TALC
6 POWDER (GRAM) TOPICAL NIGHTLY PRN
Status: DISCONTINUED | OUTPATIENT
Start: 2020-07-26 | End: 2020-07-28 | Stop reason: HOSPADM

## 2020-07-26 RX ADMIN — HEPARIN SODIUM 5000 UNITS: 5000 INJECTION INTRAVENOUS; SUBCUTANEOUS at 01:07

## 2020-07-26 RX ADMIN — FAMOTIDINE 20 MG: 20 TABLET ORAL at 08:07

## 2020-07-26 RX ADMIN — ACETAMINOPHEN 650 MG: 325 TABLET ORAL at 06:07

## 2020-07-26 RX ADMIN — ATORVASTATIN CALCIUM 40 MG: 40 TABLET, FILM COATED ORAL at 08:07

## 2020-07-26 RX ADMIN — MELATONIN 6 MG: at 09:07

## 2020-07-26 RX ADMIN — ISOSORBIDE MONONITRATE 120 MG: 30 TABLET, EXTENDED RELEASE ORAL at 08:07

## 2020-07-26 RX ADMIN — HEPARIN SODIUM 5000 UNITS: 5000 INJECTION INTRAVENOUS; SUBCUTANEOUS at 05:07

## 2020-07-26 RX ADMIN — MUPIROCIN: 20 OINTMENT TOPICAL at 08:07

## 2020-07-26 RX ADMIN — FUROSEMIDE 40 MG: 40 TABLET ORAL at 08:07

## 2020-07-26 RX ADMIN — NEPHROCAP 1 CAPSULE: 1 CAP ORAL at 08:07

## 2020-07-26 RX ADMIN — CARVEDILOL 25 MG: 25 TABLET, FILM COATED ORAL at 08:07

## 2020-07-26 RX ADMIN — HEPARIN SODIUM 5000 UNITS: 5000 INJECTION INTRAVENOUS; SUBCUTANEOUS at 09:07

## 2020-07-26 RX ADMIN — AZITHROMYCIN MONOHYDRATE 500 MG: 250 TABLET ORAL at 08:07

## 2020-07-26 RX ADMIN — ASPIRIN 81 MG: 81 TABLET, COATED ORAL at 08:07

## 2020-07-26 RX ADMIN — CEFTRIAXONE 1 G: 1 INJECTION, SOLUTION INTRAVENOUS at 01:07

## 2020-07-26 NOTE — PT/OT/SLP EVAL
Physical Therapy Evaluation    Patient Name:  Erin Clark   MRN:  [id number]    Recommendations:     Discharge Recommendations:  home health PT   Discharge Equipment Recommendations:   none  Barriers to discharge: None    Assessment:     Erin Clark is a 85 y.o. female admitted with a medical diagnosis of SOB (shortness of breath).  She presents with the following impairments/functional limitations:  weakness, gait instability, impaired cardiopulmonary response to activity, impaired endurance, impaired balance, decreased lower extremity function, impaired functional mobilty Patient finishing eating breakfast upon PT entry. Patient agreeable to participate in PT evaluation. Utilization of [employer] interpretation system due to Thai speaking. Patient transferred and ambulated well; however, patient did report dizziness with ambulation. Patient left up in chair with all needs in reach and alarm on. Patient reports good family support at home.     Rehab Prognosis: Good; patient would benefit from acute skilled PT services to address these deficits and reach maximum level of function.    Recent Surgery: * No surgery found *      Plan:     During this hospitalization, patient to be seen 5 x/week to address the identified rehab impairments via gait training, therapeutic activities, therapeutic exercises, neuromuscular re-education and progress toward the following goals:    · Plan of Care Expires:  08/25/20    Subjective     Chief Complaint: None stated  Patient/Family Comments/goals: Go home  Pain/Comfort:  · Pain Rating 1: 0/10  · Pain Rating Post-Intervention 1: 0/10    Patients cultural, spiritual, Nondenominational conflicts given the current situation: (None stated)    Living Environment:  Patient lives with her , son, and son's family in a [facility] with no steps.   Prior to admission, patients level of function was independent.  Equipment used at home: bath bench(has RW but does not use per patient).  DME owned (not  currently used): rolling walker.  Upon discharge, patient will have assistance from family.    Objective:     Communicated with RANDOLPH Chand prior to session.  Patient found HOB elevated with peripheral IV, bed alarm  upon PT entry to room.    General Precautions: Standard, fall   Orthopedic Precautions:N/A   Braces: N/A     Exams:  · Sensation:    · -       Intact  · RLE Strength: WFL  · LLE Strength: WFL    Functional Mobility:  · Bed Mobility:     · Rolling Left:  stand by assistance  · Rolling Right: stand by assistance  · Supine to Sit: stand by assistance  · Transfers:     · Sit to Stand:  stand by assistance with hand-held assist  · Bed to Chair: contact guard assistance with  hand-held assist  using  Step Transfer  · Gait: Patient ambulated 15 ft with min A and HHA. Patient limited by dizziness, requiring HHA when reported after walking ~8 ft    Therapeutic Activities and Exercises:   PT evaluation completed. Patient tolerated well. See above for assessment and recommendations.     AM-PAC 6 CLICK MOBILITY  Total Score:18     Patient left up in chair with all lines intact, call button in reach and chair alarm on.    GOALS:   Multidisciplinary Problems     Physical Therapy Goals        Problem: Physical Therapy Goal    Goal Priority Disciplines Outcome Goal Variances Interventions   Physical Therapy Goal     PT, PT/OT Ongoing, Progressing     Description: Goals to be met by: 20     Patient will increase functional independence with mobility by performin. Supine to sit with Set-up Piatt  2. Bed to chair transfer with Supervision using Rolling Walker  3. Gait  x 100 feet with Supervision using Rolling Walker.                      History:     Past Medical History:   Diagnosis Date    CHF (congestive heart failure)     Colon polyps 2013    Coronary artery disease     Diabetes mellitus     Encounter for blood transfusion     ESRD (end stage renal disease) 2014    dialysis [facility]    GERD  (gastroesophageal reflux disease)     High cholesterol     Hypertension        Past Surgical History:   Procedure Laterality Date    AV FISTULA PLACEMENT Left 9/2014    bilateral fem-pop      CENTRAL VENOUS CATHETER TUNNELED INSERTION DOUBLE LUMEN Right 2/2014    CORONARY ARTERY BYPASS GRAFT  2/14/2014     x 3 vessels    PERCUTANEOUS TRANSLUMINAL ANGIOPLASTY OF ARTERIOVENOUS FISTULA Left 5/5/2020    Procedure: PTA, Repair left upper arm anuerysm AV FISTULA;  Surgeon: Doris Mary MD;  Location: [facility];  Service: General;  Laterality: Left;    VASCULAR SURGERY         Time Tracking:     PT Received On: 07/26/20  PT Start Time: 0756     PT Stop Time: 0810  PT Total Time (min): 14 min     Billable Minutes: Evaluation 14      Aby Philip, PT  07/26/2020

## 2020-07-26 NOTE — PLAN OF CARE
07/26/20 0843   PRE-TX-O2   O2 Device (Oxygen Therapy) nasal cannula   $ Is the patient on Low Flow Oxygen? Yes   Flow (L/min) 2   SpO2 97 %   Pulse Oximetry Type Intermittent   $ Pulse Oximetry - Multiple Charge Pulse Oximetry - Multiple

## 2020-07-26 NOTE — PLAN OF CARE
Care plan explained & understood by pt. Sinus Daniele on Tele. On Oxygen, @ 2L Sats 96-98 %. Meds given as per MAR. Safety maintained at all times. Call bell within reach. Bed on low position. Bed alarm on. Will continue to monitor.    Pt c/o abdominal discomfort, gas-like discomfort as interpreted by Dana CHAIDEZ MD informed Tylenol, given.

## 2020-07-26 NOTE — PLAN OF CARE
Problem: Physical Therapy Goal  Goal: Physical Therapy Goal  Description: Goals to be met by: 20     Patient will increase functional independence with mobility by performin. Supine to sit with Set-up Perkins  2. Bed to chair transfer with Supervision using Rolling Walker  3. Gait  x 100 feet with Supervision using Rolling Walker.     Outcome: Ongoing, Progressing     PT evaluation completed with use of [employer] . Patient tolerated well. Patient ambulated 15 ft with min A and HHA (reported dizziness during ambulation). Patient left up in chair with all needs in reach, alarm on, and nsg notified. HH recommended at this time.

## 2020-07-26 NOTE — PLAN OF CARE
Problem: Adult Inpatient Plan of Care  Goal: Plan of Care Review    Patient is awake and orientedx4. Care plan explained to patient; she verbalized understanding. On 2L via nasal cannula, O2 saturation at 95%. Hooked to heart monitor running normal sinus rhythm 60-70bpm. Assisted to bathroom as needed. No pain/n/v/d during shift. Due medications given. Left arm dialysis access + for thrill/bruit. Accuchecks completed, no coverage needed per sliding scale. AVAsys in room for safety. Encouraged to turn every 2 hours as tolerated. Maintained on fall risk precaution. Bed in lowest position, bed alarm on, call light/personal items within reach and instructed to call for help when needed. Will continue to monitor.    0000H: Patient slept in chair most of the night    Outcome: Ongoing, Progressing

## 2020-07-26 NOTE — PT/OT/SLP PROGRESS
Occupational Therapy      Patient Name:  Erin Clark   MRN:  [id number]    Patient not seen today secondary to Xray. Will follow-up .    Parth Sanchez OT  7/26/2020

## 2020-07-26 NOTE — PROGRESS NOTES
[facility] Medicine Progress Note    Primary Team: [facility] Hospitalist Team B  Attending Physician: Satny Singleton MD  Resident: Debbie  Intern: Jonas    Subjective:      Comfortable and eating breakfast on interview. Says she didn't have any complaints or issues  overnight. No shortness of breath, cough, chest pain, swelling.     Objective:     Last 24 Hour Vital Signs:  BP  Min: 96/48  Max: 145/67  Temp  Av.5 °F (36.4 °C)  Min: 96.1 °F (35.6 °C)  Max: 98.6 °F (37 °C)  Pulse  Av.8  Min: 55  Max: 69  Resp  Av.3  Min: 14  Max: 20  SpO2  Av.3 %  Min: 92 %  Max: 98 %  Weight  Av.9 kg (112 lb 3.4 oz)  Min: 50.9 kg (112 lb 3.4 oz)  Max: 50.9 kg (112 lb 3.4 oz)  I/O last 3 completed shifts:  In: 1825 [P.O.:625; Other:1200]  Out: 3501 [Urine:400; Other:3100; Stool:1]    Physical Examination:  General: Alert and oriented, well nourished, no acute distress.  Eye: PERRL  Neck: Supple, non-tender  trachea midline  Lungs: clear to auscultation bilaterally, mildly labored respiration, improved from yesterday. On NC.  Heart: Normal rate, regular rhythm, systolic murmur 1/6, gallop or edema. JVP 9  Abdomen: Soft, non-tender, non-distended, normal bowel sounds, no masses.  Extremities: no pitting edema bilaterally, distal pulses 2+, fistula left arm - thrills present  Skin: Skin is warm, dry and pink, no rashes or lesions  Neurologic: Awake, alert, and oriented X3, upper and lower extremity strength 5/5  Psychiatric: Cooperative, appropriate mood and affect.      Laboratory:  Laboratory Data Reviewed: yes  Pertinent Findings:  Recent Labs   Lab 20  1024 20  0357 20  0335   WBC 11.14 14.80* 10.84   HGB 11.3* 9.6* 8.7*   HCT 35.6* 30.7* 28.1*    306 271    134* 131*   K 4.0 5.2* 4.7    105 103   CREATININE 2.9* 2.7* 3.9*   BUN 31* 25* 33*   CO2 22* 23 19*   ALT 12 12 13   AST 20 22 24       Microbiology Data Reviewed: yes  Pertinent Findings:  Microbiology Results (last  7 days)     ** No results found for the last 168 hours. **          Other Results:  EKG (my interpretation): sinus rhythm with PACs. Normal axis. LVH     Radiology Data Reviewed: yes  Pertinent Findings:  Imaging Results          US Lower Extremity Veins Bilateral (Final result)  Result time 07/24/20 23:43:19    Final result by Gissel Flores MD (07/24/20 23:43:19)                 Impression:      No evidence of deep venous thrombosis in either lower extremity.      Electronically signed by: Gissel Flores MD  Date:    07/24/2020  Time:    23:43             Narrative:    EXAMINATION:  US LOWER EXTREMITY VEINS BILATERAL    CLINICAL HISTORY:  concern for dvt; Shortness of breath    TECHNIQUE:  Duplex and color flow Doppler and dynamic compression was performed of the bilateral lower extremity veins was performed.  Please note examination is slightly limited due to artifact from arterial vascular calcifications.    COMPARISON:  Lower extremity ultrasound 02/11/2014    FINDINGS:  Right thigh veins: The common femoral, femoral, popliteal, upper greater saphenous, and deep femoral veins are patent and free of thrombus. The veins are normally compressible and have normal phasic flow and augmentation response.    Right calf veins: The visualized calf veins are patent.    Left thigh veins: The common femoral, femoral, popliteal, upper greater saphenous, and deep femoral veins are patent and free of thrombus. The veins are normally compressible and have normal phasic flow and augmentation response.    Left calf veins: The visualized calf veins are patent.    Miscellaneous: None                               CT Abdomen Pelvis With Contrast (Final result)  Result time 07/24/20 14:46:07    Final result by Calvin Joseph DO (07/24/20 14:46:07)                 Impression:      CTA chest: No evidence for central or proximal segmental pulmonary embolism.  Mid to distal segmental pulmonary arteries limited by motion.    Multifocal  somewhat nodular consolidative opacities within the lungs bilaterally concerning for inflammatory/infectious process with differential to include COVID-19.  Neoplasm felt less likely.    There is superimposed patchy ground-glass mosaic attenuation lungs with enlarged arterial to bronchus ratio in the upper lobes, reflux of contrast into the hepatic veins and bilateral pleural effusions concerning for component of vascular congestion and edema.    Please note there is prominent to enlarged mediastinal and hilar lymph nodes    CT abdomen pelvis: No acute intra-abdominal findings allowing for motion.  Colonic diverticulosis without definite diverticulitis.  Extensive vascular calcification of the aorta.  Small caliber kidneys concerning for renal atrophy with slight volume loss and hypoattenuation in the kidneys bilaterally which may represent scarring though indeterminate and distorted by motion.  Follow-up dedicated renal imaging advised.      Electronically signed by: Calvin Joseph DO  Date:    07/24/2020  Time:    14:46             Narrative:    EXAMINATION:  CTA CHEST NON CORONARY; CT ABDOMEN PELVIS WITH CONTRAST    CLINICAL HISTORY:  PE suspected, high pretest prob;; Epigastric pain;Abdominal pain, acute, nonlocalized;    TECHNIQUE:  Low dose axial images, sagittal and coronal reformations were obtained from the thoracic inlet to the lung bases following the IV administration of 100 mL of Omnipaque 350.  Contrast timing was optimized to evaluate the pulmonary arteries.  MIP images were performed.    COMPARISON:  Chest x-ray 07/24/2020    FINDINGS:  CT chest: There is multifocal patchy and confluent opacities throughout the lungs bilaterally with superimposed ground-glass attenuation.  There is bilateral pleural effusions.  Lung opacities are nonspecific concerning for possible inflammatory/infectious process with differential to include COVID-19 pneumonia underlying neoplasm felt less likely but not excluded..   Superimposed component of vascular congestion and edema to be considered with reflux of contrast into the hepatic veins and enlarged arterial to bronchus ratio in the upper lobes.    Clinical correlation and follow-up recommended.  There is no intraluminal filling defect within the central or proximal segmental pulmonary arteries to suggest pulmonary embolism.  Mid to distal segmental pulmonary arteries distorted by motion.    There are prominent to enlarged right paratracheal and prevascular lymph nodes with prevascular space node measuring 1.7 cm.  No pneumothorax.    Remote operative change from sternotomy with coronary artery bypass grafting.    CT abdomen pelvis: Study distorted by motion.  Liver, spleen, pancreas are grossly normal in size without focal lesion allowing for motion limitation.  No calcified gallstones in the gallbladder by CT criteria    The adrenal glands are within normal limits bilaterally.    The kidneys are small in caliber measuring 7-8 cm in length bilaterally.  Multifocal regions of hypoattenuation in the kidneys with slight volume loss concerning for scarring although evaluation is overall limited by motion.    Atherosclerotic plaquing of the aorta without evidence for aneurysmal dilatation.    There is colonic diverticulosis without definite acute diverticulitis.  No signs of appendicitis with normal caliber appendix identified.  Heavy calcified plaque at the origin of the celiac axis and SMA with the inferior mesenteric artery not well seen.    No free intraperitoneal gas or fluid.  No focal dilated loop of bowel to suggest bowel obstruction although limited by lack of oral contrast.    .    Probable surgical clips left groin incidental                               CTA Chest Non-Coronary (PE Study) (Final result)  Result time 07/24/20 14:46:07    Final result by Calvin Joseph DO (07/24/20 14:46:07)                 Impression:      CTA chest: No evidence for central or proximal  segmental pulmonary embolism.  Mid to distal segmental pulmonary arteries limited by motion.    Multifocal somewhat nodular consolidative opacities within the lungs bilaterally concerning for inflammatory/infectious process with differential to include COVID-19.  Neoplasm felt less likely.    There is superimposed patchy ground-glass mosaic attenuation lungs with enlarged arterial to bronchus ratio in the upper lobes, reflux of contrast into the hepatic veins and bilateral pleural effusions concerning for component of vascular congestion and edema.    Please note there is prominent to enlarged mediastinal and hilar lymph nodes    CT abdomen pelvis: No acute intra-abdominal findings allowing for motion.  Colonic diverticulosis without definite diverticulitis.  Extensive vascular calcification of the aorta.  Small caliber kidneys concerning for renal atrophy with slight volume loss and hypoattenuation in the kidneys bilaterally which may represent scarring though indeterminate and distorted by motion.  Follow-up dedicated renal imaging advised.      Electronically signed by: Calvin Joseph DO  Date:    07/24/2020  Time:    14:46             Narrative:    EXAMINATION:  CTA CHEST NON CORONARY; CT ABDOMEN PELVIS WITH CONTRAST    CLINICAL HISTORY:  PE suspected, high pretest prob;; Epigastric pain;Abdominal pain, acute, nonlocalized;    TECHNIQUE:  Low dose axial images, sagittal and coronal reformations were obtained from the thoracic inlet to the lung bases following the IV administration of 100 mL of Omnipaque 350.  Contrast timing was optimized to evaluate the pulmonary arteries.  MIP images were performed.    COMPARISON:  Chest x-ray 07/24/2020    FINDINGS:  CT chest: There is multifocal patchy and confluent opacities throughout the lungs bilaterally with superimposed ground-glass attenuation.  There is bilateral pleural effusions.  Lung opacities are nonspecific concerning for possible inflammatory/infectious process  with differential to include COVID-19 pneumonia underlying neoplasm felt less likely but not excluded..  Superimposed component of vascular congestion and edema to be considered with reflux of contrast into the hepatic veins and enlarged arterial to bronchus ratio in the upper lobes.    Clinical correlation and follow-up recommended.  There is no intraluminal filling defect within the central or proximal segmental pulmonary arteries to suggest pulmonary embolism.  Mid to distal segmental pulmonary arteries distorted by motion.    There are prominent to enlarged right paratracheal and prevascular lymph nodes with prevascular space node measuring 1.7 cm.  No pneumothorax.    Remote operative change from sternotomy with coronary artery bypass grafting.    CT abdomen pelvis: Study distorted by motion.  Liver, spleen, pancreas are grossly normal in size without focal lesion allowing for motion limitation.  No calcified gallstones in the gallbladder by CT criteria    The adrenal glands are within normal limits bilaterally.    The kidneys are small in caliber measuring 7-8 cm in length bilaterally.  Multifocal regions of hypoattenuation in the kidneys with slight volume loss concerning for scarring although evaluation is overall limited by motion.    Atherosclerotic plaquing of the aorta without evidence for aneurysmal dilatation.    There is colonic diverticulosis without definite acute diverticulitis.  No signs of appendicitis with normal caliber appendix identified.  Heavy calcified plaque at the origin of the celiac axis and SMA with the inferior mesenteric artery not well seen.    No free intraperitoneal gas or fluid.  No focal dilated loop of bowel to suggest bowel obstruction although limited by lack of oral contrast.    .    Probable surgical clips left groin incidental                               X-Ray Chest AP Portable (Final result)  Result time 07/24/20 11:37:16    Final result by Omari Hatfield MD (07/24/20  11:37:16)                 Impression:      Moderate diffuse lung opacification, possible cardiogenic/ noncardiogenic pulmonary edema, pneumonia, or aspiration.    Small pleural effusions.      Electronically signed by: Omari Hatfield MD  Date:    07/24/2020  Time:    11:37             Narrative:    EXAMINATION:  XR CHEST AP PORTABLE    CLINICAL HISTORY:  Chest Pain;    TECHNIQUE:  Single frontal view of the chest was performed.    COMPARISON:  06/18/2020    FINDINGS:  Intact median sternotomy wires.  There is moderate diffuse lung opacification with indistinct pulmonary vascularity.  No gross pneumothorax.  Probable small pleural effusions.  Heart size stable.                                Current Medications:     Infusions:       Scheduled:   sodium chloride 0.9%   Intravenous Once    sodium chloride 0.9%   Intravenous Once    aspirin  81 mg Oral Daily    atorvastatin  40 mg Oral Daily    azithromycin  500 mg Oral Daily    carvediloL  25 mg Oral BID    cefTRIAXone (ROCEPHIN) IVPB  1 g Intravenous Q24H    ergocalciferol  50,000 Units Oral Q7 Days    famotidine  20 mg Oral Daily    furosemide  40 mg Oral Daily    heparin (porcine)  5,000 Units Subcutaneous Q8H    isosorbide mononitrate  120 mg Oral Daily    mupirocin   Nasal BID    vitamin renal formula (B-complex-vitamin c-folic acid)  1 capsule Oral Daily        PRN:  sodium chloride 0.9%, sodium chloride 0.9%, sodium chloride 0.9%, sodium chloride 0.9%    Lines and Day Number of Therapy:  Left AV fistula, PIV    Assessment:     Erin Clark is a 85 y.o.female with  Patient Active Problem List    Diagnosis Date Noted    Sleep apnea 05/07/2020    Essential hypertension 05/05/2020    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    Hypoxia 04/05/2019    Chest pain     Cough     Hypervolemia     Pneumonia of both lungs due to infectious organism     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    ESRD (end stage renal disease)  12/10/2014    Hyperlipidemia LDL goal <70 12/10/2014    SOB (shortness of breath) 05/05/2014    S/P CABG x 1 04/08/2014    Anemia 03/11/2014    Type 2 diabetes mellitus 02/10/2014    Peripheral arterial occlusive disease 02/10/2014    Hypertension 02/10/2014    CAD (coronary artery disease) 02/10/2014    Chronic diastolic congestive heart failure 02/10/2014        Plan:   84 yo female with history of CHF (EF 55 2019), DM2, ESRD on HD MF, GERD, HTN, PAD, HLD complains of shortness of breath that started the day before admission     Dyspnea - unknown etiology  PE unlikely based on CTA.   Increased JVP. Crackles on exam. Likely fluid overloaded from missing dialysis. COVID negative, but could be possible.   - CXR small pleural effusion, moderate diffuse lung opacification  - CTA chest shows no evidence of PE. prominent enlarged mediastinal, hilar lymph nodes  -lower extremity doppler negative for DVT  - COVID negative x3. CRP, lactate, pro calcitonin, DDimer, LDH, ESR ordered  - CT abdomen: No acute intra-abdominal findings allowing for motion.  Colonic diverticulosis without definite diverticulitis  - due for F/U with pulmonary outpatient. Will schedule on discharge  - breathing well on NC today, may need home O2 at discharge    ESRD on HD MF  Missed today's dialysis to come to the ER.   - HD 7/24, 2300 mL taken off  - nephrology consulted  - checking CMP, mg and phos  -Creatinine 3.9 today from 2.7, received dialysis yesterday      Combined Systolic and Diastolic CHF  55% EF mild tricupsid and mitral regurgitation. PA systolic pressure 53 mmHg on Echo from 2019  - BNP 1204 and troponin 0.030 in 2019. BNP 1702 and troponin 0.033 on admission. Close to baseline  - echo pending  -due for F/U with Dr. Sanchez outpatient. Will schedule on discharge     Chronic Normocytic Anemia  - Hgb 11.3. 10.3 in 2019.   -iron study: iron 12, TIBC 186, transferrin 126, ferritin 2003     DM type 2  A1C 5.7 in 2019  - checking  A1C - 5.2  - no home meds for diabetes  - sliding scale  Code Status:      Full     Disposition: to home pending workup of new DELILAH.   Diet: renal  DVT Prophylaxis: heparin    Live Sebastian MD  [facility] Internal Medicine HO-I    [facility] Medicine Hospitalist Pager numbers:   [facility] Hospitalist Medicine Team A (Marti/Bruno): [phone]  [facility] Hospitalist Medicine Team B (Davion/Areli):  [phone]

## 2020-07-27 LAB
ALBUMIN SERPL BCP-MCNC: 2.7 G/DL (ref 3.5–5.2)
ALP SERPL-CCNC: 99 U/L (ref 55–135)
ALT SERPL W/O P-5'-P-CCNC: 19 U/L (ref 10–44)
ANION GAP SERPL CALC-SCNC: 12 MMOL/L (ref 8–16)
AST SERPL-CCNC: 26 U/L (ref 10–40)
BASOPHILS # BLD AUTO: 0.03 K/UL (ref 0–0.2)
BASOPHILS NFR BLD: 0.4 % (ref 0–1.9)
BILIRUB SERPL-MCNC: 0.3 MG/DL (ref 0.1–1)
BUN SERPL-MCNC: 50 MG/DL (ref 8–23)
CALCIUM SERPL-MCNC: 8.4 MG/DL (ref 8.7–10.5)
CHLORIDE SERPL-SCNC: 99 MMOL/L (ref 95–110)
CO2 SERPL-SCNC: 18 MMOL/L (ref 23–29)
CREAT SERPL-MCNC: 5.6 MG/DL (ref 0.5–1.4)
DIFFERENTIAL METHOD: ABNORMAL
EOSINOPHIL # BLD AUTO: 0.3 K/UL (ref 0–0.5)
EOSINOPHIL NFR BLD: 3.8 % (ref 0–8)
ERYTHROCYTE [DISTWIDTH] IN BLOOD BY AUTOMATED COUNT: 14 % (ref 11.5–14.5)
EST. GFR  (AFRICAN AMERICAN): 7 ML/MIN/1.73 M^2
EST. GFR  (NON AFRICAN AMERICAN): 7 ML/MIN/1.73 M^2
GLUCOSE SERPL-MCNC: 102 MG/DL (ref 70–110)
HCT VFR BLD AUTO: 27.7 % (ref 37–48.5)
HGB BLD-MCNC: 8.8 G/DL (ref 12–16)
IMM GRANULOCYTES # BLD AUTO: 0.04 K/UL (ref 0–0.04)
IMM GRANULOCYTES NFR BLD AUTO: 0.5 % (ref 0–0.5)
LYMPHOCYTES # BLD AUTO: 1.4 K/UL (ref 1–4.8)
LYMPHOCYTES NFR BLD: 17.6 % (ref 18–48)
MAGNESIUM SERPL-MCNC: 2.5 MG/DL (ref 1.6–2.6)
MCH RBC QN AUTO: 27.6 PG (ref 27–31)
MCHC RBC AUTO-ENTMCNC: 31.8 G/DL (ref 32–36)
MCV RBC AUTO: 87 FL (ref 82–98)
MONOCYTES # BLD AUTO: 0.7 K/UL (ref 0.3–1)
MONOCYTES NFR BLD: 8.7 % (ref 4–15)
NEUTROPHILS # BLD AUTO: 5.3 K/UL (ref 1.8–7.7)
NEUTROPHILS NFR BLD: 69 % (ref 38–73)
NRBC BLD-RTO: 0 /100 WBC
PHOSPHATE SERPL-MCNC: 5.9 MG/DL (ref 2.7–4.5)
PLATELET # BLD AUTO: 261 K/UL (ref 150–350)
PMV BLD AUTO: 9.6 FL (ref 9.2–12.9)
POCT GLUCOSE: 102 MG/DL (ref 70–110)
POCT GLUCOSE: 106 MG/DL (ref 70–110)
POCT GLUCOSE: 122 MG/DL (ref 70–110)
POCT GLUCOSE: 81 MG/DL (ref 70–110)
POTASSIUM SERPL-SCNC: 4.7 MMOL/L (ref 3.5–5.1)
PROT SERPL-MCNC: 6.6 G/DL (ref 6–8.4)
RBC # BLD AUTO: 3.19 M/UL (ref 4–5.4)
SODIUM SERPL-SCNC: 129 MMOL/L (ref 136–145)
WBC # BLD AUTO: 7.68 K/UL (ref 3.9–12.7)

## 2020-07-27 PROCEDURE — 63700000 PHARM REV CODE 250 ALT 637 W/O HCPCS: Performed by: INTERNAL MEDICINE

## 2020-07-27 PROCEDURE — 80100016 HC MAINTENANCE HEMODIALYSIS

## 2020-07-27 PROCEDURE — 11000001 HC ACUTE MED/SURG PRIVATE ROOM

## 2020-07-27 PROCEDURE — 63600175 PHARM REV CODE 636 W HCPCS: Performed by: STUDENT IN AN ORGANIZED HEALTH CARE EDUCATION/TRAINING PROGRAM

## 2020-07-27 PROCEDURE — 94761 N-INVAS EAR/PLS OXIMETRY MLT: CPT

## 2020-07-27 PROCEDURE — 85025 COMPLETE CBC W/AUTO DIFF WBC: CPT

## 2020-07-27 PROCEDURE — 80053 COMPREHEN METABOLIC PANEL: CPT

## 2020-07-27 PROCEDURE — 83735 ASSAY OF MAGNESIUM: CPT

## 2020-07-27 PROCEDURE — 99900035 HC TECH TIME PER 15 MIN (STAT)

## 2020-07-27 PROCEDURE — 84100 ASSAY OF PHOSPHORUS: CPT

## 2020-07-27 PROCEDURE — 36415 COLL VENOUS BLD VENIPUNCTURE: CPT

## 2020-07-27 PROCEDURE — 27000221 HC OXYGEN, UP TO 24 HOURS

## 2020-07-27 PROCEDURE — 25000003 PHARM REV CODE 250: Performed by: STUDENT IN AN ORGANIZED HEALTH CARE EDUCATION/TRAINING PROGRAM

## 2020-07-27 RX ADMIN — ISOSORBIDE MONONITRATE 120 MG: 30 TABLET, EXTENDED RELEASE ORAL at 01:07

## 2020-07-27 RX ADMIN — MUPIROCIN: 20 OINTMENT TOPICAL at 01:07

## 2020-07-27 RX ADMIN — HEPARIN SODIUM 5000 UNITS: 5000 INJECTION INTRAVENOUS; SUBCUTANEOUS at 05:07

## 2020-07-27 RX ADMIN — ATORVASTATIN CALCIUM 40 MG: 40 TABLET, FILM COATED ORAL at 01:07

## 2020-07-27 RX ADMIN — ASPIRIN 81 MG: 81 TABLET, COATED ORAL at 01:07

## 2020-07-27 RX ADMIN — FUROSEMIDE 40 MG: 40 TABLET ORAL at 01:07

## 2020-07-27 RX ADMIN — CARVEDILOL 25 MG: 25 TABLET, FILM COATED ORAL at 08:07

## 2020-07-27 RX ADMIN — CARVEDILOL 25 MG: 25 TABLET, FILM COATED ORAL at 01:07

## 2020-07-27 RX ADMIN — CEFTRIAXONE 1 G: 1 INJECTION, SOLUTION INTRAVENOUS at 01:07

## 2020-07-27 RX ADMIN — AZITHROMYCIN MONOHYDRATE 500 MG: 250 TABLET ORAL at 01:07

## 2020-07-27 RX ADMIN — HEPARIN SODIUM 5000 UNITS: 5000 INJECTION INTRAVENOUS; SUBCUTANEOUS at 01:07

## 2020-07-27 RX ADMIN — FAMOTIDINE 20 MG: 20 TABLET ORAL at 01:07

## 2020-07-27 RX ADMIN — HEPARIN SODIUM 5000 UNITS: 5000 INJECTION INTRAVENOUS; SUBCUTANEOUS at 09:07

## 2020-07-27 RX ADMIN — MUPIROCIN: 20 OINTMENT TOPICAL at 08:07

## 2020-07-27 RX ADMIN — NEPHROCAP 1 CAPSULE: 1 CAP ORAL at 09:07

## 2020-07-27 NOTE — NURSING
Home Oxygen Evaluation    Date Performed: 2020    1) Patient's Home O2 Sat on room air, while at rest:                91%      If O2 sats on room air at rest are 88% or below, patient qualifies. No additional testing needed. Document N/A in steps 2 and 3. If 89% or above, complete steps 2.      2) Patient's O2 Sat on room air while exercisin%        If O2 sats on room air while exercising remain 89% or above patient does not qualify, no further testing needed Document N/A in step 3. If O2 sats on room air while exercising are 88% or below, continue to step 3.      3) Patient's O2 Sat while exercising on O2:   96% at 2L/min via NC         (Must show improvement from #2 for patients to qualify)    If O2 sats improve on oxygen, patient qualifies for portable oxygen. If not, the patient does not qualify.

## 2020-07-27 NOTE — PLAN OF CARE
07/27/20 1639   Post-Acute Status   Post-Acute Authorization HME;Home Health   HME Status Pending Payor Medical Review  (Still no authorization for home oxygen for this patient. MD updated and recommended for a safer discharge home in the am when O2 can be approved by [facility].)   Home Health Status Authorization Obtained  (Family HC to service pt upon discharge home from [facility] at [facility]. [facility] 7/30)   Discharge Delays (!) Home Medical Equipment (Insurance, Delivery)  (NO authorization for home oxygen for this patient.)     Chichi Nicole RN  RN Transition Navigator  [phone]

## 2020-07-27 NOTE — DISCHARGE SUMMARY
[facility] Hospital Medicine Discharge Summary    Primary Team: [facility] Hospitalist Team B  Attending Physician: Santy Singleton MD  Resident: Debbie  Intern: Jonas    Date of Admit: 7/24/2020  Date of Discharge: 7/28/2020    Discharge to: Home  Condition: Stable    Discharge Diagnoses     Patient Active Problem List   Diagnosis    Type 2 diabetes mellitus    Peripheral arterial occlusive disease    Hypertension    CAD (coronary artery disease)    Chronic diastolic congestive heart failure    Anemia    S/P CABG x 1    SOB (shortness of breath)    ESRD (end stage renal disease)    Hyperlipidemia LDL goal <70    Hemodialysis AV fistula aneurysm    Hypoxia    Chest pain    Cough    Hypervolemia    Pneumonia of both lungs due to infectious organism    Essential hypertension    Aneurysm of arteriovenous dialysis fistula    Sleep apnea       Consultants and Procedures     Consultants:  Nephrology, Pulmonary    Procedures:   None    Imaging:  Imaging Results          US Lower Extremity Veins Bilateral (Final result)  Result time 07/24/20 23:43:19    Final result by Gissel Flores MD (07/24/20 23:43:19)                 Impression:      No evidence of deep venous thrombosis in either lower extremity.      Electronically signed by: Gissel Flores MD  Date:    07/24/2020  Time:    23:43             Narrative:    EXAMINATION:  US LOWER EXTREMITY VEINS BILATERAL    CLINICAL HISTORY:  concern for dvt; Shortness of breath    TECHNIQUE:  Duplex and color flow Doppler and dynamic compression was performed of the bilateral lower extremity veins was performed.  Please note examination is slightly limited due to artifact from arterial vascular calcifications.    COMPARISON:  Lower extremity ultrasound 02/11/2014    FINDINGS:  Right thigh veins: The common femoral, femoral, popliteal, upper greater saphenous, and deep femoral veins are patent and free of thrombus. The veins are normally compressible and have normal phasic flow  and augmentation response.    Right calf veins: The visualized calf veins are patent.    Left thigh veins: The common femoral, femoral, popliteal, upper greater saphenous, and deep femoral veins are patent and free of thrombus. The veins are normally compressible and have normal phasic flow and augmentation response.    Left calf veins: The visualized calf veins are patent.    Miscellaneous: None                               CT Abdomen Pelvis With Contrast (Final result)  Result time 07/24/20 14:46:07    Final result by Calvin Joseph DO (07/24/20 14:46:07)                 Impression:      CTA chest: No evidence for central or proximal segmental pulmonary embolism.  Mid to distal segmental pulmonary arteries limited by motion.    Multifocal somewhat nodular consolidative opacities within the lungs bilaterally concerning for inflammatory/infectious process with differential to include COVID-19.  Neoplasm felt less likely.    There is superimposed patchy ground-glass mosaic attenuation lungs with enlarged arterial to bronchus ratio in the upper lobes, reflux of contrast into the hepatic veins and bilateral pleural effusions concerning for component of vascular congestion and edema.    Please note there is prominent to enlarged mediastinal and hilar lymph nodes    CT abdomen pelvis: No acute intra-abdominal findings allowing for motion.  Colonic diverticulosis without definite diverticulitis.  Extensive vascular calcification of the aorta.  Small caliber kidneys concerning for renal atrophy with slight volume loss and hypoattenuation in the kidneys bilaterally which may represent scarring though indeterminate and distorted by motion.  Follow-up dedicated renal imaging advised.      Electronically signed by: Calvin Joseph DO  Date:    07/24/2020  Time:    14:46             Narrative:    EXAMINATION:  CTA CHEST NON CORONARY; CT ABDOMEN PELVIS WITH CONTRAST    CLINICAL HISTORY:  PE suspected, high pretest prob;;  Epigastric pain;Abdominal pain, acute, nonlocalized;    TECHNIQUE:  Low dose axial images, sagittal and coronal reformations were obtained from the thoracic inlet to the lung bases following the IV administration of 100 mL of Omnipaque 350.  Contrast timing was optimized to evaluate the pulmonary arteries.  MIP images were performed.    COMPARISON:  Chest x-ray 07/24/2020    FINDINGS:  CT chest: There is multifocal patchy and confluent opacities throughout the lungs bilaterally with superimposed ground-glass attenuation.  There is bilateral pleural effusions.  Lung opacities are nonspecific concerning for possible inflammatory/infectious process with differential to include COVID-19 pneumonia underlying neoplasm felt less likely but not excluded..  Superimposed component of vascular congestion and edema to be considered with reflux of contrast into the hepatic veins and enlarged arterial to bronchus ratio in the upper lobes.    Clinical correlation and follow-up recommended.  There is no intraluminal filling defect within the central or proximal segmental pulmonary arteries to suggest pulmonary embolism.  Mid to distal segmental pulmonary arteries distorted by motion.    There are prominent to enlarged right paratracheal and prevascular lymph nodes with prevascular space node measuring 1.7 cm.  No pneumothorax.    Remote operative change from sternotomy with coronary artery bypass grafting.    CT abdomen pelvis: Study distorted by motion.  Liver, spleen, pancreas are grossly normal in size without focal lesion allowing for motion limitation.  No calcified gallstones in the gallbladder by CT criteria    The adrenal glands are within normal limits bilaterally.    The kidneys are small in caliber measuring 7-8 cm in length bilaterally.  Multifocal regions of hypoattenuation in the kidneys with slight volume loss concerning for scarring although evaluation is overall limited by motion.    Atherosclerotic plaquing of the  aorta without evidence for aneurysmal dilatation.    There is colonic diverticulosis without definite acute diverticulitis.  No signs of appendicitis with normal caliber appendix identified.  Heavy calcified plaque at the origin of the celiac axis and SMA with the inferior mesenteric artery not well seen.    No free intraperitoneal gas or fluid.  No focal dilated loop of bowel to suggest bowel obstruction although limited by lack of oral contrast.    .    Probable surgical clips left groin incidental                               CTA Chest Non-Coronary (PE Study) (Final result)  Result time 07/24/20 14:46:07    Final result by Calvin Joseph DO (07/24/20 14:46:07)                 Impression:      CTA chest: No evidence for central or proximal segmental pulmonary embolism.  Mid to distal segmental pulmonary arteries limited by motion.    Multifocal somewhat nodular consolidative opacities within the lungs bilaterally concerning for inflammatory/infectious process with differential to include COVID-19.  Neoplasm felt less likely.    There is superimposed patchy ground-glass mosaic attenuation lungs with enlarged arterial to bronchus ratio in the upper lobes, reflux of contrast into the hepatic veins and bilateral pleural effusions concerning for component of vascular congestion and edema.    Please note there is prominent to enlarged mediastinal and hilar lymph nodes    CT abdomen pelvis: No acute intra-abdominal findings allowing for motion.  Colonic diverticulosis without definite diverticulitis.  Extensive vascular calcification of the aorta.  Small caliber kidneys concerning for renal atrophy with slight volume loss and hypoattenuation in the kidneys bilaterally which may represent scarring though indeterminate and distorted by motion.  Follow-up dedicated renal imaging advised.      Electronically signed by: Calvin Joseph DO  Date:    07/24/2020  Time:    14:46             Narrative:    EXAMINATION:  CTA CHEST  NON CORONARY; CT ABDOMEN PELVIS WITH CONTRAST    CLINICAL HISTORY:  PE suspected, high pretest prob;; Epigastric pain;Abdominal pain, acute, nonlocalized;    TECHNIQUE:  Low dose axial images, sagittal and coronal reformations were obtained from the thoracic inlet to the lung bases following the IV administration of 100 mL of Omnipaque 350.  Contrast timing was optimized to evaluate the pulmonary arteries.  MIP images were performed.    COMPARISON:  Chest x-ray 07/24/2020    FINDINGS:  CT chest: There is multifocal patchy and confluent opacities throughout the lungs bilaterally with superimposed ground-glass attenuation.  There is bilateral pleural effusions.  Lung opacities are nonspecific concerning for possible inflammatory/infectious process with differential to include COVID-19 pneumonia underlying neoplasm felt less likely but not excluded..  Superimposed component of vascular congestion and edema to be considered with reflux of contrast into the hepatic veins and enlarged arterial to bronchus ratio in the upper lobes.    Clinical correlation and follow-up recommended.  There is no intraluminal filling defect within the central or proximal segmental pulmonary arteries to suggest pulmonary embolism.  Mid to distal segmental pulmonary arteries distorted by motion.    There are prominent to enlarged right paratracheal and prevascular lymph nodes with prevascular space node measuring 1.7 cm.  No pneumothorax.    Remote operative change from sternotomy with coronary artery bypass grafting.    CT abdomen pelvis: Study distorted by motion.  Liver, spleen, pancreas are grossly normal in size without focal lesion allowing for motion limitation.  No calcified gallstones in the gallbladder by CT criteria    The adrenal glands are within normal limits bilaterally.    The kidneys are small in caliber measuring 7-8 cm in length bilaterally.  Multifocal regions of hypoattenuation in the kidneys with slight volume loss  concerning for scarring although evaluation is overall limited by motion.    Atherosclerotic plaquing of the aorta without evidence for aneurysmal dilatation.    There is colonic diverticulosis without definite acute diverticulitis.  No signs of appendicitis with normal caliber appendix identified.  Heavy calcified plaque at the origin of the celiac axis and SMA with the inferior mesenteric artery not well seen.    No free intraperitoneal gas or fluid.  No focal dilated loop of bowel to suggest bowel obstruction although limited by lack of oral contrast.    .    Probable surgical clips left groin incidental                               X-Ray Chest AP Portable (Final result)  Result time 07/24/20 11:37:16    Final result by Omari Hatfield MD (07/24/20 11:37:16)                 Impression:      Moderate diffuse lung opacification, possible cardiogenic/ noncardiogenic pulmonary edema, pneumonia, or aspiration.    Small pleural effusions.      Electronically signed by: Omari Hatfield MD  Date:    07/24/2020  Time:    11:37             Narrative:    EXAMINATION:  XR CHEST AP PORTABLE    CLINICAL HISTORY:  Chest Pain;    TECHNIQUE:  Single frontal view of the chest was performed.    COMPARISON:  06/18/2020    FINDINGS:  Intact median sternotomy wires.  There is moderate diffuse lung opacification with indistinct pulmonary vascularity.  No gross pneumothorax.  Probable small pleural effusions.  Heart size stable.                                Brief History of Present Illness    84 yo female with history of CHF (EF 55 2019), DM2, ESRD on HD MF, GERD, HTN, PAD, HLD complains of shortness of breath that started yesterday.    She was laying down when it started and is not affected by activity. She denies chest pain, abdominal pain, cough , sore throat, ageusia, anosmia, leg pain. Denies history of blood clots. She has normal BMs.   Endorses leg swelling      Initially, she said this was the first time she had ever experienced  anything like this, but upon further questioning, she mentions that she feels short of breath after dialysis. She has been receiving dialysis for 6 years and almost always experiences dyspnea after. Albuterol has been prescribed to her, but she does not have it available to use yet.  She says she took all of her meds for today and missed today's dialysis because she came to the ED    For the full HPI please refer to the History & Physical from this admission.    Hospital Course By Problem with Pertinent Findings     84 yo female with history of CHF (EF 55 2019), DM2, ESRD on HD MF, GERD, HTN, PAD, HLD complains of shortness of breath that started the day before admission     Dyspnea - unknown etiology  PE unlikely based on CTA.   Increased JVP. Crackles on exam. Likely fluid overloaded from missing dialysis. COVID negative, but could be possible.   - CXR small pleural effusion, moderate diffuse lung opacification  - CTA chest shows no evidence of PE. prominent enlarged mediastinal, hilar lymph nodes  -lower extremity doppler negative for DVT  - COVID negative x3. CRP, pro calcitonin, DDimer,  ESR elevated on admission  - CT abdomen: No acute intra-abdominal findings allowing for motion.  Colonic diverticulosis without definite diverticulitis  - due for F/U with pulmonary outpatient. Will schedule on discharge  - Possible PNA. Treaed with Rocephin and azithromycin  - breathing well on NC. Walk test 7/27 87% on exertion, improved to 96% with oxygen. Discharge with home oxygen     ESRD on HD MF  Missed today's dialysis to come to the ER.   - HD 7/24, 2300 mL taken off  - nephrology consulted. Sees Dr. Alanis outpatient  - checking CMP, mg and phos  -received dialysis 7/24, 7/25 and 7/27      Combined Systolic and Diastolic CHF  55% EF mild tricupsid and mitral regurgitation. PA systolic pressure 53 mmHg on Echo from 2019  - echo 7/25 shows 50% EF with mild left ventricular enlargement. Moderate left and right atrial  enlargement. Trace tricuspid regurgitation  -due for F/U with Dr. Sanchez outpatient. Scheduled      Chronic Normocytic Anemia  - Hgb 11.3. 10.3 in 2019.   -iron study: iron 12, TIBC 186, transferrin 126, ferritin 2003     DM type 2  A1C 5.7 in 2019  - A1C on admission - 5.2  - no home meds for diabetes      Discharge Medications      Erin Clark   Home Medication Instructions MITCHELL:[id number]    Printed on:07/27/20 1411   Medication Information                      albuterol (PROAIR HFA) 90 mcg/actuation inhaler  Inhale 2 puffs into the lungs every 6 (six) hours as needed for Wheezing. Rescue             amLODIPine (NORVASC) 10 MG tablet  TAKE 1 TABLET BY MOUTH ONCE DAILY             aspirin (ECOTRIN) 81 MG EC tablet  TAKE ONE TABLET BY MOUTH ONCE DAILY             atorvastatin (LIPITOR) 40 MG tablet  Take 1 tablet (40 mg total) by mouth once daily.             benzonatate (TESSALON) 100 MG capsule  Take 100 mg by mouth 3 (three) times daily as needed.             carvedilol (COREG) 25 MG tablet  TAKE 1 TABLET BY MOUTH TWICE DAILY             ergocalciferol (ERGOCALCIFEROL) 50,000 unit Cap  Take 1 capsule (50,000 Units total) by mouth every 7 days.             famotidine (PEPCID) 20 MG tablet  Take 1 tablet (20 mg total) by mouth once daily.             furosemide (LASIX) 40 MG tablet  Take 1 tablet (40 mg total) by mouth once daily.             isosorbide mononitrate (IMDUR) 120 MG 24 hr tablet  TAKE 1 TABLET BY MOUTH ONCE DAILY             nitroGLYCERIN (NITROSTAT) 0.4 MG SL tablet  Place 1 tablet (0.4 mg total) under the tongue every 5 (five) minutes as needed for Chest pain.             predniSONE (DELTASONE) 5 MG tablet               vitamin renal formula, B-complex-vitamin c-folic acid, (NEPHROCAP) 1 mg Cap  Take 1 capsule by mouth once daily.                 Discharge Information:   Diet:  Renal diet    Physical Activity:  As tolerated             Instructions:  1. Take all medications as prescribed  2.  Keep all follow-up appointments  3. Return to the hospital or call your primary care physicians if any worsening symptoms such as fever, chest pain, shortness of breath, return of symptoms, or any other concerns.    Follow-Up Appointments:  Dr. Sanchez (cardiology), Pulmonary, Nephrology, and PCP    Reji Mcdaniel MD  [facility] Internal Medicine, [facility]

## 2020-07-27 NOTE — PLAN OF CARE
Problem: Adult Inpatient Plan of Care  Goal: Plan of Care Review    Patient is awake and orientedx4. Care plan explained to patient; she verbalized understanding. On 2L via nasal cannula, O2 saturation at 95% SOB noted with activity. Hooked to heart monitor running sinus bradycardia 46-55bpm. Assisted to bathroom as needed. No pain/n/v/d during shift. Due medications given, held nightly coreg due to HR sustaining in the 50sm Melatonin given for sleep. Left arm dialysis access + for thrill/bruit. Accuchecks completed, no coverage needed per sliding scale. AVAsys in room for safety. Encouraged to turn every 2 hours as tolerated. Maintained on fall risk precaution. Bed in lowest position, bed alarm on, call light/personal items within reach and instructed to call for help when needed. Will continue to monitor.     Outcome: Ongoing, Progressing

## 2020-07-27 NOTE — PROCEDURES
Pt seen and examined on HD, tolerating procedure well  Still with sone SOB and wekness  Temp:  [97 °F (36.1 °C)]   Pulse:  [54-61]   Resp:  [16]   BP: (128-135)/(63)   SpO2:  [93 %-96 %]      CTA B, no edema   goal UF 2L  today    On 2L o2NC

## 2020-07-27 NOTE — PT/OT/SLP PROGRESS
Physical Therapy      Patient Name:  Erin Clark   MRN:  [id number]    1240 -Patient not seen today secondary to off unit in dialysis. Will follow-up later this afternoon if time permits.    Nasreen Farias, PTA

## 2020-07-27 NOTE — PLAN OF CARE
07/27/20 1540   Final Note   Assessment Type Final Discharge Note   Anticipated Discharge Disposition Home-Health  (per [facility])   Hospital Follow Up  Appt(s) scheduled? Yes   Discharge plans and expectations educations in teach back method with documentation complete? Yes   Post-Acute Status   Post-Acute Authorization HME;Home Health   HME Status Pending Qualifying Diagnosis  (Oxygen order updated by MD. Copy of DC summary emailed to Rachel Childers to submit for authorization. She reports MD would have to have noted pt's need for oxygen but she will submit what she has. Pending authorization.)   Home Health Status Pending Payor Medical Review  (Orders emailed to JORGE Arzate with [facility] for provider.)   Discharge Delays (!) Home Medical Equipment (Insurance, Delivery)       1628pm - Rachel childers reports she does not have authorization from [facility] and does not believe we will have any information by the close of business. PHN does have auth for [facility] for patient to be followed by [facility] to be seen on Thursday.    Chichi Nicole RN  RN Transition Navigator  [phone]

## 2020-07-27 NOTE — NURSING
Attempted to wean patient down from the 2L nasal cannula, she did not tolerate it well. On the 2L her O2 sats were 92-95%, when it was decreased or removed her O2 sats dropped to the 80s.

## 2020-07-27 NOTE — NURSING
Pt arrived from Dialysis. Per report pt had 2L Removed. Tolerated treatment well. Site has dressing in place, intact with bruit and thrill present. No c/o discomfort voiced. Lunch provided and pt is now eating.    independent

## 2020-07-27 NOTE — PROGRESS NOTES
[facility] Medicine Progress Note    Primary Team: [facility] Hospitalist Team B  Attending Physician: Santy Singleton MD  Resident: Debbie  Intern: Jonas    Subjective:      Comfortable. Says she didn't have any complaints or issues  Overnight.Complains of SOB that is better than when she was admitted. She says the nasal cannula is helping. Denies headache ,dizziness, cough, chest pain, abdominal pain, N/V, diarrhea.     Objective:     Last 24 Hour Vital Signs:  BP  Min: 107/53  Max: 145/67  Temp  Av.6 °F (36.4 °C)  Min: 96.7 °F (35.9 °C)  Max: 98.8 °F (37.1 °C)  Pulse  Av.2  Min: 46  Max: 62  Resp  Avg: 15.6  Min: 13  Max: 18  SpO2  Av.7 %  Min: 93 %  Max: 99 %  I/O last 3 completed shifts:  In: 1765 [P.O.:965; Other:800]  Out: 1201 [Urine:400; Other:800; Stool:1]    Physical Examination:  General: Alert and oriented, well nourished, no acute distress.  Eye: PERRL  Neck: Supple, non-tender  trachea midline  Lungs: coarse lung sounds lower lobes, non labored respiration. On NC.  Heart: Normal rate, regular rhythm, systolic murmur 1/6, gallop or edema.   Abdomen: Soft, non-tender, non-distended, normal bowel sounds, no masses.  Extremities: no pitting edema bilaterally, distal pulses 2+, fistula left arm - thrills present  Skin: Skin is warm, dry and pink, no rashes or lesions  Neurologic: Awake, alert, and oriented X3  Psychiatric: Cooperative, appropriate mood and affect.      Laboratory:  Laboratory Data Reviewed: yes  Pertinent Findings:  Recent Labs   Lab 20  0357 20  0335 20  0412   WBC 14.80* 10.84 7.68   HGB 9.6* 8.7* 8.8*   HCT 30.7* 28.1* 27.7*    271 261   * 131* 129*   K 5.2* 4.7 4.7    103 99   CREATININE 2.7* 3.9* 5.6*   BUN 25* 33* 50*   CO2 23 19* 18*   ALT 12 13 19   AST 22 24 26       Microbiology Data Reviewed: yes  Pertinent Findings:  Microbiology Results (last 7 days)     ** No results found for the last 168 hours. **          Other  Results:  EKG (my interpretation): sinus rhythm with PACs. Normal axis. LVH     Radiology Data Reviewed: yes  Pertinent Findings:  Imaging Results          US Lower Extremity Veins Bilateral (Final result)  Result time 07/24/20 23:43:19    Final result by Gissel Flores MD (07/24/20 23:43:19)                 Impression:      No evidence of deep venous thrombosis in either lower extremity.      Electronically signed by: Gissel Flores MD  Date:    07/24/2020  Time:    23:43             Narrative:    EXAMINATION:  US LOWER EXTREMITY VEINS BILATERAL    CLINICAL HISTORY:  concern for dvt; Shortness of breath    TECHNIQUE:  Duplex and color flow Doppler and dynamic compression was performed of the bilateral lower extremity veins was performed.  Please note examination is slightly limited due to artifact from arterial vascular calcifications.    COMPARISON:  Lower extremity ultrasound 02/11/2014    FINDINGS:  Right thigh veins: The common femoral, femoral, popliteal, upper greater saphenous, and deep femoral veins are patent and free of thrombus. The veins are normally compressible and have normal phasic flow and augmentation response.    Right calf veins: The visualized calf veins are patent.    Left thigh veins: The common femoral, femoral, popliteal, upper greater saphenous, and deep femoral veins are patent and free of thrombus. The veins are normally compressible and have normal phasic flow and augmentation response.    Left calf veins: The visualized calf veins are patent.    Miscellaneous: None                               CT Abdomen Pelvis With Contrast (Final result)  Result time 07/24/20 14:46:07    Final result by Calvin Joseph DO (07/24/20 14:46:07)                 Impression:      CTA chest: No evidence for central or proximal segmental pulmonary embolism.  Mid to distal segmental pulmonary arteries limited by motion.    Multifocal somewhat nodular consolidative opacities within the lungs bilaterally  concerning for inflammatory/infectious process with differential to include COVID-19.  Neoplasm felt less likely.    There is superimposed patchy ground-glass mosaic attenuation lungs with enlarged arterial to bronchus ratio in the upper lobes, reflux of contrast into the hepatic veins and bilateral pleural effusions concerning for component of vascular congestion and edema.    Please note there is prominent to enlarged mediastinal and hilar lymph nodes    CT abdomen pelvis: No acute intra-abdominal findings allowing for motion.  Colonic diverticulosis without definite diverticulitis.  Extensive vascular calcification of the aorta.  Small caliber kidneys concerning for renal atrophy with slight volume loss and hypoattenuation in the kidneys bilaterally which may represent scarring though indeterminate and distorted by motion.  Follow-up dedicated renal imaging advised.      Electronically signed by: Calvin Joseph,   Date:    07/24/2020  Time:    14:46             Narrative:    EXAMINATION:  CTA CHEST NON CORONARY; CT ABDOMEN PELVIS WITH CONTRAST    CLINICAL HISTORY:  PE suspected, high pretest prob;; Epigastric pain;Abdominal pain, acute, nonlocalized;    TECHNIQUE:  Low dose axial images, sagittal and coronal reformations were obtained from the thoracic inlet to the lung bases following the IV administration of 100 mL of Omnipaque 350.  Contrast timing was optimized to evaluate the pulmonary arteries.  MIP images were performed.    COMPARISON:  Chest x-ray 07/24/2020    FINDINGS:  CT chest: There is multifocal patchy and confluent opacities throughout the lungs bilaterally with superimposed ground-glass attenuation.  There is bilateral pleural effusions.  Lung opacities are nonspecific concerning for possible inflammatory/infectious process with differential to include COVID-19 pneumonia underlying neoplasm felt less likely but not excluded..  Superimposed component of vascular congestion and edema to be  considered with reflux of contrast into the hepatic veins and enlarged arterial to bronchus ratio in the upper lobes.    Clinical correlation and follow-up recommended.  There is no intraluminal filling defect within the central or proximal segmental pulmonary arteries to suggest pulmonary embolism.  Mid to distal segmental pulmonary arteries distorted by motion.    There are prominent to enlarged right paratracheal and prevascular lymph nodes with prevascular space node measuring 1.7 cm.  No pneumothorax.    Remote operative change from sternotomy with coronary artery bypass grafting.    CT abdomen pelvis: Study distorted by motion.  Liver, spleen, pancreas are grossly normal in size without focal lesion allowing for motion limitation.  No calcified gallstones in the gallbladder by CT criteria    The adrenal glands are within normal limits bilaterally.    The kidneys are small in caliber measuring 7-8 cm in length bilaterally.  Multifocal regions of hypoattenuation in the kidneys with slight volume loss concerning for scarring although evaluation is overall limited by motion.    Atherosclerotic plaquing of the aorta without evidence for aneurysmal dilatation.    There is colonic diverticulosis without definite acute diverticulitis.  No signs of appendicitis with normal caliber appendix identified.  Heavy calcified plaque at the origin of the celiac axis and SMA with the inferior mesenteric artery not well seen.    No free intraperitoneal gas or fluid.  No focal dilated loop of bowel to suggest bowel obstruction although limited by lack of oral contrast.    .    Probable surgical clips left groin incidental                               CTA Chest Non-Coronary (PE Study) (Final result)  Result time 07/24/20 14:46:07    Final result by Calvin Joseph DO (07/24/20 14:46:07)                 Impression:      CTA chest: No evidence for central or proximal segmental pulmonary embolism.  Mid to distal segmental pulmonary  arteries limited by motion.    Multifocal somewhat nodular consolidative opacities within the lungs bilaterally concerning for inflammatory/infectious process with differential to include COVID-19.  Neoplasm felt less likely.    There is superimposed patchy ground-glass mosaic attenuation lungs with enlarged arterial to bronchus ratio in the upper lobes, reflux of contrast into the hepatic veins and bilateral pleural effusions concerning for component of vascular congestion and edema.    Please note there is prominent to enlarged mediastinal and hilar lymph nodes    CT abdomen pelvis: No acute intra-abdominal findings allowing for motion.  Colonic diverticulosis without definite diverticulitis.  Extensive vascular calcification of the aorta.  Small caliber kidneys concerning for renal atrophy with slight volume loss and hypoattenuation in the kidneys bilaterally which may represent scarring though indeterminate and distorted by motion.  Follow-up dedicated renal imaging advised.      Electronically signed by: Calvin Joseph DO  Date:    07/24/2020  Time:    14:46             Narrative:    EXAMINATION:  CTA CHEST NON CORONARY; CT ABDOMEN PELVIS WITH CONTRAST    CLINICAL HISTORY:  PE suspected, high pretest prob;; Epigastric pain;Abdominal pain, acute, nonlocalized;    TECHNIQUE:  Low dose axial images, sagittal and coronal reformations were obtained from the thoracic inlet to the lung bases following the IV administration of 100 mL of Omnipaque 350.  Contrast timing was optimized to evaluate the pulmonary arteries.  MIP images were performed.    COMPARISON:  Chest x-ray 07/24/2020    FINDINGS:  CT chest: There is multifocal patchy and confluent opacities throughout the lungs bilaterally with superimposed ground-glass attenuation.  There is bilateral pleural effusions.  Lung opacities are nonspecific concerning for possible inflammatory/infectious process with differential to include COVID-19 pneumonia underlying  neoplasm felt less likely but not excluded..  Superimposed component of vascular congestion and edema to be considered with reflux of contrast into the hepatic veins and enlarged arterial to bronchus ratio in the upper lobes.    Clinical correlation and follow-up recommended.  There is no intraluminal filling defect within the central or proximal segmental pulmonary arteries to suggest pulmonary embolism.  Mid to distal segmental pulmonary arteries distorted by motion.    There are prominent to enlarged right paratracheal and prevascular lymph nodes with prevascular space node measuring 1.7 cm.  No pneumothorax.    Remote operative change from sternotomy with coronary artery bypass grafting.    CT abdomen pelvis: Study distorted by motion.  Liver, spleen, pancreas are grossly normal in size without focal lesion allowing for motion limitation.  No calcified gallstones in the gallbladder by CT criteria    The adrenal glands are within normal limits bilaterally.    The kidneys are small in caliber measuring 7-8 cm in length bilaterally.  Multifocal regions of hypoattenuation in the kidneys with slight volume loss concerning for scarring although evaluation is overall limited by motion.    Atherosclerotic plaquing of the aorta without evidence for aneurysmal dilatation.    There is colonic diverticulosis without definite acute diverticulitis.  No signs of appendicitis with normal caliber appendix identified.  Heavy calcified plaque at the origin of the celiac axis and SMA with the inferior mesenteric artery not well seen.    No free intraperitoneal gas or fluid.  No focal dilated loop of bowel to suggest bowel obstruction although limited by lack of oral contrast.    .    Probable surgical clips left groin incidental                               X-Ray Chest AP Portable (Final result)  Result time 07/24/20 11:37:16    Final result by Omari Hatfield MD (07/24/20 11:37:16)                 Impression:      Moderate  diffuse lung opacification, possible cardiogenic/ noncardiogenic pulmonary edema, pneumonia, or aspiration.    Small pleural effusions.      Electronically signed by: Omari Hatfield MD  Date:    07/24/2020  Time:    11:37             Narrative:    EXAMINATION:  XR CHEST AP PORTABLE    CLINICAL HISTORY:  Chest Pain;    TECHNIQUE:  Single frontal view of the chest was performed.    COMPARISON:  06/18/2020    FINDINGS:  Intact median sternotomy wires.  There is moderate diffuse lung opacification with indistinct pulmonary vascularity.  No gross pneumothorax.  Probable small pleural effusions.  Heart size stable.                                Current Medications:     Infusions:       Scheduled:   sodium chloride 0.9%   Intravenous Once    sodium chloride 0.9%   Intravenous Once    aspirin  81 mg Oral Daily    atorvastatin  40 mg Oral Daily    azithromycin  500 mg Oral Daily    carvediloL  25 mg Oral BID    cefTRIAXone (ROCEPHIN) IVPB  1 g Intravenous Q24H    ergocalciferol  50,000 Units Oral Q7 Days    famotidine  20 mg Oral Daily    furosemide  40 mg Oral Daily    heparin (porcine)  5,000 Units Subcutaneous Q8H    isosorbide mononitrate  120 mg Oral Daily    mupirocin   Nasal BID    vitamin renal formula (B-complex-vitamin c-folic acid)  1 capsule Oral Daily        PRN:  sodium chloride 0.9%, sodium chloride 0.9%, sodium chloride 0.9%, acetaminophen, melatonin, sodium chloride 0.9%    Lines and Day Number of Therapy:  Left AV fistula, PIV    Assessment:     Erin Clark is a 85 y.o.female with  Patient Active Problem List    Diagnosis Date Noted    Sleep apnea 05/07/2020    Essential hypertension 05/05/2020    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    Hypoxia 04/05/2019    Chest pain     Cough     Hypervolemia     Pneumonia of both lungs due to infectious organism     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    ESRD (end stage renal disease) 12/10/2014    Hyperlipidemia LDL  goal <70 12/10/2014    SOB (shortness of breath) 05/05/2014    S/P CABG x 1 04/08/2014    Anemia 03/11/2014    Type 2 diabetes mellitus 02/10/2014    Peripheral arterial occlusive disease 02/10/2014    Hypertension 02/10/2014    CAD (coronary artery disease) 02/10/2014    Chronic diastolic congestive heart failure 02/10/2014        Plan:   86 yo female with history of CHF (EF 55 2019), DM2, ESRD on HD MF, GERD, HTN, PAD, HLD complains of shortness of breath that started the day before admission     Dyspnea - unknown etiology  PE unlikely based on CTA.   Increased JVP. Crackles on exam. Likely fluid overloaded from missing dialysis. COVID negative, but could be possible.   - CXR small pleural effusion, moderate diffuse lung opacification  - CTA chest shows no evidence of PE. prominent enlarged mediastinal, hilar lymph nodes  -lower extremity doppler negative for DVT  - COVID negative x3. CRP, lactate, pro calcitonin, DDimer, LDH, ESR ordered  - CT abdomen: No acute intra-abdominal findings allowing for motion.  Colonic diverticulosis without definite diverticulitis  - due for F/U with pulmonary outpatient. Will schedule on discharge  - Possible PNA. Treating with Rocephin and azithromycin for 5 days (on day 3)  - breathing well on NC today, may need home O2 at discharge. Walk test today    ESRD on HD MF  Missed today's dialysis to come to the ER.   - HD 7/24, 2300 mL taken off  - nephrology consulted. Sees Dr. Alanis outpatient  - checking CMP, mg and phos  -received dialysis 7/24, 7/25 and 7/27      Combined Systolic and Diastolic CHF  55% EF mild tricupsid and mitral regurgitation. PA systolic pressure 53 mmHg on Echo from 2019  - BNP 1204 and troponin 0.030 in 2019. BNP 1702 and troponin 0.033 on admission. Close to baseline  - echo pending  -due for F/U with Dr. Sanchez outpatient. Will schedule on discharge     Chronic Normocytic Anemia  - Hgb 11.3. 10.3 in 2019.   -iron study: iron 12, TIBC 186,  transferrin 126, ferritin 2003     DM type 2  A1C 5.7 in 2019  - checking A1C - 5.2  - no home meds for diabetes  - sliding scale  Code Status:      Full     Disposition: possible D/C after walk test for home oxygen  Diet: renal  DVT Prophylaxis: heparin    Reji Mcdaniel MD  [facility] Internal Medicine -I    [facility] Medicine Hospitalist Pager numbers:   [facility] Hospitalist Medicine Team A (Marti/Bruno): [phone]  [facility] Hospitalist Medicine Team B (Davion/Areli):  [phone]

## 2020-07-27 NOTE — PLAN OF CARE
Bayron Golden MD  Schedule an appointment as soon as possible for a visit  FOLLOW UP WITH PCP, OFFICE TO CALL PATIENT/FAMILY TO SET UP APPT TIME  [address] [facility]  [place] [address]  [phone]   [facility]  Go on 7/31/2020  FOLLOW UP WITH NEPHROLOGY AFTER DISCHARGE; To be seen while at dialysis  [address] [facility]  [address]  [facility] [address]  [phone]   Brian Sanchez MD  Go on 8/28/2020  at 1020am; FOLLOW UP WITH CARDIOLOGIST AFTER DISCHARGE  [address]  [address]  [place] [address]  [phone]   [facility] PULMONARY MEDICINE  Go on 8/4/2020  at 230pm; FOLLOW UP WITH PULMONOLOGIST  [facility] [address]  [phone]     Future Appointments   Date Time Provider Department Center   8/4/2020  2:30 PM Lary Navarrete [facility]   8/10/2020  2:00 PM Regla Escalante MD [facility] [place] [facility]   8/28/2020 10:20 AM Brian Sanchez MD [facility] CARDIO [place] [facility]          07/27/20 1402   Post-Acute Status   Post-Acute Authorization Other   Other Status See Comments     Chichi Nicole, RN  RN Transition Navigator  [phone]

## 2020-07-27 NOTE — PT/OT/SLP PROGRESS
Occupational Therapy  Visit Attempt     Patient Name:  Erin Clark   MRN:  [id number]    Patient not seen at this time secondary to pt JEM in  Dialysis. Will follow-up as available.    Teresita Thomas OT  7/27/2020

## 2020-07-27 NOTE — PLAN OF CARE
Care plan reviewed with pt and family. Cardiac monitoring and glucose monitoring in place. Medications administered as ordered. Safety maintained, bed at lowest position, alarm on, bed wheels locked, call light within reach. Instructed to call for assistance. Voiced understanding.

## 2020-07-27 NOTE — PLAN OF CARE
VN rounds completed.  The patient has no complaints at this time and states that he didn't have a bowel movement today.  IV sights were not visible due to [place].  Patient reminded of fall precautions and verbalized understanding.

## 2020-07-28 VITALS
HEIGHT: 60 IN | TEMPERATURE: 99 F | HEART RATE: 57 BPM | RESPIRATION RATE: 20 BRPM | DIASTOLIC BLOOD PRESSURE: 65 MMHG | SYSTOLIC BLOOD PRESSURE: 135 MMHG | WEIGHT: 110.88 LBS | OXYGEN SATURATION: 93 % | BODY MASS INDEX: 21.77 KG/M2

## 2020-07-28 LAB
ALBUMIN SERPL BCP-MCNC: 2.7 G/DL (ref 3.5–5.2)
ALP SERPL-CCNC: 120 U/L (ref 55–135)
ALT SERPL W/O P-5'-P-CCNC: 25 U/L (ref 10–44)
ANION GAP SERPL CALC-SCNC: 16 MMOL/L (ref 8–16)
AST SERPL-CCNC: 32 U/L (ref 10–40)
BASOPHILS # BLD AUTO: 0.04 K/UL (ref 0–0.2)
BASOPHILS NFR BLD: 0.5 % (ref 0–1.9)
BILIRUB SERPL-MCNC: 0.3 MG/DL (ref 0.1–1)
BUN SERPL-MCNC: 41 MG/DL (ref 8–23)
CALCIUM SERPL-MCNC: 8.1 MG/DL (ref 8.7–10.5)
CHLORIDE SERPL-SCNC: 96 MMOL/L (ref 95–110)
CO2 SERPL-SCNC: 22 MMOL/L (ref 23–29)
CREAT SERPL-MCNC: 4.6 MG/DL (ref 0.5–1.4)
DIFFERENTIAL METHOD: ABNORMAL
EOSINOPHIL # BLD AUTO: 0.3 K/UL (ref 0–0.5)
EOSINOPHIL NFR BLD: 3 % (ref 0–8)
ERYTHROCYTE [DISTWIDTH] IN BLOOD BY AUTOMATED COUNT: 13.9 % (ref 11.5–14.5)
EST. GFR  (AFRICAN AMERICAN): 10 ML/MIN/1.73 M^2
EST. GFR  (NON AFRICAN AMERICAN): 8 ML/MIN/1.73 M^2
GLUCOSE SERPL-MCNC: 72 MG/DL (ref 70–110)
HBV CORE AB SERPL QL IA: NEGATIVE
HBV SURFACE AG SERPL QL IA: NEGATIVE
HCT VFR BLD AUTO: 27.9 % (ref 37–48.5)
HGB BLD-MCNC: 9 G/DL (ref 12–16)
IMM GRANULOCYTES # BLD AUTO: 0.02 K/UL (ref 0–0.04)
IMM GRANULOCYTES NFR BLD AUTO: 0.2 % (ref 0–0.5)
LYMPHOCYTES # BLD AUTO: 1.4 K/UL (ref 1–4.8)
LYMPHOCYTES NFR BLD: 16.2 % (ref 18–48)
MAGNESIUM SERPL-MCNC: 2.3 MG/DL (ref 1.6–2.6)
MCH RBC QN AUTO: 27.6 PG (ref 27–31)
MCHC RBC AUTO-ENTMCNC: 32.3 G/DL (ref 32–36)
MCV RBC AUTO: 86 FL (ref 82–98)
MONOCYTES # BLD AUTO: 0.7 K/UL (ref 0.3–1)
MONOCYTES NFR BLD: 8 % (ref 4–15)
NEUTROPHILS # BLD AUTO: 6.1 K/UL (ref 1.8–7.7)
NEUTROPHILS NFR BLD: 72.1 % (ref 38–73)
NRBC BLD-RTO: 0 /100 WBC
PHOSPHATE SERPL-MCNC: 5.5 MG/DL (ref 2.7–4.5)
PLATELET # BLD AUTO: 343 K/UL (ref 150–350)
PMV BLD AUTO: 10 FL (ref 9.2–12.9)
POCT GLUCOSE: 129 MG/DL (ref 70–110)
POCT GLUCOSE: 93 MG/DL (ref 70–110)
POTASSIUM SERPL-SCNC: 4.1 MMOL/L (ref 3.5–5.1)
PROT SERPL-MCNC: 6.7 G/DL (ref 6–8.4)
RBC # BLD AUTO: 3.26 M/UL (ref 4–5.4)
SODIUM SERPL-SCNC: 134 MMOL/L (ref 136–145)
WBC # BLD AUTO: 8.39 K/UL (ref 3.9–12.7)

## 2020-07-28 PROCEDURE — 97535 SELF CARE MNGMENT TRAINING: CPT

## 2020-07-28 PROCEDURE — 80053 COMPREHEN METABOLIC PANEL: CPT

## 2020-07-28 PROCEDURE — 63700000 PHARM REV CODE 250 ALT 637 W/O HCPCS: Performed by: INTERNAL MEDICINE

## 2020-07-28 PROCEDURE — 94761 N-INVAS EAR/PLS OXIMETRY MLT: CPT

## 2020-07-28 PROCEDURE — 63600175 PHARM REV CODE 636 W HCPCS: Performed by: STUDENT IN AN ORGANIZED HEALTH CARE EDUCATION/TRAINING PROGRAM

## 2020-07-28 PROCEDURE — 97116 GAIT TRAINING THERAPY: CPT | Mod: CQ

## 2020-07-28 PROCEDURE — 85025 COMPLETE CBC W/AUTO DIFF WBC: CPT

## 2020-07-28 PROCEDURE — 36415 COLL VENOUS BLD VENIPUNCTURE: CPT

## 2020-07-28 PROCEDURE — 25000003 PHARM REV CODE 250: Performed by: STUDENT IN AN ORGANIZED HEALTH CARE EDUCATION/TRAINING PROGRAM

## 2020-07-28 PROCEDURE — 97530 THERAPEUTIC ACTIVITIES: CPT

## 2020-07-28 PROCEDURE — 83735 ASSAY OF MAGNESIUM: CPT

## 2020-07-28 PROCEDURE — 97165 OT EVAL LOW COMPLEX 30 MIN: CPT

## 2020-07-28 PROCEDURE — 84100 ASSAY OF PHOSPHORUS: CPT

## 2020-07-28 RX ADMIN — NEPHROCAP 1 CAPSULE: 1 CAP ORAL at 09:07

## 2020-07-28 RX ADMIN — ISOSORBIDE MONONITRATE 120 MG: 30 TABLET, EXTENDED RELEASE ORAL at 09:07

## 2020-07-28 RX ADMIN — FUROSEMIDE 40 MG: 40 TABLET ORAL at 09:07

## 2020-07-28 RX ADMIN — CARVEDILOL 25 MG: 25 TABLET, FILM COATED ORAL at 09:07

## 2020-07-28 RX ADMIN — MUPIROCIN: 20 OINTMENT TOPICAL at 09:07

## 2020-07-28 RX ADMIN — AZITHROMYCIN MONOHYDRATE 500 MG: 250 TABLET ORAL at 09:07

## 2020-07-28 RX ADMIN — ATORVASTATIN CALCIUM 40 MG: 40 TABLET, FILM COATED ORAL at 09:07

## 2020-07-28 RX ADMIN — ASPIRIN 81 MG: 81 TABLET, COATED ORAL at 09:07

## 2020-07-28 RX ADMIN — HEPARIN SODIUM 5000 UNITS: 5000 INJECTION INTRAVENOUS; SUBCUTANEOUS at 05:07

## 2020-07-28 RX ADMIN — FAMOTIDINE 20 MG: 20 TABLET ORAL at 09:07

## 2020-07-28 NOTE — PROGRESS NOTES
[facility] Medicine Progress Note    Primary Team: [facility] Hospitalist Team B  Attending Physician: Santy Singleton MD  Resident: Debbie  Intern: Jonas    Subjective:      Comfortable. Says she doesn't have any complaints or issues. Says her dyspnea is better. Denies headache ,dizziness, cough, chest pain, abdominal pain, N/V, diarrhea. Says she is ready to go home to her     Objective:     Last 24 Hour Vital Signs:  BP  Min: 117/47  Max: 166/70  Temp  Av.5 °F (36.9 °C)  Min: 97.1 °F (36.2 °C)  Max: 99.6 °F (37.6 °C)  Pulse  Av.8  Min: 52  Max: 82  Resp  Av.4  Min: 16  Max: 20  SpO2  Av.9 %  Min: 93 %  Max: 98 %  Weight  Av.3 kg (110 lb 14.3 oz)  Min: 50.3 kg (110 lb 14.3 oz)  Max: 50.3 kg (110 lb 14.3 oz)  I/O last 3 completed shifts:  In: 640 [P.O.:240; Other:400]  Out: 2400 [Other:2400]    Physical Examination:  General: Alert and oriented, well nourished, no acute distress.  Neck: Supple, non-tender  trachea midline  Lungs: clear to auscultation bilaterally non labored respiration.   Heart: Normal rate, regular rhythm, systolic murmur 1/6, gallop or edema.   Abdomen: Soft, non-tender, non-distended, normal bowel sounds, no masses.  Extremities: no pitting edema bilaterally, distal pulses 2+, fistula left arm - thrills present  Skin: Skin is warm, dry and pink, no rashes or lesions  Neurologic: Awake, alert, and oriented X3  Psychiatric: Cooperative, appropriate mood and affect.      Laboratory:  Laboratory Data Reviewed: yes  Pertinent Findings:  Recent Labs   Lab 20  0335 20  0412 20  0318   WBC 10.84 7.68 8.39   HGB 8.7* 8.8* 9.0*   HCT 28.1* 27.7* 27.9*    261 343   * 129* 134*   K 4.7 4.7 4.1    99 96   CREATININE 3.9* 5.6* 4.6*   BUN 33* 50* 41*   CO2 19* 18* 22*   ALT 13 19 25   AST 24 26 32       Microbiology Data Reviewed: yes  Pertinent Findings:  Microbiology Results (last 7 days)     ** No results found for the last 168 hours. **           Other Results:  EKG (my interpretation): sinus rhythm with PACs. Normal axis. LVH     Radiology Data Reviewed: yes  Pertinent Findings:  Imaging Results          US Lower Extremity Veins Bilateral (Final result)  Result time 07/24/20 23:43:19    Final result by Gissel Flores MD (07/24/20 23:43:19)                 Impression:      No evidence of deep venous thrombosis in either lower extremity.      Electronically signed by: Gissel Flores MD  Date:    07/24/2020  Time:    23:43             Narrative:    EXAMINATION:  US LOWER EXTREMITY VEINS BILATERAL    CLINICAL HISTORY:  concern for dvt; Shortness of breath    TECHNIQUE:  Duplex and color flow Doppler and dynamic compression was performed of the bilateral lower extremity veins was performed.  Please note examination is slightly limited due to artifact from arterial vascular calcifications.    COMPARISON:  Lower extremity ultrasound 02/11/2014    FINDINGS:  Right thigh veins: The common femoral, femoral, popliteal, upper greater saphenous, and deep femoral veins are patent and free of thrombus. The veins are normally compressible and have normal phasic flow and augmentation response.    Right calf veins: The visualized calf veins are patent.    Left thigh veins: The common femoral, femoral, popliteal, upper greater saphenous, and deep femoral veins are patent and free of thrombus. The veins are normally compressible and have normal phasic flow and augmentation response.    Left calf veins: The visualized calf veins are patent.    Miscellaneous: None                               CT Abdomen Pelvis With Contrast (Final result)  Result time 07/24/20 14:46:07    Final result by Calvin Joseph DO (07/24/20 14:46:07)                 Impression:      CTA chest: No evidence for central or proximal segmental pulmonary embolism.  Mid to distal segmental pulmonary arteries limited by motion.    Multifocal somewhat nodular consolidative opacities within the lungs  bilaterally concerning for inflammatory/infectious process with differential to include COVID-19.  Neoplasm felt less likely.    There is superimposed patchy ground-glass mosaic attenuation lungs with enlarged arterial to bronchus ratio in the upper lobes, reflux of contrast into the hepatic veins and bilateral pleural effusions concerning for component of vascular congestion and edema.    Please note there is prominent to enlarged mediastinal and hilar lymph nodes    CT abdomen pelvis: No acute intra-abdominal findings allowing for motion.  Colonic diverticulosis without definite diverticulitis.  Extensive vascular calcification of the aorta.  Small caliber kidneys concerning for renal atrophy with slight volume loss and hypoattenuation in the kidneys bilaterally which may represent scarring though indeterminate and distorted by motion.  Follow-up dedicated renal imaging advised.      Electronically signed by: Calvin Joseph DO  Date:    07/24/2020  Time:    14:46             Narrative:    EXAMINATION:  CTA CHEST NON CORONARY; CT ABDOMEN PELVIS WITH CONTRAST    CLINICAL HISTORY:  PE suspected, high pretest prob;; Epigastric pain;Abdominal pain, acute, nonlocalized;    TECHNIQUE:  Low dose axial images, sagittal and coronal reformations were obtained from the thoracic inlet to the lung bases following the IV administration of 100 mL of Omnipaque 350.  Contrast timing was optimized to evaluate the pulmonary arteries.  MIP images were performed.    COMPARISON:  Chest x-ray 07/24/2020    FINDINGS:  CT chest: There is multifocal patchy and confluent opacities throughout the lungs bilaterally with superimposed ground-glass attenuation.  There is bilateral pleural effusions.  Lung opacities are nonspecific concerning for possible inflammatory/infectious process with differential to include COVID-19 pneumonia underlying neoplasm felt less likely but not excluded..  Superimposed component of vascular congestion and edema to  be considered with reflux of contrast into the hepatic veins and enlarged arterial to bronchus ratio in the upper lobes.    Clinical correlation and follow-up recommended.  There is no intraluminal filling defect within the central or proximal segmental pulmonary arteries to suggest pulmonary embolism.  Mid to distal segmental pulmonary arteries distorted by motion.    There are prominent to enlarged right paratracheal and prevascular lymph nodes with prevascular space node measuring 1.7 cm.  No pneumothorax.    Remote operative change from sternotomy with coronary artery bypass grafting.    CT abdomen pelvis: Study distorted by motion.  Liver, spleen, pancreas are grossly normal in size without focal lesion allowing for motion limitation.  No calcified gallstones in the gallbladder by CT criteria    The adrenal glands are within normal limits bilaterally.    The kidneys are small in caliber measuring 7-8 cm in length bilaterally.  Multifocal regions of hypoattenuation in the kidneys with slight volume loss concerning for scarring although evaluation is overall limited by motion.    Atherosclerotic plaquing of the aorta without evidence for aneurysmal dilatation.    There is colonic diverticulosis without definite acute diverticulitis.  No signs of appendicitis with normal caliber appendix identified.  Heavy calcified plaque at the origin of the celiac axis and SMA with the inferior mesenteric artery not well seen.    No free intraperitoneal gas or fluid.  No focal dilated loop of bowel to suggest bowel obstruction although limited by lack of oral contrast.    .    Probable surgical clips left groin incidental                               CTA Chest Non-Coronary (PE Study) (Final result)  Result time 07/24/20 14:46:07    Final result by Calvin Joseph DO (07/24/20 14:46:07)                 Impression:      CTA chest: No evidence for central or proximal segmental pulmonary embolism.  Mid to distal segmental  pulmonary arteries limited by motion.    Multifocal somewhat nodular consolidative opacities within the lungs bilaterally concerning for inflammatory/infectious process with differential to include COVID-19.  Neoplasm felt less likely.    There is superimposed patchy ground-glass mosaic attenuation lungs with enlarged arterial to bronchus ratio in the upper lobes, reflux of contrast into the hepatic veins and bilateral pleural effusions concerning for component of vascular congestion and edema.    Please note there is prominent to enlarged mediastinal and hilar lymph nodes    CT abdomen pelvis: No acute intra-abdominal findings allowing for motion.  Colonic diverticulosis without definite diverticulitis.  Extensive vascular calcification of the aorta.  Small caliber kidneys concerning for renal atrophy with slight volume loss and hypoattenuation in the kidneys bilaterally which may represent scarring though indeterminate and distorted by motion.  Follow-up dedicated renal imaging advised.      Electronically signed by: Calvin Joseph DO  Date:    07/24/2020  Time:    14:46             Narrative:    EXAMINATION:  CTA CHEST NON CORONARY; CT ABDOMEN PELVIS WITH CONTRAST    CLINICAL HISTORY:  PE suspected, high pretest prob;; Epigastric pain;Abdominal pain, acute, nonlocalized;    TECHNIQUE:  Low dose axial images, sagittal and coronal reformations were obtained from the thoracic inlet to the lung bases following the IV administration of 100 mL of Omnipaque 350.  Contrast timing was optimized to evaluate the pulmonary arteries.  MIP images were performed.    COMPARISON:  Chest x-ray 07/24/2020    FINDINGS:  CT chest: There is multifocal patchy and confluent opacities throughout the lungs bilaterally with superimposed ground-glass attenuation.  There is bilateral pleural effusions.  Lung opacities are nonspecific concerning for possible inflammatory/infectious process with differential to include COVID-19 pneumonia  underlying neoplasm felt less likely but not excluded..  Superimposed component of vascular congestion and edema to be considered with reflux of contrast into the hepatic veins and enlarged arterial to bronchus ratio in the upper lobes.    Clinical correlation and follow-up recommended.  There is no intraluminal filling defect within the central or proximal segmental pulmonary arteries to suggest pulmonary embolism.  Mid to distal segmental pulmonary arteries distorted by motion.    There are prominent to enlarged right paratracheal and prevascular lymph nodes with prevascular space node measuring 1.7 cm.  No pneumothorax.    Remote operative change from sternotomy with coronary artery bypass grafting.    CT abdomen pelvis: Study distorted by motion.  Liver, spleen, pancreas are grossly normal in size without focal lesion allowing for motion limitation.  No calcified gallstones in the gallbladder by CT criteria    The adrenal glands are within normal limits bilaterally.    The kidneys are small in caliber measuring 7-8 cm in length bilaterally.  Multifocal regions of hypoattenuation in the kidneys with slight volume loss concerning for scarring although evaluation is overall limited by motion.    Atherosclerotic plaquing of the aorta without evidence for aneurysmal dilatation.    There is colonic diverticulosis without definite acute diverticulitis.  No signs of appendicitis with normal caliber appendix identified.  Heavy calcified plaque at the origin of the celiac axis and SMA with the inferior mesenteric artery not well seen.    No free intraperitoneal gas or fluid.  No focal dilated loop of bowel to suggest bowel obstruction although limited by lack of oral contrast.    .    Probable surgical clips left groin incidental                               X-Ray Chest AP Portable (Final result)  Result time 07/24/20 11:37:16    Final result by Omari Hatfield MD (07/24/20 11:37:16)                 Impression:       Moderate diffuse lung opacification, possible cardiogenic/ noncardiogenic pulmonary edema, pneumonia, or aspiration.    Small pleural effusions.      Electronically signed by: Omari Hatfield MD  Date:    07/24/2020  Time:    11:37             Narrative:    EXAMINATION:  XR CHEST AP PORTABLE    CLINICAL HISTORY:  Chest Pain;    TECHNIQUE:  Single frontal view of the chest was performed.    COMPARISON:  06/18/2020    FINDINGS:  Intact median sternotomy wires.  There is moderate diffuse lung opacification with indistinct pulmonary vascularity.  No gross pneumothorax.  Probable small pleural effusions.  Heart size stable.                                Current Medications:     Infusions:       Scheduled:   sodium chloride 0.9%   Intravenous Once    sodium chloride 0.9%   Intravenous Once    aspirin  81 mg Oral Daily    atorvastatin  40 mg Oral Daily    azithromycin  500 mg Oral Daily    carvediloL  25 mg Oral BID    cefTRIAXone (ROCEPHIN) IVPB  1 g Intravenous Q24H    ergocalciferol  50,000 Units Oral Q7 Days    famotidine  20 mg Oral Daily    furosemide  40 mg Oral Daily    heparin (porcine)  5,000 Units Subcutaneous Q8H    isosorbide mononitrate  120 mg Oral Daily    mupirocin   Nasal BID    vitamin renal formula (B-complex-vitamin c-folic acid)  1 capsule Oral Daily        PRN:  sodium chloride 0.9%, sodium chloride 0.9%, sodium chloride 0.9%, acetaminophen, melatonin, sodium chloride 0.9%    Lines and Day Number of Therapy:  Left AV fistula, PIV    Assessment:     Erin Clark is a 85 y.o.female with  Patient Active Problem List    Diagnosis Date Noted    Sleep apnea 05/07/2020    Essential hypertension 05/05/2020    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    Hypoxia 04/05/2019    Chest pain     Cough     Hypervolemia     Pneumonia of both lungs due to infectious organism     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    ESRD (end stage renal disease) 12/10/2014     Hyperlipidemia LDL goal <70 12/10/2014    SOB (shortness of breath) 05/05/2014    S/P CABG x 1 04/08/2014    Anemia 03/11/2014    Type 2 diabetes mellitus 02/10/2014    Peripheral arterial occlusive disease 02/10/2014    Hypertension 02/10/2014    CAD (coronary artery disease) 02/10/2014    Chronic diastolic congestive heart failure 02/10/2014        Plan:   84 yo female with history of CHF (EF 55 2019), DM2, ESRD on HD MF, GERD, HTN, PAD, HLD complains of shortness of breath that started the day before admission     Dyspnea - unknown etiology  PE unlikely based on CTA.   Increased JVP. Crackles on exam. Likely fluid overloaded from missing dialysis. COVID negative, but could be possible.   - CXR small pleural effusion, moderate diffuse lung opacification  - CTA chest shows no evidence of PE. prominent enlarged mediastinal, hilar lymph nodes  -lower extremity doppler negative for DVT  - COVID negative x3. CRP, lactate, pro calcitonin, DDimer, LDH, ESR ordered  - CT abdomen: No acute intra-abdominal findings allowing for motion.  Colonic diverticulosis without definite diverticulitis  - due for F/U with pulmonary outpatient. Will schedule on discharge  - Possible PNA. Treating with Rocephin and azithromycin until discharge  - breathing well on NC, based on walk test, patient will need home O2 at discharge.     ESRD on HD MF  Missed today's dialysis to come to the ER.   - HD 7/24, 2300 mL taken off  - nephrology consulted. Sees Dr. Alanis outpatient  - checking CMP, mg and phos  -received dialysis 7/24, 7/25 and 7/27      Combined Systolic and Diastolic CHF  55% EF mild tricupsid and mitral regurgitation. PA systolic pressure 53 mmHg on Echo from 2019  - BNP 1204 and troponin 0.030 in 2019. BNP 1702 and troponin 0.033 on admission. Close to baseline  - echo pending  -due for F/U with Dr. Sanchez outpatient. Will schedule on discharge     Chronic Normocytic Anemia  - Hgb 11.3. 10.3 in 2019.   -iron study: iron  12, TIBC 186, transferrin 126, ferritin 2003     DM type 2  A1C 5.7 in 2019  - checking A1C - 5.2  - no home meds for diabetes  - sliding scale  Code Status:      Full     Disposition: Discharge today  Diet: renal  DVT Prophylaxis: heparin    Reji Mcdaniel MD  [facility]    [facility] Medicine Hospitalist Pager numbers:   [facility] Hospitalist Medicine Team A (Marti/Bruno): [phone]  [facility] Hospitalist Medicine Team B (Davion/Areli):  [phone]

## 2020-07-28 NOTE — DISCHARGE INSTRUCTIONS
Shortness of Breath (Dyspnea) (Japanese) View Edit Remove  Heart Failure, Discharge Instructions for (Japanese) View Edit Remove  Heart Failure: Tracking Your Weight (Japanese) View Edit Remove  Diet, Discharge Instructions for Eating a Low-Salt (English) View Edit Remove  Salt, Tips for Using Less (English) View Edit Remove  Oxygen, Using Safely (English) View Edit Remove  Oxygen, Using at Home (English) View Edit Remove

## 2020-07-28 NOTE — PLAN OF CARE
Problem: Physical Therapy Goal  Goal: Physical Therapy Goal  Description: Goals to be met by: 20     Patient will increase functional independence with mobility by performin. Supine to sit with Set-up [place]  2. Bed to chair transfer with Supervision using Rolling Walker  3. Gait  x 100 feet with Supervision using Rolling Walker.     Outcome: Ongoing, Progressing   Continue working toward goals.

## 2020-07-28 NOTE — NURSING
Discharge instructions reviewed in Bolivian with patient via Bol[place]ian speaking nurse Teresita Barrera RN. IV site and telemetry discontinued without adverse reaction.

## 2020-07-28 NOTE — PT/OT/SLP PROGRESS
"Physical Therapy Treatment    Patient Name:  Erin Clark   MRN:  [id number]    Recommendations:     Discharge Recommendations:  home health PT   Discharge Equipment Recommendations: none   Barriers to discharge: None    Assessment:     Erin Clark is a 85 y.o. female admitted with a medical diagnosis of SOB (shortness of breath).  She presents with the following impairments/functional limitations:  weakness, impaired endurance, gait instability, impaired balance, impaired functional mobilty, impaired cardiopulmonary response to activity, decreased lower extremity function.  Pt would continue to benefit from P.T. To address impairments listed above.  .    Rehab Prognosis: Fair+; patient would benefit from acute skilled PT services to address these deficits and reach maximum level of function.    Recent Surgery: * No surgery found *      Plan:     During this hospitalization, patient to be seen 5 x/week to address the identified rehab impairments via gait training, therapeutic activities, therapeutic exercises, neuromuscular re-education and progress toward the following goals:    · Plan of Care Expires:  08/25/20    Subjective     Chief Complaint: "I sit here all day and then everyone comes."  Patient/Family Comments/goals: Pt agreed to tx.  Pain/Comfort:  · Pain Rating 1: 0/10  · Pain Rating Post-Intervention 1: 0/10      Objective:     Communicated with RN (Gaby) prior to session.  Patient found up in chair with   upon PT entry to room.     General Precautions: Standard, fall   Orthopedic Precautions:N/A   Braces:       Functional Mobility:  · Transfers:     · Sit to Stand:  stand by assistance with no AD  · Gait: 80ft x 2 and 50ft with Macy/CGA(HHA) with 3 brief standing rest breaks secondary to mild SOB.  Pt declined using RW for gait.  Pt ambulates with decreased soham, bouts of instability secondary to occasional RLE crossing to midline and mild swaying left and right.  Pt was instructed in using a RW for " increased safety, and stated she had one at home and would use it now that she sees how she is walking.  · Balance: sitting good, standing fair+, gait fair-      AM-PAC 6 CLICK MOBILITY  Turning over in bed (including adjusting bedclothes, sheets and blankets)?: 3  Sitting down on and standing up from a chair with arms (e.g., wheelchair, bedside commode, etc.): 3  Moving from lying on back to sitting on the side of the bed?: 3  Moving to and from a bed to a chair (including a wheelchair)?: 3  Need to walk in hospital room?: 3  Climbing 3-5 steps with a railing?: 3  Basic Mobility Total Score: 18       Therapeutic Activities and Exercises:   Seated BLE therex: AP, LAQ, hip flexion x 10 reps.    Patient left up in chair with all lines intact, call button in reach, chair alarm on and RN notified..    GOALS:   Multidisciplinary Problems     Physical Therapy Goals        Problem: Physical Therapy Goal    Goal Priority Disciplines Outcome Goal Variances Interventions   Physical Therapy Goal     PT, PT/OT Ongoing, Progressing     Description: Goals to be met by: 20     Patient will increase functional independence with mobility by performin. Supine to sit with Set-up [place]  2. Bed to chair transfer with Supervision using Rolling Walker  3. Gait  x 100 feet with Supervision using Rolling Walker.                      Time Tracking:     PT Received On: 20  PT Start Time: 0950     PT Stop Time: 1008  PT Total Time (min): 18 min     Billable Minutes: Gait Training 18    Treatment Type: Treatment  PT/PTA: PTA     PTA Visit Number: 1     Nasreen Farias PTA  2020

## 2020-07-28 NOTE — PLAN OF CARE
07/28/20 0921   Post-Acute Status   Post-Acute Authorization HME   Post-Acute Placement Status Set-up Complete  (Shaii went to bedside with Ryne to deliver the oxygen. When getting her the RW, pt stated that she already had a RW at home and she didn't need it. TN attempted to call son to verify. No answer at present.)     Chichi Nicole RN  RN Transition Navigator  [phone]

## 2020-07-28 NOTE — PLAN OF CARE
Problem: Adult Inpatient Plan of Care  Goal: Chart Review  Description: Care plan reviewed.    Outcome: Ongoing, Progressing

## 2020-07-28 NOTE — PLAN OF CARE
07/28/20 0841   Post-Acute Status   Post-Acute Authorization HME   HME Status Authorization Obtained  (Pt is approved for home O2 from [facility]. MD will need to update the order to read continous. TN sent secure chat. Updated Shaii for early delivery of equipment for early discharge.)   Discharge Plan   Discharge Plan A Home with family;Home Health     Chichi Nicole RN  RN Transition Navigator  [phone]

## 2020-07-28 NOTE — PLAN OF CARE
07/28/20 0848   Final Note   Assessment Type Final Discharge Note   Anticipated Discharge Disposition Home-Health  ([facility] per PHN)   Hospital Follow Up  Appt(s) scheduled? Yes   Discharge plans and expectations educations in teach back method with documentation complete? Yes   Right Care Referral Info   Post Acute Recommendation Home-care   Facility Name [facility]   Post-Acute Status   Post-Acute Authorization Home Health;HME   HME Status Pending Delivery Hospital  (Pt has been approved. [facility] to deliver this am to bedside.)   Home Health Status Authorization Obtained  (Pt will have [facility] with SOC for 7/30)   Patient choice form signed by patient/caregiver   (Son stated he did not have any preference)   Discharge Delays (!) Home Medical Equipment (Insurance, Delivery)  ([facility] aware of early discharge. O2 and RW to be delivered to patients bedside prior to discharge.)     Bayron Golden MD  Schedule an appointment as soon as possible for a visit  FOLLOW UP WITH PCP, OFFICE TO CALL PATIENT/FAMILY TO SET UP APPT TIME  [address] [facility]  [place] [address]  [phone]   [facility]  Go on 7/31/2020  FOLLOW UP WITH NEPHROLOGY AFTER DISCHARGE; To be seen while at dialysis  [address] [address]  [address]  [facility] [address]  [phone]   Brian Sanchez MD  Go on 8/28/2020  at 1020am; FOLLOW UP WITH CARDIOLOGIST AFTER DISCHARGE  [address]  [address]  [facility] [address]  [phone]   [facility] PULMONARY MEDICINE  Go on 8/4/2020  at 230pm; FOLLOW UP WITH PULMONOLOGIST  [facility] [address]  [phone]   [facility]  Go on 7/31/2020  DIALYSIS  [address]  [place] [address]  [phone]   [facility]  Call  As needed, DME  [address] [facility] [address]  [phone]   [facility] [place]  Schedule an appointment as soon as possible for a visit on 7/30/2020  HOME HEALTH, TRANSPORTATION TO  APPOINTMENTS AS NEEDED  [address] [address]  [phone]     Discharge rounds done via telephone with pt's son Leif who is her caregiver. Pt is Kazakh speaking only. Discussed followup appointments, blue discharge folder, discharge nurse will go over home medications and reasons for medications and final discharge instructions. All patient/caregiver questions answered. Patient verbalized understanding.            Chichi Nicole RN  RN Transition Navigator  [phone]

## 2020-07-28 NOTE — PLAN OF CARE
07/28/20 1106   Post-Acute Status   Post-Acute Authorization HME   HME Status Set-up Complete  (Oxygen at bedside for delivery home with patitent. pt declines a walker at present. Updated pt's daughter Michelle at bedside to f/u she would like to have pulm f/u reshceduld due to her having a surgery that day.)   Home Health Status Set-up Complete     Future Appointments   Date Time Provider Department Center   8/5/2020 11:00 AM Azael Castle MD [facility] PULMS[facility]   8/10/2020  2:00 PM Regla Escalante MD [facility] IMPRI [facility]   8/28/2020 10:20 AM Brian Sanchez MD [facility] CARDIO [facility]     [place] updated pts' daughter at bedside to Pulm F/U appt changed to 8/5 at 11am.    Chichi Nicole RN  RN Transition Navigator  [phone]

## 2020-07-28 NOTE — PLAN OF CARE
VN note: VN completed AVS and attachments and notified bedside nurseGaby. Waiting for family to arrive to admin discharge education. Will cont to be available and intervene prn.

## 2020-07-28 NOTE — PT/OT/SLP EVAL
Occupational Therapy   Evaluation/Treatment/Dc Summary     Name: Erin Clark  MRN: [id number]  Admitting Diagnosis:  SOB (shortness of breath)      Recommendations:     Discharge Recommendations: home health OT, home health PT, home with home health  Discharge Equipment Recommendations:  oxygen  Barriers to discharge:  None    Assessment:     Erin Clark is a 85 y.o. female with a medical diagnosis of SOB (shortness of breath).  She presents with SOB. Performance deficits affecting function: weakness, gait instability, impaired endurance, impaired cardiopulmonary response to activity, impaired functional mobilty, impaired balance.      Pt set for d/c today's date; educated on home safety, use of DME, monitoring of O2, and home health recs. No goals set as pt to d/c.     Rehab Prognosis: Good; patient would benefit from acute skilled OT services to address these deficits and reach maximum level of function.       Plan:     Patient to be seen (pt d/c from hospital) to address the above listed problems via self-care/home management, therapeutic activities, therapeutic exercises  · Plan of Care Expires: 07/28/20  · Plan of Care Reviewed with: patient    Subjective     Language line utilized throughout session   Chief Complaint: dizziness with increased axs   Patient/Family Comments/goals: decrease SOB and dizziness     Occupational Profile:  Living Environment: with spouse, son and son's family, SSH, no steps to enter, t/s combo  Previous level of function: independent; use of TTB for bathing   Equipment Used at Home:  shower chair, walker, rolling  Assistance upon Discharge: from family     Pain/Comfort:  · Pain Rating 1: 0/10    Patients cultural, spiritual, Mandaen conflicts given the current situation:      Objective:     Communicated with: lucretia prior to session.    General Precautions: Standard, fall   Orthopedic Precautions:N/A   Braces: N/A     Occupational Performance:    Bed Mobility:    · UIC      Functional Mobility/Transfers:  · Patient completed Sit <> Stand Transfer with stand by assistance  with  no assistive device   · Functional Mobility: SBA- CGA without AD     Activities of Daily Living:  · Grooming: supervision at sink     Cognitive/Visual Perceptual:  WFL     Physical Exam:  Balance:    -       WFL sitting balance; SBA-supervision standing    Postural examination/scapula alignment:    -       Rounded shoulders  -       Forward head  Skin integrity: Visible skin intact  Upper Extremity Range of Motion:    BUE ROM WFL   Upper Extremity Strength:  BUE WFL for pt's needs     AMPA 6 Click ADL:  AMPA Total Score: 20    Treatment & Education:  Pt found UIC   Reporting dizziness; found without O2 donned; monitored O2 throughout session at rest and following axs- fluctuating between 92-99% on RA   Educated on pursed lip breathing and use of PRN O2; case management staff delivering home O2- educated on use and set up of O2 cord; answered pt's questions to best of knowledge   Functional mobility in room SBA without AD to stand at sink for G&H axs  Education:    Patient left up in chair with all lines intact, call button in reach, chair alarm on and nsg present    GOALS:   Multidisciplinary Problems     Occupational Therapy Goals        Problem: Occupational Therapy Goal    Goal Priority Disciplines Outcome Interventions   Occupational Therapy Goal     OT, PT/OT Adequate for Care Transition                    History:     Past Medical History:   Diagnosis Date    CHF (congestive heart failure)     Colon polyps 06/03/2013    Coronary artery disease     Diabetes mellitus     Encounter for blood transfusion     ESRD (end stage renal disease) 03/2014    dialysis M-F    GERD (gastroesophageal reflux disease)     High cholesterol     Hypertension        Past Surgical History:   Procedure Laterality Date    AV FISTULA PLACEMENT Left 9/2014    bilateral fem-pop      CENTRAL VENOUS CATHETER TUNNELED  INSERTION DOUBLE LUMEN Right 2/2014    CORONARY ARTERY BYPASS GRAFT  2/14/2014     x 3 vessels    PERCUTANEOUS TRANSLUMINAL ANGIOPLASTY OF ARTERIOVENOUS FISTULA Left 5/5/2020    Procedure: PTA, Repair left upper arm anuerysm AV FISTULA;  Surgeon: Doris Mary MD;  [facility];  Service: General;  Laterality: Left;    VASCULAR SURGERY         Time Tracking:     OT Date of Treatment: 07/28/20  OT Start Time: 0904  OT Stop Time: 0939  OT Total Time (min): 35 min    Billable Minutes:Evaluation 10  Self Care/Home Management 13  Therapeutic Activity 12    Teresita Thomas, OT  7/28/2020

## 2020-07-28 NOTE — PLAN OF CARE
Problem: Occupational Therapy Goal  Goal: Occupational Therapy Goal  Outcome: Adequate for Care Transition       Pt set for d/c today's date; educated on home safety, use of DME, monitoring of O2, and home health recs. No goals set as pt to d/c.

## 2020-07-28 NOTE — PLAN OF CARE
Patient is awake and orientedx4. Care plan explained to patient; verbalized understanding. On 2L N/C humidified, O2 saturation at 94%. Hooked to heart monitor, running normal sinus rhythm at 65-68bpm. Patient on hemodialiysis, oliguria noted. No pain/N/V/D during shift. Due medications given. Accucheck completed, no coverage needed. Encouraged to turn every 2 hours as tolerated. Maintained on fall risk precautions. Bed in lowest position, bed alarm on, call light/personal items within reach and instructed to call for help when needed. Will continue to monitor.

## 2020-07-29 ENCOUNTER — PATIENT OUTREACH (OUTPATIENT)
Dept: ADMINISTRATIVE | Facility: CLINIC | Age: 85
End: 2020-07-29

## 2020-07-29 ENCOUNTER — TELEPHONE (OUTPATIENT)
Dept: PULMONOLOGY | Facility: CLINIC | Age: 85
End: 2020-07-29

## 2020-07-29 DIAGNOSIS — R06.02 SOB (SHORTNESS OF BREATH): Primary | ICD-10-CM

## 2020-07-29 LAB
HBV SURFACE AB SER QL IA: NEGATIVE
HBV SURFACE AB SERPL IA-ACNC: 9 MIU/ML

## 2020-07-29 NOTE — TELEPHONE ENCOUNTER
C3 nurse attempted to contact patient. No answer. The following message was left for the patient to return the call:  Good morning  I am a nurse calling on behalf of [facility] from the Care Coordination Center.  This is a Transitional Care Call for Erin Clark. When you have a moment please contact us at [phone] or [phone] Monday through Friday, between the hours of 8 am to 4 pm. We look forward to speaking with you. On behalf of [facility] have a nice day.    The patient has a scheduled [facility] appointment with Regla Escalante MD on 8/10/2020 at 2:00 PM

## 2020-07-30 NOTE — PATIENT INSTRUCTIONS

## 2020-08-07 ENCOUNTER — PATIENT OUTREACH (OUTPATIENT)
Dept: ADMINISTRATIVE | Facility: OTHER | Age: 85
End: 2020-08-07

## 2020-08-07 DIAGNOSIS — E11.9 DIABETES MELLITUS WITHOUT COMPLICATION: Primary | ICD-10-CM

## 2020-08-07 NOTE — PROGRESS NOTES
Requested updates within Care Everywhere.  Patient's chart was reviewed for overdue RASHARD topics.  Immunizations reconciled.    Orders placed: Diabetic eye screening photo  Tasked appts:  Labs Linked:

## 2020-08-17 ENCOUNTER — EXTERNAL HOME HEALTH (OUTPATIENT)
Dept: HOME HEALTH SERVICES | Facility: HOSPITAL | Age: 85
End: 2020-08-17

## 2020-08-18 ENCOUNTER — TELEPHONE (OUTPATIENT)
Dept: FAMILY MEDICINE | Facility: CLINIC | Age: 85
End: 2020-08-18

## 2020-08-18 NOTE — TELEPHONE ENCOUNTER
----- Message from Marni Vidales sent at 8/18/2020  2:03 PM CDT -----  Contact: [facility] (Home health)  Type:  Needs Medical Advice    Who Called:  Lenore   Symptoms (please be specific):Home health having trouble locating pt, requesting labs be done during dialysis  How long has patient had these symptoms:   n/a   Pharmacy name and phone #: n/a   Would the patient rather a call back or a response via [facility]?  Call back   Best Call Back Number:  [phone]  Additional Information: none

## 2020-09-03 PROBLEM — T85.698A MALFUNCTION OF DEVICE: Status: ACTIVE | Noted: 2020-09-03

## 2020-09-08 ENCOUNTER — PATIENT OUTREACH (OUTPATIENT)
Dept: ADMINISTRATIVE | Facility: OTHER | Age: 85
End: 2020-09-08

## 2020-09-08 ENCOUNTER — OFFICE VISIT (OUTPATIENT)
Dept: PULMONOLOGY | Facility: CLINIC | Age: 85
End: 2020-09-08
Payer: MEDICARE

## 2020-09-08 ENCOUNTER — HOSPITAL ENCOUNTER (OUTPATIENT)
Dept: PULMONOLOGY | Facility: CLINIC | Age: 85
Discharge: HOME | End: 2020-09-08
Payer: MEDICARE

## 2020-09-08 VITALS
SYSTOLIC BLOOD PRESSURE: 158 MMHG | OXYGEN SATURATION: 93 % | WEIGHT: 106.69 LBS | HEART RATE: 84 BPM | BODY MASS INDEX: 20.55 KG/M2 | DIASTOLIC BLOOD PRESSURE: 62 MMHG | WEIGHT: 116 LBS | BODY MASS INDEX: 20.84 KG/M2 | HEIGHT: 63 IN

## 2020-09-08 DIAGNOSIS — Z95.1 S/P CABG X 1: ICD-10-CM

## 2020-09-08 DIAGNOSIS — R06.02 SOB (SHORTNESS OF BREATH): ICD-10-CM

## 2020-09-08 DIAGNOSIS — R09.02 HYPOXIA: Primary | ICD-10-CM

## 2020-09-08 DIAGNOSIS — R07.9 CHEST PAIN, UNSPECIFIED TYPE: Primary | ICD-10-CM

## 2020-09-08 PROCEDURE — 1159F MED LIST DOCD IN RCRD: CPT | Mod: S$GLB,,, | Performed by: EMERGENCY MEDICINE

## 2020-09-08 PROCEDURE — 1101F PR PT FALLS ASSESS DOC 0-1 FALLS W/OUT INJ PAST YR: ICD-10-PCS | Mod: CPTII,S$GLB,, | Performed by: EMERGENCY MEDICINE

## 2020-09-08 PROCEDURE — 1159F PR MEDICATION LIST DOCUMENTED IN MEDICAL RECORD: ICD-10-PCS | Mod: S$GLB,,, | Performed by: EMERGENCY MEDICINE

## 2020-09-08 PROCEDURE — 99999 PR PBB SHADOW E&M-EST. PATIENT-LVL III: ICD-10-PCS | Mod: PBBFAC,,, | Performed by: EMERGENCY MEDICINE

## 2020-09-08 PROCEDURE — 1126F PR PAIN SEVERITY QUANTIFIED, NO PAIN PRESENT: ICD-10-PCS | Mod: S$GLB,,, | Performed by: EMERGENCY MEDICINE

## 2020-09-08 PROCEDURE — 1126F AMNT PAIN NOTED NONE PRSNT: CPT | Mod: S$GLB,,, | Performed by: EMERGENCY MEDICINE

## 2020-09-08 PROCEDURE — 99999 PR PBB SHADOW E&M-EST. PATIENT-LVL III: CPT | Mod: PBBFAC,,, | Performed by: EMERGENCY MEDICINE

## 2020-09-08 PROCEDURE — 94618 PULMONARY STRESS TESTING: ICD-10-PCS | Mod: S$GLB,,, | Performed by: INTERNAL MEDICINE

## 2020-09-08 PROCEDURE — 1101F PT FALLS ASSESS-DOCD LE1/YR: CPT | Mod: CPTII,S$GLB,, | Performed by: EMERGENCY MEDICINE

## 2020-09-08 PROCEDURE — 94618 PULMONARY STRESS TESTING: CPT | Mod: S$GLB,,, | Performed by: INTERNAL MEDICINE

## 2020-09-08 PROCEDURE — 99214 OFFICE O/P EST MOD 30 MIN: CPT | Mod: S$GLB,,, | Performed by: EMERGENCY MEDICINE

## 2020-09-08 PROCEDURE — 99214 PR OFFICE/OUTPT VISIT, EST, LEVL IV, 30-39 MIN: ICD-10-PCS | Mod: S$GLB,,, | Performed by: EMERGENCY MEDICINE

## 2020-09-08 PROCEDURE — 99499 RISK ADDL DX/OHS AUDIT: ICD-10-PCS | Mod: S$GLB,,, | Performed by: EMERGENCY MEDICINE

## 2020-09-08 PROCEDURE — 99499 UNLISTED E&M SERVICE: CPT | Mod: S$GLB,,, | Performed by: EMERGENCY MEDICINE

## 2020-09-08 NOTE — PROGRESS NOTES
Care Everywhere: updated  Immunization: updated  Health Maintenance: updated  Media Review: review for outside eye exam report   Legacy Review:   Order placed:   Upcoming appts:

## 2020-09-08 NOTE — PROGRESS NOTES
Pulmonary & Critical Care Medicine   Clinic Note      HPI:     Seen by NP in past + [facility] on recent admit to [facility]. Per Dr. Gann: 86 yo F COVID-19 negative, with multiple medical cormorbidities as detailed by Dr. Marshall, including ESRD.  She missed a recent HD session and was admitted with SOB, bradycardia, and required oxygen on admit (does not need home O2 at baseline).  On exam is frail and had scattered crackles, heart was RRR, and there was no pedal edema.  Her radiograph has improved today vs yesterday likely due to volume removal.  The CTA has widespread ggos and increased interstitial markings and bilateral small pleural effusions. These findings, given the clinical context, also suggest volume overload and agree that she needs volume off (had a dialysis session today).  As per Dr. Marshall ,we see no need for a bronchoscopy, as the abnormalities are undoubtedly excess fluid.  She should follow up with her pulmonary provider at the [facility] after discharge    She is presenting to clinic today for routine post discharge follow uo. Records from previous admission reviewed.     In speaking with patient, her dyspnea is most noted since MI and cardiac surgery in 2014. This is near time she was initiated on HD as well. Never really limiting until recent admission in July. She did feel improved post discharge, but has remained mostly sedentary since returning home, only leaving for scheduled HD M/W/F. Has home supplemental O2, but not using as daughter does not want to pull tanks and mother is incapable of pulling tanks. Last HD on Friday. Missed yesterday as centers closed. Has had a lot of access issues and not with great flow on HD. Seen by General Surgery for fistula issue and told to re-attempt with plans to go to ED if not better for catheter placement. Dyspnea worsens as she nears scheduled HD. No chest pain/syncope. Cough intermittent and mostly dry. No hemoptysis or sputum  production.     Additional Pulmonary History:   Occupational/Environmental Exposures: No occupational exposure. From [place]. Has lived in [place] Daughter present at today;s visit.   Travel History: No recent travel.   History of exposures to TB: Denies   Family History of Lung Cancer: Denies   Childhood history of Lung Disease:Denies   Recurrent PNAs- none.   Chest surgery/trauma- prior median sternotomy (3 vessel CABG 2014)   Immunosuppression- none   Chest radiation- None   Amiodarone/additional offending medications- None   Tobacco use- Never smoker.     Past Medical History:   Diagnosis Date    CHF (congestive heart failure)     Colon polyps 06/03/2013    Coronary artery disease     Diabetes mellitus     Encounter for blood transfusion     ESRD (end stage renal disease) 03/2014    dialysis M-F    GERD (gastroesophageal reflux disease)     High cholesterol     Hypertension      Past Surgical History:   Procedure Laterality Date    AV FISTULA PLACEMENT Left 9/2014    bilateral fem-pop      CENTRAL VENOUS CATHETER TUNNELED INSERTION DOUBLE LUMEN Right 2/2014    CORONARY ARTERY BYPASS GRAFT  2/14/2014     x 3 vessels    PERCUTANEOUS TRANSLUMINAL ANGIOPLASTY OF ARTERIOVENOUS FISTULA Left 5/5/2020    Procedure: PTA, Repair left upper arm anuerysm AV FISTULA;  Surgeon: Doris Mary MD;  Location: [facility];  Service: General;  Laterality: Left;    VASCULAR SURGERY       Family/Social History: Reviewed and updated.     Drug Allergies:   Review of patient's allergies indicates:   Allergen Reactions    Aspirin Nausea Only and Swelling     Able to tolerated in small doses       Review of Systems:     Constitutional: Negative for fever, chills, diaphoresis + fatigue. Worse after HD. + weight loss- 10lbs. Appetite is down significantly    HENT: Negative for congestion, drooling, facial swelling, hearing loss, rhinorrhea, sinus pressure, tinnitus, trouble swallowing and voice change.     Eyes: Negative for photophobia, pain and visual disturbance.   Respiratory: Per HPI     Cardiovascular: Negative for chest pain, palpitations and leg swelling.   Gastrointestinal: Negative for nausea, vomiting, abdominal pain, diarrhea and abdominal distention.   Endocrine: Negative for cold intolerance, heat intolerance, polydipsia and polyuria.   Genitourinary: Negative for dysuria, frequency and hematuria.   Musculoskeletal: Negative for myalgias, back pain, arthralgias, neck pain and neck stiffness.   Skin: Negative for color change, pallor, rash and wound.   Allergic/Immunologic: Negative for immunocompromised state.   Neurological: Negative for dizziness and headaches.  + generalized weakness.   A comprehensive 12-point review of systems was performed, and is negative except for those items mentioned above in the HPI section of this note.     Vital Signs:  BP (!) 158/62 (BP Location: Right arm, Patient Position: Sitting)   Pulse 84   Wt 48.4 kg (106 lb 11.2 oz)   SpO2 (!) 93%   BMI 20.84 kg/m²      Physical Exam:   GEN- NAD AAOx3 Frail, elderly   HEENT- ATNC, PERRLA, EOMI, OP-Cl. No  LAD or bruit noted. Trachea Midline. JVP 8cm.   CV- RRR No M/R/G  RESP- Crackles throughout posterior lung fields.   GI- S/NT/ND. Positive BS X 4. No HSM Noted  BACK- Spine midline. No step off, crepitus or deformity noted. No midline TTP.   Ext- MAEW, No deformity. No rashes noted. Trace edema bilateral. No asymmetry   Neuro- Strength 5/5 symmetric. No focal deficits   Left UE fistula. Surrounding ecchymosis. + thrill.      Personal Review and Summary of Prior Diagnostics    Laboratory Studies:   WBC 3.90 - 12.70 K/uL 8.39    RBC 4.00 - 5.40 M/uL 3.26Low     Hemoglobin 12.0 - 16.0 g/dL 9.0Low     Hematocrit 37.0 - 48.5 % 27.9Low     Mean Corpuscular Volume 82 - 98 fL 86    Mean Corpuscular Hemoglobin 27.0 - 31.0 pg 27.6    Mean Corpuscular Hemoglobin Conc 32.0 - 36.0 g/dL 32.3    RDW 11.5 - 14.5 % 13.9    Platelets 150 -  350 K/uL 343    MPV 9.2 - 12.9 fL 10.0    Immature Granulocytes 0.0 - 0.5 % 0.2    Gran # (ANC) 1.8 - 7.7 K/uL 6.1    Immature Grans (Abs) 0.00 - 0.04 K/uL 0.02    Comment: Mild elevation in immature granulocytes is non specific and   can be seen in a variety of conditions including stress response,   acute inflammation, trauma and pregnancy. Correlation with other   laboratory and clinical findings is essential.    Lymph # 1.0 - 4.8 K/uL 1.4    Mono # 0.3 - 1.0 K/uL 0.7    Eos # 0.0 - 0.5 K/uL 0.3    Baso # 0.00 - 0.20 K/uL 0.04    nRBC 0 /100 WBC 0    Gran% 38.0 - 73.0 % 72.1    Lymph% 18.0 - 48.0 % 16.2Low     Mono% 4.0 - 15.0 % 8.0    Eosinophil% 0.0 - 8.0 % 3.0    Basophil% 0.0 - 1.9 % 0.5      Sodium 136 - 145 mmol/L 134Low     Potassium 3.5 - 5.1 mmol/L 4.1    Chloride 95 - 110 mmol/L 96    CO2 23 - 29 mmol/L 22Low     Glucose 70 - 110 mg/dL 72    BUN, Bld 8 - 23 mg/dL 41High     Creatinine 0.5 - 1.4 mg/dL 4.6High     Calcium 8.7 - 10.5 mg/dL 8.1Low     Total Protein 6.0 - 8.4 g/dL 6.7    Albumin 3.5 - 5.2 g/dL 2.7Low     Total Bilirubin 0.1 - 1.0 mg/dL 0.3    Comment: For infants and newborns, interpretation of results should be based   on gestational age, weight and in agreement with clinical   observations.   Premature Infant recommended reference ranges:   Up to 24 hours.............<8.0 mg/dL   Up to 48 hours............<12.0 mg/dL   3-5 days..................<15.0 mg/dL   6-29 days.................<15.0 mg/dL    Alkaline Phosphatase 55 - 135 U/L 120    AST 10 - 40 U/L 32    ALT 10 - 44 U/L 25    Anion Gap 8 - 16 mmol/L 16    eGFR if African American >60 mL/min/1.73 m^2 10Abnormal     eGFR if non African American >60 mL/min/1.73 m^2 8Abnormal            TSH 0.400 - 4.000 uIU/mL 2.244           Procalcitonin <0.25 ng/mL 4.88High            Hemoglobin A1C 4.0 - 5.6 % 5.2          Microbiology Data: Reviewed   None     Summary of Chest Imaging Personally Reviewed:     CT Chest 7/2020- CTA chest: No evidence  for central or proximal segmental pulmonary embolism.  Mid to distal segmental pulmonary arteries limited by motion.     Multifocal somewhat nodular consolidative opacities within the lungs bilaterally concerning for inflammatory/infectious process with differential to include COVID-19.  Neoplasm felt less likely.     There is superimposed patchy ground-glass mosaic attenuation lungs with enlarged arterial to bronchus ratio in the upper lobes, reflux of contrast into the hepatic veins and bilateral pleural effusions concerning for component of vascular congestion and edema.     Please note there is prominent to enlarged mediastinal and hilar lymph nodes     CT abdomen pelvis: No acute intra-abdominal findings allowing for motion.  Colonic diverticulosis without definite diverticulitis.  Extensive vascular calcification of the aorta.  Small caliber kidneys concerning for renal atrophy with slight volume loss and hypoattenuation in the kidneys bilaterally which may represent scarring though indeterminate and distorted by motion.  Follow-up dedicated renal imaging advised.     LE doppler- No evidence of deep venous thrombosis in either lower extremity.    CXR- There has been interval increased aeration of the left lung base compared to previous performed 1 day prior.  There is no pneumothorax.  The remainder of the examination is unchanged.    2D Echo:     · Mild eccentric left ventricular hypertrophy.  · Moderate left atrial enlargement.  · Low normal left ventricular systolic function. The estimated ejection fraction is 50%.  · Mild left ventricular enlargement.  · Indeterminate left ventricular diastolic function.  · Normal right ventricular systolic function.  · Moderate right atrial enlargement.  · Mild mitral regurgitation.  · Normal central venous pressure (3 mmHg).    PFT's: None         Assessment:       No diagnosis found.    Outpatient Encounter Medications as of 9/8/2020   Medication Sig Dispense Refill     albuterol (PROAIR HFA) 90 mcg/actuation inhaler Inhale 2 puffs into the lungs every 6 (six) hours as needed for Wheezing. Rescue (Patient not taking: Reported on 7/30/2020) 18 g 0    amLODIPine (NORVASC) 10 MG tablet TAKE 1 TABLET BY MOUTH ONCE DAILY 90 tablet 3    aspirin (ECOTRIN) 81 MG EC tablet TAKE ONE TABLET BY MOUTH ONCE DAILY 30 tablet 6    atorvastatin (LIPITOR) 40 MG tablet Take 1 tablet (40 mg total) by mouth once daily. 90 tablet 3    benzonatate (TESSALON) 100 MG capsule Take 100 mg by mouth 3 (three) times daily as needed.      carvedilol (COREG) 25 MG tablet TAKE 1 TABLET BY MOUTH TWICE DAILY 180 tablet 3    ergocalciferol (ERGOCALCIFEROL) 50,000 unit Cap Take 1 capsule (50,000 Units total) by mouth every 7 days. 30 capsule 3    famotidine (PEPCID) 20 MG tablet Take 1 tablet (20 mg total) by mouth once daily. 30 tablet 11    furosemide (LASIX) 40 MG tablet Take 1 tablet by mouth once daily 90 tablet 3    isosorbide mononitrate (IMDUR) 120 MG 24 hr tablet TAKE 1 TABLET BY MOUTH ONCE DAILY 90 tablet 3    nitroGLYCERIN (NITROSTAT) 0.4 MG SL tablet Place 1 tablet (0.4 mg total) under the tongue every 5 (five) minutes as needed for Chest pain. 20 tablet 12    predniSONE (DELTASONE) 5 MG tablet       vitamin renal formula, B-complex-vitamin c-folic acid, (NEPHROCAP) 1 mg Cap Take 1 capsule by mouth once daily. 30 capsule 3     No facility-administered encounter medications on file as of 9/8/2020.      No orders of the defined types were placed in this encounter.      Plan:         1. Chronic hypoxemic respiratory failure- Chest imaging reviewed. Has significant cardiomegaly with GG + consolidation on recent inpatient CTA chest. I agree that some opacities have a slightly nodular configuration (this has been seen in prior imaging).. It improved post volume removal with HD and prior radiographs have clearly waxed and waned suggesting that her biggest contributor is cardiogenic pulmonary edema in  "setting of DD and ESRD with ongoing ineffective HD sessions.. Confounding issues include elevated PCT at prior admission. She was additionally Rxed for CAP, but cultures unrevealing and COVID testing negative.. Appears volume up today and no HD since Friday, which appears to be incomplete. Significant ambulatory desaturation on 6MWT off O2. Given the extensive parenchymal abnormalities on recent imaging something could certainly be "hiding" in the background, but this seems unlikely..     -- Needs HD today. Discussed ED with her, but they want to arrange with HD center and will go there following clinic   -- Supplemental O2 targeting SpO2 >90%.. 3L supplemental O2 sufficient based on testing. D/w patient and daughter regarding need to utilize.. Portable concentrator orders.   -- GDMT for HF..     She can RT clinic in 3-4 months for repeat imaging once her volume status has been optimized.. I am worried that her current AV fistula not functioning correctly and HD ineffective..       2. HFpEF (EF 50%) + DD- Volume up as above. Continue GDMT per primary/cardiology     3. CAD- Statin/ASA/BB. 3 vessel CABG 2014     4. ESRD- needs HD as above     Discussed with daughter and patient.. They will call in the interim if any issues.      Azael Castle M.D.   [facility]   "

## 2020-09-09 NOTE — TELEPHONE ENCOUNTER
Left pt detailed message requesting a call back in regards to scheduling nuclear stress test with clinical visit after testing done     Orders were placed for echo and stress during last virtual visit with Dr. Sanchez, echo was done but stress test was not     Requested a call back to further discuss

## 2020-09-09 NOTE — PROCEDURES
Erin Clark is a 85 y.o.  female patient, who presents for a 6 minute walk test ordered by MD Corrine.  The diagnosis is Shortness of Breath.  The patient's BMI is 20.6kg/m2. Predicted distance (lower limit of normal) is 265.57 meters.    Test Results:    The test was completed.  The patient stopped 1 times for a total of 110 seconds.  The total time walked was 250 seconds.  During walking, the patient reported:  Dyspnea. The patient used no assistive devices during testing.     09/08/2020---------Distance: 182.88 meters (600 feet)     O2 Sat % Supplemental Oxygen Heart Rate Blood Pressure Rosalie Scale   Pre-exercise  (Resting) 96 % Room Air 89 bpm 182/79 3   During Exercise 80 % Room Air 118 bpm 181/78 5-6   Post-exercise   91 % Room Air  89 bpm         Recovery Time: 105 seconds    Oxygen Qualification:     O2 Sat % Supplemental Oxygen Heart Rate Blood Pressure Rosalie Scale   Pre-exercise  (Resting) 99 % 3 L/M  83 bpm  147/67  3    During Exercise 99 %  3 L/M  84 bpm  164/70  3    Post-exercise   98 %  3 L/M  86 bpm            Recovery Time: 61 seconds    Performing nurse/tech: Honey FERNANDEZ    PREVIOUS STUDY:   The patient has not had a previous study.      CLINICAL INTERPRETATION:  Six minute walk distance is 182.88 meters (600 feet) with heavy dyspnea.  During exercise, there was significant desaturation while breathing room air.  Blood pressure remained stable and Heart rate increased significantly with walking.  Hypertension was present prior to exercise.  This may represent a tachycardic response to exercise.  The patient did not report non-pulmonary symptoms during exercise.  Significant exercise impairment is likely due to desaturation.  The patient may benefit from using supplemental oxygen during exertion.  No previous study performed.  Based upon age and body mass index, exercise capacity is less than predicted.   Oxygen saturation did improve while breathing supplemental oxygen.

## 2020-09-10 RX ORDER — NITROGLYCERIN 0.4 MG/1
0.4 TABLET SUBLINGUAL EVERY 5 MIN PRN
Qty: 20 TABLET | Refills: 12 | OUTPATIENT
Start: 2020-09-10 | End: 2020-10-13 | Stop reason: SDUPTHER

## 2020-09-21 ENCOUNTER — OFFICE VISIT (OUTPATIENT)
Dept: FAMILY MEDICINE | Facility: CLINIC | Age: 85
End: 2020-09-21
Payer: MEDICARE

## 2020-09-21 DIAGNOSIS — R10.13 EPIGASTRIC PAIN: Primary | ICD-10-CM

## 2020-09-21 DIAGNOSIS — R14.2 BURPING: ICD-10-CM

## 2020-09-21 DIAGNOSIS — K21.9 GASTROESOPHAGEAL REFLUX DISEASE, ESOPHAGITIS PRESENCE NOT SPECIFIED: ICD-10-CM

## 2020-09-21 DIAGNOSIS — R06.02 SOB (SHORTNESS OF BREATH): ICD-10-CM

## 2020-09-21 PROCEDURE — 99442 PR PHYSICIAN TELEPHONE EVALUATION 11-20 MIN: CPT | Mod: 95,,, | Performed by: FAMILY MEDICINE

## 2020-09-21 PROCEDURE — 99442 PR PHYSICIAN TELEPHONE EVALUATION 11-20 MIN: ICD-10-PCS | Mod: 95,,, | Performed by: FAMILY MEDICINE

## 2020-09-21 RX ORDER — PANTOPRAZOLE SODIUM 40 MG/1
40 TABLET, DELAYED RELEASE ORAL
Qty: 30 TABLET | Refills: 0 | Status: SHIPPED | OUTPATIENT
Start: 2020-09-21 | End: 2023-09-12

## 2020-09-21 NOTE — PROGRESS NOTES
Established Patient - Audio Only Telehealth Visit     The patient location is:  [place].  The chief complaint leading to consultation is:  Shortness of breath/epigastric discomfort.  Reflux  Visit type: Virtual visit with audio only (telephone)  Total time spent with patient: 15 min       The reason for the audio only service rather than synchronous audio and video virtual visit was related to technical difficulties or patient preference/necessity.     Each patient to whom I provide medical services by telemedicine is:  (1) informed of the relationship between the physician and patient and the respective role of any other health care provider with respect to management of the patient; and (2) notified that they may decline to receive medical services by telemedicine and may withdraw from such care at any time. Patient verbally consented to receive this service via voice-only telephone call.       HPI:  85 years old female who was evaluated by telemedicine.  Patient with epigastric pain associated with shortness of breath and burping.  Patient also with reflux.  She is not taking famotidine.  No palpitation, orthopnea or PND.  Patient with mild chest discomfort.  Diagnoses and all orders for this visit:    Epigastric pain    SOB (shortness of breath)    Burping    Gastroesophageal reflux disease, esophagitis presence not specified  -     pantoprazole (PROTONIX) 40 MG tablet; Take 1 tablet (40 mg total) by mouth before breakfast.         Assessment and plan:  Diagnoses and all orders for this visit:    Epigastric pain    SOB (shortness of breath)    Burping    Gastroesophageal reflux disease, esophagitis presence not specified  -     pantoprazole (PROTONIX) 40 MG tablet; Take 1 tablet (40 mg total) by mouth before breakfast.     I have recommended patient to go to the emergency room for a cardiovascular workup.    Patient agree to go to the emergency room today.                        This service was not  originating from a related E/M service provided within the previous 7 days nor will  to an E/M service or procedure within the next 24 hours or my soonest available appointment.  Prevailing standard of care was able to be met in this audio-only visit.

## 2020-09-29 ENCOUNTER — TELEPHONE (OUTPATIENT)
Dept: PULMONOLOGY | Facility: CLINIC | Age: 85
End: 2020-09-29

## 2020-09-29 NOTE — TELEPHONE ENCOUNTER
Sp[pino with patient daughter Rosa, informed her that I have received her message.  Patient daughter states that she has spoken with Dr Catsle and that he advised her that he was going to contact someone in regards to getting patient a portable oxygen tank that patient can carry on her shoulder but patient daughter states that she has not heard anything form Dr Castle and would like to know if patient has been approved for a portable oxygen tank. I verbalized to patient daughter that I understand and advised patient daughter that I will forward her message to Dr Castle to review/advise. Patient daughter verbalized that she understand.

## 2020-09-29 NOTE — TELEPHONE ENCOUNTER
----- Message from Ana Zhou sent at 9/21/2020  3:31 PM CDT -----  Regarding: advice  Contact: Rosa [phone]  Patient's daughter Rosa is requesting a call back regarding ordering oxygen tank for her shoulder. She has never heard from the company. Please call to discuss. Thanks

## 2020-10-12 ENCOUNTER — PATIENT OUTREACH (OUTPATIENT)
Dept: ADMINISTRATIVE | Facility: OTHER | Age: 85
End: 2020-10-12

## 2020-10-12 NOTE — PROGRESS NOTES
Health Maintenance Due   Topic Date Due    TETANUS VACCINE  05/07/1953    DEXA SCAN  05/07/1977    Shingles Vaccine (1 of 2) 05/07/1987    Pneumococcal Vaccine (65+ High/Highest Risk) (2 of 2 - PPSV23) 05/01/2019    Foot Exam  03/06/2020    Eye Exam  05/15/2020    Influenza Vaccine (1) 08/01/2020     Updates were requested from care everywhere.  Chart was reviewed for overdue Proactive [facility] Encounters ([facility]) topics (CRS, Breast Cancer Screening, Eye exam)  Health Maintenance has been updated.  LINKS immunization registry triggered.  Immunizations were reconciled.

## 2020-10-13 ENCOUNTER — OFFICE VISIT (OUTPATIENT)
Dept: CARDIOLOGY | Facility: CLINIC | Age: 85
End: 2020-10-13
Payer: MEDICARE

## 2020-10-13 VITALS
OXYGEN SATURATION: 97 % | WEIGHT: 107.06 LBS | SYSTOLIC BLOOD PRESSURE: 134 MMHG | HEART RATE: 81 BPM | DIASTOLIC BLOOD PRESSURE: 52 MMHG | BODY MASS INDEX: 18.96 KG/M2

## 2020-10-13 DIAGNOSIS — I10 ESSENTIAL HYPERTENSION: ICD-10-CM

## 2020-10-13 DIAGNOSIS — T82.898D ANEURYSM OF ARTERIOVENOUS DIALYSIS FISTULA, SUBSEQUENT ENCOUNTER: ICD-10-CM

## 2020-10-13 DIAGNOSIS — Z79.4 TYPE 2 DIABETES MELLITUS WITH CHRONIC KIDNEY DISEASE ON CHRONIC DIALYSIS, WITH LONG-TERM CURRENT USE OF INSULIN: ICD-10-CM

## 2020-10-13 DIAGNOSIS — N18.6 TYPE 2 DIABETES MELLITUS WITH CHRONIC KIDNEY DISEASE ON CHRONIC DIALYSIS, WITH LONG-TERM CURRENT USE OF INSULIN: ICD-10-CM

## 2020-10-13 DIAGNOSIS — I25.10 ATHEROSCLEROSIS OF NATIVE CORONARY ARTERY OF NATIVE HEART WITHOUT ANGINA PECTORIS: ICD-10-CM

## 2020-10-13 DIAGNOSIS — I50.32 CHRONIC DIASTOLIC CONGESTIVE HEART FAILURE: ICD-10-CM

## 2020-10-13 DIAGNOSIS — R06.02 SOB (SHORTNESS OF BREATH): ICD-10-CM

## 2020-10-13 DIAGNOSIS — R07.9 CHEST PAIN, UNSPECIFIED TYPE: ICD-10-CM

## 2020-10-13 DIAGNOSIS — Z99.2 TYPE 2 DIABETES MELLITUS WITH CHRONIC KIDNEY DISEASE ON CHRONIC DIALYSIS, WITH LONG-TERM CURRENT USE OF INSULIN: ICD-10-CM

## 2020-10-13 DIAGNOSIS — I25.10 CORONARY ARTERY DISEASE INVOLVING NATIVE CORONARY ARTERY WITHOUT ANGINA PECTORIS, UNSPECIFIED WHETHER NATIVE OR TRANSPLANTED HEART: Primary | ICD-10-CM

## 2020-10-13 DIAGNOSIS — E78.5 HYPERLIPIDEMIA LDL GOAL <70: ICD-10-CM

## 2020-10-13 DIAGNOSIS — I77.9 PERIPHERAL ARTERIAL OCCLUSIVE DISEASE: ICD-10-CM

## 2020-10-13 DIAGNOSIS — E11.22 TYPE 2 DIABETES MELLITUS WITH CHRONIC KIDNEY DISEASE ON CHRONIC DIALYSIS, WITH LONG-TERM CURRENT USE OF INSULIN: ICD-10-CM

## 2020-10-13 DIAGNOSIS — Z95.1 S/P CABG X 1: ICD-10-CM

## 2020-10-13 PROCEDURE — 99215 OFFICE O/P EST HI 40 MIN: CPT | Mod: S$GLB,,, | Performed by: INTERNAL MEDICINE

## 2020-10-13 PROCEDURE — 99499 RISK ADDL DX/OHS AUDIT: ICD-10-PCS | Mod: S$GLB,,, | Performed by: INTERNAL MEDICINE

## 2020-10-13 PROCEDURE — 1126F PR PAIN SEVERITY QUANTIFIED, NO PAIN PRESENT: ICD-10-PCS | Mod: S$GLB,,, | Performed by: INTERNAL MEDICINE

## 2020-10-13 PROCEDURE — 1159F MED LIST DOCD IN RCRD: CPT | Mod: S$GLB,,, | Performed by: INTERNAL MEDICINE

## 2020-10-13 PROCEDURE — 99215 PR OFFICE/OUTPT VISIT, EST, LEVL V, 40-54 MIN: ICD-10-PCS | Mod: S$GLB,,, | Performed by: INTERNAL MEDICINE

## 2020-10-13 PROCEDURE — 1101F PR PT FALLS ASSESS DOC 0-1 FALLS W/OUT INJ PAST YR: ICD-10-PCS | Mod: CPTII,S$GLB,, | Performed by: INTERNAL MEDICINE

## 2020-10-13 PROCEDURE — 99999 PR PBB SHADOW E&M-EST. PATIENT-LVL IV: ICD-10-PCS | Mod: PBBFAC,,, | Performed by: INTERNAL MEDICINE

## 2020-10-13 PROCEDURE — 99999 PR PBB SHADOW E&M-EST. PATIENT-LVL IV: CPT | Mod: PBBFAC,,, | Performed by: INTERNAL MEDICINE

## 2020-10-13 PROCEDURE — 99499 UNLISTED E&M SERVICE: CPT | Mod: S$GLB,,, | Performed by: INTERNAL MEDICINE

## 2020-10-13 PROCEDURE — 1126F AMNT PAIN NOTED NONE PRSNT: CPT | Mod: S$GLB,,, | Performed by: INTERNAL MEDICINE

## 2020-10-13 PROCEDURE — 1101F PT FALLS ASSESS-DOCD LE1/YR: CPT | Mod: CPTII,S$GLB,, | Performed by: INTERNAL MEDICINE

## 2020-10-13 PROCEDURE — 1159F PR MEDICATION LIST DOCUMENTED IN MEDICAL RECORD: ICD-10-PCS | Mod: S$GLB,,, | Performed by: INTERNAL MEDICINE

## 2020-10-13 RX ORDER — NITROGLYCERIN 0.4 MG/1
0.4 TABLET SUBLINGUAL EVERY 5 MIN PRN
Qty: 20 TABLET | Refills: 12 | Status: SHIPPED | OUTPATIENT
Start: 2020-10-13 | End: 2023-08-24 | Stop reason: SDUPTHER

## 2020-10-13 RX ORDER — HYDRALAZINE HYDROCHLORIDE 25 MG/1
25 TABLET, FILM COATED ORAL EVERY 12 HOURS
Qty: 60 TABLET | Refills: 11 | Status: SHIPPED | OUTPATIENT
Start: 2020-10-13 | End: 2023-09-12

## 2020-10-13 NOTE — PROGRESS NOTES
Subjective:    Patient ID:  Erin Clark is a 85 y.o. female who presents for follow-up of Shortness of Breath      HPI     84 y/o Czech speaking female who has seen Dr Ware in the past. 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 Initially seen by me for substernal CP with intermittent QUEZADA, had 2DE with EF 50%, had resolution of CP. Was hospitalized 7/2020 for SOB and hypoxia, had elevated BNP/trop, treated for PNA and HD and home O2. Has continued to have QUEZADA that is lifestyle limiting. HD previously twice weekly, now three times weekly. Has some episodes of weakness with low BP after HD. Intermittent dry cough. Has episodes of PND. denies orthopnea, syncope, palps, LE edema, claudication. Compliant with meds. Has been seen by Pulmonology.     Review of Systems   Constitution: Positive for malaise/fatigue.   HENT: Negative for congestion.    Eyes: Negative for blurred vision.   Cardiovascular: Positive for dyspnea on exertion. Negative for chest pain, claudication, cyanosis, irregular heartbeat, leg swelling, near-syncope, orthopnea, palpitations, paroxysmal nocturnal dyspnea and syncope.   Respiratory: Positive for cough and shortness of breath.    Endocrine: Negative for polyuria.   Hematologic/Lymphatic: Negative for bleeding problem.   Skin: Negative for itching and rash.   Musculoskeletal: Negative for joint swelling, muscle cramps and muscle weakness.   Gastrointestinal: Negative for abdominal pain, hematemesis, hematochezia, melena, nausea and vomiting.   Genitourinary: Negative for dysuria and hematuria.   Neurological: Positive for weakness. Negative for dizziness, focal weakness, headaches, light-headedness and loss of balance.   Psychiatric/Behavioral: Negative for depression. The patient is not nervous/anxious.         Objective:    Physical  Exam   Constitutional: She is oriented to person, place, and time. She appears well-developed and well-nourished.   HENT:   Head: Normocephalic and atraumatic.   Neck: Neck supple. No JVD present.   Cardiovascular: Normal rate, regular rhythm and normal heart sounds.   Pulses:       Carotid pulses are 2+ on the right side and 2+ on the left side.       Radial pulses are 2+ on the right side and 2+ on the left side.        Femoral pulses are 2+ on the right side and 2+ on the left side.       Posterior tibial pulses are 1+ on the right side and 1+ on the left side.   Pulmonary/Chest: Effort normal. She has rales.   Abdominal: Soft. Bowel sounds are normal.   Musculoskeletal:         General: No edema.   Neurological: She is alert and oriented to person, place, and time.   Skin: Skin is warm and dry.   Psychiatric: She has a normal mood and affect. Her behavior is normal. Thought content normal.         Assessment:       1. Coronary artery disease involving native coronary artery without angina pectoris, unspecified whether native or transplanted heart    2. Atherosclerosis of native coronary artery of native heart without angina pectoris    3. S/P CABG x 1    4. Chest pain, unspecified type    5. Chronic diastolic congestive heart failure    6. Essential hypertension    7. Aneurysm of arteriovenous dialysis fistula, subsequent encounter    8. Hyperlipidemia LDL goal <70    9. Peripheral arterial occlusive disease    10. Type 2 diabetes mellitus with chronic kidney disease on chronic dialysis, with long-term current use of insulin    11. SOB (shortness of breath)      85 year old patient with hx and presentation as above. Has hx of CAD and will evaluate symptoms with noninvasive cardiac stress imaging. Will add hydralazine to regimen for afterload reduction and BP. Needs to stay active and limit sodium intake. Discussed the etiology, evaluation, and management of CAD, CABG, CHF, DD, HTN, HLD, PAD, DM. Discussed the  importance of med compliance, heart healthy diet, and regular exercise.        Plan:       -Nuclear SPECT  -Hydralazine 25 mg BID  -f/u in 1 month

## 2020-10-23 ENCOUNTER — TELEPHONE (OUTPATIENT)
Dept: PULMONOLOGY | Facility: CLINIC | Age: 85
End: 2020-10-23

## 2020-10-23 NOTE — TELEPHONE ENCOUNTER
----- Message from Venice Diaz sent at 10/23/2020  3:33 PM CDT -----  Regarding: speak w/ the Nurse  Contact: Pt's Daughter/ Meaghan  Type: Requesting to speak with nurse    Who Called: PT  Regarding:Oxygen Tank  Would the patient rather a call back or a response via [facility]? Call back  Best Call Back Number: [phone]  Additional Information: n/a

## 2020-10-23 NOTE — TELEPHONE ENCOUNTER
I spoke to patient's daughter, Meaghan. Meaghan will call me back to let me know patient's DME provider. She verbalized understanding.

## 2020-11-18 ENCOUNTER — TELEPHONE (OUTPATIENT)
Dept: CARDIOLOGY | Facility: CLINIC | Age: 85
End: 2020-11-18

## 2020-11-18 NOTE — TELEPHONE ENCOUNTER
Reached out to pt in regards to scheduled clinical follow up with Dr. Sanchez next week Tuesday 11/24    Advised pt son that pt needs a nuclear stress test prior to being seen in the clinic     Provided pt son with the scheduling dept number, [phone], to schedule test at earliest convenience     Requested a call back to the office once test is scheduled so we can coordinate a clinical f/u after

## 2021-01-12 ENCOUNTER — TELEPHONE (OUTPATIENT)
Dept: CARDIOLOGY | Facility: CLINIC | Age: 86
End: 2021-01-12

## 2021-02-08 ENCOUNTER — PATIENT OUTREACH (OUTPATIENT)
Dept: ADMINISTRATIVE | Facility: OTHER | Age: 86
End: 2021-02-08

## 2021-02-08 ENCOUNTER — TELEPHONE (OUTPATIENT)
Dept: CARDIOLOGY | Facility: CLINIC | Age: 86
End: 2021-02-08

## 2021-03-02 ENCOUNTER — HOSPITAL ENCOUNTER (OUTPATIENT)
Dept: RADIOLOGY | Facility: HOSPITAL | Age: 86
Discharge: HOME | End: 2021-03-02
Attending: INTERNAL MEDICINE
Payer: MEDICARE

## 2021-03-02 ENCOUNTER — HOSPITAL ENCOUNTER (OUTPATIENT)
Dept: CARDIOLOGY | Facility: HOSPITAL | Age: 86
Discharge: HOME | End: 2021-03-02
Attending: INTERNAL MEDICINE
Payer: MEDICARE

## 2021-03-02 VITALS — BODY MASS INDEX: 18.96 KG/M2 | HEIGHT: 63 IN | WEIGHT: 107 LBS

## 2021-03-02 DIAGNOSIS — R07.9 CHEST PAIN, UNSPECIFIED TYPE: ICD-10-CM

## 2021-03-02 DIAGNOSIS — I50.42 CHRONIC COMBINED SYSTOLIC AND DIASTOLIC CONGESTIVE HEART FAILURE: ICD-10-CM

## 2021-03-02 DIAGNOSIS — I25.10 ATHEROSCLEROSIS OF NATIVE CORONARY ARTERY OF NATIVE HEART WITHOUT ANGINA PECTORIS: ICD-10-CM

## 2021-03-02 DIAGNOSIS — I25.10 CORONARY ARTERY DISEASE INVOLVING NATIVE HEART, ANGINA PRESENCE UNSPECIFIED, UNSPECIFIED VESSEL OR LESION TYPE: ICD-10-CM

## 2021-03-02 LAB
AORTIC ROOT ANNULUS: 3.07 CM
ASCENDING AORTA: 2.94 CM
AV INDEX (PROSTH): 0.65
AV MEAN GRADIENT: 5 MMHG
AV PEAK GRADIENT: 9 MMHG
AV VALVE AREA: 1.88 CM2
AV VELOCITY RATIO: 0.6
BSA FOR ECHO PROCEDURE: 1.47 M2
CV ECHO LV RWT: 0.49 CM
CV STRESS BASE HR: 67 BPM
DIASTOLIC BLOOD PRESSURE: 55 MMHG
DOP CALC AO PEAK VEL: 1.53 M/S
DOP CALC AO VTI: 37.79 CM
DOP CALC LVOT AREA: 2.9 CM2
DOP CALC LVOT DIAMETER: 1.92 CM
DOP CALC LVOT PEAK VEL: 0.92 M/S
DOP CALC LVOT STROKE VOLUME: 71.01 CM3
DOP CALCLVOT PEAK VEL VTI: 24.54 CM
E WAVE DECELERATION TIME: 197.5 MSEC
E/A RATIO: 1.54
E/E' RATIO: 54.33 M/S
ECHO LV POSTERIOR WALL: 1.07 CM (ref 0.6–1.1)
FRACTIONAL SHORTENING: 22 % (ref 28–44)
INTERVENTRICULAR SEPTUM: 1.26 CM (ref 0.6–1.1)
LA MAJOR: 5.41 CM
LA MINOR: 5.43 CM
LA WIDTH: 3.84 CM
LEFT ATRIUM SIZE: 4.29 CM
LEFT ATRIUM VOLUME INDEX MOD: 35.5 ML/M2
LEFT ATRIUM VOLUME INDEX: 51.3 ML/M2
LEFT ATRIUM VOLUME MOD: 52.59 CM3
LEFT ATRIUM VOLUME: 75.89 CM3
LEFT INTERNAL DIMENSION IN SYSTOLE: 3.43 CM (ref 2.1–4)
LEFT VENTRICLE DIASTOLIC VOLUME INDEX: 59.66 ML/M2
LEFT VENTRICLE DIASTOLIC VOLUME: 88.29 ML
LEFT VENTRICLE MASS INDEX: 124 G/M2
LEFT VENTRICLE SYSTOLIC VOLUME INDEX: 32.8 ML/M2
LEFT VENTRICLE SYSTOLIC VOLUME: 48.51 ML
LEFT VENTRICULAR INTERNAL DIMENSION IN DIASTOLE: 4.41 CM (ref 3.5–6)
LEFT VENTRICULAR MASS: 183.99 G
LV LATERAL E/E' RATIO: 54.33 M/S
LV SEPTAL E/E' RATIO: 54.33 M/S
MV MEAN GRADIENT: 1 MMHG
MV PEAK A VEL: 1.06 M/S
MV PEAK E VEL: 1.63 M/S
MV PEAK GRADIENT: 11 MMHG
MV STENOSIS PRESSURE HALF TIME: 57.27 MS
MV VALVE AREA P 1/2 METHOD: 3.84 CM2
OHS CV CPX 85 PERCENT MAX PREDICTED HEART RATE MALE: 113
OHS CV CPX MAX PREDICTED HEART RATE: 133
OHS CV CPX PATIENT IS FEMALE: 1
OHS CV CPX PATIENT IS MALE: 0
OHS CV CPX PEAK DIASTOLIC BLOOD PRESSURE: 57 MMHG
OHS CV CPX PEAK HEAR RATE: 84 BPM
OHS CV CPX PEAK RATE PRESSURE PRODUCT: NORMAL
OHS CV CPX PEAK SYSTOLIC BLOOD PRESSURE: 157 MMHG
OHS CV CPX PERCENT MAX PREDICTED HEART RATE ACHIEVED: 63
OHS CV CPX RATE PRESSURE PRODUCT PRESENTING: NORMAL
PISA TR MAX VEL: 2.06 M/S
PV PEAK VELOCITY: 1.13 CM/S
RA MAJOR: 5.05 CM
RA PRESSURE: 3 MMHG
RA WIDTH: 3.5 CM
RIGHT VENTRICULAR END-DIASTOLIC DIMENSION: 3.2 CM
RV TISSUE DOPPLER FREE WALL SYSTOLIC VELOCITY 1 (APICAL 4 CHAMBER VIEW): 7.2 CM/S
STJ: 2.44 CM
SYSTOLIC BLOOD PRESSURE: 153 MMHG
TDI LATERAL: 0.03 M/S
TDI SEPTAL: 0.03 M/S
TDI: 0.03 M/S
TR MAX PG: 17 MMHG
TRICUSPID ANNULAR PLANE SYSTOLIC EXCURSION: 1.29 CM
TV REST PULMONARY ARTERY PRESSURE: 20 MMHG

## 2021-03-02 PROCEDURE — 93306 TTE W/DOPPLER COMPLETE: CPT | Mod: 26,,, | Performed by: INTERNAL MEDICINE

## 2021-03-02 PROCEDURE — 93018 NUCLEAR STRESS TEST (CUPID ONLY): ICD-10-PCS | Mod: ,,, | Performed by: INTERNAL MEDICINE

## 2021-03-02 PROCEDURE — A9502 TC99M TETROFOSMIN: HCPCS

## 2021-03-02 PROCEDURE — 93306 ECHO (CUPID ONLY): ICD-10-PCS | Mod: 26,,, | Performed by: INTERNAL MEDICINE

## 2021-03-02 PROCEDURE — 63600175 PHARM REV CODE 636 W HCPCS: Performed by: INTERNAL MEDICINE

## 2021-03-02 PROCEDURE — 93017 CV STRESS TEST TRACING ONLY: CPT

## 2021-03-02 PROCEDURE — 93016 NUCLEAR STRESS TEST (CUPID ONLY): ICD-10-PCS | Mod: ,,, | Performed by: INTERNAL MEDICINE

## 2021-03-02 PROCEDURE — 78452 HT MUSCLE IMAGE SPECT MULT: CPT | Mod: 26,,, | Performed by: RADIOLOGY

## 2021-03-02 PROCEDURE — 93016 CV STRESS TEST SUPVJ ONLY: CPT | Mod: ,,, | Performed by: INTERNAL MEDICINE

## 2021-03-02 PROCEDURE — 93018 CV STRESS TEST I&R ONLY: CPT | Mod: ,,, | Performed by: INTERNAL MEDICINE

## 2021-03-02 PROCEDURE — 78452 HT MUSCLE IMAGE SPECT MULT: CPT | Mod: TC

## 2021-03-02 PROCEDURE — 78452 NM MYOCARDIAL PERFUSION SPECT MULTI PHARM: ICD-10-PCS | Mod: 26,,, | Performed by: RADIOLOGY

## 2021-03-02 PROCEDURE — 93306 TTE W/DOPPLER COMPLETE: CPT

## 2021-03-02 RX ORDER — REGADENOSON 0.08 MG/ML
0.4 INJECTION, SOLUTION INTRAVENOUS ONCE
Status: COMPLETED | OUTPATIENT
Start: 2021-03-02 | End: 2021-03-02

## 2021-03-02 RX ADMIN — REGADENOSON 0.4 MG: 0.08 INJECTION, SOLUTION INTRAVENOUS at 03:03

## 2021-03-29 ENCOUNTER — PATIENT OUTREACH (OUTPATIENT)
Dept: ADMINISTRATIVE | Facility: OTHER | Age: 86
End: 2021-03-29

## 2021-03-30 ENCOUNTER — OFFICE VISIT (OUTPATIENT)
Dept: CARDIOLOGY | Facility: CLINIC | Age: 86
End: 2021-03-30
Payer: MEDICARE

## 2021-03-30 VITALS
DIASTOLIC BLOOD PRESSURE: 69 MMHG | OXYGEN SATURATION: 94 % | BODY MASS INDEX: 17.87 KG/M2 | HEART RATE: 69 BPM | WEIGHT: 107.25 LBS | HEIGHT: 65 IN | SYSTOLIC BLOOD PRESSURE: 155 MMHG

## 2021-03-30 DIAGNOSIS — I77.9 PERIPHERAL ARTERIAL OCCLUSIVE DISEASE: ICD-10-CM

## 2021-03-30 DIAGNOSIS — I25.10 CORONARY ARTERY DISEASE INVOLVING NATIVE CORONARY ARTERY WITHOUT ANGINA PECTORIS, UNSPECIFIED WHETHER NATIVE OR TRANSPLANTED HEART: Primary | ICD-10-CM

## 2021-03-30 DIAGNOSIS — E11.22 TYPE 2 DIABETES MELLITUS WITH CHRONIC KIDNEY DISEASE ON CHRONIC DIALYSIS, WITH LONG-TERM CURRENT USE OF INSULIN: ICD-10-CM

## 2021-03-30 DIAGNOSIS — Z99.2 TYPE 2 DIABETES MELLITUS WITH CHRONIC KIDNEY DISEASE ON CHRONIC DIALYSIS, WITH LONG-TERM CURRENT USE OF INSULIN: ICD-10-CM

## 2021-03-30 DIAGNOSIS — N18.6 ESRD (END STAGE RENAL DISEASE): ICD-10-CM

## 2021-03-30 DIAGNOSIS — N18.6 TYPE 2 DIABETES MELLITUS WITH CHRONIC KIDNEY DISEASE ON CHRONIC DIALYSIS, WITH LONG-TERM CURRENT USE OF INSULIN: ICD-10-CM

## 2021-03-30 DIAGNOSIS — G47.30 SLEEP APNEA, UNSPECIFIED TYPE: ICD-10-CM

## 2021-03-30 DIAGNOSIS — I50.32 CHRONIC DIASTOLIC CONGESTIVE HEART FAILURE: ICD-10-CM

## 2021-03-30 DIAGNOSIS — Z95.1 S/P CABG X 1: ICD-10-CM

## 2021-03-30 DIAGNOSIS — I10 ESSENTIAL HYPERTENSION: ICD-10-CM

## 2021-03-30 DIAGNOSIS — E78.5 HYPERLIPIDEMIA LDL GOAL <70: ICD-10-CM

## 2021-03-30 DIAGNOSIS — Z79.4 TYPE 2 DIABETES MELLITUS WITH CHRONIC KIDNEY DISEASE ON CHRONIC DIALYSIS, WITH LONG-TERM CURRENT USE OF INSULIN: ICD-10-CM

## 2021-03-30 PROCEDURE — 3288F FALL RISK ASSESSMENT DOCD: CPT | Mod: CPTII,S$GLB,, | Performed by: INTERNAL MEDICINE

## 2021-03-30 PROCEDURE — 99499 UNLISTED E&M SERVICE: CPT | Mod: S$GLB,,, | Performed by: INTERNAL MEDICINE

## 2021-03-30 PROCEDURE — 3288F PR FALLS RISK ASSESSMENT DOCUMENTED: ICD-10-PCS | Mod: CPTII,S$GLB,, | Performed by: INTERNAL MEDICINE

## 2021-03-30 PROCEDURE — 1159F PR MEDICATION LIST DOCUMENTED IN MEDICAL RECORD: ICD-10-PCS | Mod: S$GLB,,, | Performed by: INTERNAL MEDICINE

## 2021-03-30 PROCEDURE — 1126F PR PAIN SEVERITY QUANTIFIED, NO PAIN PRESENT: ICD-10-PCS | Mod: S$GLB,,, | Performed by: INTERNAL MEDICINE

## 2021-03-30 PROCEDURE — 99214 OFFICE O/P EST MOD 30 MIN: CPT | Mod: S$GLB,,, | Performed by: INTERNAL MEDICINE

## 2021-03-30 PROCEDURE — 99999 PR PBB SHADOW E&M-EST. PATIENT-LVL III: CPT | Mod: PBBFAC,,, | Performed by: INTERNAL MEDICINE

## 2021-03-30 PROCEDURE — 1101F PR PT FALLS ASSESS DOC 0-1 FALLS W/OUT INJ PAST YR: ICD-10-PCS | Mod: CPTII,S$GLB,, | Performed by: INTERNAL MEDICINE

## 2021-03-30 PROCEDURE — 1101F PT FALLS ASSESS-DOCD LE1/YR: CPT | Mod: CPTII,S$GLB,, | Performed by: INTERNAL MEDICINE

## 2021-03-30 PROCEDURE — 1126F AMNT PAIN NOTED NONE PRSNT: CPT | Mod: S$GLB,,, | Performed by: INTERNAL MEDICINE

## 2021-03-30 PROCEDURE — 99999 PR PBB SHADOW E&M-EST. PATIENT-LVL III: ICD-10-PCS | Mod: PBBFAC,,, | Performed by: INTERNAL MEDICINE

## 2021-03-30 PROCEDURE — 1159F MED LIST DOCD IN RCRD: CPT | Mod: S$GLB,,, | Performed by: INTERNAL MEDICINE

## 2021-03-30 PROCEDURE — 99214 PR OFFICE/OUTPT VISIT, EST, LEVL IV, 30-39 MIN: ICD-10-PCS | Mod: S$GLB,,, | Performed by: INTERNAL MEDICINE

## 2021-03-30 PROCEDURE — 99499 RISK ADDL DX/OHS AUDIT: ICD-10-PCS | Mod: S$GLB,,, | Performed by: INTERNAL MEDICINE

## 2021-04-15 ENCOUNTER — PATIENT MESSAGE (OUTPATIENT)
Dept: RESEARCH | Facility: HOSPITAL | Age: 86
End: 2021-04-15

## 2021-04-29 ENCOUNTER — TELEPHONE (OUTPATIENT)
Dept: CARDIOLOGY | Facility: CLINIC | Age: 86
End: 2021-04-29

## 2021-04-30 ENCOUNTER — TELEPHONE (OUTPATIENT)
Dept: CARDIOLOGY | Facility: CLINIC | Age: 86
End: 2021-04-30

## 2021-05-10 ENCOUNTER — TELEPHONE (OUTPATIENT)
Dept: CARDIOLOGY | Facility: CLINIC | Age: 86
End: 2021-05-10

## 2021-05-17 ENCOUNTER — PATIENT OUTREACH (OUTPATIENT)
Dept: ADMINISTRATIVE | Facility: OTHER | Age: 86
End: 2021-05-17

## 2021-05-18 ENCOUNTER — OFFICE VISIT (OUTPATIENT)
Dept: CARDIOLOGY | Facility: CLINIC | Age: 86
End: 2021-05-18
Payer: MEDICARE

## 2021-05-18 VITALS
WEIGHT: 112.56 LBS | OXYGEN SATURATION: 97 % | HEART RATE: 87 BPM | BODY MASS INDEX: 18.75 KG/M2 | HEIGHT: 65 IN | DIASTOLIC BLOOD PRESSURE: 75 MMHG | SYSTOLIC BLOOD PRESSURE: 145 MMHG

## 2021-05-18 DIAGNOSIS — I50.32 CHRONIC DIASTOLIC CONGESTIVE HEART FAILURE: ICD-10-CM

## 2021-05-18 DIAGNOSIS — I25.10 CORONARY ARTERY DISEASE INVOLVING NATIVE CORONARY ARTERY WITHOUT ANGINA PECTORIS, UNSPECIFIED WHETHER NATIVE OR TRANSPLANTED HEART: Primary | ICD-10-CM

## 2021-05-18 DIAGNOSIS — T82.898S ANEURYSM OF ARTERIOVENOUS DIALYSIS FISTULA, SEQUELA: ICD-10-CM

## 2021-05-18 DIAGNOSIS — Z99.2 TYPE 2 DIABETES MELLITUS WITH CHRONIC KIDNEY DISEASE ON CHRONIC DIALYSIS, WITH LONG-TERM CURRENT USE OF INSULIN: ICD-10-CM

## 2021-05-18 DIAGNOSIS — I77.9 PERIPHERAL ARTERIAL OCCLUSIVE DISEASE: ICD-10-CM

## 2021-05-18 DIAGNOSIS — R94.39 ABNORMAL STRESS TEST: ICD-10-CM

## 2021-05-18 DIAGNOSIS — T82.590A MALFUNCTION OF ARTERIOVENOUS DIALYSIS FISTULA, INITIAL ENCOUNTER: ICD-10-CM

## 2021-05-18 DIAGNOSIS — I10 ESSENTIAL HYPERTENSION: ICD-10-CM

## 2021-05-18 DIAGNOSIS — N18.6 ESRD (END STAGE RENAL DISEASE): ICD-10-CM

## 2021-05-18 DIAGNOSIS — E11.22 TYPE 2 DIABETES MELLITUS WITH CHRONIC KIDNEY DISEASE ON CHRONIC DIALYSIS, WITH LONG-TERM CURRENT USE OF INSULIN: ICD-10-CM

## 2021-05-18 DIAGNOSIS — T82.898D ANEURYSM OF ARTERIOVENOUS DIALYSIS FISTULA, SUBSEQUENT ENCOUNTER: ICD-10-CM

## 2021-05-18 DIAGNOSIS — Z95.1 S/P CABG X 1: ICD-10-CM

## 2021-05-18 DIAGNOSIS — E78.5 HYPERLIPIDEMIA LDL GOAL <70: ICD-10-CM

## 2021-05-18 DIAGNOSIS — G47.30 SLEEP APNEA, UNSPECIFIED TYPE: ICD-10-CM

## 2021-05-18 DIAGNOSIS — N18.6 TYPE 2 DIABETES MELLITUS WITH CHRONIC KIDNEY DISEASE ON CHRONIC DIALYSIS, WITH LONG-TERM CURRENT USE OF INSULIN: ICD-10-CM

## 2021-05-18 DIAGNOSIS — Z79.4 TYPE 2 DIABETES MELLITUS WITH CHRONIC KIDNEY DISEASE ON CHRONIC DIALYSIS, WITH LONG-TERM CURRENT USE OF INSULIN: ICD-10-CM

## 2021-05-18 PROCEDURE — 1126F PR PAIN SEVERITY QUANTIFIED, NO PAIN PRESENT: ICD-10-PCS | Mod: S$GLB,,, | Performed by: INTERNAL MEDICINE

## 2021-05-18 PROCEDURE — 3288F FALL RISK ASSESSMENT DOCD: CPT | Mod: CPTII,S$GLB,, | Performed by: INTERNAL MEDICINE

## 2021-05-18 PROCEDURE — 99999 PR PBB SHADOW E&M-EST. PATIENT-LVL IV: ICD-10-PCS | Mod: PBBFAC,,, | Performed by: INTERNAL MEDICINE

## 2021-05-18 PROCEDURE — 99214 OFFICE O/P EST MOD 30 MIN: CPT | Mod: S$GLB,,, | Performed by: INTERNAL MEDICINE

## 2021-05-18 PROCEDURE — 99499 UNLISTED E&M SERVICE: CPT | Mod: S$GLB,,, | Performed by: INTERNAL MEDICINE

## 2021-05-18 PROCEDURE — 1159F MED LIST DOCD IN RCRD: CPT | Mod: S$GLB,,, | Performed by: INTERNAL MEDICINE

## 2021-05-18 PROCEDURE — 99214 PR OFFICE/OUTPT VISIT, EST, LEVL IV, 30-39 MIN: ICD-10-PCS | Mod: S$GLB,,, | Performed by: INTERNAL MEDICINE

## 2021-05-18 PROCEDURE — 3288F PR FALLS RISK ASSESSMENT DOCUMENTED: ICD-10-PCS | Mod: CPTII,S$GLB,, | Performed by: INTERNAL MEDICINE

## 2021-05-18 PROCEDURE — 99499 RISK ADDL DX/OHS AUDIT: ICD-10-PCS | Mod: S$GLB,,, | Performed by: INTERNAL MEDICINE

## 2021-05-18 PROCEDURE — 99999 PR PBB SHADOW E&M-EST. PATIENT-LVL IV: CPT | Mod: PBBFAC,,, | Performed by: INTERNAL MEDICINE

## 2021-05-18 PROCEDURE — 1126F AMNT PAIN NOTED NONE PRSNT: CPT | Mod: S$GLB,,, | Performed by: INTERNAL MEDICINE

## 2021-05-18 PROCEDURE — 1159F PR MEDICATION LIST DOCUMENTED IN MEDICAL RECORD: ICD-10-PCS | Mod: S$GLB,,, | Performed by: INTERNAL MEDICINE

## 2021-05-18 PROCEDURE — 1101F PR PT FALLS ASSESS DOC 0-1 FALLS W/OUT INJ PAST YR: ICD-10-PCS | Mod: CPTII,S$GLB,, | Performed by: INTERNAL MEDICINE

## 2021-05-18 PROCEDURE — 1101F PT FALLS ASSESS-DOCD LE1/YR: CPT | Mod: CPTII,S$GLB,, | Performed by: INTERNAL MEDICINE

## 2021-05-18 RX ORDER — SODIUM CHLORIDE 9 MG/ML
INJECTION, SOLUTION INTRAVENOUS CONTINUOUS
Status: CANCELLED | OUTPATIENT
Start: 2021-05-18 | End: 2021-05-18

## 2021-05-18 RX ORDER — DIPHENHYDRAMINE HCL 25 MG
50 CAPSULE ORAL ONCE
Status: CANCELLED | OUTPATIENT
Start: 2021-05-18 | End: 2021-05-18

## 2021-05-31 ENCOUNTER — HOSPITAL ENCOUNTER (OUTPATIENT)
Facility: HOSPITAL | Age: 86
Discharge: HOME | End: 2021-05-31
Attending: INTERNAL MEDICINE | Admitting: INTERNAL MEDICINE
Payer: MEDICARE

## 2021-05-31 VITALS
WEIGHT: 116 LBS | SYSTOLIC BLOOD PRESSURE: 163 MMHG | DIASTOLIC BLOOD PRESSURE: 72 MMHG | RESPIRATION RATE: 20 BRPM | TEMPERATURE: 97 F | HEART RATE: 66 BPM | OXYGEN SATURATION: 96 % | BODY MASS INDEX: 19.33 KG/M2 | HEIGHT: 65 IN

## 2021-05-31 DIAGNOSIS — R94.39 ABNORMAL STRESS TEST: ICD-10-CM

## 2021-05-31 DIAGNOSIS — Z95.1 S/P CABG X 1: ICD-10-CM

## 2021-05-31 DIAGNOSIS — I77.9 PERIPHERAL ARTERIAL OCCLUSIVE DISEASE: ICD-10-CM

## 2021-05-31 DIAGNOSIS — I25.10 CORONARY ARTERY DISEASE INVOLVING NATIVE CORONARY ARTERY WITHOUT ANGINA PECTORIS, UNSPECIFIED WHETHER NATIVE OR TRANSPLANTED HEART: ICD-10-CM

## 2021-05-31 DIAGNOSIS — I10 ESSENTIAL HYPERTENSION: ICD-10-CM

## 2021-05-31 DIAGNOSIS — E78.5 HYPERLIPIDEMIA LDL GOAL <70: ICD-10-CM

## 2021-05-31 LAB
ANION GAP SERPL CALC-SCNC: 10 MMOL/L (ref 8–16)
BASOPHILS # BLD AUTO: 0.06 K/UL (ref 0–0.2)
BASOPHILS NFR BLD: 0.6 % (ref 0–1.9)
BUN SERPL-MCNC: 44 MG/DL (ref 8–23)
CALCIUM SERPL-MCNC: 9.5 MG/DL (ref 8.7–10.5)
CHLORIDE SERPL-SCNC: 99 MMOL/L (ref 95–110)
CO2 SERPL-SCNC: 28 MMOL/L (ref 23–29)
CREAT SERPL-MCNC: 4 MG/DL (ref 0.5–1.4)
DIFFERENTIAL METHOD: ABNORMAL
EOSINOPHIL # BLD AUTO: 0.4 K/UL (ref 0–0.5)
EOSINOPHIL NFR BLD: 3.8 % (ref 0–8)
ERYTHROCYTE [DISTWIDTH] IN BLOOD BY AUTOMATED COUNT: 13.1 % (ref 11.5–14.5)
EST. GFR  (AFRICAN AMERICAN): 11 ML/MIN/1.73 M^2
EST. GFR  (NON AFRICAN AMERICAN): 10 ML/MIN/1.73 M^2
GLUCOSE SERPL-MCNC: 116 MG/DL (ref 70–110)
HCT VFR BLD AUTO: 32.4 % (ref 37–48.5)
HGB BLD-MCNC: 10.8 G/DL (ref 12–16)
IMM GRANULOCYTES # BLD AUTO: 0.05 K/UL (ref 0–0.04)
IMM GRANULOCYTES NFR BLD AUTO: 0.5 % (ref 0–0.5)
LYMPHOCYTES # BLD AUTO: 1.7 K/UL (ref 1–4.8)
LYMPHOCYTES NFR BLD: 17.4 % (ref 18–48)
MCH RBC QN AUTO: 32 PG (ref 27–31)
MCHC RBC AUTO-ENTMCNC: 33.3 G/DL (ref 32–36)
MCV RBC AUTO: 96 FL (ref 82–98)
MONOCYTES # BLD AUTO: 0.7 K/UL (ref 0.3–1)
MONOCYTES NFR BLD: 7.2 % (ref 4–15)
NEUTROPHILS # BLD AUTO: 7 K/UL (ref 1.8–7.7)
NEUTROPHILS NFR BLD: 70.5 % (ref 38–73)
NRBC BLD-RTO: 0 /100 WBC
PLATELET # BLD AUTO: 276 K/UL (ref 150–450)
PMV BLD AUTO: 9.1 FL (ref 9.2–12.9)
POTASSIUM SERPL-SCNC: 4.1 MMOL/L (ref 3.5–5.1)
RBC # BLD AUTO: 3.38 M/UL (ref 4–5.4)
SODIUM SERPL-SCNC: 137 MMOL/L (ref 136–145)
WBC # BLD AUTO: 9.92 K/UL (ref 3.9–12.7)

## 2021-05-31 PROCEDURE — C1760 CLOSURE DEV, VASC: HCPCS | Performed by: INTERNAL MEDICINE

## 2021-05-31 PROCEDURE — 85025 COMPLETE CBC W/AUTO DIFF WBC: CPT | Performed by: INTERNAL MEDICINE

## 2021-05-31 PROCEDURE — C1769 GUIDE WIRE: HCPCS | Performed by: INTERNAL MEDICINE

## 2021-05-31 PROCEDURE — 93459: ICD-10-PCS | Mod: 26,,, | Performed by: INTERNAL MEDICINE

## 2021-05-31 PROCEDURE — 93459 L HRT ART/GRFT ANGIO: CPT | Mod: 26,,, | Performed by: INTERNAL MEDICINE

## 2021-05-31 PROCEDURE — 99152 MOD SED SAME PHYS/QHP 5/>YRS: CPT | Mod: ,,, | Performed by: INTERNAL MEDICINE

## 2021-05-31 PROCEDURE — 99153 MOD SED SAME PHYS/QHP EA: CPT | Performed by: INTERNAL MEDICINE

## 2021-05-31 PROCEDURE — C1894 INTRO/SHEATH, NON-LASER: HCPCS | Performed by: INTERNAL MEDICINE

## 2021-05-31 PROCEDURE — 93459 L HRT ART/GRFT ANGIO: CPT | Performed by: INTERNAL MEDICINE

## 2021-05-31 PROCEDURE — 93010 EKG 12-LEAD: ICD-10-PCS | Mod: ,,, | Performed by: INTERNAL MEDICINE

## 2021-05-31 PROCEDURE — 80048 BASIC METABOLIC PNL TOTAL CA: CPT | Performed by: INTERNAL MEDICINE

## 2021-05-31 PROCEDURE — 63600175 PHARM REV CODE 636 W HCPCS: Performed by: INTERNAL MEDICINE

## 2021-05-31 PROCEDURE — 99152 PR MOD CONSCIOUS SEDATION, SAME PHYS, 5+ YRS, FIRST 15 MIN: ICD-10-PCS | Mod: ,,, | Performed by: INTERNAL MEDICINE

## 2021-05-31 PROCEDURE — 25500020 PHARM REV CODE 255: Performed by: INTERNAL MEDICINE

## 2021-05-31 PROCEDURE — 25000003 PHARM REV CODE 250: Performed by: INTERNAL MEDICINE

## 2021-05-31 PROCEDURE — 93005 ELECTROCARDIOGRAM TRACING: CPT | Mod: 59

## 2021-05-31 PROCEDURE — 36415 COLL VENOUS BLD VENIPUNCTURE: CPT | Performed by: INTERNAL MEDICINE

## 2021-05-31 PROCEDURE — 93010 ELECTROCARDIOGRAM REPORT: CPT | Mod: ,,, | Performed by: INTERNAL MEDICINE

## 2021-05-31 PROCEDURE — 99152 MOD SED SAME PHYS/QHP 5/>YRS: CPT | Performed by: INTERNAL MEDICINE

## 2021-05-31 RX ORDER — ONDANSETRON 4 MG/1
8 TABLET, ORALLY DISINTEGRATING ORAL EVERY 8 HOURS PRN
Status: DISCONTINUED | OUTPATIENT
Start: 2021-05-31 | End: 2021-05-31 | Stop reason: HOSPADM

## 2021-05-31 RX ORDER — ISOSORBIDE MONONITRATE 30 MG/1
30 TABLET, EXTENDED RELEASE ORAL DAILY
Qty: 30 TABLET | Refills: 11 | Status: SHIPPED | OUTPATIENT
Start: 2021-05-31 | End: 2023-04-17

## 2021-05-31 RX ORDER — IODIXANOL 320 MG/ML
INJECTION, SOLUTION INTRAVASCULAR
Status: DISCONTINUED | OUTPATIENT
Start: 2021-05-31 | End: 2021-05-31 | Stop reason: HOSPADM

## 2021-05-31 RX ORDER — ACETAMINOPHEN 325 MG/1
650 TABLET ORAL EVERY 4 HOURS PRN
Status: DISCONTINUED | OUTPATIENT
Start: 2021-05-31 | End: 2021-05-31 | Stop reason: HOSPADM

## 2021-05-31 RX ORDER — METOPROLOL SUCCINATE 25 MG/1
25 TABLET, EXTENDED RELEASE ORAL DAILY
Qty: 30 TABLET | Refills: 11 | Status: SHIPPED | OUTPATIENT
Start: 2021-05-31 | End: 2022-06-27 | Stop reason: SDUPTHER

## 2021-05-31 RX ORDER — HEPARIN SODIUM 200 [USP'U]/100ML
INJECTION, SOLUTION INTRAVENOUS
Status: DISCONTINUED | OUTPATIENT
Start: 2021-05-31 | End: 2021-05-31 | Stop reason: HOSPADM

## 2021-05-31 RX ORDER — DIPHENHYDRAMINE HCL 25 MG
50 CAPSULE ORAL ONCE
Status: DISCONTINUED | OUTPATIENT
Start: 2021-05-31 | End: 2021-05-31 | Stop reason: HOSPADM

## 2021-05-31 RX ORDER — LABETALOL HYDROCHLORIDE 5 MG/ML
INJECTION, SOLUTION INTRAVENOUS
Status: DISCONTINUED | OUTPATIENT
Start: 2021-05-31 | End: 2021-05-31 | Stop reason: HOSPADM

## 2021-05-31 RX ORDER — MIDAZOLAM HYDROCHLORIDE 1 MG/ML
INJECTION, SOLUTION INTRAMUSCULAR; INTRAVENOUS
Status: DISCONTINUED | OUTPATIENT
Start: 2021-05-31 | End: 2021-05-31 | Stop reason: HOSPADM

## 2021-05-31 RX ORDER — FENTANYL CITRATE 50 UG/ML
INJECTION, SOLUTION INTRAMUSCULAR; INTRAVENOUS
Status: DISCONTINUED | OUTPATIENT
Start: 2021-05-31 | End: 2021-05-31 | Stop reason: HOSPADM

## 2021-05-31 RX ORDER — SODIUM CHLORIDE 9 MG/ML
INJECTION, SOLUTION INTRAVENOUS CONTINUOUS
Status: ACTIVE | OUTPATIENT
Start: 2021-05-31 | End: 2021-05-31

## 2021-05-31 RX ORDER — LIDOCAINE HYDROCHLORIDE 10 MG/ML
INJECTION, SOLUTION EPIDURAL; INFILTRATION; INTRACAUDAL; PERINEURAL
Status: DISCONTINUED | OUTPATIENT
Start: 2021-05-31 | End: 2021-05-31 | Stop reason: HOSPADM

## 2021-06-07 LAB — CATH EF QUANTITATIVE: 45 %

## 2021-06-08 ENCOUNTER — OFFICE VISIT (OUTPATIENT)
Dept: FAMILY MEDICINE | Facility: CLINIC | Age: 86
End: 2021-06-08
Payer: MEDICARE

## 2021-06-08 VITALS
SYSTOLIC BLOOD PRESSURE: 138 MMHG | HEART RATE: 75 BPM | OXYGEN SATURATION: 96 % | WEIGHT: 112.63 LBS | DIASTOLIC BLOOD PRESSURE: 50 MMHG | BODY MASS INDEX: 18.77 KG/M2 | HEIGHT: 65 IN

## 2021-06-08 DIAGNOSIS — R05.9 COUGH: ICD-10-CM

## 2021-06-08 DIAGNOSIS — Z79.4 TYPE 2 DIABETES MELLITUS WITH CHRONIC KIDNEY DISEASE ON CHRONIC DIALYSIS, WITH LONG-TERM CURRENT USE OF INSULIN: ICD-10-CM

## 2021-06-08 DIAGNOSIS — N18.6 ESRD (END STAGE RENAL DISEASE): Primary | ICD-10-CM

## 2021-06-08 DIAGNOSIS — N18.6 TYPE 2 DIABETES MELLITUS WITH CHRONIC KIDNEY DISEASE ON CHRONIC DIALYSIS, WITH LONG-TERM CURRENT USE OF INSULIN: ICD-10-CM

## 2021-06-08 DIAGNOSIS — D63.8 CHRONIC DISEASE ANEMIA: ICD-10-CM

## 2021-06-08 DIAGNOSIS — Z99.2 TYPE 2 DIABETES MELLITUS WITH CHRONIC KIDNEY DISEASE ON CHRONIC DIALYSIS, WITH LONG-TERM CURRENT USE OF INSULIN: ICD-10-CM

## 2021-06-08 DIAGNOSIS — R54 FRAILTY: ICD-10-CM

## 2021-06-08 DIAGNOSIS — E11.22 TYPE 2 DIABETES MELLITUS WITH CHRONIC KIDNEY DISEASE ON CHRONIC DIALYSIS, WITH LONG-TERM CURRENT USE OF INSULIN: ICD-10-CM

## 2021-06-08 DIAGNOSIS — R53.82 CHRONIC FATIGUE: ICD-10-CM

## 2021-06-08 PROCEDURE — 99999 PR PBB SHADOW E&M-EST. PATIENT-LVL IV: CPT | Mod: PBBFAC,,, | Performed by: FAMILY MEDICINE

## 2021-06-08 PROCEDURE — 3075F PR MOST RECENT SYSTOLIC BLOOD PRESS GE 130-139MM HG: ICD-10-PCS | Mod: CPTII,S$GLB,, | Performed by: FAMILY MEDICINE

## 2021-06-08 PROCEDURE — 1101F PT FALLS ASSESS-DOCD LE1/YR: CPT | Mod: CPTII,S$GLB,, | Performed by: FAMILY MEDICINE

## 2021-06-08 PROCEDURE — 99214 OFFICE O/P EST MOD 30 MIN: CPT | Mod: S$GLB,,, | Performed by: FAMILY MEDICINE

## 2021-06-08 PROCEDURE — 1101F PR PT FALLS ASSESS DOC 0-1 FALLS W/OUT INJ PAST YR: ICD-10-PCS | Mod: CPTII,S$GLB,, | Performed by: FAMILY MEDICINE

## 2021-06-08 PROCEDURE — 99999 PR PBB SHADOW E&M-EST. PATIENT-LVL IV: ICD-10-PCS | Mod: PBBFAC,,, | Performed by: FAMILY MEDICINE

## 2021-06-08 PROCEDURE — 1159F PR MEDICATION LIST DOCUMENTED IN MEDICAL RECORD: ICD-10-PCS | Mod: S$GLB,,, | Performed by: FAMILY MEDICINE

## 2021-06-08 PROCEDURE — 1126F PR PAIN SEVERITY QUANTIFIED, NO PAIN PRESENT: ICD-10-PCS | Mod: S$GLB,,, | Performed by: FAMILY MEDICINE

## 2021-06-08 PROCEDURE — 1159F MED LIST DOCD IN RCRD: CPT | Mod: S$GLB,,, | Performed by: FAMILY MEDICINE

## 2021-06-08 PROCEDURE — 3075F SYST BP GE 130 - 139MM HG: CPT | Mod: CPTII,S$GLB,, | Performed by: FAMILY MEDICINE

## 2021-06-08 PROCEDURE — 99214 PR OFFICE/OUTPT VISIT, EST, LEVL IV, 30-39 MIN: ICD-10-PCS | Mod: S$GLB,,, | Performed by: FAMILY MEDICINE

## 2021-06-08 PROCEDURE — 1126F AMNT PAIN NOTED NONE PRSNT: CPT | Mod: S$GLB,,, | Performed by: FAMILY MEDICINE

## 2021-06-08 PROCEDURE — 3288F FALL RISK ASSESSMENT DOCD: CPT | Mod: CPTII,S$GLB,, | Performed by: FAMILY MEDICINE

## 2021-06-08 PROCEDURE — 3288F PR FALLS RISK ASSESSMENT DOCUMENTED: ICD-10-PCS | Mod: CPTII,S$GLB,, | Performed by: FAMILY MEDICINE

## 2021-06-08 PROCEDURE — 3078F DIAST BP <80 MM HG: CPT | Mod: CPTII,S$GLB,, | Performed by: FAMILY MEDICINE

## 2021-06-08 PROCEDURE — 3078F PR MOST RECENT DIASTOLIC BLOOD PRESSURE < 80 MM HG: ICD-10-PCS | Mod: CPTII,S$GLB,, | Performed by: FAMILY MEDICINE

## 2021-06-08 RX ORDER — PROMETHAZINE HYDROCHLORIDE AND DEXTROMETHORPHAN HYDROBROMIDE 6.25; 15 MG/5ML; MG/5ML
5 SYRUP ORAL EVERY 8 HOURS PRN
Qty: 240 ML | Refills: 0 | Status: SHIPPED | OUTPATIENT
Start: 2021-06-08 | End: 2021-06-18

## 2021-06-15 ENCOUNTER — LAB VISIT (OUTPATIENT)
Dept: LAB | Facility: HOSPITAL | Age: 86
End: 2021-06-15
Attending: FAMILY MEDICINE
Payer: MEDICARE

## 2021-06-15 DIAGNOSIS — E11.22 TYPE 2 DIABETES MELLITUS WITH CHRONIC KIDNEY DISEASE ON CHRONIC DIALYSIS, WITH LONG-TERM CURRENT USE OF INSULIN: ICD-10-CM

## 2021-06-15 DIAGNOSIS — Z99.2 TYPE 2 DIABETES MELLITUS WITH CHRONIC KIDNEY DISEASE ON CHRONIC DIALYSIS, WITH LONG-TERM CURRENT USE OF INSULIN: ICD-10-CM

## 2021-06-15 DIAGNOSIS — R53.82 CHRONIC FATIGUE: ICD-10-CM

## 2021-06-15 DIAGNOSIS — Z79.4 TYPE 2 DIABETES MELLITUS WITH CHRONIC KIDNEY DISEASE ON CHRONIC DIALYSIS, WITH LONG-TERM CURRENT USE OF INSULIN: ICD-10-CM

## 2021-06-15 DIAGNOSIS — D63.8 CHRONIC DISEASE ANEMIA: ICD-10-CM

## 2021-06-15 DIAGNOSIS — N18.6 TYPE 2 DIABETES MELLITUS WITH CHRONIC KIDNEY DISEASE ON CHRONIC DIALYSIS, WITH LONG-TERM CURRENT USE OF INSULIN: ICD-10-CM

## 2021-06-15 LAB
BASOPHILS # BLD AUTO: 0.04 K/UL (ref 0–0.2)
BASOPHILS NFR BLD: 0.5 % (ref 0–1.9)
DIFFERENTIAL METHOD: ABNORMAL
EOSINOPHIL # BLD AUTO: 0.4 K/UL (ref 0–0.5)
EOSINOPHIL NFR BLD: 4 % (ref 0–8)
ERYTHROCYTE [DISTWIDTH] IN BLOOD BY AUTOMATED COUNT: 13.8 % (ref 11.5–14.5)
ESTIMATED AVG GLUCOSE: 94 MG/DL (ref 68–131)
HBA1C MFR BLD: 4.9 % (ref 4–5.6)
HCT VFR BLD AUTO: 31.9 % (ref 37–48.5)
HGB BLD-MCNC: 10.2 G/DL (ref 12–16)
IMM GRANULOCYTES # BLD AUTO: 0.07 K/UL (ref 0–0.04)
IMM GRANULOCYTES NFR BLD AUTO: 0.8 % (ref 0–0.5)
LYMPHOCYTES # BLD AUTO: 1.4 K/UL (ref 1–4.8)
LYMPHOCYTES NFR BLD: 16 % (ref 18–48)
MCH RBC QN AUTO: 31.1 PG (ref 27–31)
MCHC RBC AUTO-ENTMCNC: 32 G/DL (ref 32–36)
MCV RBC AUTO: 97 FL (ref 82–98)
MONOCYTES # BLD AUTO: 0.7 K/UL (ref 0.3–1)
MONOCYTES NFR BLD: 7.5 % (ref 4–15)
NEUTROPHILS # BLD AUTO: 6.3 K/UL (ref 1.8–7.7)
NEUTROPHILS NFR BLD: 71.2 % (ref 38–73)
NRBC BLD-RTO: 0 /100 WBC
PLATELET # BLD AUTO: 308 K/UL (ref 150–450)
PMV BLD AUTO: 9.4 FL (ref 9.2–12.9)
RBC # BLD AUTO: 3.28 M/UL (ref 4–5.4)
TSH SERPL DL<=0.005 MIU/L-ACNC: 3.99 UIU/ML (ref 0.4–4)
WBC # BLD AUTO: 8.82 K/UL (ref 3.9–12.7)

## 2021-06-15 PROCEDURE — 84443 ASSAY THYROID STIM HORMONE: CPT | Performed by: FAMILY MEDICINE

## 2021-06-15 PROCEDURE — 85025 COMPLETE CBC W/AUTO DIFF WBC: CPT | Performed by: FAMILY MEDICINE

## 2021-06-15 PROCEDURE — 83036 HEMOGLOBIN GLYCOSYLATED A1C: CPT | Performed by: FAMILY MEDICINE

## 2021-06-15 PROCEDURE — 36415 COLL VENOUS BLD VENIPUNCTURE: CPT | Mod: PO | Performed by: FAMILY MEDICINE

## 2021-06-24 ENCOUNTER — HOSPITAL ENCOUNTER (OUTPATIENT)
Dept: RADIOLOGY | Facility: HOSPITAL | Age: 86
Discharge: HOME | End: 2021-06-24
Attending: NURSE PRACTITIONER
Payer: MEDICARE

## 2021-06-24 DIAGNOSIS — N18.6 END STAGE RENAL DISEASE: ICD-10-CM

## 2021-06-24 DIAGNOSIS — N18.6 END STAGE RENAL DISEASE: Primary | ICD-10-CM

## 2021-06-24 PROCEDURE — 71046 XR CHEST PA AND LATERAL: ICD-10-PCS | Mod: 26,,, | Performed by: RADIOLOGY

## 2021-06-24 PROCEDURE — 71046 X-RAY EXAM CHEST 2 VIEWS: CPT | Mod: TC,FY

## 2021-06-24 PROCEDURE — 71046 X-RAY EXAM CHEST 2 VIEWS: CPT | Mod: 26,,, | Performed by: RADIOLOGY

## 2021-06-30 ENCOUNTER — TELEPHONE (OUTPATIENT)
Dept: CARDIOLOGY | Facility: CLINIC | Age: 86
End: 2021-06-30

## 2021-07-12 ENCOUNTER — PATIENT OUTREACH (OUTPATIENT)
Dept: ADMINISTRATIVE | Facility: OTHER | Age: 86
End: 2021-07-12

## 2021-07-28 ENCOUNTER — TELEPHONE (OUTPATIENT)
Dept: CARDIOLOGY | Facility: CLINIC | Age: 86
End: 2021-07-28

## 2021-09-28 ENCOUNTER — TELEPHONE (OUTPATIENT)
Dept: CARDIOLOGY | Facility: CLINIC | Age: 86
End: 2021-09-28

## 2021-10-18 ENCOUNTER — PATIENT OUTREACH (OUTPATIENT)
Dept: ADMINISTRATIVE | Facility: OTHER | Age: 86
End: 2021-10-18

## 2021-12-09 NOTE — PLAN OF CARE
Post Acute Skilled Nursing Home Subsequent Visit Note     Date of Service: 12/8/2021  Location seen at: [address]  Subacute / Skilled Need: Rehabilitation    Admitted to [facility] 11/12/21 - 11/20/21    PCP: Celeste Corado Pcp   Patient Care Team:  Julianna Cruz as PCP - General  Mign Barnett MD as [address] [place] [address] Provider: Physician (Internal Medicine)  Shaina Lawton CNP as [address] [place] [address] Provider: Jen Mason (Nurse Practitioner - Family)      Gurwinder Wilkins is a 81year old female presenting to [address] [place] [address] for: Rehabilitation. History of Present Illness: 81year old female with past medical history significant for morbid obesity, diastolic congestive heart failure with cor pulmonale, COPD, and DENISE (not on CPAP), who has not seen a physician in a long time, who presented with progressive shortness of breath and leg swelling. Cardiology and Pulmonology were consulted. She was admitted with acute hypoxic hypercapnic respiratory failure suspected to be secondary to untreated COPD, obesity hypoventilation syndrome, and DENISE. Initial ABG showed pH 7.27, PCO2 74, PO2 110, she was admitted to ICU for BiPAP. Subsequently weaned down to nasal cannula. She was transferred out of ICU with BiPAP at night and during naps. COVID testing was negative. CXR showed bilateral pulmonary infiltrates Left greater than Right. She was treated for acute Right-sided heart failure exacerbation, NT proBNP of 75,000. ECHO with EF of 65% and moderate RV dilation, RVSP 65 mmHg, RA moderately dilated, severe TVR. She received IV diuresis and was transitioned to PO furosemide for discharge. She also received a dose of acetazolamide for contraction alkalosis. She had a degree of chronic compensatory metabolic alkalosis related to her respiratory acidosis. Her hospital course was complicated by PILO, which resolved. She was unable to be weaned from oxygen.  CTA was negative for pulmonary Pt Persian speaking only and was off unit to dialysis. DCA done via telephone with nabeel Yadav. Demographics/Ins/NextofKin/PCP verified with patient/family. Denies any DME needs at present from pt/family. Patient/family plans to return home with family. Educated on bedside RX. Patient consents for [place] to discuss discharge plan with next of kin. Preferences appointment times and location obtained. Patient reports they will have transportation home upon discharge.    Son reports that she usually is independent with ADLS and has a good appetitie. He reports she will need a cane and maybe some oxygen for home because she is short of breath.    This  put name on white board and explained blue discharge folder to patient. Discharge planning brochure and/or business card given to patient.  Patient verbalized understanding.    Future Appointments   Date Time Provider Department Center   8/4/2020  2:30 PM Lary Navarrete NP [facility] [address]   8/28/2020 10:20 AM Brian Sanchez MD [facility] CARD[facility]        07/27/20 1429   Discharge Assessment   Assessment Type Discharge Planning Assessment   Confirmed/corrected address and phone number on facesheet? Yes   Assessment information obtained from? Caregiver  (pt's Nabeel Yadav. Pt is Persian speaking only)   Communicated expected length of stay with patient/caregiver yes   Prior to hospitilization cognitive status: Alert/Oriented;No Deficits   Prior to hospitalization functional status: Assistive Equipment;Independent   Current cognitive status: Alert/Oriented;No Deficits   Current Functional Status: Independent;Assistive Equipment   Lives With spouse   Able to Return to Prior Arrangements yes   Is patient able to care for self after discharge? Yes   Who are your caregiver(s) and their phone number(s)? Nabeel Yadav [phone]   Patient's perception of discharge disposition home health   Readmission Within the Last 30 Days no previous admission in last 30 days    Patient currently being followed by outpatient case management? No   Patient currently receives any other outside agency services? Yes   Name and contact number of agency or person providing outside services Dialysis [facility] at 10am   Is it the patient/care giver preference to resume care with the current outside agency? Yes   Equipment Currently Used at Home bedside commode;shower chair   Do you have any problems affording any of your prescribed medications? No   Is the patient taking medications as prescribed? yes   Does the patient have transportation home? Yes   Transportation Anticipated family or friend will provide   Does the patient receive services at the [facility]? No   Discharge Plan A Home with family;Home Health   DME Needed Upon Discharge  cane, straight   Patient/Family in Agreement with Plan yes   Does the patient have transportation to healthcare appointments? Yes     Chichi Nicole RN  RN Transition Navigator  [phone]       embolism, however showed atelectasis and/or consolidation. She did not have s/s of pneumonia, so was encouraged to use incentive spirometer. She was on 3L O2 per NC. She was noted to have mild LFT elevation, which was down-trending. RUQ US showed hepatic steatosis cholelithiasis and sludge, without evidence of acute cholecystitis. She had no abdominal symptoms. She was stabilized and discharged to SNF for sub-acute rehab. She will need to remain on BiPAP 20/10 at ECG with naps and at night, and will need outpatient sleep study. Prior to hospitalization, patient lives in [address] [place] [address] home with her nephew. There is one exterior step. There are 6 interior steps down to her bedroom and bathroom. She reports having assistive device at home, however primarily furniture walks in her home. 12/8/2021 Patient was seen and examined sitting upright on side of bed. In mild distress, appears with mild shortness of breath and congested cough. 3.5 L O2 intact per NC. She denies fever, chills, chest pain, nausea, vomiting, difficulty urinating, constipation, diarrhea. Last BM 12/4, nursing to follow up. Will discontinue Colace and start Senna-S 1 tab BID, hold for loose stool. Continue PRN laxatives. Denies pain at time of visit. Tolerating p.o. intake, appetite is good. She expresses frustration that her weight has been going up, and is concerned that she is holding on to fluid. Will review weights, STAT CXR ordered to evaluate for pleural effusion and pneumonia. Patient's vitals from today reviewed and entered into chart. Pt's lab work reviewed and entered into chart. Medications reviewed, reconciled and entered into chart. D/w Nursing staff, all other concerns addressed. HISTORY  Past Medical History:   Diagnosis Date   â¢ Congestive cardiac failure (CMS/[facility])    â¢ COPD (chronic obstructive pulmonary disease) (CMS/[facility])       reports that she has been smoking cigarettes.  She has been smoking about 0.50 packs per day. She has never used smokeless tobacco. She reports that she does not drink alcohol and does not use drugs. No past surgical history on file. No family history on file. History     Not marked as reviewed during this visit. PROBLEM LIST:  Patient Active Problem List   Diagnosis   â¢ Acute on chronic respiratory failure with hypoxia and hypercapnia (CMS/HCC)   â¢ Acute right-sided heart failure (CMS/HCC)   â¢ Acute kidney injury (CMS/HCC)   â¢ Advanced care planning/counseling discussion   â¢ Morbid obesity with BMI of 40.0-44.9, adult (CMS/HCC)   â¢ Transaminitis   â¢ Tobacco abuse   â¢ Debility   â¢ DENSIE (obstructive sleep apnea)   â¢ Obesity hypoventilation syndrome (CMS/HCC)   â¢ Chronic obstructive pulmonary disease (CMS/HCC)   â¢ Shortness of breath   â¢ Cough   â¢ Weight gain       ADVANCE CARE PLANNING:  Â   Advance Care Planning 12/8/2021  Â   PCP:  [place] Pcp  Discussion Leader:  APRN  Participants: APRN, patient, SS  Location:  [facility]  Â   Advance Care Planning Discussion  Â   Patient and/or Substitute Decision-Maker agree to Advance Care Planning discussion. Â   Does the patient have the capacity to make healthcare decisions: Yes, the patient is able to receive, process, and then give feedback to information regarding medical discussions.     Â   Are the current Advance Directive documents consistent with the Patient's current wishes: No, SS consult  Â   Goals of Care Discussion  Â   Patient has one or more life-limiting conditions: yes  Â   A discussion of the patientâs Goals of Care has been completed with the patient regarding the following:   Â· Reason for Discussion: Admitted to ICU (exclude less than 71 y/o or substance abuse), Advance disease process: CHF, COPD, morbid obestiy, acute respiratory failure and Goals of care discussion needed  Â· Treatment Considerations: Rehabiliatation  Â· Prognostication:   Â· Is patient likely to return to baseline function?: Unclear  Â§ Surprise Question: Yes  Â· Patient-Centered Goals: Aggressive, usual medical care and Return home  Â· Plan for Future Deterioration: Would be ok with returning to hospital for care. Â· Would be ok with returning to ICU for care. Â   Discussed Goals of Care and those present have a good understanding of everything discussed above. Â   The following additional treatment/referrals is recommended: Home Health Services, Palliative Care at Home  Recommend follow-up Goals of Care discussion within the following timeframe: 1 week  Â   Summary of Discussion: Patient's goal is to rehabilitate and discharge home. Patient states that she would like to be full code with aggressive resuscitation, intubation, medically administered nutrition if necessary. However she expresses she does not wish to be on prolonged life support. She has elected her nephew Hermann Seats to be her healthcare power of , he can be reached at [phone]. She has elected an alternate healthcare power of  as her sister Sonali Paul she can be reached at [phone]. Recommend caregiver training with family prior to discharge. Patient will need oxygen walking test prior to DC, as she will need home O2 concentrator and portable O2 tank on DC.    12/7 Discussed with patient importance of scheduling PCP appointment within 1 week of discharge from SNF. We reviewed 14 days of prescriptions will be sent to patient's pharmacy on discharge, she will need to follow-up with PCP for refills. We also reviewed importance of follow-up as outpatient with CHF clinic, cardiologist, pulmonologist.  Reviewed home health referral on discharge. SS to follow-up on BiPAP and home oxygen. 12/8 We reviewed DC is on hold pending workup for increased shortness of breath, congested cough, and weight gain. Reviewed the importance of following up with PCP within 1 week of DC. She has appointment scheduled with Cardiology 12/10.  Reviewed importance of follow up with CHF clinic as well to assist with CHF management. Â   Does the patient have a state-issued Ambulatory Code Status ([place]-POLST [place]-DNR) order: No  After discussion, the patient has decided on Full Resuscitation   Â   Discussion Time: 21 minutes  SANDER Godinez CNP      DEPRESSION SCREENING:  Recent PHQ 2/9 Score    PHQ 2:  Date Adult PHQ 2 Score Adult PHQ 2 Interpretation   11/12/2021 0 No further screening needed       PHQ 9:       DEPRESSION ASSESSMENT/PLAN:  Depression screening is negative no further plan needed. ALLERGIES:  Allergies as of 12/08/2021 - Reviewed 12/07/2021   Allergen Reaction Noted   â¢ Zosyn Other (See Comments) 05/31/2016       CURRENT MEDICATIONS:   Current Outpatient Medications   Medication Sig Dispense Refill   â¢ fluticasone-vilanterol (BREO ELLIPTA) 100-25 MCG/INH inhaler Inhale 1 puff into the lungs daily. â¢ pantoprazole (PROTONIX) 40 MG tablet Take 40 mg by mouth daily. Discontinue on DC     â¢ oxygen (O2) gas Inhale 3.5 L into the lungs continuous. â¢ furosemide (LASIX) 40 MG tablet Take 1 tablet by mouth 2 times daily. 60 tablet 3   â¢ potassium CHLORIDE (KLOR-CON M) 20 MEQ cris ER tablet Take 1 tablet by mouth daily. 30 tablet 3   â¢ polyethylene glycol (MIRALAX) 17 g packet Take 17 g by mouth daily. Do not start before November 21, 2021. Stir and dissolve powder in any 4 to 8 ounces of beverage, then drink. â¢ Heparin Sodium, Porcine, (heparin, porcine,) 5000 UNIT/ML injectable solution Inject 1 mL into the skin every 8 hours. For DVT prophylaxis until mobility improved     â¢ ipratropium (ATROVENT) 0.02 % nebulizer solution Take 2.5 mLs by nebulization every 6 hours as needed for Wheezing (shortness of breath). â¢ docusate sodium-sennosides (SENOKOT S) 50-8.6 MG per tablet Take 1 tablet by mouth every 12 hours as needed for Constipation. No current facility-administered medications for this visit.      Medications reviewed / reconciled: Yes    BASELINE FUNCTIONAL STATUS:  Cane    CURRENT FUNCTIONAL STATUS:  Weight bearing as tolerated (WBAT)   CLOF 11/22/2021 bed mobility-CGA, transfers-CGA, ambulates 10 feet RW CGA, stairs not attempted, UE ADLs-SBA, LE ADLs-min A, toileting-SBA  CLOF 11/24/2021 Bed mobility - supervision/SBA, transfers - SBA, ambulates  ft RW CGA, up 4 steps CGA, UE ADLs - SBA, LE ADLs - min A, toileting - SBA  11/29/2021: Current level of functioning: Bed mobility supervision to standby assist, transfers standby assist, ambulating 30 to 100 feet with a rolling walker standby assist, upper extremity ADLs standby assist, lower extremity ADLs standby assist, toileting standby assist, Edouard up-and-down 4 steps contact-guard assist,  11/30/2021: Current level of functioning: Bed mobility supervision, transfers standby assist, ambulating 3 to 100 feet with a rolling walker standby assist, is gone up and down 4 steps contact-guard assist, all ADLs and toileting standby assist, anticipate discharge to lower level of care 12/5  CLOF 12/3/2021 Bed mobility - mod I, transfers - supervision, ambulates 100 ft RW SBA, up 1 step x10 reps CGA, UE ADLs - supervision/mod I, LE ADLs - SBA, toileting - mod I  CLOF 12/6/2021 Bed mobility - mod I, transfers - mod I, ambulates 100 ft RW SBA, up 4 steps x2 reps SBA, UE ADLs - mod I, LE ADLs - supervision, toileting - mod I  CLOF 12/8/2021 Bed mobility - mod I, transfers - mod I, ambulates 100 ft RW SBA, up 4 steps x2 reps SBA, UE ADLs - mod I, LE ADLs - supervision, toileting - mod I    DIET:  Consistency: General   Type: cardiac diet - 2gm sodium/day, 2L FR/day  Appetite: Good    REVIEW OF SYSTEMS:  Review of Systems   Constitutional: Positive for activity change. Negative for appetite change. HENT: Negative for congestion, sore throat and trouble swallowing. Eyes: Negative for visual disturbance. Respiratory: Negative for cough and wheezing. Cardiovascular: Negative for palpitations.    Gastrointestinal: Negative for abdominal pain, nausea and vomiting. Endocrine: Negative. Genitourinary: Negative for dysuria and hematuria. Musculoskeletal: Negative for arthralgias and myalgias. Skin: Positive for color change. Neurological: Positive for weakness. Negative for dizziness, light-headedness and headaches. Hematological: Negative. Psychiatric/Behavioral: Negative for behavioral problems, sleep disturbance and suicidal ideas. The patient is not nervous/anxious. VITALS:  Vitals:    12/07/21 1201 12/08/21 0411   BP:  126/77   Pulse:  68   Resp:  18   Temp:  97.9 Â°F (36.6 Â°C)   SpO2:  95%   Weight: 116.3 kg (256 lb 4.8 oz)    PainSc:   0   Body mass index is 42.65 kg/mÂ². Weights:  Awaiting today's weight  12/7 256.3 lb, BMI 42.7  12/6 254.9 lb, BMI 42.42  12/4 253.6 lb  12/2 253.1 lb  12/1 253.3 lb    Received from [facility] EMR    PHYSICAL ASSESSMENT:  Physical Exam  Vitals reviewed. Constitutional:       General: She is not in acute distress. Appearance: She is morbidly obese. HENT:      Head: Normocephalic and atraumatic. Nose: Nose normal.      Mouth/Throat:      Mouth: Mucous membranes are moist.   Eyes:      General:         Right eye: No discharge. Left eye: No discharge. Conjunctiva/sclera: Conjunctivae normal.   Neck:      Vascular: No JVD. Cardiovascular:      Rate and Rhythm: Normal rate and regular rhythm. Pulses: Normal pulses. Heart sounds: Normal heart sounds. No murmur heard. Comments: No edema to BLE  Pulmonary:      Effort: Pulmonary effort is normal. No respiratory distress. Breath sounds: Wheezing (scattered bilat) present. No rhonchi or rales. Comments: Diminished lung sounds throughout bilaterally. 3.5L O2 per NC, wet/congested cough  Abdominal:      General: Abdomen is protuberant. Bowel sounds are normal. There is no distension. Palpations: Abdomen is soft. Tenderness: There is no abdominal tenderness. Musculoskeletal:      Cervical back: Normal range of motion. Comments: BUE and BLE weakness 4/5   Skin:     General: Skin is warm and dry. Capillary Refill: Capillary refill takes 2 to 3 seconds. Comments: Bruising to BUE    Neurological:      Mental Status: She is alert and oriented to person, place, and time. Cranial Nerves: No cranial nerve deficit. Motor: Weakness present. Psychiatric:         Attention and Perception: Attention normal.         Mood and Affect: Mood normal.         Speech: Speech normal.         Behavior: Behavior is cooperative. Thought Content: Thought content does not include homicidal or suicidal ideation.          Cognition and Memory: Cognition normal.         LABS:  12/6/2021 WBC 6.55, hemoglobin 14.3, hematocrit 47.7, platelets 592, sodium 140, potassium 4.6, chloride 91, carbon dioxide 35, glucose 87, BUN 20, creatinine 0.79, GFR >60, total protein 6.7, albumin 3.4, ALT 26, AST 16, alkaline phosphatase 76, calcium 9.2, magnesium 2.1, phosphorus 4.5    12/1/2021 sodium 139, potassium 4.5, chloride 90, carbon dioxide 37, glucose 74, BUN 19, creatinine 0.83, GFR >60, total protein 6.4, albumin 3.4, ALT 26, AST 15, alkaline phosphatase 79, calcium 9.1, phosphorus 4.4    11/29/2021: WBC 6.93, H&H 14.5 and 48.9, platelets 793, sodium 138, potassium 4.4, BUN 20, creatinine 0.7, glucose 91, GFR greater than 60, chloride 98, CO2 34, Magnesium 2.1, phosphorus 4.7  11/24/2021 Sodium 137, potassium 4.5, chloride 92, carbon dioxide 34, glucose 96, BUN 26, creatinine 0.71, GFR >60, total protein 6.2, albumin 3.4, ALT 30, AST 20, alkaline phosphatase 57, calcium 8.5    11/22/2021 WBC 6.39, hemoglobin 14.5, hematocrit 48.5, platelets 125, sodium 138, potassium 4.8, chloride 95, carbon dioxide 32, glucose 104, BUN 31, creatinine 0.85, GFR >60, total bilirubin 0.94, total protein 6.6, albumin 3.4, ALT 31, AST 24, alkaline phosphatase 54, calcium 8.8, magnesium 1.9, phosphorus 4.3      ASSESSMENT AND PLAN  #Debility  Continue PT, OT  Fall and fracture precautions reviewed  See current level of function above    #Shortness of Breath  #Weight Gain  #Congested Cough  STAT CXR 2 view  Rapid COVID swab - negative  Continue PRN duonebs  Maintain aspiration precaution  Maintain SpO2 >92%    #Acute hypoxic hypercapnic respiratory failure 2/2 underlying COPD, obesity hypoventilation syndrome, and DENISE  Continue BiPAP 20/10 at nighttime and during naps  Continue dual nebs every 6 hours as ordered  Wean oxygen as tolerated for Sats >90%  Pulmonology on consult  11/24 - STAT CXR 2 view bibasilar airspace opacities favoring atelectasis  Oxygenating well on 3 L,  Home O2 eval, prior to discharge  11/30 per staff patient will have overnight sleep study on 12/1, for BiPAP   12/7 patient completed sleep study in SNF, SS and nursing to follow-up on BiPAP order for discharge. 12/8 Notified sleep study with insufficient data, will need repeat sleep study. SS following up with Pulm if can be completed in SNF pending above imaging results   Reviewed supplemental oxygen use for discharge. Continue monitor    #Chronic right-sided heart failure, ejection fraction 65%, RV moderately dilated, systolic function reduced, systolic pressure increased at 65 mmHg, RA moderately dilated, severe TVR- S/p IV diuresis  Cardiac diet  Continue furosemide 40 mg BID and potassium chloride 20 mEq daily  Daily weights, notify provider of weight gain > 2 pounds in 1 day, or > 5 pounds in 5 days  Cardiology on consult  12/8 No edema to BLE on exam, however with progressive weight gain since admission. Admit weight 248 # on 11/20, last weight 256.3# 12/7. CXR as above for further evaluation.     #PILO  Avoid nephrotoxins  Monitor strict I&O  Resolved    #Transaminitis  Right upper quadrant ultrasound with hepatic steatosis, cholelithiasis and sludge without evidence of acute cholecystitis  Downtrending  Remains asymptomatic  Continue monitor  Resolved    #Prediabetes  Last hemoglobin A1c 6.1%  Diabetic diet-dietitian consult to review with patient  Follow-up with PCP as outpatient    #Suspected DENISE  Continue BiPAP 20/10 at night and with naps per Pulm  Maintain SPO2 > 92%  Follow-up with pulmonology as outpatient    #Tobacco abuse  Nicotine patch declined  Counseled on cessation from smoking    #Morbid obesity-BMI 41.44  Dietitian consult  Monitor p.o. intake and trend labs    #Contraction alkalosis  S/p acetazolamide  CTM    #DVT prophylaxis-Heparin subcu 5000 units every 8 hours, discontinue on discharge  #GI prophylaxis Protonix 40 mg daily, discontinue on discharge    FOLLOW UP APPOINTMENTS:  Future Appointments   Date Time Provider [address]   12/10/2021 10:30 AM Zainab Young CNP [id number]     SNF to schedule follow up appointments prior to DC:  PCP within 1 week of DC, SS to provide list the patient  Cardiology - [facility] within 3 weeks  Will need repeat ECHO as outpatient  Pulmonology - Dr. Duke Carroll within 2 weeks  Will need outpatient sleep study - Continue BiPAP 20/10 at night and with naps per Pulm    DISCHARGE PLANNING: Ongoing - Home with nephew   Home health SN, PT, OT, MSW, care manager, high risk referral  Palliative care at home on discharge for assistance with CHF and COPD management    Tentative DC 12/9/2021, home health orders entered.  SN to draw CBC with differential, CMP, magnesium, phosphorus within 2 days, results to PCP. Per SS Dr. Dee Dee Piper will follow until patient establishes with new PCP. SS to follow-up with provider regarding PCP appointment prior to discharge. 12/8 Tentative DC held pending workup as above.     Prognosis: fair    Discussed with: RN / Nursing, Patient, Consultant, MD, SW/, PT and OT       Barriers to discharge: not medically ready for discharge, therapy needs, equipment needed (DME, nebulizer, home O2) and education needs    Anticipated disposition: Home W/Palliative Care and [address]    Total time spent is more than 45 minutes, with more than 50% of the time spent in coordination of care, counseling, review of records and discussion of plan of care with the patient Radha Stack. Charting performed using Dragon Dictation and thus may result in grammatical, spelling & documentation inaccuracies. I spent an additional 16-30 minutes discussing ACP, including GOC, DHCPOA and Code status. Â   Reviewed extensively all external records from admission. Discussed with patient current plans of care and medication changes. New orders placed. Answered all questions. Ann Graham CNP    Addendum:  12/8/2021  16:00 pm    Chest Xray Findings:  Chest Findings:  No acute osseous or soft tissue abnormality. Cardiac silhouette projects enlarged. No pneumothorax. Basilar, infrahilar airspace opacity is present bilaterally, extending to the lung bases. Left costophrenic sulcus is blunted. IMPRESSION:  Changes of subsegmental atelectasis bilaterally. Left effusion. Cardiomegaly. Slightly worse compared to November 26, 2021. Xray results relayed to Pul with orders to start Doxycycline 100 mg BID x 7 days. Additional 20 mg lasix qAM x2 days. Add on Florastor 250 mg BID x 14 days. Patient's clinical status, imaging results, and weights d/w Cardiology PA, who will follow up with patient on Friday.      Ann Graham CNP

## 2022-03-07 ENCOUNTER — PES CALL (OUTPATIENT)
Dept: ADMINISTRATIVE | Facility: CLINIC | Age: 87
End: 2022-03-07
Payer: MEDICARE

## 2022-03-15 ENCOUNTER — PATIENT MESSAGE (OUTPATIENT)
Dept: ADMINISTRATIVE | Facility: HOSPITAL | Age: 87
End: 2022-03-15
Payer: MEDICARE

## 2022-08-25 ENCOUNTER — PES CALL (OUTPATIENT)
Dept: ADMINISTRATIVE | Facility: CLINIC | Age: 87
End: 2022-08-25
Payer: MEDICARE

## 2022-09-22 ENCOUNTER — PES CALL (OUTPATIENT)
Dept: ADMINISTRATIVE | Facility: CLINIC | Age: 87
End: 2022-09-22
Payer: MEDICARE

## 2022-11-12 ENCOUNTER — PES CALL (OUTPATIENT)
Dept: ADMINISTRATIVE | Facility: CLINIC | Age: 87
End: 2022-11-12
Payer: MEDICARE

## 2022-12-19 ENCOUNTER — PES CALL (OUTPATIENT)
Dept: ADMINISTRATIVE | Facility: OTHER | Age: 87
End: 2022-12-19
Payer: MEDICARE

## 2023-03-16 ENCOUNTER — LAB VISIT (OUTPATIENT)
Dept: LAB | Facility: HOSPITAL | Age: 88
End: 2023-03-16
Attending: FAMILY MEDICINE
Payer: MEDICARE

## 2023-03-16 ENCOUNTER — OFFICE VISIT (OUTPATIENT)
Dept: FAMILY MEDICINE | Facility: CLINIC | Age: 88
End: 2023-03-16
Payer: MEDICARE

## 2023-03-16 VITALS
HEIGHT: 65 IN | BODY MASS INDEX: 18.73 KG/M2 | DIASTOLIC BLOOD PRESSURE: 60 MMHG | HEART RATE: 83 BPM | OXYGEN SATURATION: 96 % | SYSTOLIC BLOOD PRESSURE: 136 MMHG | WEIGHT: 112.44 LBS

## 2023-03-16 DIAGNOSIS — I77.0 AVF (ARTERIOVENOUS FISTULA): ICD-10-CM

## 2023-03-16 DIAGNOSIS — E11.22 TYPE 2 DIABETES MELLITUS WITH CHRONIC KIDNEY DISEASE ON CHRONIC DIALYSIS, WITH LONG-TERM CURRENT USE OF INSULIN: ICD-10-CM

## 2023-03-16 DIAGNOSIS — N18.6 TYPE 2 DIABETES MELLITUS WITH CHRONIC KIDNEY DISEASE ON CHRONIC DIALYSIS, WITH LONG-TERM CURRENT USE OF INSULIN: ICD-10-CM

## 2023-03-16 DIAGNOSIS — Z78.0 ASYMPTOMATIC MENOPAUSE: ICD-10-CM

## 2023-03-16 DIAGNOSIS — Z99.2 DEPENDENCE ON RENAL DIALYSIS: ICD-10-CM

## 2023-03-16 DIAGNOSIS — Z99.2 TYPE 2 DIABETES MELLITUS WITH CHRONIC KIDNEY DISEASE ON CHRONIC DIALYSIS, WITH LONG-TERM CURRENT USE OF INSULIN: ICD-10-CM

## 2023-03-16 DIAGNOSIS — N25.81 SECONDARY HYPERPARATHYROIDISM OF RENAL ORIGIN: Primary | ICD-10-CM

## 2023-03-16 DIAGNOSIS — I50.32 CHRONIC DIASTOLIC CONGESTIVE HEART FAILURE: ICD-10-CM

## 2023-03-16 DIAGNOSIS — J06.9 UPPER RESPIRATORY TRACT INFECTION, UNSPECIFIED TYPE: ICD-10-CM

## 2023-03-16 DIAGNOSIS — Z79.4 TYPE 2 DIABETES MELLITUS WITH CHRONIC KIDNEY DISEASE ON CHRONIC DIALYSIS, WITH LONG-TERM CURRENT USE OF INSULIN: ICD-10-CM

## 2023-03-16 DIAGNOSIS — J41.1 MUCOPURULENT CHRONIC BRONCHITIS: ICD-10-CM

## 2023-03-16 LAB
ESTIMATED AVG GLUCOSE: 128 MG/DL (ref 68–131)
HBA1C MFR BLD: 6.1 % (ref 4–5.6)

## 2023-03-16 PROCEDURE — 1125F AMNT PAIN NOTED PAIN PRSNT: CPT | Mod: CPTII,S$GLB,, | Performed by: FAMILY MEDICINE

## 2023-03-16 PROCEDURE — 3288F PR FALLS RISK ASSESSMENT DOCUMENTED: ICD-10-PCS | Mod: CPTII,S$GLB,, | Performed by: FAMILY MEDICINE

## 2023-03-16 PROCEDURE — 99215 PR OFFICE/OUTPT VISIT, EST, LEVL V, 40-54 MIN: ICD-10-PCS | Mod: S$GLB,,, | Performed by: FAMILY MEDICINE

## 2023-03-16 PROCEDURE — 1160F RVW MEDS BY RX/DR IN RCRD: CPT | Mod: CPTII,S$GLB,, | Performed by: FAMILY MEDICINE

## 2023-03-16 PROCEDURE — 3075F SYST BP GE 130 - 139MM HG: CPT | Mod: CPTII,S$GLB,, | Performed by: FAMILY MEDICINE

## 2023-03-16 PROCEDURE — 3075F PR MOST RECENT SYSTOLIC BLOOD PRESS GE 130-139MM HG: ICD-10-PCS | Mod: CPTII,S$GLB,, | Performed by: FAMILY MEDICINE

## 2023-03-16 PROCEDURE — 99999 PR PBB SHADOW E&M-EST. PATIENT-LVL IV: ICD-10-PCS | Mod: PBBFAC,,, | Performed by: FAMILY MEDICINE

## 2023-03-16 PROCEDURE — 99999 PR PBB SHADOW E&M-EST. PATIENT-LVL IV: CPT | Mod: PBBFAC,,, | Performed by: FAMILY MEDICINE

## 2023-03-16 PROCEDURE — 99215 OFFICE O/P EST HI 40 MIN: CPT | Mod: S$GLB,,, | Performed by: FAMILY MEDICINE

## 2023-03-16 PROCEDURE — 1159F MED LIST DOCD IN RCRD: CPT | Mod: CPTII,S$GLB,, | Performed by: FAMILY MEDICINE

## 2023-03-16 PROCEDURE — 3078F DIAST BP <80 MM HG: CPT | Mod: CPTII,S$GLB,, | Performed by: FAMILY MEDICINE

## 2023-03-16 PROCEDURE — 1101F PT FALLS ASSESS-DOCD LE1/YR: CPT | Mod: CPTII,S$GLB,, | Performed by: FAMILY MEDICINE

## 2023-03-16 PROCEDURE — 3078F PR MOST RECENT DIASTOLIC BLOOD PRESSURE < 80 MM HG: ICD-10-PCS | Mod: CPTII,S$GLB,, | Performed by: FAMILY MEDICINE

## 2023-03-16 PROCEDURE — 36415 COLL VENOUS BLD VENIPUNCTURE: CPT | Mod: PO | Performed by: FAMILY MEDICINE

## 2023-03-16 PROCEDURE — 1101F PR PT FALLS ASSESS DOC 0-1 FALLS W/OUT INJ PAST YR: ICD-10-PCS | Mod: CPTII,S$GLB,, | Performed by: FAMILY MEDICINE

## 2023-03-16 PROCEDURE — 1125F PR PAIN SEVERITY QUANTIFIED, PAIN PRESENT: ICD-10-PCS | Mod: CPTII,S$GLB,, | Performed by: FAMILY MEDICINE

## 2023-03-16 PROCEDURE — 3288F FALL RISK ASSESSMENT DOCD: CPT | Mod: CPTII,S$GLB,, | Performed by: FAMILY MEDICINE

## 2023-03-16 PROCEDURE — 1160F PR REVIEW ALL MEDS BY PRESCRIBER/CLIN PHARMACIST DOCUMENTED: ICD-10-PCS | Mod: CPTII,S$GLB,, | Performed by: FAMILY MEDICINE

## 2023-03-16 PROCEDURE — 83036 HEMOGLOBIN GLYCOSYLATED A1C: CPT | Performed by: FAMILY MEDICINE

## 2023-03-16 PROCEDURE — 1159F PR MEDICATION LIST DOCUMENTED IN MEDICAL RECORD: ICD-10-PCS | Mod: CPTII,S$GLB,, | Performed by: FAMILY MEDICINE

## 2023-03-16 RX ORDER — BENZONATATE 100 MG/1
100 CAPSULE ORAL 3 TIMES DAILY PRN
Qty: 60 CAPSULE | Refills: 0 | Status: SHIPPED | OUTPATIENT
Start: 2023-03-16 | End: 2023-04-15

## 2023-03-16 NOTE — PROGRESS NOTES
Subjective:       Patient ID: Erin Clark is a 87 y.o. female.    Chief Complaint: Follow-up    87 years old female came to the clinic for diabetes follow-up.  Patient with chronic kidney disease currently on dialysis.  No flare ups of heart failure.  No chest pain, palpitation, orthopnea or PND.  Patient with patent AV fistula.  Patient with cough and congestion for last week.  No fever or chills.  Patient is due for her bone density she was previously diagnosed with hyperparathyroidism..  Bronchitis currently control.    Follow-up  Associated symptoms include coughing. Pertinent negatives include no chest pain.   Review of Systems   Constitutional: Negative.    HENT: Negative.     Eyes: Negative.    Respiratory:  Positive for cough.    Cardiovascular:  Negative for chest pain, palpitations, leg swelling and claudication.   Gastrointestinal: Negative.    Endocrine: Negative for polydipsia, polyphagia and polyuria.   Genitourinary: Negative.    Musculoskeletal: Negative.    Neurological: Negative.    Psychiatric/Behavioral: Negative.         Objective:      Physical Exam  Constitutional:       General: She is not in acute distress.     Appearance: She is well-developed. She is not diaphoretic.   HENT:      Head: Normocephalic and atraumatic.      Right Ear: External ear normal.      Left Ear: External ear normal.      Nose: Nose normal.      Mouth/Throat:      Pharynx: No oropharyngeal exudate.   Eyes:      General: No scleral icterus.        Right eye: No discharge.         Left eye: No discharge.      Conjunctiva/sclera: Conjunctivae normal.      Pupils: Pupils are equal, round, and reactive to light.   Neck:      Thyroid: No thyromegaly.      Vascular: No JVD.      Trachea: No tracheal deviation.   Cardiovascular:      Rate and Rhythm: Normal rate and regular rhythm.      Heart sounds: Normal heart sounds. No murmur heard.    No friction rub. No gallop.   Pulmonary:      Effort: Pulmonary effort is normal. No  respiratory distress.      Breath sounds: Normal breath sounds. No stridor. No wheezing or rales.   Chest:      Chest wall: No tenderness.   Abdominal:      General: Bowel sounds are normal. There is no distension.      Palpations: Abdomen is soft. There is no mass.      Tenderness: There is no abdominal tenderness. There is no guarding or rebound.   Musculoskeletal:         General: No tenderness. Normal range of motion.      Cervical back: Normal range of motion and neck supple.   Lymphadenopathy:      Cervical: No cervical adenopathy.   Skin:     General: Skin is warm and dry.      Coloration: Skin is not pale.      Findings: No erythema or rash.   Neurological:      Mental Status: She is alert and oriented to person, place, and time.      Cranial Nerves: No cranial nerve deficit.      Motor: No abnormal muscle tone.      Coordination: Coordination normal.      Deep Tendon Reflexes: Reflexes are normal and symmetric.   Psychiatric:         Behavior: Behavior normal.         Thought Content: Thought content normal.         Judgment: Judgment normal.       Assessment:       Problem List Items Addressed This Visit       Type 2 diabetes mellitus with chronic kidney disease on chronic dialysis, with long-term current use of insulin    Relevant Orders    Hemoglobin A1C    Chronic diastolic congestive heart failure    Secondary hyperparathyroidism of renal origin - Primary    Mucopurulent chronic bronchitis    Dependence on renal dialysis     Other Visit Diagnoses       AVF (arteriovenous fistula)        Upper respiratory tract infection, unspecified type        Relevant Medications    benzonatate (TESSALON) 100 MG capsule    Asymptomatic menopause        Relevant Orders    DXA Bone Density Axial Skeleton 1 or more sites              Plan:         Erin was seen today for follow-up and diabetes.    Diagnoses and all orders for this visit:    Secondary hyperparathyroidism of renal origin    Mucopurulent chronic  bronchitis    Dependence on renal dialysis    Chronic diastolic congestive heart failure    AVF (arteriovenous fistula)    Type 2 diabetes mellitus with chronic kidney disease on chronic dialysis, with long-term current use of insulin  -     Hemoglobin A1C; Future    Upper respiratory tract infection, unspecified type  -     benzonatate (TESSALON) 100 MG capsule; Take 1 capsule (100 mg total) by mouth 3 (three) times daily as needed for Cough.    Asymptomatic menopause  -     DXA Bone Density Axial Skeleton 1 or more sites; Future     Continue with renal diet.

## 2023-03-30 ENCOUNTER — HOSPITAL ENCOUNTER (OUTPATIENT)
Dept: RADIOLOGY | Facility: HOSPITAL | Age: 88
Discharge: HOME | End: 2023-03-30
Attending: FAMILY MEDICINE
Payer: MEDICARE

## 2023-03-30 DIAGNOSIS — Z78.0 ASYMPTOMATIC MENOPAUSE: ICD-10-CM

## 2023-03-30 PROCEDURE — 77080 DXA BONE DENSITY AXIAL: CPT | Mod: 26,,, | Performed by: RADIOLOGY

## 2023-03-30 PROCEDURE — 77080 DXA BONE DENSITY AXIAL: CPT | Mod: TC

## 2023-03-30 PROCEDURE — 77080 DXA BONE DENSITY AXIAL SKELETON 1 OR MORE SITES: ICD-10-PCS | Mod: 26,,, | Performed by: RADIOLOGY

## 2023-05-18 ENCOUNTER — TELEPHONE (OUTPATIENT)
Dept: FAMILY MEDICINE | Facility: CLINIC | Age: 88
End: 2023-05-18
Payer: MEDICARE

## 2023-05-18 NOTE — TELEPHONE ENCOUNTER
----- Message from Bayron Golden MD sent at 5/18/2023  7:51 AM CDT -----  Please help the patient with appointment with me, urgent care or my nurse practitioner.  ----- Message -----  From: Romana Mikhail  Sent: 5/17/2023   4:16 PM CDT  To: Bayron Golden MD    Caller: pt son    Requesting Appt With Provider: nyasia    Preferred Date Range: tuesday or thursday     Preferred Times: 10:30-11:30    Reason for visit: fluid in legs    Additional Information: call [phone]

## 2023-05-22 ENCOUNTER — LAB VISIT (OUTPATIENT)
Dept: LAB | Facility: HOSPITAL | Age: 88
End: 2023-05-22
Attending: FAMILY MEDICINE
Payer: MEDICARE

## 2023-05-22 ENCOUNTER — OFFICE VISIT (OUTPATIENT)
Dept: FAMILY MEDICINE | Facility: CLINIC | Age: 88
End: 2023-05-22
Payer: MEDICARE

## 2023-05-22 VITALS
HEART RATE: 83 BPM | SYSTOLIC BLOOD PRESSURE: 134 MMHG | OXYGEN SATURATION: 98 % | WEIGHT: 115.94 LBS | DIASTOLIC BLOOD PRESSURE: 62 MMHG | HEIGHT: 65 IN | BODY MASS INDEX: 19.32 KG/M2

## 2023-05-22 DIAGNOSIS — R60.0 BILATERAL LEG EDEMA: Primary | ICD-10-CM

## 2023-05-22 DIAGNOSIS — I10 ESSENTIAL HYPERTENSION: ICD-10-CM

## 2023-05-22 DIAGNOSIS — N18.6 ESRD (END STAGE RENAL DISEASE): ICD-10-CM

## 2023-05-22 DIAGNOSIS — I50.32 CHRONIC DIASTOLIC CONGESTIVE HEART FAILURE: ICD-10-CM

## 2023-05-22 DIAGNOSIS — Z99.2 DEPENDENCE ON RENAL DIALYSIS: ICD-10-CM

## 2023-05-22 DIAGNOSIS — R60.0 BILATERAL LEG EDEMA: ICD-10-CM

## 2023-05-22 PROCEDURE — 1126F PR PAIN SEVERITY QUANTIFIED, NO PAIN PRESENT: ICD-10-PCS | Mod: CPTII,S$GLB,, | Performed by: FAMILY MEDICINE

## 2023-05-22 PROCEDURE — 84443 ASSAY THYROID STIM HORMONE: CPT | Performed by: FAMILY MEDICINE

## 2023-05-22 PROCEDURE — 99214 OFFICE O/P EST MOD 30 MIN: CPT | Mod: PO | Performed by: FAMILY MEDICINE

## 2023-05-22 PROCEDURE — 84439 ASSAY OF FREE THYROXINE: CPT | Performed by: FAMILY MEDICINE

## 2023-05-22 PROCEDURE — 1159F PR MEDICATION LIST DOCUMENTED IN MEDICAL RECORD: ICD-10-PCS | Mod: CPTII,S$GLB,, | Performed by: FAMILY MEDICINE

## 2023-05-22 PROCEDURE — 1160F RVW MEDS BY RX/DR IN RCRD: CPT | Mod: CPTII,S$GLB,, | Performed by: FAMILY MEDICINE

## 2023-05-22 PROCEDURE — 3288F PR FALLS RISK ASSESSMENT DOCUMENTED: ICD-10-PCS | Mod: CPTII,S$GLB,, | Performed by: FAMILY MEDICINE

## 2023-05-22 PROCEDURE — 99215 PR OFFICE/OUTPT VISIT, EST, LEVL V, 40-54 MIN: ICD-10-PCS | Mod: S$GLB,,, | Performed by: FAMILY MEDICINE

## 2023-05-22 PROCEDURE — 1159F MED LIST DOCD IN RCRD: CPT | Mod: CPTII,S$GLB,, | Performed by: FAMILY MEDICINE

## 2023-05-22 PROCEDURE — 99999 PR PBB SHADOW E&M-EST. PATIENT-LVL IV: ICD-10-PCS | Mod: PBBFAC,,, | Performed by: FAMILY MEDICINE

## 2023-05-22 PROCEDURE — 36415 COLL VENOUS BLD VENIPUNCTURE: CPT | Mod: PO | Performed by: FAMILY MEDICINE

## 2023-05-22 PROCEDURE — 3288F FALL RISK ASSESSMENT DOCD: CPT | Mod: CPTII,S$GLB,, | Performed by: FAMILY MEDICINE

## 2023-05-22 PROCEDURE — 1160F PR REVIEW ALL MEDS BY PRESCRIBER/CLIN PHARMACIST DOCUMENTED: ICD-10-PCS | Mod: CPTII,S$GLB,, | Performed by: FAMILY MEDICINE

## 2023-05-22 PROCEDURE — 1126F AMNT PAIN NOTED NONE PRSNT: CPT | Mod: CPTII,S$GLB,, | Performed by: FAMILY MEDICINE

## 2023-05-22 PROCEDURE — 99999 PR PBB SHADOW E&M-EST. PATIENT-LVL IV: CPT | Mod: PBBFAC,,, | Performed by: FAMILY MEDICINE

## 2023-05-22 PROCEDURE — 99215 OFFICE O/P EST HI 40 MIN: CPT | Mod: S$GLB,,, | Performed by: FAMILY MEDICINE

## 2023-05-22 PROCEDURE — 1101F PR PT FALLS ASSESS DOC 0-1 FALLS W/OUT INJ PAST YR: ICD-10-PCS | Mod: CPTII,S$GLB,, | Performed by: FAMILY MEDICINE

## 2023-05-22 PROCEDURE — 1101F PT FALLS ASSESS-DOCD LE1/YR: CPT | Mod: CPTII,S$GLB,, | Performed by: FAMILY MEDICINE

## 2023-05-22 NOTE — PROGRESS NOTES
Subjective     Patient ID: Erin Clark is a 88 y.o. female.    Chief Complaint: Edema    88 years old female came to the clinic with bilateral lower extremities edema for the last.  Patient currently on hemodialysis secondary to end-stage renal disease.  No chest pain, palpitation, orthopnea or PND.  Patient with coronary artery disease before.  No recent follow-up with the heart doctor.  She was previously diagnosed with diastolic congestive heart failure.    Edema  Pertinent negatives include no chest pain.   Review of Systems   Constitutional: Negative.    HENT: Negative.     Eyes: Negative.    Respiratory: Negative.     Cardiovascular:  Positive for leg swelling. Negative for chest pain, palpitations and claudication.   Gastrointestinal: Negative.    Genitourinary: Negative.    Musculoskeletal: Negative.    Neurological: Negative.    Psychiatric/Behavioral: Negative.          Objective     Physical Exam  Constitutional:       General: She is not in acute distress.     Appearance: She is well-developed. She is not diaphoretic.   HENT:      Head: Normocephalic and atraumatic.      Right Ear: External ear normal.      Left Ear: External ear normal.      Nose: Nose normal.      Mouth/Throat:      Pharynx: No oropharyngeal exudate.   Eyes:      General: No scleral icterus.        Right eye: No discharge.         Left eye: No discharge.      Conjunctiva/sclera: Conjunctivae normal.      Pupils: Pupils are equal, round, and reactive to light.   Neck:      Thyroid: No thyromegaly.      Vascular: No JVD.      Trachea: No tracheal deviation.   Cardiovascular:      Rate and Rhythm: Normal rate and regular rhythm.      Heart sounds: Normal heart sounds. No murmur heard.    No friction rub. No gallop.   Pulmonary:      Effort: Pulmonary effort is normal. No respiratory distress.      Breath sounds: Normal breath sounds. No stridor. No wheezing or rales.   Chest:      Chest wall: No tenderness.   Abdominal:      General:  Bowel sounds are normal. There is no distension.      Palpations: Abdomen is soft. There is no mass.      Tenderness: There is no abdominal tenderness. There is no guarding or rebound.   Musculoskeletal:         General: No tenderness. Normal range of motion.      Cervical back: Normal range of motion and neck supple.      Right lower leg: Edema present.      Left lower leg: Edema present.   Lymphadenopathy:      Cervical: No cervical adenopathy.   Skin:     General: Skin is warm and dry.      Coloration: Skin is not pale.      Findings: No erythema or rash.   Neurological:      Mental Status: She is alert and oriented to person, place, and time.      Cranial Nerves: No cranial nerve deficit.      Motor: No abnormal muscle tone.      Coordination: Coordination abnormal.      Gait: Gait abnormal.      Deep Tendon Reflexes: Reflexes are normal and symmetric.   Psychiatric:         Behavior: Behavior normal.         Thought Content: Thought content normal.         Cognition and Memory: Cognition is impaired.         Judgment: Judgment normal.          Assessment and Plan     Problem List Items Addressed This Visit       Chronic diastolic congestive heart failure    Relevant Orders    Ambulatory referral/consult to Cardiology    BNP    Echo Saline Bubble? No    ESRD (end stage renal disease)    Essential hypertension    Dependence on renal dialysis     Other Visit Diagnoses       Bilateral leg edema    -  Primary    Relevant Orders    TSH    COMPRESSION STOCKINGS          Erin was seen today for edema.    Diagnoses and all orders for this visit:    Bilateral leg edema  -     TSH; Future  -     COMPRESSION STOCKINGS    Chronic diastolic congestive heart failure  -     Ambulatory referral/consult to Cardiology; Future  -     BNP; Future  -     Echo Saline Bubble? No; Future    ESRD (end stage renal disease)    Dependence on renal dialysis    Essential hypertension     Low-sodium renal diet.  Compression stockings during  the day.

## 2023-05-23 ENCOUNTER — TELEPHONE (OUTPATIENT)
Dept: CARDIOLOGY | Facility: CLINIC | Age: 88
End: 2023-05-23
Payer: MEDICARE

## 2023-05-23 LAB
T4 FREE SERPL-MCNC: 0.98 NG/DL (ref 0.71–1.51)
TSH SERPL DL<=0.005 MIU/L-ACNC: 4.1 UIU/ML (ref 0.4–4)

## 2023-05-23 NOTE — TELEPHONE ENCOUNTER
----- Message from Mary Kay Griggs sent at 5/22/2023  3:58 PM CDT -----  Good afternoon  Patient ep with Dr Sanchez. Please assist scheduling patient.  Thanks    Mary Kay

## 2023-05-25 DIAGNOSIS — E03.9 HYPOTHYROIDISM, UNSPECIFIED TYPE: Primary | ICD-10-CM

## 2023-05-26 ENCOUNTER — TELEPHONE (OUTPATIENT)
Dept: FAMILY MEDICINE | Facility: CLINIC | Age: 88
End: 2023-05-26
Payer: MEDICARE

## 2023-05-26 NOTE — TELEPHONE ENCOUNTER
----- Message from Kraig Luna sent at 5/26/2023  2:35 PM CDT -----  Contact: Pt.  .Type:  Needs Medical Advice    Who Called: pt. Nabeel Yadav    Would the patient rather a call back or a response via [facility]?  Call back  Best Call Back Number: [phone]  Additional Information:  Pt. Son is calling regarding the compression stocking  for his mother.  Pt. Son would like to know how do he obtain the compression stockings.

## 2023-06-09 ENCOUNTER — TELEPHONE (OUTPATIENT)
Dept: FAMILY MEDICINE | Facility: CLINIC | Age: 88
End: 2023-06-09
Payer: MEDICARE

## 2023-06-09 NOTE — TELEPHONE ENCOUNTER
Left voice message stating pt to call back for we can discuss lab results. Dr Carbone would like for her to repeat thyroid testing as well.

## 2023-06-09 NOTE — TELEPHONE ENCOUNTER
----- Message from Bayron Golden MD sent at 5/25/2023  9:00 AM CDT -----  Please notify the patient.  Borderline thyroid testing.  Repeat testing in 2 months.

## 2023-06-27 ENCOUNTER — HOSPITAL ENCOUNTER (OUTPATIENT)
Dept: CARDIOLOGY | Facility: HOSPITAL | Age: 88
Discharge: HOME | End: 2023-06-27
Attending: FAMILY MEDICINE
Payer: MEDICARE

## 2023-06-27 DIAGNOSIS — I50.32 CHRONIC DIASTOLIC CONGESTIVE HEART FAILURE: ICD-10-CM

## 2023-06-27 LAB
AORTIC ROOT ANNULUS: 2.7 CM
AV INDEX (PROSTH): 0.47
AV MEAN GRADIENT: 4 MMHG
AV PEAK GRADIENT: 8 MMHG
AV VALVE AREA: 1.45 CM2
AV VELOCITY RATIO: 0.39
CV ECHO LV RWT: 0.41 CM
DOP CALC AO PEAK VEL: 1.4 M/S
DOP CALC AO VTI: 25.1 CM
DOP CALC LVOT AREA: 3.1 CM2
DOP CALC LVOT DIAMETER: 1.99 CM
DOP CALC LVOT PEAK VEL: 0.54 M/S
DOP CALC LVOT STROKE VOLUME: 36.37 CM3
DOP CALC MV VTI: 44.1 CM
DOP CALCLVOT PEAK VEL VTI: 11.7 CM
E WAVE DECELERATION TIME: 232.44 MSEC
E/A RATIO: 1.78
E/E' RATIO: 34.6 M/S
ECHO LV POSTERIOR WALL: 0.9 CM (ref 0.6–1.1)
EJECTION FRACTION: 45 %
FRACTIONAL SHORTENING: 45 % (ref 28–44)
INTERVENTRICULAR SEPTUM: 0.9 CM (ref 0.6–1.1)
IVC DIAMETER: 2.01 CM
LA MAJOR: 6 CM
LA MINOR: 5.6 CM
LA WIDTH: 4.6 CM
LEFT ATRIUM SIZE: 4.73 CM
LEFT ATRIUM VOLUME MOD: 51.16 CM3
LEFT ATRIUM VOLUME: 107.14 CM3
LEFT INTERNAL DIMENSION IN SYSTOLE: 2.4 CM (ref 2.1–4)
LEFT VENTRICLE DIASTOLIC VOLUME: 86.45 ML
LEFT VENTRICLE SYSTOLIC VOLUME: 38.35 ML
LEFT VENTRICULAR INTERNAL DIMENSION IN DIASTOLE: 4.37 CM (ref 3.5–6)
LEFT VENTRICULAR MASS: 126.59 G
LV LATERAL E/E' RATIO: 34.6 M/S
LV SEPTAL E/E' RATIO: 34.6 M/S
LVOT MG: 0.57 MMHG
LVOT MV: 0.35 CM/S
MV MEAN GRADIENT: 4 MMHG
MV PEAK A VEL: 0.97 M/S
MV PEAK E VEL: 1.73 M/S
MV PEAK GRADIENT: 13 MMHG
MV STENOSIS PRESSURE HALF TIME: 68.46 MS
MV VALVE AREA BY CONTINUITY EQUATION: 0.82 CM2
MV VALVE AREA P 1/2 METHOD: 3.21 CM2
OHS LV EJECTION FRACTION SIMPSONS BIPLANE MOD: 6 %
PISA MRMAX VEL: 4.9 M/S
PISA TR MAX VEL: 3.5 M/S
PULM VEIN S/D RATIO: 2.83
PV PEAK D VEL: 0.23 M/S
PV PEAK S VEL: 0.65 M/S
RA MAJOR: 5.09 CM
RA PRESSURE: 3 MMHG
RA WIDTH: 3.7 CM
RIGHT VENTRICULAR END-DIASTOLIC DIMENSION: 3.46 CM
RV TISSUE DOPPLER FREE WALL SYSTOLIC VELOCITY 1 (APICAL 4 CHAMBER VIEW): 7.09 CM/S
TDI LATERAL: 0.05 M/S
TDI SEPTAL: 0.05 M/S
TDI: 0.05 M/S
TR MAX PG: 49 MMHG
TV REST PULMONARY ARTERY PRESSURE: 52 MMHG

## 2023-06-27 PROCEDURE — 93306 ECHO (CUPID ONLY): ICD-10-PCS | Mod: 26,,, | Performed by: INTERNAL MEDICINE

## 2023-06-27 PROCEDURE — 93306 TTE W/DOPPLER COMPLETE: CPT | Mod: 26,,, | Performed by: INTERNAL MEDICINE

## 2023-06-27 PROCEDURE — 93306 TTE W/DOPPLER COMPLETE: CPT

## 2023-07-05 ENCOUNTER — TELEPHONE (OUTPATIENT)
Dept: CARDIOLOGY | Facility: CLINIC | Age: 88
End: 2023-07-05
Payer: MEDICARE

## 2023-07-05 NOTE — TELEPHONE ENCOUNTER
----- Message from Teresita Graham sent at 7/5/2023  3:22 PM CDT -----  Type:  Patient Returning Call    Who Called:son  Who Left Message for Patient:office  Does the patient know what this is regarding?:n/a  Would the patient rather a call back or a response via [facility]? call  Best Call Back Number:[phone]  Additional Information:

## 2023-07-05 NOTE — TELEPHONE ENCOUNTER
Left voice message for patient to call office back.  Im not sure who called her, its was not Dr Sanchez's office.

## 2023-07-06 ENCOUNTER — TELEPHONE (OUTPATIENT)
Dept: CARDIOLOGY | Facility: CLINIC | Age: 88
End: 2023-07-06
Payer: MEDICARE

## 2023-07-06 NOTE — TELEPHONE ENCOUNTER
----- Message from Cameron Urena sent at 7/5/2023  5:56 PM CDT -----  .Type:  Needs Medical Advice    Who Called: pt son Leif    Would the patient rather a call back or a response via [facility]? Call back  Best Call Back Number: [phone]  Additional Information:     Pt son stated he missed a call and would like a call back please

## 2023-07-11 ENCOUNTER — OFFICE VISIT (OUTPATIENT)
Dept: CARDIOLOGY | Facility: CLINIC | Age: 88
End: 2023-07-11
Payer: MEDICARE

## 2023-07-11 VITALS
WEIGHT: 122.81 LBS | SYSTOLIC BLOOD PRESSURE: 153 MMHG | BODY MASS INDEX: 20.46 KG/M2 | DIASTOLIC BLOOD PRESSURE: 49 MMHG | OXYGEN SATURATION: 95 % | HEART RATE: 70 BPM | HEIGHT: 65 IN

## 2023-07-11 DIAGNOSIS — Z95.1 S/P CABG X 1: ICD-10-CM

## 2023-07-11 DIAGNOSIS — G47.30 SLEEP APNEA, UNSPECIFIED TYPE: ICD-10-CM

## 2023-07-11 DIAGNOSIS — N18.6 TYPE 2 DIABETES MELLITUS WITH CHRONIC KIDNEY DISEASE ON CHRONIC DIALYSIS, WITH LONG-TERM CURRENT USE OF INSULIN: ICD-10-CM

## 2023-07-11 DIAGNOSIS — Z99.2 TYPE 2 DIABETES MELLITUS WITH CHRONIC KIDNEY DISEASE ON CHRONIC DIALYSIS, WITH LONG-TERM CURRENT USE OF INSULIN: ICD-10-CM

## 2023-07-11 DIAGNOSIS — E78.5 HYPERLIPIDEMIA LDL GOAL <70: ICD-10-CM

## 2023-07-11 DIAGNOSIS — I25.10 CORONARY ARTERY DISEASE INVOLVING NATIVE CORONARY ARTERY OF NATIVE HEART WITHOUT ANGINA PECTORIS: ICD-10-CM

## 2023-07-11 DIAGNOSIS — I10 ESSENTIAL HYPERTENSION: ICD-10-CM

## 2023-07-11 DIAGNOSIS — I77.9 PERIPHERAL ARTERIAL OCCLUSIVE DISEASE: ICD-10-CM

## 2023-07-11 DIAGNOSIS — N18.6 ESRD (END STAGE RENAL DISEASE): ICD-10-CM

## 2023-07-11 DIAGNOSIS — E11.22 TYPE 2 DIABETES MELLITUS WITH CHRONIC KIDNEY DISEASE ON CHRONIC DIALYSIS, WITH LONG-TERM CURRENT USE OF INSULIN: ICD-10-CM

## 2023-07-11 DIAGNOSIS — I10 HYPERTENSION, UNSPECIFIED TYPE: ICD-10-CM

## 2023-07-11 DIAGNOSIS — Z79.4 TYPE 2 DIABETES MELLITUS WITH CHRONIC KIDNEY DISEASE ON CHRONIC DIALYSIS, WITH LONG-TERM CURRENT USE OF INSULIN: ICD-10-CM

## 2023-07-11 DIAGNOSIS — R05.9 COUGH, UNSPECIFIED TYPE: ICD-10-CM

## 2023-07-11 DIAGNOSIS — I50.32 CHRONIC DIASTOLIC CONGESTIVE HEART FAILURE: ICD-10-CM

## 2023-07-11 DIAGNOSIS — I50.43 ACUTE ON CHRONIC COMBINED SYSTOLIC AND DIASTOLIC CONGESTIVE HEART FAILURE: Primary | ICD-10-CM

## 2023-07-11 PROCEDURE — 1126F AMNT PAIN NOTED NONE PRSNT: CPT | Mod: CPTII,S$GLB,, | Performed by: INTERNAL MEDICINE

## 2023-07-11 PROCEDURE — 1159F PR MEDICATION LIST DOCUMENTED IN MEDICAL RECORD: ICD-10-PCS | Mod: CPTII,S$GLB,, | Performed by: INTERNAL MEDICINE

## 2023-07-11 PROCEDURE — 1160F PR REVIEW ALL MEDS BY PRESCRIBER/CLIN PHARMACIST DOCUMENTED: ICD-10-PCS | Mod: CPTII,S$GLB,, | Performed by: INTERNAL MEDICINE

## 2023-07-11 PROCEDURE — 99999 PR PBB SHADOW E&M-EST. PATIENT-LVL IV: ICD-10-PCS | Mod: PBBFAC,,, | Performed by: INTERNAL MEDICINE

## 2023-07-11 PROCEDURE — 1159F MED LIST DOCD IN RCRD: CPT | Mod: CPTII,S$GLB,, | Performed by: INTERNAL MEDICINE

## 2023-07-11 PROCEDURE — 99999 PR PBB SHADOW E&M-EST. PATIENT-LVL IV: CPT | Mod: PBBFAC,,, | Performed by: INTERNAL MEDICINE

## 2023-07-11 PROCEDURE — 99214 OFFICE O/P EST MOD 30 MIN: CPT | Mod: S$GLB,,, | Performed by: INTERNAL MEDICINE

## 2023-07-11 PROCEDURE — 1160F RVW MEDS BY RX/DR IN RCRD: CPT | Mod: CPTII,S$GLB,, | Performed by: INTERNAL MEDICINE

## 2023-07-11 PROCEDURE — 99214 PR OFFICE/OUTPT VISIT, EST, LEVL IV, 30-39 MIN: ICD-10-PCS | Mod: S$GLB,,, | Performed by: INTERNAL MEDICINE

## 2023-07-11 PROCEDURE — 93000 EKG 12-LEAD: ICD-10-PCS | Mod: S$GLB,,, | Performed by: INTERNAL MEDICINE

## 2023-07-11 PROCEDURE — 93000 ELECTROCARDIOGRAM COMPLETE: CPT | Mod: S$GLB,,, | Performed by: INTERNAL MEDICINE

## 2023-07-11 PROCEDURE — 1126F PR PAIN SEVERITY QUANTIFIED, NO PAIN PRESENT: ICD-10-PCS | Mod: CPTII,S$GLB,, | Performed by: INTERNAL MEDICINE

## 2023-07-13 NOTE — PROGRESS NOTES
Subjective:    Patient ID:  Erin Clark is a 88 y.o. female who presents for follow-up of Leg Swelling      HPI    87 y/o Tuvaluan speaking female who has seen Dr Ware in the past. She has a hx of 3V CAD with abnomal EF s/p CABG x 3 2014 with RCA , normalization of EF to 55% with last 2DE from 7/2020 with EF 50%, PET with no ischemia 2015, ESRD on HD, PAD without claudication. Initially seen by me for substernal CP with intermittent QUEZADA, had 2DE with EF 50%, had resolution of CP. Was hospitalized 7/2020 for SOB and hypoxia, had elevated BNP/trop, treated for PNA and HD and home O2. Had continued to have QUEZADA that was lifestyle limiting per last visit. HD previously twice weekly, now three times weekly.   On further review of chart and after discussion with the patient, it seems like she also has significant PAD. 2010 report by Dr Wood of peripheral angiogram with RRA stent and mentions hx of fem-pop bypass.   Had nuclear SPECT with:  1. Appearance is concerning for reversible ischemia along the anterior wall.  There is associated hypokinesis along the anterior wall on stress images.  2. No convincing evidence of infarct.  3. Left ventricular ejection fraction 41% during stress and 51% during rest.  Has episodes of PND. denies orthopnea, syncope, palps, LE edema, claudication. Compliant with meds. Has been seen by Pulmonology.   Last visit added hydralazine to regimen.  States that she feels bad with HD of 4 hours and feels like she will pass out and states that at least 4 times she thinks she has passed out. Still makes urine. Daughter notes HD access increased in size.     7/13/2023:  Last seen in clinic in 2021. Pt present with family. Pt is grossly volume overloaded and family states she misses HD regularly and may have dementia. Forgets to take meds and social issues with family that cannot take her to her sessions regularly. Bilateral leg edema, cough, weight gain, QUEZADA. Recent 2DE with unchanged  EF:  The left ventricle is normal in size with eccentric hypertrophy and mildly decreased systolic function.  The estimated ejection fraction is 45%.  Grade II left ventricular diastolic dysfunction.  There is abnormal septal wall motion consistent with post-operative status.  Normal right ventricular size with normal right ventricular systolic function.  Mild right atrial enlargement.  Mild left atrial enlargement.  Mild mitral regurgitation.  Mild tricuspid regurgitation.  The estimated PA systolic pressure is 52 mmHg.  Normal central venous pressure (3 mmHg).  There is pulmonary hypertension.    Review of Systems   Constitutional: Positive for malaise/fatigue.   HENT:  Negative for congestion.    Eyes:  Negative for blurred vision.   Cardiovascular:  Positive for dyspnea on exertion, leg swelling, near-syncope, paroxysmal nocturnal dyspnea and syncope. Negative for chest pain, claudication, cyanosis, irregular heartbeat, orthopnea and palpitations.   Respiratory:  Negative for shortness of breath.    Endocrine: Negative for polyuria.   Hematologic/Lymphatic: Negative for bleeding problem.   Skin:  Negative for itching and rash.   Musculoskeletal:  Negative for joint swelling, muscle cramps and muscle weakness.   Gastrointestinal:  Positive for bloating. Negative for abdominal pain, hematemesis, hematochezia, melena, nausea and vomiting.   Genitourinary:  Negative for dysuria and hematuria.   Neurological:  Positive for weakness. Negative for dizziness, focal weakness, headaches, light-headedness and loss of balance.   Psychiatric/Behavioral:  Negative for depression. The patient is not nervous/anxious.       Objective:    Physical Exam  Constitutional:       Appearance: She is well-developed.   HENT:      Head: Normocephalic and atraumatic.   Neck:      Vascular: No JVD.   Cardiovascular:      Rate and Rhythm: Normal rate and regular rhythm. FrequentExtrasystoles are present.     Pulses:           Carotid pulses  are 2+ on the right side and 2+ on the left side.       Radial pulses are 2+ on the right side and 2+ on the left side.        Femoral pulses are 2+ on the right side and 2+ on the left side.     Heart sounds: Normal heart sounds.      Comments: LUE AVF with good thrill and bruit  Pulmonary:      Effort: Pulmonary effort is normal.      Breath sounds: Rales present.   Abdominal:      General: Bowel sounds are normal. There is distension.      Palpations: Abdomen is soft.   Musculoskeletal:      Cervical back: Neck supple.      Right lower leg: Edema present.      Left lower leg: Edema present.   Skin:     General: Skin is warm and dry.   Neurological:      Mental Status: She is alert and oriented to person, place, and time.   Psychiatric:         Behavior: Behavior normal.         Thought Content: Thought content normal.         Assessment:       1. Acute on chronic combined systolic and diastolic congestive heart failure    2. Chronic diastolic congestive heart failure    3. Coronary artery disease involving native coronary artery of native heart without angina pectoris    4. S/P CABG x 1    5. Peripheral arterial occlusive disease    6. Hypertension, unspecified type    7. Hyperlipidemia LDL goal <70    8. Essential hypertension    9. Cough, unspecified type    10. Type 2 diabetes mellitus with chronic kidney disease on chronic dialysis, with long-term current use of insulin    11. ESRD (end stage renal disease)    12. Sleep apnea, unspecified type      89 y/o pt with hx and presentation as above. Grossly volume overloaded and makes minimal urine. Needs HD and needs to stop missing sessions. ECG in clinic non ischemic. Family appears to have issues with possible caregiver fatigue and difficult to take care of her. They were told that if she doesn't go to HD that she would need to go to ED for urgent HD and volume removal. Discussed the etiology, evaluation, and management of CAD, CABG, CHF, DD, HTN, HLD, PAD, DM.  Discussed the importance of med compliance, heart healthy diet, and regular exercise.      Plan:       -Continue current medical management  -f/u in 1 month

## 2023-07-18 ENCOUNTER — TELEPHONE (OUTPATIENT)
Dept: FAMILY MEDICINE | Facility: CLINIC | Age: 88
End: 2023-07-18
Payer: MEDICARE

## 2023-07-18 NOTE — TELEPHONE ENCOUNTER
----- Message from Bayron Golden MD sent at 7/11/2023  1:26 AM CDT -----  Decreased heart function.  Keep Follow-up with cardiologist.

## 2023-08-03 ENCOUNTER — TELEPHONE (OUTPATIENT)
Dept: CARDIOLOGY | Facility: CLINIC | Age: 88
End: 2023-08-03
Payer: MEDICARE

## 2023-08-03 NOTE — TELEPHONE ENCOUNTER
Called patient to check on needs HD and needs to stop missing sessions.  Patient is going to HD. Will call back for further advise.

## 2023-08-20 DIAGNOSIS — I25.10 CORONARY ARTERY DISEASE INVOLVING NATIVE CORONARY ARTERY WITHOUT ANGINA PECTORIS, UNSPECIFIED WHETHER NATIVE OR TRANSPLANTED HEART: ICD-10-CM

## 2023-08-20 DIAGNOSIS — I10 ESSENTIAL HYPERTENSION: ICD-10-CM

## 2023-08-20 DIAGNOSIS — Z95.1 S/P CABG X 1: ICD-10-CM

## 2023-08-20 DIAGNOSIS — I77.9 PERIPHERAL ARTERIAL OCCLUSIVE DISEASE: ICD-10-CM

## 2023-08-20 DIAGNOSIS — E78.5 HYPERLIPIDEMIA LDL GOAL <70: ICD-10-CM

## 2023-08-21 RX ORDER — FUROSEMIDE 40 MG/1
40 TABLET ORAL
Qty: 90 TABLET | Refills: 0 | Status: ON HOLD | OUTPATIENT
Start: 2023-08-21 | End: 2023-08-25 | Stop reason: SDUPTHER

## 2023-08-21 RX ORDER — ATORVASTATIN CALCIUM 40 MG/1
40 TABLET, FILM COATED ORAL
Qty: 90 TABLET | Refills: 0 | Status: SHIPPED | OUTPATIENT
Start: 2023-08-21

## 2023-08-24 ENCOUNTER — HOSPITAL ENCOUNTER (INPATIENT)
Facility: HOSPITAL | Age: 88
LOS: 1 days | Discharge: HOME HEALTH | DRG: 291 | End: 2023-08-25
Attending: EMERGENCY MEDICINE | Admitting: INTERNAL MEDICINE
Payer: MEDICARE

## 2023-08-24 DIAGNOSIS — I77.9 PERIPHERAL ARTERIAL OCCLUSIVE DISEASE: ICD-10-CM

## 2023-08-24 DIAGNOSIS — R06.02 SOB (SHORTNESS OF BREATH): ICD-10-CM

## 2023-08-24 DIAGNOSIS — I10 ESSENTIAL HYPERTENSION: ICD-10-CM

## 2023-08-24 DIAGNOSIS — E78.5 HYPERLIPIDEMIA LDL GOAL <70: ICD-10-CM

## 2023-08-24 DIAGNOSIS — Z95.1 S/P CABG X 1: ICD-10-CM

## 2023-08-24 DIAGNOSIS — R09.02 HYPOXIA: ICD-10-CM

## 2023-08-24 DIAGNOSIS — E87.70 HYPERVOLEMIA, UNSPECIFIED HYPERVOLEMIA TYPE: ICD-10-CM

## 2023-08-24 DIAGNOSIS — I25.10 CORONARY ARTERY DISEASE INVOLVING NATIVE CORONARY ARTERY WITHOUT ANGINA PECTORIS, UNSPECIFIED WHETHER NATIVE OR TRANSPLANTED HEART: ICD-10-CM

## 2023-08-24 DIAGNOSIS — N18.6 ESRD (END STAGE RENAL DISEASE): ICD-10-CM

## 2023-08-24 DIAGNOSIS — J81.0 ACUTE PULMONARY EDEMA: Primary | ICD-10-CM

## 2023-08-24 PROBLEM — T82.898A HEMODIALYSIS AV FISTULA ANEURYSM: Status: RESOLVED | Noted: 2017-11-30 | Resolved: 2023-08-24

## 2023-08-24 LAB
ALBUMIN SERPL BCP-MCNC: 3.7 G/DL (ref 3.5–5.2)
ALP SERPL-CCNC: 137 U/L (ref 55–135)
ALT SERPL W/O P-5'-P-CCNC: 27 U/L (ref 10–44)
ANION GAP SERPL CALC-SCNC: 25 MMOL/L (ref 8–16)
AST SERPL-CCNC: 37 U/L (ref 10–40)
BASOPHILS # BLD AUTO: 0.03 K/UL (ref 0–0.2)
BASOPHILS NFR BLD: 0.3 % (ref 0–1.9)
BILIRUB SERPL-MCNC: 1.2 MG/DL (ref 0.1–1)
BNP SERPL-MCNC: >4900 PG/ML (ref 0–99)
BUN SERPL-MCNC: 68 MG/DL (ref 8–23)
CALCIUM SERPL-MCNC: 8.9 MG/DL (ref 8.7–10.5)
CHLORIDE SERPL-SCNC: 92 MMOL/L (ref 95–110)
CO2 SERPL-SCNC: 13 MMOL/L (ref 23–29)
CREAT SERPL-MCNC: 6.2 MG/DL (ref 0.5–1.4)
DIFFERENTIAL METHOD: ABNORMAL
EOSINOPHIL # BLD AUTO: 0 K/UL (ref 0–0.5)
EOSINOPHIL NFR BLD: 0.1 % (ref 0–8)
ERYTHROCYTE [DISTWIDTH] IN BLOOD BY AUTOMATED COUNT: 16.5 % (ref 11.5–14.5)
EST. GFR  (NO RACE VARIABLE): 6 ML/MIN/1.73 M^2
GLUCOSE SERPL-MCNC: 81 MG/DL (ref 70–110)
HCT VFR BLD AUTO: 34.5 % (ref 37–48.5)
HGB BLD-MCNC: 11.4 G/DL (ref 12–16)
IMM GRANULOCYTES # BLD AUTO: 0.06 K/UL (ref 0–0.04)
IMM GRANULOCYTES NFR BLD AUTO: 0.5 % (ref 0–0.5)
INFLUENZA A, MOLECULAR: NEGATIVE
INFLUENZA B, MOLECULAR: NEGATIVE
LYMPHOCYTES # BLD AUTO: 0.9 K/UL (ref 1–4.8)
LYMPHOCYTES NFR BLD: 7.7 % (ref 18–48)
MAGNESIUM SERPL-MCNC: 2 MG/DL (ref 1.6–2.6)
MCH RBC QN AUTO: 27.7 PG (ref 27–31)
MCHC RBC AUTO-ENTMCNC: 33 G/DL (ref 32–36)
MCV RBC AUTO: 84 FL (ref 82–98)
MONOCYTES # BLD AUTO: 0.9 K/UL (ref 0.3–1)
MONOCYTES NFR BLD: 7.3 % (ref 4–15)
NEUTROPHILS # BLD AUTO: 9.8 K/UL (ref 1.8–7.7)
NEUTROPHILS NFR BLD: 84.1 % (ref 38–73)
NRBC BLD-RTO: 0 /100 WBC
PLATELET # BLD AUTO: 243 K/UL (ref 150–450)
PMV BLD AUTO: 10 FL (ref 9.2–12.9)
POCT GLUCOSE: 63 MG/DL (ref 70–110)
POCT GLUCOSE: 73 MG/DL (ref 70–110)
POCT GLUCOSE: 91 MG/DL (ref 70–110)
POTASSIUM SERPL-SCNC: 4.3 MMOL/L (ref 3.5–5.1)
PROCALCITONIN SERPL IA-MCNC: 0.63 NG/ML
PROT SERPL-MCNC: 7.7 G/DL (ref 6–8.4)
RBC # BLD AUTO: 4.11 M/UL (ref 4–5.4)
RSV AG SPEC QL IA: NEGATIVE
SARS-COV-2 RDRP RESP QL NAA+PROBE: NEGATIVE
SODIUM SERPL-SCNC: 130 MMOL/L (ref 136–145)
SPECIMEN SOURCE: NORMAL
SPECIMEN SOURCE: NORMAL
TROPONIN I SERPL DL<=0.01 NG/ML-MCNC: 0.04 NG/ML (ref 0–0.03)
WBC # BLD AUTO: 11.64 K/UL (ref 3.9–12.7)

## 2023-08-24 PROCEDURE — 90935 HEMODIALYSIS ONE EVALUATION: CPT

## 2023-08-24 PROCEDURE — 25000003 PHARM REV CODE 250

## 2023-08-24 PROCEDURE — U0002 COVID-19 LAB TEST NON-CDC: HCPCS | Performed by: EMERGENCY MEDICINE

## 2023-08-24 PROCEDURE — 83735 ASSAY OF MAGNESIUM: CPT | Performed by: EMERGENCY MEDICINE

## 2023-08-24 PROCEDURE — 11000001 HC ACUTE MED/SURG PRIVATE ROOM

## 2023-08-24 PROCEDURE — 85025 COMPLETE CBC W/AUTO DIFF WBC: CPT | Performed by: EMERGENCY MEDICINE

## 2023-08-24 PROCEDURE — 84484 ASSAY OF TROPONIN QUANT: CPT | Performed by: EMERGENCY MEDICINE

## 2023-08-24 PROCEDURE — 80100014 HC HEMODIALYSIS 1:1

## 2023-08-24 PROCEDURE — 87502 INFLUENZA DNA AMP PROBE: CPT

## 2023-08-24 PROCEDURE — 99285 EMERGENCY DEPT VISIT HI MDM: CPT | Mod: 25

## 2023-08-24 PROCEDURE — 83880 ASSAY OF NATRIURETIC PEPTIDE: CPT | Performed by: EMERGENCY MEDICINE

## 2023-08-24 PROCEDURE — 87634 RSV DNA/RNA AMP PROBE: CPT

## 2023-08-24 PROCEDURE — 93010 ELECTROCARDIOGRAM REPORT: CPT | Mod: ,,, | Performed by: INTERNAL MEDICINE

## 2023-08-24 PROCEDURE — 80053 COMPREHEN METABOLIC PANEL: CPT | Performed by: EMERGENCY MEDICINE

## 2023-08-24 PROCEDURE — 84145 PROCALCITONIN (PCT): CPT

## 2023-08-24 PROCEDURE — 63600175 PHARM REV CODE 636 W HCPCS

## 2023-08-24 PROCEDURE — 36415 COLL VENOUS BLD VENIPUNCTURE: CPT

## 2023-08-24 PROCEDURE — 93005 ELECTROCARDIOGRAM TRACING: CPT

## 2023-08-24 PROCEDURE — 93010 EKG 12-LEAD: ICD-10-PCS | Mod: ,,, | Performed by: INTERNAL MEDICINE

## 2023-08-24 RX ORDER — ISOSORBIDE MONONITRATE 30 MG/1
30 TABLET, EXTENDED RELEASE ORAL DAILY
Status: DISCONTINUED | OUTPATIENT
Start: 2023-08-25 | End: 2023-08-25 | Stop reason: HOSPADM

## 2023-08-24 RX ORDER — IBUPROFEN 200 MG
16 TABLET ORAL
Status: DISCONTINUED | OUTPATIENT
Start: 2023-08-24 | End: 2023-08-25 | Stop reason: HOSPADM

## 2023-08-24 RX ORDER — SODIUM CHLORIDE 9 MG/ML
INJECTION, SOLUTION INTRAVENOUS ONCE
Status: DISCONTINUED | OUTPATIENT
Start: 2023-08-24 | End: 2023-08-25 | Stop reason: HOSPADM

## 2023-08-24 RX ORDER — AMOXICILLIN 250 MG
1 CAPSULE ORAL 2 TIMES DAILY
Status: DISCONTINUED | OUTPATIENT
Start: 2023-08-24 | End: 2023-08-25 | Stop reason: HOSPADM

## 2023-08-24 RX ORDER — METOPROLOL SUCCINATE 25 MG/1
25 TABLET, EXTENDED RELEASE ORAL DAILY
COMMUNITY
End: 2023-10-24

## 2023-08-24 RX ORDER — POLYETHYLENE GLYCOL 3350 17 G/17G
17 POWDER, FOR SOLUTION ORAL DAILY
Status: DISCONTINUED | OUTPATIENT
Start: 2023-08-24 | End: 2023-08-25 | Stop reason: HOSPADM

## 2023-08-24 RX ORDER — FUROSEMIDE 10 MG/ML
80 INJECTION INTRAMUSCULAR; INTRAVENOUS ONCE
Status: COMPLETED | OUTPATIENT
Start: 2023-08-24 | End: 2023-08-24

## 2023-08-24 RX ORDER — NITROGLYCERIN 0.4 MG/1
0.4 TABLET SUBLINGUAL EVERY 5 MIN PRN
COMMUNITY

## 2023-08-24 RX ORDER — B COMPLEX, C NO.20/FOLIC ACID 1 MG
1 CAPSULE ORAL DAILY
COMMUNITY

## 2023-08-24 RX ORDER — INSULIN ASPART 100 [IU]/ML
0-5 INJECTION, SOLUTION INTRAVENOUS; SUBCUTANEOUS
Status: DISCONTINUED | OUTPATIENT
Start: 2023-08-24 | End: 2023-08-25 | Stop reason: HOSPADM

## 2023-08-24 RX ORDER — HYDRALAZINE HYDROCHLORIDE 25 MG/1
25 TABLET, FILM COATED ORAL EVERY 8 HOURS PRN
Status: DISCONTINUED | OUTPATIENT
Start: 2023-08-24 | End: 2023-08-25 | Stop reason: HOSPADM

## 2023-08-24 RX ORDER — ACETAMINOPHEN 325 MG/1
650 TABLET ORAL EVERY 4 HOURS PRN
Status: DISCONTINUED | OUTPATIENT
Start: 2023-08-24 | End: 2023-08-25 | Stop reason: HOSPADM

## 2023-08-24 RX ORDER — GLUCAGON 1 MG
1 KIT INJECTION
Status: DISCONTINUED | OUTPATIENT
Start: 2023-08-24 | End: 2023-08-25 | Stop reason: HOSPADM

## 2023-08-24 RX ORDER — MUPIROCIN 20 MG/G
OINTMENT TOPICAL 2 TIMES DAILY
Status: DISCONTINUED | OUTPATIENT
Start: 2023-08-24 | End: 2023-08-25 | Stop reason: HOSPADM

## 2023-08-24 RX ORDER — SODIUM CHLORIDE 0.9 % (FLUSH) 0.9 %
10 SYRINGE (ML) INJECTION EVERY 12 HOURS PRN
Status: DISCONTINUED | OUTPATIENT
Start: 2023-08-24 | End: 2023-08-25 | Stop reason: HOSPADM

## 2023-08-24 RX ORDER — SODIUM CHLORIDE 9 MG/ML
INJECTION, SOLUTION INTRAVENOUS
Status: DISCONTINUED | OUTPATIENT
Start: 2023-08-24 | End: 2023-08-25 | Stop reason: HOSPADM

## 2023-08-24 RX ORDER — NALOXONE HCL 0.4 MG/ML
0.02 VIAL (ML) INJECTION
Status: DISCONTINUED | OUTPATIENT
Start: 2023-08-24 | End: 2023-08-25 | Stop reason: HOSPADM

## 2023-08-24 RX ORDER — HEPARIN SODIUM 5000 [USP'U]/ML
5000 INJECTION, SOLUTION INTRAVENOUS; SUBCUTANEOUS EVERY 8 HOURS
Status: DISCONTINUED | OUTPATIENT
Start: 2023-08-24 | End: 2023-08-25 | Stop reason: HOSPADM

## 2023-08-24 RX ORDER — IBUPROFEN 200 MG
24 TABLET ORAL
Status: DISCONTINUED | OUTPATIENT
Start: 2023-08-24 | End: 2023-08-25 | Stop reason: HOSPADM

## 2023-08-24 RX ORDER — SIMETHICONE 80 MG
1 TABLET,CHEWABLE ORAL 4 TIMES DAILY PRN
Status: DISCONTINUED | OUTPATIENT
Start: 2023-08-24 | End: 2023-08-25 | Stop reason: HOSPADM

## 2023-08-24 RX ORDER — ATORVASTATIN CALCIUM 40 MG/1
40 TABLET, FILM COATED ORAL NIGHTLY
Status: DISCONTINUED | OUTPATIENT
Start: 2023-08-24 | End: 2023-08-25 | Stop reason: HOSPADM

## 2023-08-24 RX ORDER — METOPROLOL SUCCINATE 25 MG/1
25 TABLET, EXTENDED RELEASE ORAL DAILY
Status: DISCONTINUED | OUTPATIENT
Start: 2023-08-24 | End: 2023-08-25 | Stop reason: HOSPADM

## 2023-08-24 RX ORDER — TALC
6 POWDER (GRAM) TOPICAL NIGHTLY PRN
Status: DISCONTINUED | OUTPATIENT
Start: 2023-08-24 | End: 2023-08-25 | Stop reason: HOSPADM

## 2023-08-24 RX ORDER — ASPIRIN 81 MG/1
81 TABLET ORAL DAILY
Status: DISCONTINUED | OUTPATIENT
Start: 2023-08-25 | End: 2023-08-25 | Stop reason: HOSPADM

## 2023-08-24 RX ADMIN — HEPARIN SODIUM 5000 UNITS: 5000 INJECTION INTRAVENOUS; SUBCUTANEOUS at 10:08

## 2023-08-24 RX ADMIN — POLYETHYLENE GLYCOL 3350 17 G: 17 POWDER, FOR SOLUTION ORAL at 06:08

## 2023-08-24 RX ADMIN — MUPIROCIN: 20 OINTMENT TOPICAL at 08:08

## 2023-08-24 RX ADMIN — MUPIROCIN: 20 OINTMENT TOPICAL at 06:08

## 2023-08-24 RX ADMIN — HEPARIN SODIUM 5000 UNITS: 5000 INJECTION INTRAVENOUS; SUBCUTANEOUS at 06:08

## 2023-08-24 RX ADMIN — Medication 6 MG: at 08:08

## 2023-08-24 RX ADMIN — ATORVASTATIN CALCIUM 40 MG: 40 TABLET, FILM COATED ORAL at 08:08

## 2023-08-24 RX ADMIN — METOPROLOL SUCCINATE 25 MG: 25 TABLET, EXTENDED RELEASE ORAL at 01:08

## 2023-08-24 RX ADMIN — DOCUSATE SODIUM AND SENNOSIDES 1 TABLET: 8.6; 5 TABLET, FILM COATED ORAL at 08:08

## 2023-08-24 RX ADMIN — FUROSEMIDE 80 MG: 10 INJECTION, SOLUTION INTRAMUSCULAR; INTRAVENOUS at 01:08

## 2023-08-24 NOTE — PROGRESS NOTES
08/24/23 1634   Vital Signs   Temp 98.1 °F (36.7 °C)   Temp Source Temporal   Pulse 77   Heart Rate Source Monitor   Resp 18   BP (!) 172/77   During Hemodialysis Assessment   Blood Flow Rate (mL/min) 400 mL/min   Dialysate Flow Rate (mL/min) 800 ml/min   Ultrafiltration Rate (mL/Hr) 800 mL/Hr   Arteriovenous Lines Secure Yes   Arterial Pressure (mmHg) -119 mmHg   Venous Pressure (mmHg) 172   Blood Volume Processed (Liters) 57.9 L   UF Removed (mL) 2000 mL   TMP 32   Venous Line in Air Detector Yes   Transducer Dry Yes   Access Visible Yes   Intra-Hemodialysis Comments HD completed   Post-Hemodialysis Assessment   Rinseback Volume (mL) 250 mL   Blood Volume Processed (Liters) 57.9 L   Dialyzer Clearance Clear   Duration of Treatment 150 minutes   Additional Fluid Intake (mL) 250 mL   Total UF (mL) 2000 mL   Net Fluid Removal 1500   Patient Response to Treatment tolerated   Arterial bleeding stop time (min) 5 min   Venous bleeding stop time (min) 5 min   Post-Hemodialysis Comments post HD report given to the primary nurse

## 2023-08-24 NOTE — PHARMACY MED REC
"    [facility]           Pharmacy  Admission Medication History     The home medication history was taken by Dejah Shanks.      Medication history obtained from Medications listed below were obtained from: [employer]- [employer]    Based on information gathered for medication list, you may go to "Admission" then "Reconcile Home Medications" tabs to review and/or act upon those items.     The home medication list has been updated by the Pharmacy department.   Please read ALL comments highlighted in yellow.   Please address this information as you see fit.    Feel free to contact us if you have any questions or require assistance.    The medications listed below were removed from the home medication list.  Please reorder if appropriate:    Patient reports NOT TAKING the following medication(s):  D2 50,000unit      No current facility-administered medications on file prior to encounter.     Current Outpatient Medications on File Prior to Encounter   Medication Sig Dispense Refill    atorvastatin (LIPITOR) 40 MG tablet Take 1 tablet by mouth once daily (Patient taking differently: Take 40 mg by mouth once daily.) 90 tablet 0    furosemide (LASIX) 40 MG tablet Take 1 tablet by mouth once daily (Patient taking differently: Take 40 mg by mouth once daily.) 90 tablet 0    metoprolol succinate (TOPROL-XL) 25 MG 24 hr tablet Take 25 mg by mouth once daily.      vitamin renal formula, B-complex-vitamin c-folic acid, (TRIPHROCAPS) 1 mg Cap Take 1 capsule by mouth once daily.      albuterol (PROAIR HFA) 90 mcg/actuation inhaler Inhale 2 puffs into the lungs every 6 (six) hours as needed for Wheezing. Rescue (Patient not taking: Reported on 8/24/2023) 18 g 0    aspirin (ECOTRIN) 81 MG EC tablet TAKE ONE TABLET BY MOUTH ONCE DAILY 30 tablet 6    hydrALAZINE (APRESOLINE) 25 MG tablet Take 1 tablet (25 mg total) by mouth every 12 (twelve) hours. (Patient not taking: Reported on 8/24/2023) 60 tablet 11    " isosorbide mononitrate (IMDUR) 30 MG 24 hr tablet Take 1 tablet by mouth once daily (Patient not taking: Reported on 8/24/2023) 90 tablet 0    nitroGLYCERIN (NITROSTAT) 0.4 MG SL tablet Place 0.4 mg under the tongue every 5 (five) minutes as needed for Chest pain.      pantoprazole (PROTONIX) 40 MG tablet Take 1 tablet (40 mg total) by mouth before breakfast. (Patient not taking: Reported on 8/24/2023) 30 tablet 0       Please address this information as you see fit.  Feel free to contact us if you have any questions or require assistance.    Dejah Shanks  [phone]              .

## 2023-08-24 NOTE — Clinical Note
Diagnosis: SOB (shortness of breath) [919873]   Admitting Provider:: KRYSTINA BARNHART [[id number]]   Future Attending Provider: KRYSTINA BARNHART [[id number]]   Reason for IP Medical Treatment  (Clinical interventions that can only be accomplished in the IP setting? ) :: Pulmonary edema with hypoxia   I certify that Inpatient services for greater than or equal to 2 midnights are medically necessary:: Yes   Plans for Post-Acute care--if anticipated (pick the single best option):: A. No post acute care anticipated at this time

## 2023-08-24 NOTE — H&P
"[facility] Medicine H&P Note     Admitting Team: [facility] Hospitalist Team B  Attending Physician: Santy Singleton MD  Resident: Nestor  Intern: Sienna    Date of Admit: 8/24/2023    Chief Complaint     Shortness of breath and debility for several days     Subjective:      History of Present Illness:  Erin Clark is a 88 y.o. F with past medical history of ESRD on HD MWF, CAD s/p CABG, combined systolic and diastolic HF (EF 45% in 6/2023), pulmonary HTN, PAD, DM2, HTN, HLD, osteoporosis who presented on 8/24/2023 for shortness of breath and weakness for several days. History is gathered primarily from pt's daughter.     Per pt's daughter, pt has not been feeling well since sometime over the weekend. She has been complaining of abdominal discomfort and constipation as well as difficulty urinating (pt does still make some urine at home and is on home lasix).  She has also had progressive debility with several falls at home, most recent 1 month ago. She has not sought medical care for these falls. Because of her sx, she did not go to dialysis on Monday or Wednesday of this week, last session was on Friday. She became progressively short of breath and asked her daughter to bring her to the emergency room. On interview of patient, she states "I don't know why I'm here" and denies all current and prior symptoms including those expressed by her daughter.     Patient lives at home with her  and her brother. She is ambulatory at home, and has a walker and a cane but does not use them. Per her daughter, pt's son comes over several times a week to help her with her medications, and her  also helps her. She does not know what meds she takes and her daughter also isn't sure. Per chart review, pt has not been compliant with HD in the past, though had been doing better of the last few months until this past week. Pt's daughter is also concerned about baseline dementia as pt has been forgetful especially when it " comes to her medications and health in general. Outpatient she sees Dr. Sanchez for cardiology and Dr. Alanis for nephrology.       Review of Systems:  A comprehensive review of systems was negative unless otherwise stated in the HPI.     Past Medical History:  ESRD on HD   CAD s/p CABG  Combined systolic and diastolic HF (EF 45%)  Pulmonary HTN  PAD  DM2  HTN  HLD  Osteoporosis      Past Surgical History:    Past Surgical History:   Procedure Laterality Date    AV FISTULA PLACEMENT Left 9/2014    bilateral fem-pop      CENTRAL VENOUS CATHETER TUNNELED INSERTION DOUBLE LUMEN Right 2/2014    CORONARY ARTERY BYPASS GRAFT  2/14/2014     x 3 vessels    LEFT HEART CATHETERIZATION N/A 5/31/2021    Procedure: Left heart cath;  Surgeon: Brian Sanchez MD;  Location: [facility] CATH LAB/EP;  Service: Cardiology;  Laterality: N/A;    PERCUTANEOUS TRANSLUMINAL ANGIOPLASTY OF ARTERIOVENOUS FISTULA Left 5/5/2020    Procedure: PTA, Repair left upper arm anuerysm AV FISTULA;  Surgeon: Doris aMry MD;  Location: [facility] OR;  Service: General;  Laterality: Left;    VASCULAR SURGERY         Allergies:    Review of patient's allergies indicates:   Allergen Reactions    Aspirin Nausea Only and Swelling     Able to tolerated in small doses         Home Medications:  Medications reconciled with AVS from cardiology visit in 5/2023, attempted to get in contact with patient's son to continue med rec.    Current Outpatient Medications   Medication Instructions    albuterol (PROAIR HFA) 90 mcg/actuation inhaler 2 puffs, Inhalation, Every 6 hours PRN, Rescue    aspirin (ECOTRIN) 81 MG EC tablet TAKE ONE TABLET BY MOUTH ONCE DAILY    atorvastatin (LIPITOR) 40 mg, Oral    furosemide (LASIX) 40 mg, Oral    hydrALAZINE (APRESOLINE) 25 mg, Oral, Every 12 hours    isosorbide mononitrate (IMDUR) 30 MG 24 hr tablet Take 1 tablet by mouth once daily    metoprolol succinate (TOPROL-XL) 25 mg, Oral, Daily    nitroGLYCERIN (NITROSTAT) 0.4 mg,  Sublingual, Every 5 min PRN    pantoprazole (PROTONIX) 40 mg, Oral, Before breakfast    vitamin renal formula, B-complex-vitamin c-folic acid, (TRIPHROCAPS) 1 mg Cap 1 capsule, Oral, Daily       Family History:  Non-contributory    Family History   Problem Relation Age of Onset    Hypertension Mother     Heart disease Mother     Heart attack Mother        Social History:  Alcohol use: Denies  Tobacco use: Denies  Recreational drug use: Denies   Current living situation: Lives at home with  and brother    Health Maintaince :   Primary Care Physician: Bayron Golden MD     Immunizations:   Currently on File:   Most Recent Immunizations   Administered Date(s) Administered    COVID-19, MRNA, LN-S, PF (MODERNA FULL 0.5 ML DOSE) 01/15/2022    Hepatitis B, Adult 10/05/2022    Influenza 10/17/2013    Influenza - High Dose - PF (65 years and older) 10/07/2022    Influenza - Quadrivalent - PF *Preferred* (6 months and older) 10/17/2013    Influenza - Trivalent (ADULT) 09/25/2017    Influenza Split 09/12/2016    PPD Test 02/21/2022    Pneumococcal 09/16/2019    Pneumococcal Conjugate - 13 Valent 03/06/2019    Pneumococcal Polysaccharide - 23 Valent 09/16/2019     Influenza:   Due 2023 season  COVID-19:   Due for 4th dose  Pneumonia:   UTD  TDap:    Due  Shingles:   Due    Cancer Screening:  Colonoscopy:   6/2013- 2 polyps removed     Objective     ED Vitals: T 97.6, HR 70, /78, RR 22, O2 95% on 4L NC    ED Intervention:   - Placed on NC O2  - Consulted nephrology for HD     Physical Examination:  Exam Vitals: T 98.2, HR 61, /79, RR 21, O2 95% on 2L NC    Temp:  [97.6 °F (36.4 °C)] 97.6 °F (36.4 °C)  Pulse:  [62-70] 63  Resp:  [22-33] 27  SpO2:  [88 %-100 %] 97 %  BP: (180-203)/(72-78) 180/75  General: No acute distress, resting comfortably   HEENT: Atraumatic, normocephalic, moist mucous membranes  Cardiovascular: Regular rate and rhythm, normal S1 and S2, no extra heart sounds or murmurs  appreciated   Chest wall: CABG scar over sternum    Back: No abnormal curvature noted, symmetric rise with respiration   Respiratory: Stable on 2L NC, normal work of breathing, no conversational dyspnea, no accessory muscle use noted, minimal crackles at bilateral lung bases, no wheezes   Abdominal: Non-distended, soft, normoactive bowel sounds, non-tender to palpation, no guarding  Extremities: 2+ pitting edema of BLEs to the knees, moving all four extremities  Skin: Non-diaphoretic, warm, dry  Neurologic: Oriented x3, at baseline    Laboratory:  Recent Labs   Lab 08/24/23  0859   WBC 11.64   HGB 11.4*      MCV 84   *   K 4.3   CL 92*   CO2 13*   BUN 68*   CREATININE 6.2*   GLU 81   CALCIUM 8.9   PROT 7.7   ALBUMIN 3.7   MG 2.0   AST 37   ALT 27   ALKPHOS 137*   TROPONINI 0.039*   BNP >4,900*       All laboratory data reviewed    Microbiology Data: Reviewed  Microbiology Results (last 7 days)       Procedure Component Value Units Date/Time    Influenza A & B by Molecular [[id number]]     Order Status: No result Specimen: Nasopharyngeal Swab             EKG Data: Reviewed  Sinus rhythm with irregular R-R intervals   Results for orders placed or performed during the hospital encounter of 08/24/23   EKG 12-lead    Collection Time: 08/24/23  8:27 AM    Narrative    Test Reason : R06.02,    Vent. Rate : 072 BPM     Atrial Rate : 072 BPM     P-R Int : 180 ms          QRS Dur : 088 ms      QT Int : 460 ms       P-R-T Axes : 075 095 084 degrees     QTc Int : 503 ms    Sinus rhythm with marked sinus arrythmia  Rightward axis  Right ventricular conduction delay  Prolonged QT  Abnormal ECG  When compared with ECG of 11-JUL-2023 16:20,  No significant change was found    Referred By: [email or web address]   SELF           Confirmed By:        Radiology Data: Reviewed  X-Ray Chest AP Portable   Final Result      1. Radiographic findings suggestive of pulmonary edema.   2. Stable enlargement of the cardiac silhouette.  Status  post CABG.   3. Prominent central pulmonary vasculature, a finding that can be seen in the setting of pulmonary hypertension.         Electronically signed by: Abdirizak Baird MD   Date:    08/24/2023   Time:    10:03             Assessment/Plan     Erin Clark is a 88 y.o. F with past medical history of ESRD on HD MWF, CAD s/p CABG, combined systolic and diastolic HF (EF 45% in 6/2023), pulmonary HTN, PAD, DM2, HTN, HLD osteoporosis who presented to [facility] on 8/24/2023 for shortness of breath and weakness for several days in the setting of 2 missed HD sessions and questionable medication compliance at home. Pt denying sx at this time and VS overall stable outside of elevated BP. CXR c/w pulmonary edema, pt requiring 2L O2 and BNP elevated to >4900. Clinical picture most c/w gradual volume overload 2/2 missed HD vs CHF exacerbation with infection less likely. Patient is admitted to [facility] for further evaluation and management of acute hypoxic respiratory failure and generalized weakness.     Acute Hypoxic Respiratory Failure 2/2 Pulmonary Edema  - Pt with gradual onset of sx that have worsened over the last several days   - Satting 89% on RA here, no PFTs in chart, but dx of chronic mucopurulent bronchitis per chart review   - Pt denies SOB at this time, lungs with minimal crackles at lung bases  - CXR c/w pulmonary edema, BNP >4900, trop mildly elevated at 0.039  - COVID negative  Plan:  - Nephrology consulted, pt to get HD today, appreciate additional recs    - For now, goal sats >94%, wean NC O2 as tolerated   - Will r/o flu and RSV   - Will give Lasix 80 IV, will monitor urine output and sx     ESRD on HD   - Unclear what incited pt's renal failure, has been on HD for approx. 5 years  - Gets HD MWF, skipped Monday and Wednesday this week   - Pt follows outpatient with Dr. Alanis  - Pt to receive HD today   Plan:  - Nephrology consulted, appreciate recs  - Renally dose all meds   -  Renal diet     Combined Systolic and Diastolic HF  Pulmonary HTN  - Last echo from 6/27/23- EF 45%, Grade 2 DD, elevated PA pressure at 52 mmHg   - Pt followed by Dr. Sanchez outpatient  - On Toprol, Imdur, Lasix at home; questionable compliance  - Pulmonary edema and 2+ pitting lower extremity edema to knees   - BNP elevated to >4900 on admission trop 0.039   Plan:  - Will give IV lasix 80 mg and will monitor UOP  - Pt to receive HD today as above   - Will continue home toprol 25 mg, Imdur 30 mg  - Consider continued diuresis if pt has good response     Debility   - Pt w/ worsening debility over the last several days, and per daughter w/ several falls at home, most recent 1 month ago   - Pt has walker at home, but does not use it   - Pt is denying symptoms at this time  Plan:   - PT/OT consult, follow-up recs     CAD s/p CABG   - Pt had 3V CABG completed in 2014 for NSTEMI with non revascularized RCA   - She followed previously with Dr. Ware and now follows with Dr. Sanchez   - She currently is denying chest pain   - Trop mildly elevated to 0.039 on admission, BNP >4900  - EKG w/ sinus rhythm and irregular R-R interval  Plan:  - Continue home ASA 81 mg  - Will trend troponin until downtrending or stable   - Remainder as above     Constipation  - Per pt's daughter, pt has been c/o constipation and abdominal pain for several days  - Pt denies these concerns  - Abdominal exam benign, non-tender  Plan:  - Will start senna-docusate BID   - Encourage oral hydration    HTN  - BP elevated on admission to 203/78, improved on repeat to 180/74  - Pt to receive HD today  Plan:   - Restart home Imdur 30 mg, toprol 25 mg as above   - Getting lasix 80 as above   - Continue to monitor     HLD  - No recent lipid panel   - Continue home atorvastatin 40 mg       Healthcare maintenance   -primary care provider is Bayron Golden MD     Diet: Renal diet   VTE PPx: Heparin  Code Status: Full    Dispo: Admit to inpatient  for HD and IV diuresis.   Estimated LOS: 1-2 days         Lexus El MD  [facility] Internal Medicine/Pediatrics, PGY-2  08/24/2023 1:10 PM    [facility] Medicine Hospitalist Pager numbers:   [facility] Hospitalist Medicine Team A (Marti/Bruno): [phone]  [facility] Hospitalist Medicine Team B (Davion/Areli):  [phone]

## 2023-08-24 NOTE — ED PROVIDER NOTES
Encounter Date: 8/24/2023       History     Chief Complaint   Patient presents with    Shortness of Breath     Pt is a dialysis pt who presents with shortness of breath and weakness. Last dialysis was Friday. Pt reports she does still make some urine. Pt missed dialysis this week because she was feeling bad. Pt's family continuing to answer for patient and not allowing the  service to speak to pt.     88-year-old female with a history of end-stage renal disease on dialysis, last dialyzed Friday, diabetes, heart failure, coronary artery disease, hypertension presenting with generalized weakness and fatigue with shortness of breath all week.  Patient did not go to dialysis on Monday Wednesday because she felt bad.  No chest pain, fever, abdominal pain, vomiting or diarrhea.      Review of patient's allergies indicates:   Allergen Reactions    Aspirin Nausea Only and Swelling     Able to tolerated in small doses       Past Medical History:   Diagnosis Date    CHF (congestive heart failure)     Colon polyps 06/03/2013    Coronary artery disease     Diabetes mellitus     Encounter for blood transfusion     ESRD (end stage renal disease) 03/2014    dialysis M-F    GERD (gastroesophageal reflux disease)     High cholesterol     Hypertension      Past Surgical History:   Procedure Laterality Date    AV FISTULA PLACEMENT Left 9/2014    bilateral fem-pop      CENTRAL VENOUS CATHETER TUNNELED INSERTION DOUBLE LUMEN Right 2/2014    CORONARY ARTERY BYPASS GRAFT  2/14/2014     x 3 vessels    LEFT HEART CATHETERIZATION N/A 5/31/2021    Procedure: Left heart cath;  Surgeon: Brian Sanchez MD;  Location: [facility] CATH LAB/EP;  Service: Cardiology;  Laterality: N/A;    PERCUTANEOUS TRANSLUMINAL ANGIOPLASTY OF ARTERIOVENOUS FISTULA Left 5/5/2020    Procedure: PTA, Repair left upper arm anuerysm AV FISTULA;  Surgeon: Doris Mary MD;  Location: [facility] OR;  Service: General;  Laterality: Left;    VASCULAR SURGERY        Family History   Problem Relation Age of Onset    Hypertension Mother     Heart disease Mother     Heart attack Mother      Social History     Tobacco Use    Smoking status: Never    Smokeless tobacco: Never   Substance Use Topics    Alcohol use: No    Drug use: No     Review of Systems   Constitutional:  Positive for fatigue. Negative for fever.   Eyes:  Negative for pain.   Respiratory:  Positive for shortness of breath.    Cardiovascular:  Negative for chest pain.   Gastrointestinal:  Negative for abdominal pain, nausea and vomiting.   Genitourinary:  Negative for difficulty urinating.   Musculoskeletal:  Negative for back pain and neck pain.   Neurological:  Positive for weakness. Negative for headaches.   Psychiatric/Behavioral:  Negative for confusion.        Physical Exam     Initial Vitals [08/24/23 0822]   BP Pulse Resp Temp SpO2   (!) 203/78 70 (!) 22 97.6 °F (36.4 °C) 95 %      MAP       --         Physical Exam    Nursing note and vitals reviewed.  HENT:   Head: Atraumatic.   Eyes: Conjunctivae and EOM are normal.   Neck:   Normal range of motion.  Cardiovascular:      Exam reveals no gallop and no friction rub.       No murmur heard.  Pulmonary/Chest: Breath sounds normal. She is in respiratory distress (Mild tachypnea). She has no wheezes.   Abdominal: Abdomen is soft. There is no abdominal tenderness.   Musculoskeletal:         General: Edema present. Normal range of motion.      Cervical back: Normal range of motion.     Neurological: She is alert and oriented to person, place, and time.   Psychiatric: She has a normal mood and affect.         ED Course   Critical Care    Date/Time: 8/24/2023 10:12 AM    Performed by: Bright Gary MD  Authorized by: Bright Gary MD  Direct patient critical care time: 28 minutes  Ordering / reviewing critical care time: 10 minutes  Documentation critical care time: 10 minutes  Consulting other physicians critical care time: 5 minutes  Total critical care  time (exclusive of procedural time) : 53 minutes  Critical care was necessary to treat or prevent imminent or life-threatening deterioration of the following conditions: respiratory failure.  Critical care was time spent personally by me on the following activities: discussions with consultants, development of treatment plan with patient or surrogate, examination of patient, obtaining history from patient or surrogate, evaluation of patient's response to treatment, ordering and performing treatments and interventions, ordering and review of laboratory studies, ordering and review of radiographic studies, pulse oximetry, re-evaluation of patient's condition and review of old charts.        Labs Reviewed   TROPONIN I - Abnormal; Notable for the following components:       Result Value    Troponin I 0.039 (*)     All other components within normal limits   COMPREHENSIVE METABOLIC PANEL - Abnormal; Notable for the following components:    Sodium 130 (*)     Chloride 92 (*)     CO2 13 (*)     BUN 68 (*)     Creatinine 6.2 (*)     Total Bilirubin 1.2 (*)     Alkaline Phosphatase 137 (*)     eGFR 6 (*)     Anion Gap 25 (*)     All other components within normal limits   CBC W/ AUTO DIFFERENTIAL - Abnormal; Notable for the following components:    Hemoglobin 11.4 (*)     Hematocrit 34.5 (*)     RDW 16.5 (*)     Gran # (ANC) 9.8 (*)     Immature Grans (Abs) 0.06 (*)     Lymph # 0.9 (*)     Gran % 84.1 (*)     Lymph % 7.7 (*)     All other components within normal limits   B-TYPE NATRIURETIC PEPTIDE - Abnormal; Notable for the following components:    BNP >4,900 (*)     All other components within normal limits   MAGNESIUM   SARS-COV-2 RNA AMPLIFICATION, QUAL     EKG Readings: (Independently Interpreted)   Initial Reading: No STEMI. Rhythm: Sinus Arrhythmia. Heart Rate: 72. Ectopy: No Ectopy. Conduction: Normal.     ECG Results              EKG 12-lead (In process)  Result time 08/24/23 10:18:50      In process by Interface,  Lab In [facility] (08/24/23 10:18:50)                   Narrative:    Test Reason : R06.02,    Vent. Rate : 072 BPM     Atrial Rate : 072 BPM     P-R Int : 180 ms          QRS Dur : 088 ms      QT Int : 460 ms       P-R-T Axes : 075 095 084 degrees     QTc Int : 503 ms    Sinus rhythm with marked sinus arrythmia  Rightward axis  Right ventricular conduction delay  Prolonged QT  Abnormal ECG  When compared with ECG of 11-JUL-2023 16:20,  No significant change was found    Referred By: [email or web address]   SELF           Confirmed By:                                   Imaging Results              X-Ray Chest AP Portable (Final result)  Result time 08/24/23 10:03:48      Final result by Abdirizak Baird MD (08/24/23 10:03:48)                   Impression:      1. Radiographic findings suggestive of pulmonary edema.  2. Stable enlargement of the cardiac silhouette.  Status post CABG.  3. Prominent central pulmonary vasculature, a finding that can be seen in the setting of pulmonary hypertension.      Electronically signed by: Abdirizak Baird MD  Date:    08/24/2023  Time:    10:03               Narrative:    EXAMINATION:  XR CHEST AP PORTABLE    CLINICAL HISTORY:  sob;    TECHNIQUE:  Single frontal view of the chest was performed.    COMPARISON:  Radiograph 06/24/2021.    FINDINGS:  Cardiac monitoring leads project over the bilateral hemithoraces.  Mediastinal structures are midline.  Stable prominence of the central pulmonary vasculature, a finding that can be seen in the setting of pulmonary hypertension.  Cardiac silhouette remains enlarged.  Postoperative change of CABG.  Lung volumes are symmetric.  Patchy pulmonary parenchymal ground-glass attenuation and increased interstitial opacities, favored to represent sequela of pulmonary edema.  Subsegmental band like opacity projecting over the lingula, likely atelectasis or scarring.  Elevated left hemidiaphragm.  No pneumothorax or large pleural effusion.  No free air beneath  the diaphragm.  Sternotomy wires are well aligned.  No acute osseous abnormalities.                                    X-Rays:   Independently Interpreted Readings:   Chest X-Ray: Increased vascular markings consistent with CHF are present.     Medications   mupirocin 2 % ointment (has no administration in time range)   0.9%  NaCl infusion (has no administration in time range)   0.9%  NaCl infusion (has no administration in time range)   sodium chloride 0.9% bolus 250 mL 250 mL (has no administration in time range)   0.9%  NaCl infusion (has no administration in time range)   0.9%  NaCl infusion (has no administration in time range)   sodium chloride 0.9% bolus 250 mL 250 mL (has no administration in time range)     Medical Decision Making  88-year-old female with shortness of breath, malaise and fatigue this week.  Missed dialysis twice this week because she did not feel well.  Vital signs notable for mild tachypnea and hypertension.  She is afebrile.  She does have lower extremity edema.  EKG without ischemic changes.    Amount and/or Complexity of Data Reviewed  Labs: ordered. Decision-making details documented in ED Course.  Radiology: ordered.    Risk  Decision regarding hospitalization.               ED Course as of 08/24/23 1059   Thu Aug 24, 2023   1006 Comprehensive Metabolic Panel(!) [SN]   1006 Magnesium: 2.0 [SN]   1006 Troponin I(!): 0.039 [SN]   1006 BNP(!): >4,900 [SN]   1006 CBC Auto Differential(!) [SN]   1013 Chest x-ray shows pulmonary edema.  O2 sats on room air 89%.  Will consult Nephrology and admit to medicine for further management [SN]      ED Course User Index  [SN] Bright Gary MD                    Clinical Impression:   Final diagnoses:  [R06.02] SOB (shortness of breath)  [R09.02] Hypoxia (Primary)  [E87.70] Hypervolemia, unspecified hypervolemia type  [N18.6] ESRD (end stage renal disease)        ED Disposition Condition    Admit Stable                Bright Gary,  MD  08/24/23 1015       Bright Gary MD  08/24/23 1327

## 2023-08-24 NOTE — PT/OT/SLP PROGRESS
Physical Therapy       Patient Name:  Erin Clark   MRN:  [id number]    Patient not seen today secondary to Dialysis. Will follow-up as able.    8/24/2023

## 2023-08-24 NOTE — PT/OT/SLP PROGRESS
Occupational Therapy  Visit Attempt     Patient Name:  Erin Clark   MRN:  [id number]    Patient not seen today secondary to Dialysis. Will follow-up as available.    8/24/2023

## 2023-08-25 VITALS
HEART RATE: 77 BPM | WEIGHT: 119.94 LBS | RESPIRATION RATE: 20 BRPM | DIASTOLIC BLOOD PRESSURE: 72 MMHG | HEIGHT: 65 IN | BODY MASS INDEX: 19.98 KG/M2 | OXYGEN SATURATION: 93 % | TEMPERATURE: 98 F | SYSTOLIC BLOOD PRESSURE: 173 MMHG

## 2023-08-25 PROBLEM — J81.0 ACUTE PULMONARY EDEMA: Status: RESOLVED | Noted: 2023-08-24 | Resolved: 2023-08-25

## 2023-08-25 LAB
ALBUMIN SERPL BCP-MCNC: 3.2 G/DL (ref 3.5–5.2)
ANION GAP SERPL CALC-SCNC: 15 MMOL/L (ref 8–16)
BASOPHILS # BLD AUTO: 0.05 K/UL (ref 0–0.2)
BASOPHILS NFR BLD: 0.6 % (ref 0–1.9)
BUN SERPL-MCNC: 34 MG/DL (ref 8–23)
CALCIUM SERPL-MCNC: 8.4 MG/DL (ref 8.7–10.5)
CHLORIDE SERPL-SCNC: 92 MMOL/L (ref 95–110)
CO2 SERPL-SCNC: 28 MMOL/L (ref 23–29)
CREAT SERPL-MCNC: 3.9 MG/DL (ref 0.5–1.4)
DIFFERENTIAL METHOD: ABNORMAL
EOSINOPHIL # BLD AUTO: 0.1 K/UL (ref 0–0.5)
EOSINOPHIL NFR BLD: 0.7 % (ref 0–8)
ERYTHROCYTE [DISTWIDTH] IN BLOOD BY AUTOMATED COUNT: 16.8 % (ref 11.5–14.5)
EST. GFR  (NO RACE VARIABLE): 11 ML/MIN/1.73 M^2
GLUCOSE SERPL-MCNC: 71 MG/DL (ref 70–110)
HBV CORE AB SERPL QL IA: NORMAL
HBV SURFACE AG SERPL QL IA: NORMAL
HCT VFR BLD AUTO: 34.9 % (ref 37–48.5)
HGB BLD-MCNC: 11.2 G/DL (ref 12–16)
IMM GRANULOCYTES # BLD AUTO: 0.05 K/UL (ref 0–0.04)
IMM GRANULOCYTES NFR BLD AUTO: 0.6 % (ref 0–0.5)
LYMPHOCYTES # BLD AUTO: 0.9 K/UL (ref 1–4.8)
LYMPHOCYTES NFR BLD: 9.5 % (ref 18–48)
MAGNESIUM SERPL-MCNC: 1.9 MG/DL (ref 1.6–2.6)
MCH RBC QN AUTO: 27 PG (ref 27–31)
MCHC RBC AUTO-ENTMCNC: 32.1 G/DL (ref 32–36)
MCV RBC AUTO: 84 FL (ref 82–98)
MONOCYTES # BLD AUTO: 0.7 K/UL (ref 0.3–1)
MONOCYTES NFR BLD: 7.9 % (ref 4–15)
NEUTROPHILS # BLD AUTO: 7.2 K/UL (ref 1.8–7.7)
NEUTROPHILS NFR BLD: 80.7 % (ref 38–73)
NRBC BLD-RTO: 0 /100 WBC
PHOSPHATE SERPL-MCNC: 3.4 MG/DL (ref 2.7–4.5)
PHOSPHATE SERPL-MCNC: 3.4 MG/DL (ref 2.7–4.5)
PLATELET # BLD AUTO: 259 K/UL (ref 150–450)
PMV BLD AUTO: 9.4 FL (ref 9.2–12.9)
POCT GLUCOSE: 100 MG/DL (ref 70–110)
POCT GLUCOSE: 179 MG/DL (ref 70–110)
POCT GLUCOSE: 59 MG/DL (ref 70–110)
POCT GLUCOSE: 78 MG/DL (ref 70–110)
POTASSIUM SERPL-SCNC: 3 MMOL/L (ref 3.5–5.1)
RBC # BLD AUTO: 4.15 M/UL (ref 4–5.4)
SODIUM SERPL-SCNC: 135 MMOL/L (ref 136–145)
WBC # BLD AUTO: 8.95 K/UL (ref 3.9–12.7)

## 2023-08-25 PROCEDURE — 36415 COLL VENOUS BLD VENIPUNCTURE: CPT

## 2023-08-25 PROCEDURE — 87340 HEPATITIS B SURFACE AG IA: CPT | Performed by: STUDENT IN AN ORGANIZED HEALTH CARE EDUCATION/TRAINING PROGRAM

## 2023-08-25 PROCEDURE — 86704 HEP B CORE ANTIBODY TOTAL: CPT | Performed by: STUDENT IN AN ORGANIZED HEALTH CARE EDUCATION/TRAINING PROGRAM

## 2023-08-25 PROCEDURE — 63600175 PHARM REV CODE 636 W HCPCS

## 2023-08-25 PROCEDURE — 83735 ASSAY OF MAGNESIUM: CPT

## 2023-08-25 PROCEDURE — 36415 COLL VENOUS BLD VENIPUNCTURE: CPT | Performed by: STUDENT IN AN ORGANIZED HEALTH CARE EDUCATION/TRAINING PROGRAM

## 2023-08-25 PROCEDURE — 86706 HEP B SURFACE ANTIBODY: CPT | Performed by: STUDENT IN AN ORGANIZED HEALTH CARE EDUCATION/TRAINING PROGRAM

## 2023-08-25 PROCEDURE — 80100016 HC MAINTENANCE HEMODIALYSIS

## 2023-08-25 PROCEDURE — 80069 RENAL FUNCTION PANEL: CPT

## 2023-08-25 PROCEDURE — 85025 COMPLETE CBC W/AUTO DIFF WBC: CPT

## 2023-08-25 PROCEDURE — 25000003 PHARM REV CODE 250

## 2023-08-25 RX ORDER — POLYETHYLENE GLYCOL 3350 17 G/17G
17 POWDER, FOR SOLUTION ORAL DAILY PRN
Qty: 238 G | Refills: 0 | Status: SHIPPED | OUTPATIENT
Start: 2023-08-25

## 2023-08-25 RX ORDER — FUROSEMIDE 40 MG/1
80 TABLET ORAL DAILY
Qty: 90 TABLET | Refills: 0 | Status: SHIPPED | OUTPATIENT
Start: 2023-08-25 | End: 2023-11-27

## 2023-08-25 RX ADMIN — DEXTROSE MONOHYDRATE 125 ML: 100 INJECTION, SOLUTION INTRAVENOUS at 06:08

## 2023-08-25 RX ADMIN — HEPARIN SODIUM 5000 UNITS: 5000 INJECTION INTRAVENOUS; SUBCUTANEOUS at 06:08

## 2023-08-25 NOTE — NURSING
Pt care assumed. Pt found sitting up in bed, on continuous cardiac monitoring, left upper arm fistula dressing DCI. Pt took off NC multiple times, SpO2 94 on room air. O2 at 2L and NC within pt's reach. Pt instructed to eb if SOB. Vishal utilized to translate. Pt oriented to room, instructed no OOB w/o staff assistance, call when need, verbalized understanding. Bed in lowest position, locked and bed alarms on. Call bell within pt's reach.

## 2023-08-25 NOTE — PT/OT/SLP PROGRESS
Physical Therapy      Patient Name:  Erin Clark   MRN:  [id number]    Patient not seen today secondary to Dialysis. Will follow-up.

## 2023-08-25 NOTE — DISCHARGE SUMMARY
[facility] Medicine Discharge Summary    Primary Team: [facility] Hospitalist Team B  Attending Physician: Santy Singleton MD  Resident: Nestor  Intern: Sienna    Date of Admit: 8/24/2023  Date of Discharge: 8/25/2023    Discharge to: Home/Self-Care  Condition: Stable    Discharge Diagnoses     Patient Active Problem List   Diagnosis    Type 2 diabetes mellitus with chronic kidney disease on chronic dialysis, with long-term current use of insulin    Peripheral arterial occlusive disease    Hypertension    CAD (coronary artery disease)    Acute on chronic combined systolic and diastolic congestive heart failure    Anemia    S/P CABG x 1    SOB (shortness of breath)    End-stage renal disease on hemodialysis    Hyperlipidemia LDL goal <70    Hypoxia    Hypervolemia    Essential hypertension    Aneurysm of arteriovenous dialysis fistula    Sleep apnea    Malfunction of device    Frailty    Secondary hyperparathyroidism of renal origin    Mucopurulent chronic bronchitis    Dependence on renal dialysis    Acute pulmonary edema       Consultants and Procedures     Consultants:  - Nephrology    Procedures:   - None    Imaging:  X-Ray Chest AP Portable   Final Result      1. Radiographic findings suggestive of pulmonary edema.   2. Stable enlargement of the cardiac silhouette.  Status post CABG.   3. Prominent central pulmonary vasculature, a finding that can be seen in the setting of pulmonary hypertension.         Electronically signed by: Abdirizak Baird MD   Date:    08/24/2023   Time:    10:03            Brief History of Present Illness      Erin Clark is a 88 y.o. F with past medical history of ESRD on HD MWF, CAD s/p CABG, combined systolic and diastolic HF (EF 45% in 6/2023), pulmonary HTN, PAD, DM2, HTN, HLD, osteoporosis who presented on 8/24/2023 for shortness of breath and weakness for several days. History is gathered primarily from pt's daughter.      Per pt's daughter, pt has not been feeling well since  "sometime over the weekend. She has been complaining of abdominal discomfort and constipation as well as difficulty urinating (pt does still make some urine at home and is on home lasix).  She has also had progressive debility with several falls at home, most recent 1 month ago. She has not sought medical care for these falls. Because of her sx, she did not go to dialysis on Monday or Wednesday of this week, last session was on Friday. She became progressively short of breath and asked her daughter to bring her to the emergency room. On interview of patient, she states "I don't know why I'm here" and denies all current and prior symptoms including those expressed by her daughter.     For the full HPI please refer to the History & Physical from this admission.    Hospital Course By Problem with Pertinent Findings     In the ED pt was initially found to be hypertensive to 203/78 satting at 89% on RA and CXR w/ pulmonary edema with undetectably high BNP. She was placed on 2L NC and nephrology was consulted. She was admitted to medicine for further management of pulmonary edema. Pt had clear lung sounds on exam and no increased WOB. She had a 2.5 hr HD session with removal of 1.75L. She tolerated this session well and was quickly weaned off O2 onto RA. Pt was given an 80 mg IV lasix dose as well. She received her regularly scheduled HD on day of discharge. Pt worked with PT/OT during admission given debility and plan for discharge to home with home health PT/OT and health aide for assistance with meds. Pt had no new concerns throughout her hospital course.     Acute Hypoxic Respiratory Failure 2/2 Pulmonary Edema  - Pt with gradual onset of sx that have worsened over the last several days   - Pt denies SOB at this time, lungs with minimal crackles at lung bases  - CXR c/w pulmonary edema, BNP >4900, trop mildly elevated at 0.039  - COVID, flu negative  - Got Lasix 80 IV, 1x unmeasured UOP  - Nephrology consulted, pt got " 2.5 hr HD session on day of admission and full session of day of dc, 1.75 L and 2.75L removed respectively  - Pt weaned off NC O2 rapidly and w/o SOB/crackles on lung exam by day of discharge  - Increased lasix to 80 mg daily on dc per nephro recs    ESRD on HD   - Gets HD MWF, skipped Monday and Wednesday this week   - Pt received HD x2 as above  - Lasix dosing as above      Combined Systolic and Diastolic HF  Pulmonary HTN  - Last echo from 6/27/23- EF 45%, Grade 2 DD, elevated PA pressure at 52 mmHg   - Pt followed by Dr. Sanchez outpatient  - On Toprol, Imdur, Lasix at home; questionable compliance  - Pulmonary edema and 2+ pitting lower extremity edema to knees   - BNP elevated to >4900 on admission trop 0.039   - Got lasix and HD as above w/ improvement  - continued home toprol 25 mg, Imdur 30 mg  - home health aide on discharge to assist with medication compliance/HF monitoring     Debility   - Pt w/ worsening debility over the last several days, and per daughter w/ several falls at home, most recent 1 month ago   - PT/OT consult, worked with pt during admission   - Pt dc'd home with home health       CAD s/p CABG   - Pt had 3V CABG completed in 2014 for NSTEMI with non revascularized RCA   - She followed previously with Dr. Ware and now follows with Dr. Sanchez   - No chest pain this admission  - Trop mildly elevated to 0.039 on admission, BNP >4900  - EKG w/ sinus rhythm and irregular R-R interval  - Continued home ASA 81 mg  - Remainder as above      Constipation  - Per pt's daughter, pt has been c/o constipation and abdominal pain for several days  - Abdominal exam benign, non-tender  - Started miralax and senna-docusate BID inpatient  - Discharged home on miralax  - Encouraged oral hydration     HTN  - BP elevated on admission to 203/78, improved on repeat to 180/74  - HD as above w/ improvement in pressures to 160s-170s/90s   - Continued home Imdur 30 mg, toprol 25 mg as above   - Got lasix 80 as  above   - Continue to monitor      HLD  - No recent lipid panel   - Continued home atorvastatin 40 mg       Physical exam on day of discharge:  Temp:  [97.6 °F (36.4 °C)-98.6 °F (37 °C)] 98.3 °F (36.8 °C)  Pulse:  [61-80] 64  Resp:  [17-33] 20  SpO2:  [85 %-100 %] 85 %  BP: (144-203)/(65-87) 144/65  General: No acute distress, resting comfortably   HEENT: Atraumatic, normocephalic, moist mucous membranes  Cardiovascular: Regular rate and rhythm, normal S1 and S2, no extra heart sounds or murmurs appreciated   Chest wall: CABG scar over sternum    Back: No abnormal curvature noted, symmetric rise with respiration   Respiratory: Stable on room air, normal work of breathing, no conversational dyspnea, no accessory muscle use noted, minimal crackles at bilateral lung bases, no wheezes   Abdominal: Non-distended, soft, normoactive bowel sounds, non-tender to palpation, no guarding  Extremities: 2+ pitting edema of BLEs to the knees, moving all four extremities  Skin: Non-diaphoretic, warm, dry  Neurologic: Oriented x3, at baseline    Vitals on discharge:   Vitals:    08/25/23 0740   BP: (!) 144/65   Pulse: 64   Resp: 20   Temp: 98.3 °F (36.8 °C)        Discharge Medications        Medication List        START taking these medications      polyethylene glycol 17 gram/dose powder  Commonly known as: GLYCOLAX  Take 17 g by mouth daily as needed (Estrenimiento (constipation)).            CHANGE how you take these medications      atorvastatin 40 MG tablet  Commonly known as: LIPITOR  Take 1 tablet by mouth once daily  What changed: when to take this     furosemide 40 MG tablet  Commonly known as: LASIX  Take 2 tablets (80 mg total) by mouth once daily.  What changed:   how much to take  when to take this            CONTINUE taking these medications      aspirin 81 MG EC tablet  Commonly known as: ECOTRIN  TAKE ONE TABLET BY MOUTH ONCE DAILY     hydrALAZINE 25 MG tablet  Commonly known as: APRESOLINE  Take 1 tablet (25 mg  total) by mouth every 12 (twelve) hours.     metoprolol succinate 25 MG 24 hr tablet  Commonly known as: TOPROL-XL     nitroGLYCERIN 0.4 MG SL tablet  Commonly known as: NITROSTAT     pantoprazole 40 MG tablet  Commonly known as: PROTONIX  Take 1 tablet (40 mg total) by mouth before breakfast.     TRIPHROCAPS 1 mg Cap  Generic drug: vitamin renal formula (B-complex-vitamin c-folic acid)            ASK your doctor about these medications      albuterol 90 mcg/actuation inhaler  Commonly known as: PROAIR HFA  Inhale 2 puffs into the lungs every 6 (six) hours as needed for Wheezing. Rescue     isosorbide mononitrate 30 MG 24 hr tablet  Commonly known as: IMDUR  Take 1 tablet by mouth once daily               Where to Get Your Medications        These medications were sent to [facility]  [address], [place] [address]      Hours: Mon-Fri, 8a-5:30p Phone: [phone]   furosemide 40 MG tablet  polyethylene glycol 17 gram/dose powder          Discharge Information:   Diet:  Renal diet    Physical Activity:  Up with assistance             Instructions:  1. Take all medications as prescribed  2. Keep all follow-up appointments  3. Return to the hospital or call your primary care physicians if any worsening symptoms such as fever, chest pain, shortness of breath, return of symptoms, or any other concerns.    Follow-Up Appointments:  Future Appointments   Date Time Provider Department Center   11/28/2023  1:00 PM Bayron Golden MD [facility] Lulu El MD  [facility] Internal Medicine and Pediatrics, PGY-2  08/25/2023 8:08 AM

## 2023-08-25 NOTE — PLAN OF CARE
CM contacted by Virtual Nurse - pt for d/c to home today   Per SW notes - orders sent to [facility] for HH - agency to be assigned per N   Pt to continue HD MWF at [facility]   f/u apt made with Dr. Gregory (f/u apt - pcp is Dr. Golden)   Pt has transportation to home   Discharging nurse to review all d/c meds/instructions         08/25/23 1701   Final Note   Assessment Type Final Discharge Note   Anticipated Discharge Disposition Home-Health  (Per N)   What phone number can be called within the next 1-3 days to see how you are doing after discharge? [phone]   Hospital Resources/Appts/Education Provided Appointments scheduled and added to AVS   Post-Acute Status   Post-Acute Authorization Other  (Orders sent to [facility])   Home Health Status Referrals Sent

## 2023-08-25 NOTE — PLAN OF CARE
RANCHO met with pt and pt's brother at bedside this AM to complete DCA. Pt's brother Efrain [phone] confirmed pt's . Pt lives at home with her ocnrado Lentz and brother. Pt will have her brother help transport her home at time of d/c. The pt [facility] [phone] MWF at 10am. Pt has a cane that she occasionally will use to ambulate but is for the most part independent in her ADL. White board updated with CM name and contact information.  Discharge brochure provided.  Pt encouraged to call with any questions or concerns.  Cm will continue to follow pt through transitions of care and assist with any discharge needs.    Mike Jasso, CHRISTOPHER  [phone]    Future Appointments   Date Time Provider Department Center   2023  1:00 PM Bayron Golden MD [facility]

## 2023-08-25 NOTE — NURSING
RAPID RESPONSE NURSE PROACTIVE ROUNDING NOTE     Time of Visit: 0613    Called per Bedside Nurse for PIV placement. 20G PIV placed via U/S guidance to R upper arm. Blood return noted and no resistance met when flushed. Pt tolerated well. NS locked.        FOLLOW-UP     Call back the Rapid Response Nurse, MARIPOSA VILLANUEVA RN at [phone] for additional questions or concerns.

## 2023-08-25 NOTE — PROCEDURES
"Patient seen and examined on HD tolerating well. No complains. Ok for discharge after  HD resume home meds   BP (!) 182/73   Pulse 74   Temp 97.3 °F (36.3 °C) (Temporal)   Resp 16   Ht 5' 5" (1.651 m)   Wt 54.4 kg (119 lb 14.9 oz)   SpO2 (!) 85%   BMI 19.96 kg/m²     With any question please call answering service [phone]  Jesus Angel MD    [facility]   CANDY Alanis MD,   MD KRISTI Baugh MD E. V. Harmon, NP    [address]  [place], [address]   "

## 2023-08-25 NOTE — PLAN OF CARE
VN reviewed discharge instructions with pt.'s son. Pts son refused . Using teach back method.  AVS printed and handed to pt by bedside nurse.  Reviewed follow-up appointments, medications, diet, and importance of medication compliance.  Reviewed home care instructions, treatment plan, self-management, and when to seek medical attention.  Allowed time for questions.  All questions answered.  Patient's son  verbalized complete understanding of discharge instructions and voices no concerns.     Discharge instructions complete.  Bedside delivery done.  Transport/wheelchair requested.  Bedside nurse notified.

## 2023-08-25 NOTE — PLAN OF CARE
Pt is cleared to d/c home today. Pt will have her brother Efrain [phone] help transport her home at time of d/c. RANCHO faxed and emailed HH orders to [facility] with [facility]. [facility] requested f/u appts. Rounds completed on pt. All questions addressed. Bedside nurse to discuss d/c medications. Discussed importance to attend all f/u appts and take medications as prescribed. Verbalized understanding. Case Management to sign off.     CHRISTOPHER Mei  [phone]    Future Appointments   Date Time Provider Department Center   11/28/2023  1:00 PM Bayron Golden MD [facility]        08/25/23 8900   Final Note   Assessment Type Final Discharge Note   Anticipated Discharge Disposition Home-Health   What phone number can be called within the next 1-3 days to see how you are doing after discharge? [phone]   Post-Acute Status   Post-Acute Authorization Home Health   Home Health Status Referrals Sent   Coverage PHN   Discharge Delays None known at this time

## 2023-08-25 NOTE — PT/OT/SLP PROGRESS
Occupational Therapy attempt      Patient Name:  Erin Clark   MRN:  [id number]    Patient not seen today secondary to Dialysis. Will follow-up as able.    8/25/2023

## 2023-08-25 NOTE — PLAN OF CARE
[facility]      HOME HEALTH ORDERS  FACE TO FACE ENCOUNTER    Patient Name: Erin Clark  YOB: 1935    PCP: Bayron Golden MD   PCP Address: [address] / [place] [address]  PCP Phone Number: [phone]  PCP Fax: [phone]    Encounter Date: 8/24/23    Admit to Home Health    Diagnoses:  Active Hospital Problems    Diagnosis  POA    End-stage renal disease on hemodialysis [N18.6, Z99.2]  Not Applicable    Peripheral arterial occlusive disease [I77.9]  Yes    Hypertension [I10]  Yes    CAD (coronary artery disease) [I25.10]  Yes     S/p CABG 2/14/14 LIMA-D1, SVG-LAD, SVG-OM1 for NSTEMI with non revascularized RCA , normalized EF      Acute on chronic combined systolic and diastolic congestive heart failure [I50.43]  Yes    Type 2 diabetes mellitus with chronic kidney disease on chronic dialysis, with long-term current use of insulin [E11.22, N18.6, Z99.2, Z79.4]  Not Applicable      Resolved Hospital Problems    Diagnosis Date Resolved POA    *Acute pulmonary edema [J81.0] 08/25/2023 Yes    Hypervolemia [E87.70] 08/25/2023 Yes     Follow Up Appointments:  Future Appointments   Date Time Provider Department Center   11/28/2023  1:00 PM Bayron Golden MD [facility]     Allergies:  Review of patient's allergies indicates:   Allergen Reactions    Aspirin Nausea Only and Swelling     Able to tolerated in small doses       Medications: Review discharge medications with patient and family and provide education.    Current Facility-Administered Medications   Medication Dose Route Frequency Provider Last Rate Last Admin    0.9%  NaCl infusion   Intravenous PRN Angel Baez MD        0.9%  NaCl infusion   Intravenous Once Angel Baez MD        0.9%  NaCl infusion   Intravenous PRN Angel Baez MD        0.9%  NaCl infusion   Intravenous Once Angel Baez MD        acetaminophen tablet 650 mg  650 mg Oral Q4H PRN Angel Baez MD         aspirin EC tablet 81 mg  81 mg Oral Daily Angel aBez MD        atorvastatin tablet 40 mg  40 mg Oral QHS Angel Baez MD   40 mg at 08/24/23 2032    dextrose 10% bolus 125 mL 125 mL  12.5 g Intravenous PRN Angel Baez MD   Stopped at 08/25/23 0629    dextrose 10% bolus 250 mL 250 mL  25 g Intravenous PRN Angel Baez MD        glucagon (human recombinant) injection 1 mg  1 mg Intramuscular PRN Angel Baez MD        glucose chewable tablet 16 g  16 g Oral PRN Angel Baez MD        glucose chewable tablet 24 g  24 g Oral PRN Angel Baez MD        heparin (porcine) injection 5,000 Units  5,000 Units Subcutaneous Q8H Angel Baez MD   5,000 Units at 08/25/23 0616    hydrALAZINE tablet 25 mg  25 mg Oral Q8H PRN Angel Baez MD        insulin aspart U-100 pen 0-5 Units  0-5 Units Subcutaneous QID (AC + HS) PRAngel Haro MD        isosorbide mononitrate 24 hr tablet 30 mg  30 mg Oral Daily Angel Baez MD        melatonin tablet 6 mg  6 mg Oral Nightly PRN Angel Baez MD   6 mg at 08/24/23 2032    metoprolol succinate (TOPROL-XL) 24 hr tablet 25 mg  25 mg Oral Daily Angel Baez MD   25 mg at 08/24/23 1334    mupirocin 2 % ointment   Nasal BID Angel Baez MD   Given at 08/24/23 2032    naloxone 0.4 mg/mL injection 0.02 mg  0.02 mg Intravenous PRAngel Haro MD        polyethylene glycol packet 17 g  17 g Oral Daily Angel Baez MD   17 g at 08/24/23 1810    senna-docusate 8.6-50 mg per tablet 1 tablet  1 tablet Oral BID Angel Baez MD   1 tablet at 08/24/23 2032    simethicone chewable tablet 80 mg  1 tablet Oral QID PRN Angel Beaz MD        sodium chloride 0.9% bolus 250 mL 250 mL  250 mL Intravenous PRAngel Haro MD        sodium chloride 0.9% bolus 250 mL 250 mL  250 mL Intravenous PRAngel Haro MD        sodium chloride 0.9% flush 10 mL  10 mL Intravenous Q12H PRN Angel Baez MD         Current Discharge  Medication List        START taking these medications    Details   polyethylene glycol (GLYCOLAX) 17 gram PwPk Take 17 g by mouth daily as needed (Estrenimiento (constipation)).  Qty: 14 each, Refills: 0           CONTINUE these medications which have CHANGED    Details   furosemide (LASIX) 40 MG tablet Take 2 tablets (80 mg total) by mouth once daily.  Qty: 90 tablet, Refills: 0    Associated Diagnoses: Coronary artery disease involving native coronary artery without angina pectoris, unspecified whether native or transplanted heart; Essential hypertension; Peripheral arterial occlusive disease; S/P CABG x 1; Hyperlipidemia LDL goal <70           CONTINUE these medications which have NOT CHANGED    Details   atorvastatin (LIPITOR) 40 MG tablet Take 1 tablet by mouth once daily  Qty: 90 tablet, Refills: 0      metoprolol succinate (TOPROL-XL) 25 MG 24 hr tablet Take 25 mg by mouth once daily.      vitamin renal formula, B-complex-vitamin c-folic acid, (TRIPHROCAPS) 1 mg Cap Take 1 capsule by mouth once daily.      albuterol (PROAIR HFA) 90 mcg/actuation inhaler Inhale 2 puffs into the lungs every 6 (six) hours as needed for Wheezing. Rescue  Qty: 18 g, Refills: 0    Associated Diagnoses: Mucopurulent chronic bronchitis      aspirin (ECOTRIN) 81 MG EC tablet TAKE ONE TABLET BY MOUTH ONCE DAILY  Qty: 30 tablet, Refills: 6      hydrALAZINE (APRESOLINE) 25 MG tablet Take 1 tablet (25 mg total) by mouth every 12 (twelve) hours.  Qty: 60 tablet, Refills: 11    Comments: .      isosorbide mononitrate (IMDUR) 30 MG 24 hr tablet Take 1 tablet by mouth once daily  Qty: 90 tablet, Refills: 0      nitroGLYCERIN (NITROSTAT) 0.4 MG SL tablet Place 0.4 mg under the tongue every 5 (five) minutes as needed for Chest pain.      pantoprazole (PROTONIX) 40 MG tablet Take 1 tablet (40 mg total) by mouth before breakfast.  Qty: 30 tablet, Refills: 0    Associated Diagnoses: Gastroesophageal reflux disease, esophagitis presence not  specified               I have seen and examined this patient within the last 30 days. My clinical findings that support the need for the home health skilled services and home bound status are the following:no   Weakness/numbness causing balance and gait disturbance due to Weakness/Debility making it taxing to leave home.  Requiring assistive device to leave home due to unsteady gait caused by  Weakness/Debility.  Patient with medication mismanagement issues requiring home bound status as evidenced by  Poor understanding of medication regimen/dosage, Poor adherence to medication regimen/dosage, and frequently missed hemodialysis sessions.     Diet:   renal diet    Labs:  None    Referrals/ Consults  Physical Therapy to evaluate and treat. Evaluate for home safety and equipment needs; Establish/upgrade home exercise program. Perform / instruct on therapeutic exercises, gait training, transfer training, and Range of Motion.  Occupational Therapy to evaluate and treat. Evaluate home environment for safety and equipment needs. Perform/Instruct on transfers, ADL training, ROM, and therapeutic exercises.  Aide to provide assistance with personal care, ADLs, and vital signs.    Activities:   ambulate in house with assistance    Nursing:   Agency to admit patient within 24 hours of hospital discharge unless specified on physician order or at patient request    SN to complete comprehensive assessment including routine vital signs. Instruct on disease process and s/s of complications to report to MD. Review/verify medication list sent home with the patient at time of discharge  and instruct patient/caregiver as needed. Frequency may be adjusted depending on start of care date.     Skilled nurse to perform up to 3 visits PRN for symptoms related to diagnosis    Notify MD if SBP > 180 or < 90; DBP > 110 or < 50; HR > 120 or < 50; Temp > 101; O2 < 88%; Other:   missed HD sessions    Ok to schedule additional visits based on  staff availability and patient request on consecutive days within the home health episode.    When multiple disciplines ordered:    Start of Care occurs on Sunday - Wednesday schedule remaining discipline evaluations as ordered on separate consecutive days following the start of care.    Thursday SOC -schedule subsequent evaluations Friday and Monday the following week.     Friday - Saturday SOC - schedule subsequent discipline evaluations on consecutive days starting Monday of the following week.    For all post-discharge communication and subsequent orders please contact patient's primary care physician. If unable to reach primary care physician or do not receive response within 30 minutes, please contact [phone] for clinical staff order clarification    Miscellaneous   Heart Failure:      SN to instruct on the following:    Instruct on the definition of CHF.   Instruct on the signs/sympoms of CHF to be reported.   Instruct on and monitor daily weights.   Instruct on factors that cause exacerbation.   Instruct on action, dose, schedule, and side effects of medications.   Instruct on diet as prescribed.   Instruct on activity allowed.   Instruct on life-style modifications for life long management of CHF   SN to assess compliance with daily weights, diet, medications, fluid retention,    safety precautions, activities permitted and life-style modifications.   Additional 1-2 SN visits per week as needed for signs and symptoms     of CHF exacerbation.      For Weight Gain > 2-3 lbs in 1 day or 4-6 lbs over 1 week notify PCP:      Home Health Aide:  Nursing Weekly, Physical Therapy Three times weekly, Occupational Therapy Three times weekly, and Home Health Aide Daily    Wound Care Orders  no    I certify that this patient is confined to her home and needs intermittent skilled nursing care, physical therapy, and occupational therapy.      Lexus El MD  [facility] Internal Medicine/Pediatrics,  PGY-2  08/25/2023 2:21 PM

## 2023-08-26 PROCEDURE — G0180 MD CERTIFICATION HHA PATIENT: HCPCS | Mod: ,,, | Performed by: FAMILY MEDICINE

## 2023-08-26 PROCEDURE — G0180 PR HOME HEALTH MD CERTIFICATION: ICD-10-PCS | Mod: ,,, | Performed by: FAMILY MEDICINE

## 2023-08-28 ENCOUNTER — PATIENT OUTREACH (OUTPATIENT)
Dept: ADMINISTRATIVE | Facility: CLINIC | Age: 88
End: 2023-08-28
Payer: MEDICARE

## 2023-08-28 LAB
HBV SURFACE AB SER QL IA: POSITIVE
HBV SURFACE AB SERPL IA-ACNC: 49 MIU/ML

## 2023-08-28 NOTE — PROGRESS NOTES
C3 nurse attempted to contact Erin Clark  for a TCC post hospital discharge follow up call. No answer. Left voicemail with callback information. The patient has a scheduled HOSFU appointment with Ada Gregory MD on 09/12/23 @ 1000.

## 2023-08-28 NOTE — PROGRESS NOTES
2nd Attempt made to reach patient for TCC call. Spoke with patient's spouse per assistance of . Advised contact patient's son, Leif for tcc call. Advised no  needed.

## 2023-08-29 NOTE — PROGRESS NOTES
3rd Attempt made to reach patient for TCC call. Left voicemail please call [phone] leave first name, last name, and  Mago will return your call.

## 2023-09-05 RX ORDER — ISOSORBIDE MONONITRATE 30 MG/1
TABLET, EXTENDED RELEASE ORAL
Qty: 90 TABLET | Refills: 0 | Status: SHIPPED | OUTPATIENT
Start: 2023-09-05 | End: 2024-02-06 | Stop reason: SDUPTHER

## 2023-09-11 ENCOUNTER — DOCUMENT SCAN (OUTPATIENT)
Dept: HOME HEALTH SERVICES | Facility: HOSPITAL | Age: 88
End: 2023-09-11
Payer: MEDICARE

## 2023-09-12 ENCOUNTER — OFFICE VISIT (OUTPATIENT)
Dept: PRIMARY CARE CLINIC | Facility: CLINIC | Age: 88
End: 2023-09-12
Payer: MEDICARE

## 2023-09-12 VITALS
DIASTOLIC BLOOD PRESSURE: 53 MMHG | WEIGHT: 115.06 LBS | SYSTOLIC BLOOD PRESSURE: 137 MMHG | HEART RATE: 80 BPM | BODY MASS INDEX: 19.15 KG/M2 | OXYGEN SATURATION: 92 %

## 2023-09-12 DIAGNOSIS — N18.6 END-STAGE RENAL DISEASE ON HEMODIALYSIS: ICD-10-CM

## 2023-09-12 DIAGNOSIS — Z99.2 END-STAGE RENAL DISEASE ON HEMODIALYSIS: ICD-10-CM

## 2023-09-12 DIAGNOSIS — J81.0 ACUTE PULMONARY EDEMA: ICD-10-CM

## 2023-09-12 DIAGNOSIS — I16.0 HYPERTENSIVE URGENCY: Primary | ICD-10-CM

## 2023-09-12 DIAGNOSIS — I50.43 ACUTE ON CHRONIC COMBINED SYSTOLIC AND DIASTOLIC CONGESTIVE HEART FAILURE: ICD-10-CM

## 2023-09-12 PROBLEM — R09.02 HYPOXIA: Status: RESOLVED | Noted: 2019-04-05 | Resolved: 2023-09-12

## 2023-09-12 PROCEDURE — 99214 OFFICE O/P EST MOD 30 MIN: CPT | Mod: S$GLB,,, | Performed by: INTERNAL MEDICINE

## 2023-09-12 PROCEDURE — 1101F PR PT FALLS ASSESS DOC 0-1 FALLS W/OUT INJ PAST YR: ICD-10-PCS | Mod: CPTII,S$GLB,, | Performed by: INTERNAL MEDICINE

## 2023-09-12 PROCEDURE — 1111F DSCHRG MED/CURRENT MED MERGE: CPT | Mod: CPTII,S$GLB,, | Performed by: INTERNAL MEDICINE

## 2023-09-12 PROCEDURE — 3288F PR FALLS RISK ASSESSMENT DOCUMENTED: ICD-10-PCS | Mod: CPTII,S$GLB,, | Performed by: INTERNAL MEDICINE

## 2023-09-12 PROCEDURE — 1160F RVW MEDS BY RX/DR IN RCRD: CPT | Mod: CPTII,S$GLB,, | Performed by: INTERNAL MEDICINE

## 2023-09-12 PROCEDURE — 99214 PR OFFICE/OUTPT VISIT, EST, LEVL IV, 30-39 MIN: ICD-10-PCS | Mod: S$GLB,,, | Performed by: INTERNAL MEDICINE

## 2023-09-12 PROCEDURE — 1159F PR MEDICATION LIST DOCUMENTED IN MEDICAL RECORD: ICD-10-PCS | Mod: CPTII,S$GLB,, | Performed by: INTERNAL MEDICINE

## 2023-09-12 PROCEDURE — 1126F PR PAIN SEVERITY QUANTIFIED, NO PAIN PRESENT: ICD-10-PCS | Mod: CPTII,S$GLB,, | Performed by: INTERNAL MEDICINE

## 2023-09-12 PROCEDURE — 1160F PR REVIEW ALL MEDS BY PRESCRIBER/CLIN PHARMACIST DOCUMENTED: ICD-10-PCS | Mod: CPTII,S$GLB,, | Performed by: INTERNAL MEDICINE

## 2023-09-12 PROCEDURE — 1101F PT FALLS ASSESS-DOCD LE1/YR: CPT | Mod: CPTII,S$GLB,, | Performed by: INTERNAL MEDICINE

## 2023-09-12 PROCEDURE — 1159F MED LIST DOCD IN RCRD: CPT | Mod: CPTII,S$GLB,, | Performed by: INTERNAL MEDICINE

## 2023-09-12 PROCEDURE — 99999 PR PBB SHADOW E&M-EST. PATIENT-LVL III: CPT | Mod: PBBFAC,,, | Performed by: INTERNAL MEDICINE

## 2023-09-12 PROCEDURE — 1126F AMNT PAIN NOTED NONE PRSNT: CPT | Mod: CPTII,S$GLB,, | Performed by: INTERNAL MEDICINE

## 2023-09-12 PROCEDURE — 1111F PR DISCHARGE MEDS RECONCILED W/ CURRENT OUTPATIENT MED LIST: ICD-10-PCS | Mod: CPTII,S$GLB,, | Performed by: INTERNAL MEDICINE

## 2023-09-12 PROCEDURE — 99999 PR PBB SHADOW E&M-EST. PATIENT-LVL III: ICD-10-PCS | Mod: PBBFAC,,, | Performed by: INTERNAL MEDICINE

## 2023-09-12 PROCEDURE — 3288F FALL RISK ASSESSMENT DOCD: CPT | Mod: CPTII,S$GLB,, | Performed by: INTERNAL MEDICINE

## 2023-09-12 NOTE — PROGRESS NOTES
[facility]   New Visit Progress Note   Recent Hospital Discharge     PRESENTING HISTORY     Chief Complaint/Reason for Admission:  Follow up Hospital Discharge   PCP: Bayron Golden MD    History of Present Illness:  Ms. Erin Clark is a 88 y.o. female who was recently admitted to the hospital.    [facility] Medicine Discharge Summary  Primary Team: [facility] Hospitalist Team B  Attending Physician: Santy Singleton MD  Resident: Netsor  Intern: Sienna  Date of Admit: 8/24/2023  Date of Discharge: 8/25/2023  Discharge to: Home/Self-Care  Condition: Stable   ___________________________________________________________________    Today:  [facility] follow up.  Recently hospitalized for management of hypertensive urgency and pulmonary edema after missing two outpatient dialysis sessions.  Condition further complicated by decompensated heart failure, known EF 45% with grade 2 diastolic dysfunction.   Admitted to [facility] Medicine service.  Diureses with IV lasix initiated.   Nephrology team consulted for hemodialysis with rapid improvement in respiratory status following dialysis session.  Patient responded well to above intervention and supportive care.  Discharged to home with [facility] services.  Imdur added to her medication regimen at discharge .    Patient accompanied today by family-> family member is providing [place] translation services.  On home O2 but not wearing- all portable tanks are empty.  Has PT and skilled nursing service in the home.  She is ambulatory and independent with ADL's.  Has rolling walker and cane but does not use.   Reports compliance with all medication but did not bring medication bottles for review.     [facility] M[facility].   Left arm fistula.  Review of Systems  General ROS: negative for chills, fever or weight loss  Psychological ROS: negative for hallucination, depression or suicidal ideation  Ophthalmic ROS:  negative for blurry vision, photophobia or eye pain  ENT ROS: negative for epistaxis, sore throat or rhinorrhea  Respiratory ROS: no cough, shortness of breath, or wheezing  Cardiovascular ROS: no chest pain or dyspnea on exertion  Gastrointestinal ROS: no abdominal pain, change in bowel habits, or black/ bloody stools  Genito-Urinary ROS: no dysuria, trouble voiding, or hematuria  Musculoskeletal ROS: negative for gait disturbance or muscular weakness  Neurological ROS: no syncope or seizures; no ataxia  Dermatological ROS: negative for pruritis, rash and jaundice      PAST HISTORY:     Past Medical History:   Diagnosis Date    CHF (congestive heart failure)     Colon polyps 06/03/2013    Coronary artery disease     Diabetes mellitus     Encounter for blood transfusion     ESRD (end stage renal disease) 03/2014    dialysis M-F    GERD (gastroesophageal reflux disease)     High cholesterol     Hypertension        Past Surgical History:   Procedure Laterality Date    AV FISTULA PLACEMENT Left 9/2014    bilateral fem-pop      CENTRAL VENOUS CATHETER TUNNELED INSERTION DOUBLE LUMEN Right 2/2014    CORONARY ARTERY BYPASS GRAFT  2/14/2014     x 3 vessels    LEFT HEART CATHETERIZATION N/A 5/31/2021    Procedure: Left heart cath;  Surgeon: Brian Sanchez MD;  Location: [facility] CATH LAB/EP;  Service: Cardiology;  Laterality: N/A;    PERCUTANEOUS TRANSLUMINAL ANGIOPLASTY OF ARTERIOVENOUS FISTULA Left 5/5/2020    Procedure: PTA, Repair left upper arm anuerysm AV FISTULA;  Surgeon: Doris Mary MD;  Location: [facility] OR;  Service: General;  Laterality: Left;    VASCULAR SURGERY         Family History   Problem Relation Age of Onset    Hypertension Mother     Heart disease Mother     Heart attack Mother          MEDICATIONS & ALLERGIES:     Current Outpatient Medications on File Prior to Visit   Medication Sig Dispense Refill    albuterol (PROAIR HFA) 90 mcg/actuation inhaler Inhale 2 puffs into the lungs every 6 (six)  hours as needed for Wheezing. Rescue (Patient not taking: Reported on 8/24/2023) 18 g 0    aspirin (ECOTRIN) 81 MG EC tablet TAKE ONE TABLET BY MOUTH ONCE DAILY 30 tablet 6    atorvastatin (LIPITOR) 40 MG tablet Take 1 tablet by mouth once daily 90 tablet 0    furosemide (LASIX) 40 MG tablet Take 2 tablets (80 mg total) by mouth once daily. 90 tablet 0    hydrALAZINE (APRESOLINE) 25 MG tablet Take 1 tablet (25 mg total) by mouth every 12 (twelve) hours. (Patient not taking: Reported on 8/24/2023) 60 tablet 11    isosorbide mononitrate (IMDUR) 30 MG 24 hr tablet Take 1 tablet by mouth once daily 90 tablet 0    metoprolol succinate (TOPROL-XL) 25 MG 24 hr tablet Take 25 mg by mouth once daily.      nitroGLYCERIN (NITROSTAT) 0.4 MG SL tablet Place 0.4 mg under the tongue every 5 (five) minutes as needed for Chest pain.      pantoprazole (PROTONIX) 40 MG tablet Take 1 tablet (40 mg total) by mouth before breakfast. (Patient not taking: Reported on 8/24/2023) 30 tablet 0    polyethylene glycol (GLYCOLAX) 17 gram/dose powder Take 17 g by mouth daily as needed (Estrenimiento (constipation)). 238 g 0    vitamin renal formula, B-complex-vitamin c-folic acid, (TRIPHROCAPS) 1 mg Cap Take 1 capsule by mouth once daily.       No current facility-administered medications on file prior to visit.        Review of patient's allergies indicates:   Allergen Reactions    Aspirin Nausea Only and Swelling     Able to tolerated in small doses         OBJECTIVE:     Vital Signs:  BP (!) 137/53 (BP Location: Right arm, Patient Position: Sitting, BP Method: Small (Automatic))   Pulse 80   Wt 52.2 kg (115 lb 1.3 oz)   SpO2 (!) 92%   BMI 19.15 kg/m²   Wt Readings from Last 3 Encounters:   08/25/23 0415 54.4 kg (119 lb 14.9 oz)   08/24/23 1641 55.7 kg (122 lb 12.7 oz)   07/11/23 1552 55.7 kg (122 lb 12.7 oz)   05/22/23 1443 52.6 kg (115 lb 15.4 oz)     Body mass index is 19.15 kg/m².        Physical Exam:  BP (!) 137/53 (BP Location: Right  arm, Patient Position: Sitting, BP Method: Small (Automatic))   Pulse 80   Wt 52.2 kg (115 lb 1.3 oz)   SpO2 (!) 92%   BMI 19.15 kg/m²   General appearance: alert, cooperative, no distress  Constitutional:Oriented to person, place, and time  + appears well-developed and well-nourished.   HEENT: Normocephalic, atraumatic, neck symmetrical, no nasal discharge   Eyes: conjunctivae/corneas clear, PERRL, EOM's intact  Lungs: clear to auscultation bilaterally, no dullness to percussion bilaterally  Heart: regular rate and rhythm without rub; no displacement of the PMI   Abdomen: soft, non-tender; bowel sounds normoactive; no organomegaly  Extremities: extremities symmetric; no clubbing, cyanosis, or edema  + left arm hemodialysis fistula   + trace pitting edema bilateral LE   Integument: Skin color, texture, turgor normal; no rashes; hair distrubution normal  Neurologic: Alert and oriented X 3, normal strength, normal coordination and gait  Psychiatric: no pressured speech; normal affect; no evidence of impaired cognition     Laboratory  Lab Results   Component Value Date    WBC 8.95 08/25/2023    HGB 11.2 (L) 08/25/2023    HCT 34.9 (L) 08/25/2023    MCV 84 08/25/2023     08/25/2023     BMP  Lab Results   Component Value Date     (L) 08/25/2023    K 3.0 (L) 08/25/2023    CL 92 (L) 08/25/2023    CO2 28 08/25/2023    BUN 34 (H) 08/25/2023    CREATININE 3.9 (H) 08/25/2023    CALCIUM 8.4 (L) 08/25/2023    ANIONGAP 15 08/25/2023    EGFRNORACEVR 11 (A) 08/25/2023     Lab Results   Component Value Date    ALT 27 08/24/2023    AST 37 08/24/2023    ALKPHOS 137 (H) 08/24/2023    BILITOT 1.2 (H) 08/24/2023     Lab Results   Component Value Date    INR 1.2 07/24/2020    INR 1.2 03/09/2014    INR 1.3 (H) 02/14/2014     Lab Results   Component Value Date    HGBA1C 6.1 (H) 03/16/2023       Diagnostic Results:    Chest X Ray 8/24/23:  1. Radiographic findings suggestive of pulmonary edema.  2. Stable enlargement of the  cardiac silhouette.  Status post CABG.  3. Prominent central pulmonary vasculature, a finding that can be seen in the setting of pulmonary hypertension.      TRANSITION OF CARE:     [facility] On Call Contact Note: 3 unsuccessful attempts  8/29/23 @ 8:44 am  8/28/23 @ 3:35 pm  8/28/23 @ 12:40 pm     Family and/or Caretaker present at visit?  Yes.  Diagnostic tests reviewed/disposition: I have reviewed all completed as well as pending diagnostic tests at the time of discharge.  Disease/illness education: Yes   Home health/community services discussion/referrals: Patient has home health established at [facility]  .   Establishment or re-establishment of referral orders for community resources: No other necessary community resources.   Discussion with other health care providers: No discussion with other health care providers necessary.     ASSESSMENT & PLAN:         Hypertensive urgency  Acute pulmonary edema  Acute on chronic combined systolic and diastolic congestive heart failure  -recent hospitalization as above  - occurred after missing 2 outpatient dialysis sessions   - rapid improvement in clinical condition following in house dialysis     End-stage renal disease on hemodialysis  - MWF at [facility]  - has LUE fistula    Patient will be released from hospital follow up clinic.  She will see her PCP, Dr Carbone, 11/28/23.     Instructions for the patient:      Scheduled Follow-up :  Future Appointments   Date Time Provider Department Center   11/28/2023  1:00 PM Bayron Golden MD [facility]       Post Visit Medication List:     Medication List            Accurate as of September 12, 2023 10:53 AM. If you have any questions, ask your nurse or doctor.                CONTINUE taking these medications      albuterol 90 mcg/actuation inhaler  Commonly known as: PROAIR HFA  Inhale 2 puffs into the lungs every 6 (six) hours as needed for Wheezing. Rescue     aspirin 81 MG EC  tablet  Commonly known as: ECOTRIN  TAKE ONE TABLET BY MOUTH ONCE DAILY     atorvastatin 40 MG tablet  Commonly known as: LIPITOR  Take 1 tablet by mouth once daily     furosemide 40 MG tablet  Commonly known as: LASIX  Take 2 tablets (80 mg total) by mouth once daily.     hydrALAZINE 25 MG tablet  Commonly known as: APRESOLINE  Take 1 tablet (25 mg total) by mouth every 12 (twelve) hours.     isosorbide mononitrate 30 MG 24 hr tablet  Commonly known as: IMDUR  Take 1 tablet by mouth once daily     metoprolol succinate 25 MG 24 hr tablet  Commonly known as: TOPROL-XL     nitroGLYCERIN 0.4 MG SL tablet  Commonly known as: NITROSTAT     pantoprazole 40 MG tablet  Commonly known as: PROTONIX  Take 1 tablet (40 mg total) by mouth before breakfast.     polyethylene glycol 17 gram/dose powder  Commonly known as: GLYCOLAX  Take 17 g by mouth daily as needed (Estrenimiento (constipation)).     TRIPHROCAPS 1 mg Cap  Generic drug: vitamin renal formula (B-complex-vitamin c-folic acid)              Signing Physician:  Ada Gregory MD

## 2023-10-05 ENCOUNTER — EXTERNAL HOME HEALTH (OUTPATIENT)
Dept: HOME HEALTH SERVICES | Facility: HOSPITAL | Age: 88
End: 2023-10-05
Payer: MEDICARE

## 2023-10-12 ENCOUNTER — PATIENT MESSAGE (OUTPATIENT)
Dept: RESPIRATORY THERAPY | Facility: HOSPITAL | Age: 88
End: 2023-10-12
Payer: MEDICARE

## 2023-10-24 RX ORDER — METOPROLOL SUCCINATE 25 MG/1
25 TABLET, EXTENDED RELEASE ORAL
Qty: 90 TABLET | Refills: 0 | Status: SHIPPED | OUTPATIENT
Start: 2023-10-24 | End: 2024-03-20 | Stop reason: SDUPTHER

## 2023-11-25 DIAGNOSIS — Z95.1 S/P CABG X 1: ICD-10-CM

## 2023-11-25 DIAGNOSIS — E78.5 HYPERLIPIDEMIA LDL GOAL <70: ICD-10-CM

## 2023-11-25 DIAGNOSIS — I10 ESSENTIAL HYPERTENSION: ICD-10-CM

## 2023-11-25 DIAGNOSIS — I77.9 PERIPHERAL ARTERIAL OCCLUSIVE DISEASE: ICD-10-CM

## 2023-11-25 DIAGNOSIS — I25.10 CORONARY ARTERY DISEASE INVOLVING NATIVE CORONARY ARTERY WITHOUT ANGINA PECTORIS, UNSPECIFIED WHETHER NATIVE OR TRANSPLANTED HEART: ICD-10-CM

## 2023-11-27 RX ORDER — FUROSEMIDE 40 MG/1
40 TABLET ORAL
Qty: 90 TABLET | Refills: 0 | Status: SHIPPED | OUTPATIENT
Start: 2023-11-27

## 2023-12-18 PROBLEM — J81.0 ACUTE PULMONARY EDEMA: Status: RESOLVED | Noted: 2023-09-12 | Resolved: 2023-12-18

## 2024-01-25 ENCOUNTER — OFFICE VISIT (OUTPATIENT)
Dept: FAMILY MEDICINE | Facility: CLINIC | Age: 89
End: 2024-01-25
Payer: MEDICARE

## 2024-01-25 ENCOUNTER — LAB VISIT (OUTPATIENT)
Dept: LAB | Facility: HOSPITAL | Age: 89
End: 2024-01-25
Attending: FAMILY MEDICINE
Payer: MEDICARE

## 2024-01-25 VITALS
DIASTOLIC BLOOD PRESSURE: 58 MMHG | HEIGHT: 65 IN | BODY MASS INDEX: 19.14 KG/M2 | OXYGEN SATURATION: 96 % | SYSTOLIC BLOOD PRESSURE: 106 MMHG | HEART RATE: 95 BPM | WEIGHT: 114.88 LBS

## 2024-01-25 DIAGNOSIS — Z79.4 TYPE 2 DIABETES MELLITUS WITH CHRONIC KIDNEY DISEASE ON CHRONIC DIALYSIS, WITH LONG-TERM CURRENT USE OF INSULIN: ICD-10-CM

## 2024-01-25 DIAGNOSIS — N18.6 TYPE 2 DIABETES MELLITUS WITH CHRONIC KIDNEY DISEASE ON CHRONIC DIALYSIS, WITH LONG-TERM CURRENT USE OF INSULIN: ICD-10-CM

## 2024-01-25 DIAGNOSIS — F03.90 SENILE DEMENTIA: ICD-10-CM

## 2024-01-25 DIAGNOSIS — Z99.2 END-STAGE RENAL DISEASE ON HEMODIALYSIS: ICD-10-CM

## 2024-01-25 DIAGNOSIS — Z99.2 TYPE 2 DIABETES MELLITUS WITH CHRONIC KIDNEY DISEASE ON CHRONIC DIALYSIS, WITH LONG-TERM CURRENT USE OF INSULIN: ICD-10-CM

## 2024-01-25 DIAGNOSIS — E11.22 TYPE 2 DIABETES MELLITUS WITH CHRONIC KIDNEY DISEASE ON CHRONIC DIALYSIS, WITH LONG-TERM CURRENT USE OF INSULIN: ICD-10-CM

## 2024-01-25 DIAGNOSIS — J41.1 MUCOPURULENT CHRONIC BRONCHITIS: ICD-10-CM

## 2024-01-25 DIAGNOSIS — L98.499 SKIN ULCER, UNSPECIFIED ULCER STAGE: ICD-10-CM

## 2024-01-25 DIAGNOSIS — N18.6 END-STAGE RENAL DISEASE ON HEMODIALYSIS: ICD-10-CM

## 2024-01-25 DIAGNOSIS — I77.9 PERIPHERAL ARTERIAL OCCLUSIVE DISEASE: ICD-10-CM

## 2024-01-25 DIAGNOSIS — D53.9 NUTRITIONAL ANEMIA: ICD-10-CM

## 2024-01-25 DIAGNOSIS — F03.90 SENILE DEMENTIA: Primary | ICD-10-CM

## 2024-01-25 DIAGNOSIS — N25.81 SECONDARY HYPERPARATHYROIDISM OF RENAL ORIGIN: ICD-10-CM

## 2024-01-25 LAB
ALBUMIN SERPL BCP-MCNC: 3.2 G/DL (ref 3.5–5.2)
ALP SERPL-CCNC: 149 U/L (ref 55–135)
ALT SERPL W/O P-5'-P-CCNC: 12 U/L (ref 10–44)
ANION GAP SERPL CALC-SCNC: 16 MMOL/L (ref 8–16)
AST SERPL-CCNC: 22 U/L (ref 10–40)
BASOPHILS # BLD AUTO: 0.05 K/UL (ref 0–0.2)
BASOPHILS NFR BLD: 0.6 % (ref 0–1.9)
BILIRUB SERPL-MCNC: 1.2 MG/DL (ref 0.1–1)
BUN SERPL-MCNC: 38 MG/DL (ref 8–23)
CALCIUM SERPL-MCNC: 9.6 MG/DL (ref 8.7–10.5)
CHLORIDE SERPL-SCNC: 93 MMOL/L (ref 95–110)
CO2 SERPL-SCNC: 26 MMOL/L (ref 23–29)
CREAT SERPL-MCNC: 5 MG/DL (ref 0.5–1.4)
DIFFERENTIAL METHOD BLD: ABNORMAL
EOSINOPHIL # BLD AUTO: 0.1 K/UL (ref 0–0.5)
EOSINOPHIL NFR BLD: 1.4 % (ref 0–8)
ERYTHROCYTE [DISTWIDTH] IN BLOOD BY AUTOMATED COUNT: 18.5 % (ref 11.5–14.5)
EST. GFR  (NO RACE VARIABLE): 8 ML/MIN/1.73 M^2
ESTIMATED AVG GLUCOSE: 151 MG/DL (ref 68–131)
GLUCOSE SERPL-MCNC: 189 MG/DL (ref 70–110)
HBA1C MFR BLD: 6.9 % (ref 4–5.6)
HCT VFR BLD AUTO: 36.4 % (ref 37–48.5)
HGB BLD-MCNC: 11.3 G/DL (ref 12–16)
IMM GRANULOCYTES # BLD AUTO: 0.03 K/UL (ref 0–0.04)
IMM GRANULOCYTES NFR BLD AUTO: 0.4 % (ref 0–0.5)
LYMPHOCYTES # BLD AUTO: 1.1 K/UL (ref 1–4.8)
LYMPHOCYTES NFR BLD: 12.8 % (ref 18–48)
MCH RBC QN AUTO: 27.8 PG (ref 27–31)
MCHC RBC AUTO-ENTMCNC: 31 G/DL (ref 32–36)
MCV RBC AUTO: 90 FL (ref 82–98)
MONOCYTES # BLD AUTO: 0.6 K/UL (ref 0.3–1)
MONOCYTES NFR BLD: 7.2 % (ref 4–15)
NEUTROPHILS # BLD AUTO: 6.4 K/UL (ref 1.8–7.7)
NEUTROPHILS NFR BLD: 77.6 % (ref 38–73)
NRBC BLD-RTO: 0 /100 WBC
PLATELET # BLD AUTO: 266 K/UL (ref 150–450)
PMV BLD AUTO: 10.2 FL (ref 9.2–12.9)
POTASSIUM SERPL-SCNC: 3.8 MMOL/L (ref 3.5–5.1)
PROT SERPL-MCNC: 7.7 G/DL (ref 6–8.4)
RBC # BLD AUTO: 4.06 M/UL (ref 4–5.4)
SODIUM SERPL-SCNC: 135 MMOL/L (ref 136–145)
TSH SERPL DL<=0.005 MIU/L-ACNC: 4.5 UIU/ML (ref 0.4–4)
VIT B12 SERPL-MCNC: 703 PG/ML (ref 210–950)
WBC # BLD AUTO: 8.3 K/UL (ref 3.9–12.7)

## 2024-01-25 PROCEDURE — 84439 ASSAY OF FREE THYROXINE: CPT | Performed by: FAMILY MEDICINE

## 2024-01-25 PROCEDURE — 80053 COMPREHEN METABOLIC PANEL: CPT | Performed by: FAMILY MEDICINE

## 2024-01-25 PROCEDURE — 82746 ASSAY OF FOLIC ACID SERUM: CPT | Mod: AY | Performed by: FAMILY MEDICINE

## 2024-01-25 PROCEDURE — 99999 PR PBB SHADOW E&M-EST. PATIENT-LVL IV: CPT | Mod: PBBFAC,,, | Performed by: FAMILY MEDICINE

## 2024-01-25 PROCEDURE — 99215 OFFICE O/P EST HI 40 MIN: CPT | Mod: S$GLB,,, | Performed by: FAMILY MEDICINE

## 2024-01-25 PROCEDURE — 85025 COMPLETE CBC W/AUTO DIFF WBC: CPT | Mod: AY | Performed by: FAMILY MEDICINE

## 2024-01-25 PROCEDURE — 83036 HEMOGLOBIN GLYCOSYLATED A1C: CPT | Performed by: FAMILY MEDICINE

## 2024-01-25 PROCEDURE — 84443 ASSAY THYROID STIM HORMONE: CPT | Performed by: FAMILY MEDICINE

## 2024-01-25 PROCEDURE — 86592 SYPHILIS TEST NON-TREP QUAL: CPT | Performed by: FAMILY MEDICINE

## 2024-01-25 PROCEDURE — 82607 VITAMIN B-12: CPT | Performed by: FAMILY MEDICINE

## 2024-01-25 PROCEDURE — 36415 COLL VENOUS BLD VENIPUNCTURE: CPT | Mod: PO | Performed by: FAMILY MEDICINE

## 2024-01-25 RX ORDER — MUPIROCIN 20 MG/G
OINTMENT TOPICAL 3 TIMES DAILY
Qty: 30 G | Refills: 2 | Status: SHIPPED | OUTPATIENT
Start: 2024-01-25

## 2024-01-25 NOTE — PROGRESS NOTES
Subjective     Patient ID: Erin Clark is a 88 y.o. female.    Chief Complaint: Dementia    88 years old female came to the clinic for evaluation for cognitive problems.  Patient with problems with concentration memory.  No flare ups of chronic bronchitis.  Patient currently on hemodialysis.  No recent A1c.  Patient with left lower extremity skin ulcers after scratching.  No fever or chills.  She was previously diagnosed with peripheral arterial disease.  Patient with mild anemia but stable in comparison with previous reports.      Review of Systems   Constitutional: Negative.  Negative for fever.   HENT: Negative.     Eyes: Negative.    Respiratory: Negative.  Negative for cough and wheezing.    Gastrointestinal: Negative.    Endocrine: Negative for polydipsia, polyphagia and polyuria.   Genitourinary: Negative.    Musculoskeletal:  Positive for gait problem.   Neurological:  Positive for memory loss.   Psychiatric/Behavioral: Negative.            Objective     Physical Exam  Neurological:      Motor: Weakness present.      Coordination: Coordination abnormal.      Gait: Gait abnormal.   Psychiatric:         Mood and Affect: Mood is anxious and depressed.         Cognition and Memory: Cognition is impaired. Memory is impaired. She exhibits impaired recent memory. She does not exhibit impaired remote memory.      Comments: Mini-mental test 13/30.            Assessment and Plan     1. Senile dementia  -     TSH; Future; Expected date: 01/25/2024  -     CBC Auto Differential; Future; Expected date: 01/25/2024  -     Comprehensive Metabolic Panel; Future; Expected date: 01/25/2024  -     Vitamin B12; Future; Expected date: 01/25/2024  -     Folate; Future; Expected date: 01/25/2024  -     RPR; Future; Expected date: 01/25/2024  -     Ambulatory referral/consult to Adult Neuropsychology; Future; Expected date: 02/01/2024    2. Mucopurulent chronic bronchitis    3. Type 2 diabetes mellitus with chronic kidney disease  on chronic dialysis, with long-term current use of insulin  -     Hemoglobin A1C; Future; Expected date: 01/25/2024    4. End-stage renal disease on hemodialysis  -     CBC Auto Differential; Future; Expected date: 01/25/2024  -     Comprehensive Metabolic Panel; Future; Expected date: 01/25/2024    5. Secondary hyperparathyroidism of renal origin  -     Comprehensive Metabolic Panel; Future; Expected date: 01/25/2024    6. Peripheral arterial occlusive disease    7. Nutritional anemia  -     CBC Auto Differential; Future; Expected date: 01/25/2024  -     Vitamin B12; Future; Expected date: 01/25/2024  -     Folate; Future; Expected date: 01/25/2024    8. Skin ulcer, unspecified ulcer stage  -     mupirocin (BACTROBAN) 2 % ointment; Apply topically 3 (three) times daily.  Dispense: 30 g; Refill: 2        Family did not want additional medical treatment for dementia.         Follow up in about 4 months (around 5/25/2024), or if symptoms worsen or fail to improve.

## 2024-01-26 LAB
FOLATE SERPL-MCNC: 39.4 NG/ML (ref 4–24)
RPR SER QL: NORMAL
T4 FREE SERPL-MCNC: 0.83 NG/DL (ref 0.71–1.51)

## 2024-02-06 DIAGNOSIS — I25.10 CORONARY ARTERY DISEASE INVOLVING NATIVE CORONARY ARTERY OF NATIVE HEART WITHOUT ANGINA PECTORIS: Primary | ICD-10-CM

## 2024-02-06 RX ORDER — ISOSORBIDE MONONITRATE 30 MG/1
30 TABLET, EXTENDED RELEASE ORAL DAILY
Qty: 90 TABLET | Refills: 3 | Status: SHIPPED | OUTPATIENT
Start: 2024-02-06

## 2024-03-20 RX ORDER — METOPROLOL SUCCINATE 25 MG/1
25 TABLET, EXTENDED RELEASE ORAL DAILY
Qty: 90 TABLET | Refills: 3 | Status: SHIPPED | OUTPATIENT
Start: 2024-03-20

## 2024-03-20 NOTE — TELEPHONE ENCOUNTER
No care due was identified.  [facility] Embedded Care Due Messages. Reference number: [id number].   3/20/2024 7:30:43 AM CDT

## 2024-03-20 NOTE — TELEPHONE ENCOUNTER
Refill Routing Note   Medication(s) are not appropriate for processing by [facility] for the following reason(s):        No active prescription written by provider  Responsible provider unclear    [facility] action(s):  Defer             Appointments  past 12m or future 3m with PCP    Date Provider   Last Visit   1/25/2024 Bayron Golden MD   Next Visit   5/7/2024 Bayron Golden MD   ED visits in past 90 days: 0        Note composed:7:32 AM 03/20/2024

## 2024-05-30 ENCOUNTER — HOSPITAL ENCOUNTER (EMERGENCY)
Facility: HOSPITAL | Age: 89
Discharge: HOME | End: 2024-05-30
Attending: EMERGENCY MEDICINE
Payer: MEDICARE

## 2024-05-30 VITALS
HEIGHT: 63 IN | SYSTOLIC BLOOD PRESSURE: 151 MMHG | HEART RATE: 76 BPM | DIASTOLIC BLOOD PRESSURE: 65 MMHG | RESPIRATION RATE: 20 BRPM | OXYGEN SATURATION: 99 % | TEMPERATURE: 98 F | BODY MASS INDEX: 20.55 KG/M2 | WEIGHT: 116 LBS

## 2024-05-30 DIAGNOSIS — S00.03XA SCALP HEMATOMA, INITIAL ENCOUNTER: Primary | ICD-10-CM
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
## 2024-05-30 DIAGNOSIS — S05.12XA TRAUMATIC HEMATOMA OF LEFT ORBIT, INITIAL ENCOUNTER: ICD-10-CM

## 2024-05-30 PROCEDURE — 99285 EMERGENCY DEPT VISIT HI MDM: CPT | Mod: 25

## 2024-05-30 NOTE — ED PROVIDER NOTES
"Encounter Date: 5/30/2024       History     Chief Complaint   Patient presents with    Fall     Patient presents to ED from home with c/o unwitnessed fall x2 days ago. Patient hit left side of head and has bruising and swelling to her left eye and left side of forehead. Denies LOC. Patient takes ASA daily. Denies complaints of other joints/extremities. Patient AAOx4 and is requesting son for translation.       Patient is an 89-year-old female history of end-stage renal disease, CHF on home oxygen presents to the ED with complaint of fall.  Per her son at bedside, the patient sustained a mechanical fall approximately 2 days ago after slipping while walking.   He states that the and fell on her left side and struck the left side of her face.  She now reports pain and swelling around the left orbit and pain to the left hip. The patient has been putting ice on her eye swelling with some improvement.  She denies any blurry vision or double vision.  The son, who served as  for this interaction, states the patient normally ambulates with a walker but on this particular instance decided to ambulate with a cane only.  The patient has been ambulatory after this fall that occurred reportedly 2 days ago.  The patient denies any other complaints.  Specifically she denies any headache, nausea, vomiting, vision change, neck pain or back pain.  The  patient's son brought the patient and to have her "checked out. "    Pt has been ambulatory since the fall.     Pt takes baby ASA daily; no other anti-platelet or anti-coagulants per son.       Review of patient's allergies indicates:   Allergen Reactions    Aspirin Nausea Only and Swelling     Able to tolerated in small doses       Past Medical History:   Diagnosis Date    CHF (congestive heart failure)     Colon polyps 06/03/2013    Coronary artery disease     Diabetes mellitus     Encounter for blood transfusion     ESRD (end stage renal disease) 03/2014    dialysis M-F    " GERD (gastroesophageal reflux disease)     High cholesterol     Hypertension      Past Surgical History:   Procedure Laterality Date    AV FISTULA PLACEMENT Left 9/2014    bilateral fem-pop      CENTRAL VENOUS CATHETER TUNNELED INSERTION DOUBLE LUMEN Right 2/2014    CORONARY ARTERY BYPASS GRAFT  2/14/2014     x 3 vessels    LEFT HEART CATHETERIZATION N/A 5/31/2021    Procedure: Left heart cath;  Surgeon: Brian Sanchez MD;  Location: [facility] CATH LAB/EP;  Service: Cardiology;  Laterality: N/A;    PERCUTANEOUS TRANSLUMINAL ANGIOPLASTY OF ARTERIOVENOUS FISTULA Left 5/5/2020    Procedure: PTA, Repair left upper arm anuerysm AV FISTULA;  Surgeon: Doris Mary MD;  Location: [facility] OR;  Service: General;  Laterality: Left;    VASCULAR SURGERY       Family History   Problem Relation Name Age of Onset    Hypertension Mother      Heart disease Mother      Heart attack Mother       Social History     Tobacco Use    Smoking status: Never    Smokeless tobacco: Never   Substance Use Topics    Alcohol use: No    Drug use: No     Review of Systems   Constitutional:  Negative for fatigue and fever.   HENT:  Negative for dental problem, drooling, nosebleeds, sinus pressure and sinus pain.    Eyes:  Negative for visual disturbance.   Respiratory:  Negative for chest tightness and shortness of breath.    Cardiovascular:  Negative for palpitations and leg swelling.   Gastrointestinal:  Negative for abdominal pain, nausea and vomiting.   Genitourinary:  Negative for pelvic pain.   Musculoskeletal:  Positive for arthralgias. Negative for back pain and neck pain.   Skin:  Positive for color change.   Neurological:  Negative for dizziness, syncope, light-headedness and headaches.   Psychiatric/Behavioral:  Negative for agitation and confusion.        Physical Exam     Initial Vitals   BP Pulse Resp Temp SpO2   05/30/24 1015 05/30/24 1015 05/30/24 1015 05/30/24 1015 05/30/24 1038   (!) 146/65 102 18 96 °F (35.6 °C) 97 %      MAP        --                Physical Exam    Nursing note and vitals reviewed.  Constitutional: She appears well-developed and well-nourished. No distress.   Well-appearing   No acute distress noted    HENT:   Head: Normocephalic.       L periorbital swelling and ecchymosis; no proptosis; PERRL    Bruising noted to the L upper forehead    No other findings of overt head trauma    Eyes: EOM are normal. Pupils are equal, round, and reactive to light.   Neck: Neck supple.   No midline tenderness  Normal ROM    Normal range of motion.  Cardiovascular:  Normal rate, regular rhythm, normal heart sounds and intact distal pulses.           Pulmonary/Chest: Breath sounds normal.   Abdominal: Abdomen is soft.   Musculoskeletal:      Cervical back: Normal range of motion and neck supple.      Comments: Normal active and passive ROM of the L hip joint  Mild TTP to the area of the L greater trochanter  No abnormal shortening or lengthening or internal or external rotation of the LLE  2+ DP pulse LLE     Neurological: She is alert and oriented to person, place, and time. She has normal strength. GCS score is 15. GCS eye subscore is 4. GCS verbal subscore is 5. GCS motor subscore is 6.   No focal neurological deficit  Strength 5/5  Sensation grossly in tact  MAEW   GCS 15     Skin: Skin is warm.         ED Course   Procedures  Labs Reviewed - No data to display       Imaging Results              CT Cervical Spine Without Contrast (Final result)  Result time 05/30/24 11:53:44      Final result by Jose Cruz Martinez MD (05/30/24 11:53:44)                   Impression:      1. No acute intracranial findings.  2. Anterior and left paramedian frontal/supraorbital scalp hematoma extending to the left preseptal periorbital region and pre malar region without definite displaced skull or facial bone fracture.  3. No acute cervical spine fracture.      Electronically signed by: Jose Cruz Martinez  Date:    05/30/2024  Time:    11:53                Narrative:    EXAMINATION:  CT HEAD WITHOUT CONTRAST; CT CERVICAL SPINE WITHOUT CONTRAST; CT MAXILLOFACIAL WITHOUT CONTRAST    CLINICAL HISTORY:  Facial trauma, blunt;; Neck trauma (Age >= 65y);    TECHNIQUE:  Low dose axial images were obtained through the head, facial bones, and cervical spine.  Coronal and sagittal reformations were also performed. Contrast was not administered.    COMPARISON:  None.    FINDINGS:  Head:    Blood: No acute intracranial hemorrhage.    Parenchyma: No definite loss of gray-white differentiation to suggest acute or subacute transcortical infarct. Generalized pattern age-related parenchymal volume loss.  Nonspecific areas of white matter hypoattenuation may reflect sequela of chronic small vessel ischemic disease.  Remote lacunar type infarct left basal ganglia.    Ventricles/Extra-axial spaces: No abnormal extra-axial fluid collection. Basal cisterns are patent.  Prominent dural calcification about the lateral left middle cranial fossa, measuring up to 10 mm.    Vessels: Moderate atherosclerotic calcifications.    Orbits: Status post bilateral lens replacements.  Additional details below.    Scalp: Anterior and left paramedian/supraorbital scalp hematoma extending to the preseptal periorbital region and pre malar region on the left.    Skull: There are no depressed skull fractures or destructive bone lesions.    Sinuses and mastoids: See below.    Other findings: None    Facial bones:    Acute facial fractures: There are no acute facial bone fractures.    Pterygoid plates, Zygomatic arches, Sphenotemporal buttresses: Intact.    Nasal Bones: No acute nasal bone fracture.    Mandible: The mandible is intact.  Advanced degenerative changes of bilateral temporomandibular joints with marked erosion of the mandibular condyles.    Dentition: No tooth fracture. No periapical lucencies.  Numerous dental caries.    Sinuses: There are no fluid levels in the sinuses.    Imaged mastoids and middle  ears: The imaged mastoid air cells and middle ear cavities are clear.    Imaged upper cervical spine: See below.    Facial soft tissues: As above.    Orbits: The bony orbits and orbital contents are atraumatic.    Imaged intracranial structures and upper aerodigestive tract: Unremarkable.    Cervical spine:    Fractures: No acute fractures    Alignment: Minimal grade 1 anterolisthesis of C6 on C7 and of C7 on T1.  Atlanto-axial and atlanto-occipital joints: Atlanto-axial and atlanto-occipital intervals are not widened.  Facet joints: There is no traumatic facet joint widening.  Degenerative set arthropathy.  Ankylosis across the bilateral C3-4 facet joints and left C2-3 facet joint.  Vertebral bodies: Query osseous demineralization.  Degenerative endplate change.  Discs: Degenerative disc disease.  Spinal canal and foraminal narrowing: Although CT does not optimally evaluate the soft tissue contents of the spinal canal and foramina, no critical stenosis is suggested.  Paraspinal soft tissues: Unremarkable.    Upper Lungs:Clear                                       CT Maxillofacial Without Contrast (Final result)  Result time 05/30/24 11:53:44      Final result by Jose Cruz Martinez MD (05/30/24 11:53:44)                   Impression:      1. No acute intracranial findings.  2. Anterior and left paramedian frontal/supraorbital scalp hematoma extending to the left preseptal periorbital region and pre malar region without definite displaced skull or facial bone fracture.  3. No acute cervical spine fracture.      Electronically signed by: Jose Cruz Martinez  Date:    05/30/2024  Time:    11:53               Narrative:    EXAMINATION:  CT HEAD WITHOUT CONTRAST; CT CERVICAL SPINE WITHOUT CONTRAST; CT MAXILLOFACIAL WITHOUT CONTRAST    CLINICAL HISTORY:  Facial trauma, blunt;; Neck trauma (Age >= 65y);    TECHNIQUE:  Low dose axial images were obtained through the head, facial bones, and cervical spine.  Coronal and sagittal  reformations were also performed. Contrast was not administered.    COMPARISON:  None.    FINDINGS:  Head:    Blood: No acute intracranial hemorrhage.    Parenchyma: No definite loss of gray-white differentiation to suggest acute or subacute transcortical infarct. Generalized pattern age-related parenchymal volume loss.  Nonspecific areas of white matter hypoattenuation may reflect sequela of chronic small vessel ischemic disease.  Remote lacunar type infarct left basal ganglia.    Ventricles/Extra-axial spaces: No abnormal extra-axial fluid collection. Basal cisterns are patent.  Prominent dural calcification about the lateral left middle cranial fossa, measuring up to 10 mm.    Vessels: Moderate atherosclerotic calcifications.    Orbits: Status post bilateral lens replacements.  Additional details below.    Scalp: Anterior and left paramedian/supraorbital scalp hematoma extending to the preseptal periorbital region and pre malar region on the left.    Skull: There are no depressed skull fractures or destructive bone lesions.    Sinuses and mastoids: See below.    Other findings: None    Facial bones:    Acute facial fractures: There are no acute facial bone fractures.    Pterygoid plates, Zygomatic arches, Sphenotemporal buttresses: Intact.    Nasal Bones: No acute nasal bone fracture.    Mandible: The mandible is intact.  Advanced degenerative changes of bilateral temporomandibular joints with marked erosion of the mandibular condyles.    Dentition: No tooth fracture. No periapical lucencies.  Numerous dental caries.    Sinuses: There are no fluid levels in the sinuses.    Imaged mastoids and middle ears: The imaged mastoid air cells and middle ear cavities are clear.    Imaged upper cervical spine: See below.    Facial soft tissues: As above.    Orbits: The bony orbits and orbital contents are atraumatic.    Imaged intracranial structures and upper aerodigestive tract: Unremarkable.    Cervical  spine:    Fractures: No acute fractures    Alignment: Minimal grade 1 anterolisthesis of C6 on C7 and of C7 on T1.  Atlanto-axial and atlanto-occipital joints: Atlanto-axial and atlanto-occipital intervals are not widened.  Facet joints: There is no traumatic facet joint widening.  Degenerative set arthropathy.  Ankylosis across the bilateral C3-4 facet joints and left C2-3 facet joint.  Vertebral bodies: Query osseous demineralization.  Degenerative endplate change.  Discs: Degenerative disc disease.  Spinal canal and foraminal narrowing: Although CT does not optimally evaluate the soft tissue contents of the spinal canal and foramina, no critical stenosis is suggested.  Paraspinal soft tissues: Unremarkable.    Upper Lungs:Clear                                       CT Head Without Contrast (Final result)  Result time 05/30/24 11:53:44      Final result by Jose Cruz Martinez MD (05/30/24 11:53:44)                   Impression:      1. No acute intracranial findings.  2. Anterior and left paramedian frontal/supraorbital scalp hematoma extending to the left preseptal periorbital region and pre malar region without definite displaced skull or facial bone fracture.  3. No acute cervical spine fracture.      Electronically signed by: Jose Cruz Martinez  Date:    05/30/2024  Time:    11:53               Narrative:    EXAMINATION:  CT HEAD WITHOUT CONTRAST; CT CERVICAL SPINE WITHOUT CONTRAST; CT MAXILLOFACIAL WITHOUT CONTRAST    CLINICAL HISTORY:  Facial trauma, blunt;; Neck trauma (Age >= 65y);    TECHNIQUE:  Low dose axial images were obtained through the head, facial bones, and cervical spine.  Coronal and sagittal reformations were also performed. Contrast was not administered.    COMPARISON:  None.    FINDINGS:  Head:    Blood: No acute intracranial hemorrhage.    Parenchyma: No definite loss of gray-white differentiation to suggest acute or subacute transcortical infarct. Generalized pattern age-related parenchymal  volume loss.  Nonspecific areas of white matter hypoattenuation may reflect sequela of chronic small vessel ischemic disease.  Remote lacunar type infarct left basal ganglia.    Ventricles/Extra-axial spaces: No abnormal extra-axial fluid collection. Basal cisterns are patent.  Prominent dural calcification about the lateral left middle cranial fossa, measuring up to 10 mm.    Vessels: Moderate atherosclerotic calcifications.    Orbits: Status post bilateral lens replacements.  Additional details below.    Scalp: Anterior and left paramedian/supraorbital scalp hematoma extending to the preseptal periorbital region and pre malar region on the left.    Skull: There are no depressed skull fractures or destructive bone lesions.    Sinuses and mastoids: See below.    Other findings: None    Facial bones:    Acute facial fractures: There are no acute facial bone fractures.    Pterygoid plates, Zygomatic arches, Sphenotemporal buttresses: Intact.    Nasal Bones: No acute nasal bone fracture.    Mandible: The mandible is intact.  Advanced degenerative changes of bilateral temporomandibular joints with marked erosion of the mandibular condyles.    Dentition: No tooth fracture. No periapical lucencies.  Numerous dental caries.    Sinuses: There are no fluid levels in the sinuses.    Imaged mastoids and middle ears: The imaged mastoid air cells and middle ear cavities are clear.    Imaged upper cervical spine: See below.    Facial soft tissues: As above.    Orbits: The bony orbits and orbital contents are atraumatic.    Imaged intracranial structures and upper aerodigestive tract: Unremarkable.    Cervical spine:    Fractures: No acute fractures    Alignment: Minimal grade 1 anterolisthesis of C6 on C7 and of C7 on T1.  Atlanto-axial and atlanto-occipital joints: Atlanto-axial and atlanto-occipital intervals are not widened.  Facet joints: There is no traumatic facet joint widening.  Degenerative set arthropathy.  Ankylosis  across the bilateral C3-4 facet joints and left C2-3 facet joint.  Vertebral bodies: Query osseous demineralization.  Degenerative endplate change.  Discs: Degenerative disc disease.  Spinal canal and foraminal narrowing: Although CT does not optimally evaluate the soft tissue contents of the spinal canal and foramina, no critical stenosis is suggested.  Paraspinal soft tissues: Unremarkable.    Upper Lungs:Clear                                       X-Ray Hip 2 or 3 views Left with Pelvis when performed (Final result)  Result time 05/30/24 11:39:39      Final result by Rosy Fuentes MD (05/30/24 11:39:39)                   Impression:      No definite fracture identified, if clinical doubt persists for the presence of an inconspicuous fracture, consider CT evaluation.      Electronically signed by: Rosy Fuentes MD  Date:    05/30/2024  Time:    11:39               Narrative:    EXAMINATION:  XR HIP WITH PELVIS WHEN PERFORMED 2 OR 3 VIEWS LEFT    CLINICAL HISTORY:  fall;    TECHNIQUE:  AP view and lateral view    COMPARISON:  12/09/2013    FINDINGS:  There is bilateral joint space narrowing and osteophyte formation.  No acute fracture is identified, no osseous lesion seen.  Normal left femoral head contour.    Extensive vascular calcification and postoperative change with multiple surgical clips left inguinal region.    Subcutaneous soft tissue edema left lateral thigh region.                                       Medications - No data to display  Medical Decision Making  Amount and/or Complexity of Data Reviewed  Radiology: ordered. Decision-making details documented in ED Course.               ED Course as of 05/30/24 1229   Thu May 30, 2024   1210 CT Cervical Spine Without Contrast  No acute intracranial findings.  2. Anterior and left paramedian frontal/supraorbital scalp hematoma extending to the left preseptal periorbital region and pre malar region without definite displaced skull or facial  bone fracture.  3. No acute cervical spine fracture.   [LC]   1211 CT Head Without Contrast  No acute intracranial findings.  2. Anterior and left paramedian frontal/supraorbital scalp hematoma extending to the left preseptal periorbital region and pre malar region without definite displaced skull or facial bone fracture.  3. No acute cervical spine fracture.   [LC]   1211 CT Maxillofacial Without Contrast  No acute intracranial findings.  2. Anterior and left paramedian frontal/supraorbital scalp hematoma extending to the left preseptal periorbital region and pre malar region without definite displaced skull or facial bone fracture.  3. No acute cervical spine fracture.   [LC]   1211 X-Ray Hip 2 or 3 views Left with Pelvis when performed  No acute osseous abnormality per final radiology read.    Note: Pt is able to bear weight on extremity.  [LC]      ED Course User Index  [LC] Jason Valderrama MD               Medical Decision Making:   Initial Assessment:   See HPI   Clinical Tests:   Radiological Study: Ordered and Reviewed  ED Management:  - CT head WO negative for acute intracranial abnormality per final radiology read  - CT cervical spine WO negative for acute abnormality per final radiology read  - CT max/face WO negative for acute abnormality per final radiology read  - plain radiograph of the L hip negative for acute fracture or dislocation per final radiology read  - no further emergent intervention indicated at this time  - pt's son instructed to continue symptomatic care for hematoma to the scalp and orbit per our discussion  - pt stable for discharge  - No further intervention is indicated at this time after having taken into account the patient's history, physical exam findings, and empirical and objective data obtained during the patient's emergency department workup.   - The patient is at low risk for an emergent medical condition at this time, and I am of the belief that that it is safe to  discharge the patient from the emergency department.   - The patient is instructed to follow up as outpatient as indicated on the discharge paperwork.    - I have discussed the specifics of the workup with the patient and the patient has verbalized understanding of the details of the workup, the diagnosis, the treatment plan, and the need for outpatient follow-up.    - Although the patient has no emergent etiology today this does not preclude the development of an emergent condition so, in addition, I have advised the patient that they can return to the ED and/or activate EMS at any time with worsening of their symptoms, change of their symptoms, or with any other medical complaint.    - The patient remained comfortable and stable during their visit in the ED.    - Discharge and follow-up instructions discussed with the patient who expressed understanding and willingness to comply with my recommendations.  - Results of all emergency department tests  discussed thoroughly with patient; all patient questions answered; pt in agreement with plan  - Pt instructed to follow up with PCP in 2-3 days for recheck of today's complaints  - Pt given strict emergency department return precautions for any new or worsening of symptoms  - Pt discharged from the emergency department in stable condition, in no acute distress                 Clinical Impression:  Final diagnoses:  [S00.03XA] Scalp hematoma, initial encounter (Primary)  [S05.12XA] Traumatic hematoma of left orbit, initial encounter  [W19.XXXA] Fall, initial encounter          ED Disposition Condition    Discharge Stable          ED Prescriptions    None       Follow-up Information       Follow up With Specialties Details Why Contact Info    Bayron Golden MD Family Medicine Schedule an appointment as soon as possible for a visit   [address] [facility]  [place] [id number] [phone]               Jason Valderrama MD  05/30/24 2894

## 2024-05-31 ENCOUNTER — PATIENT OUTREACH (OUTPATIENT)
Dept: EMERGENCY MEDICINE | Facility: HOSPITAL | Age: 89
End: 2024-05-31
Payer: MEDICARE

## 2024-07-24 RX ORDER — ATORVASTATIN CALCIUM 40 MG/1
40 TABLET, FILM COATED ORAL NIGHTLY
Qty: 90 TABLET | Refills: 0 | Status: SHIPPED | OUTPATIENT
Start: 2024-07-24

## 2024-07-24 NOTE — TELEPHONE ENCOUNTER
Refill Routing Note   Medication(s) are not appropriate for processing by [facility] for the following reason(s):        No active prescription written by provider  Required labs outdated    [facility] action(s):  Defer             Appointments  past 12m or future 3m with PCP    Date Provider   Last Visit   1/25/2024 Bayron Golden MD   Next Visit   8/8/2024 Bayron Golden MD   ED visits in past 90 days: 1        Note composed:2:48 PM 07/24/2024

## 2024-07-24 NOTE — TELEPHONE ENCOUNTER
No care due was identified.  [facility] Embedded Care Due Messages. Reference number: [id number].   7/24/2024 11:36:06 AM CDT

## 2024-10-24 RX ORDER — ATORVASTATIN CALCIUM 40 MG/1
40 TABLET, FILM COATED ORAL NIGHTLY
Qty: 90 TABLET | Refills: 3 | Status: SHIPPED | OUTPATIENT
Start: 2024-10-24 | End: 2024-10-25

## 2024-10-24 NOTE — TELEPHONE ENCOUNTER
Refill Routing Note   Medication(s) are not appropriate for processing by [facility] for the following reason(s):        ED/Hospital Visit since last OV with provider  Required labs outdated    [facility] action(s):  Defer               Appointments  past 12m or future 3m with PCP    Date Provider   Last Visit   1/25/2024 Bayron Golden MD   Next Visit   11/21/2024 Bayron Golden MD   ED visits in past 90 days: 0        Note composed:1:14 PM 10/24/2024

## 2024-10-24 NOTE — TELEPHONE ENCOUNTER
No care due was identified.  [facility] Embedded Care Due Messages. Reference number: [id number].   10/24/2024 7:04:43 AM CDT

## 2024-10-25 RX ORDER — ATORVASTATIN CALCIUM 40 MG/1
40 TABLET, FILM COATED ORAL NIGHTLY
Qty: 90 TABLET | Refills: 3 | Status: SHIPPED | OUTPATIENT
Start: 2024-10-25

## 2024-11-21 ENCOUNTER — PATIENT MESSAGE (OUTPATIENT)
Dept: ADMINISTRATIVE | Facility: HOSPITAL | Age: 89
End: 2024-11-21
Payer: MEDICARE

## 2025-03-24 DIAGNOSIS — Z00.00 ENCOUNTER FOR MEDICARE ANNUAL WELLNESS EXAM: ICD-10-CM

## 2025-04-07 DIAGNOSIS — I25.10 CORONARY ARTERY DISEASE INVOLVING NATIVE CORONARY ARTERY OF NATIVE HEART WITHOUT ANGINA PECTORIS: ICD-10-CM

## 2025-04-07 RX ORDER — ISOSORBIDE MONONITRATE 30 MG/1
30 TABLET, EXTENDED RELEASE ORAL DAILY
Qty: 90 TABLET | Refills: 3 | Status: SHIPPED | OUTPATIENT
Start: 2025-04-07

## 2025-04-07 NOTE — TELEPHONE ENCOUNTER
Care Due:                  Date            Visit Type   Department     Provider  --------------------------------------------------------------------------------                                EP -                              PRIMARY      [facility]  Last Visit: 01-      CARE ([facility])   MEDICINE       Bayron Golden  Next Visit: None Scheduled  None         None Found                                                            Last  Test          Frequency    Reason                     Performed    Due Date  --------------------------------------------------------------------------------    Office Visit  15 months..  atorvastatin, isosorbide.  01- 04-    CMP.........  12 months..  atorvastatin.............  01- 01-    Lipid Panel.  12 months..  atorvastatin.............  07- 07-    [facility] Embedded Care Due Messages. Reference number: [id number].   4/07/2025 7:31:37 AM CDT

## 2025-04-21 ENCOUNTER — TELEPHONE (OUTPATIENT)
Dept: FAMILY MEDICINE | Facility: CLINIC | Age: 89
End: 2025-04-21
Payer: MEDICARE

## 2025-04-21 NOTE — TELEPHONE ENCOUNTER
Pt's daughter called requesting an appt for side pain. Pt was offered appointment tomorrow @730 but declined. I then offered her with ALYSSIA Méndez but she declined. Pt scheduled appt 5/6 with Dr Carbone.

## 2025-04-21 NOTE — TELEPHONE ENCOUNTER
----- Message from Milo sent at 2025  4:20 PM CDT -----  [phone]  Type:  Sooner Appointment Request Patient is requesting a sooner appointment.  Patient declined first available appointment listed as well as another facility and provider .  Patient will not accept being placed on the waitlist and is requesting a message be sent to doctor. Name of Caller: Sherri - pt's daughter  When is the first available appointment?  2025 Symptoms: side pain Would the patient rather a call back or a response via [facility]? Call back Best Call Back Number: [phone]  Additional Information:  pt is available on  [facility] , pt does dialysis all other days.

## 2025-05-06 ENCOUNTER — OFFICE VISIT (OUTPATIENT)
Dept: FAMILY MEDICINE | Facility: CLINIC | Age: 89
End: 2025-05-06
Payer: MEDICARE

## 2025-05-06 VITALS
DIASTOLIC BLOOD PRESSURE: 46 MMHG | HEART RATE: 84 BPM | SYSTOLIC BLOOD PRESSURE: 110 MMHG | OXYGEN SATURATION: 98 % | BODY MASS INDEX: 19.14 KG/M2 | WEIGHT: 108 LBS | HEIGHT: 63 IN

## 2025-05-06 DIAGNOSIS — I50.32 CHRONIC DIASTOLIC CONGESTIVE HEART FAILURE: ICD-10-CM

## 2025-05-06 DIAGNOSIS — E11.22 TYPE 2 DIABETES MELLITUS WITH CHRONIC KIDNEY DISEASE ON CHRONIC DIALYSIS, WITH LONG-TERM CURRENT USE OF INSULIN: ICD-10-CM

## 2025-05-06 DIAGNOSIS — Z99.2 TYPE 2 DIABETES MELLITUS WITH CHRONIC KIDNEY DISEASE ON CHRONIC DIALYSIS, WITH LONG-TERM CURRENT USE OF INSULIN: ICD-10-CM

## 2025-05-06 DIAGNOSIS — N18.6 TYPE 2 DIABETES MELLITUS WITH CHRONIC KIDNEY DISEASE ON CHRONIC DIALYSIS, WITH LONG-TERM CURRENT USE OF INSULIN: ICD-10-CM

## 2025-05-06 DIAGNOSIS — Z12.31 ENCOUNTER FOR SCREENING MAMMOGRAM FOR BREAST CANCER: ICD-10-CM

## 2025-05-06 DIAGNOSIS — F03.90 SENILE DEMENTIA: ICD-10-CM

## 2025-05-06 DIAGNOSIS — N64.59 BLEEDING FROM BREAST: Primary | ICD-10-CM

## 2025-05-06 DIAGNOSIS — Z79.4 TYPE 2 DIABETES MELLITUS WITH CHRONIC KIDNEY DISEASE ON CHRONIC DIALYSIS, WITH LONG-TERM CURRENT USE OF INSULIN: ICD-10-CM

## 2025-05-06 PROBLEM — J41.1 MUCOPURULENT CHRONIC BRONCHITIS: Status: RESOLVED | Noted: 2023-03-16 | Resolved: 2025-05-06

## 2025-05-06 PROCEDURE — 3288F FALL RISK ASSESSMENT DOCD: CPT | Mod: CPTII,S$GLB,, | Performed by: FAMILY MEDICINE

## 2025-05-06 PROCEDURE — 1101F PT FALLS ASSESS-DOCD LE1/YR: CPT | Mod: CPTII,S$GLB,, | Performed by: FAMILY MEDICINE

## 2025-05-06 PROCEDURE — 1160F RVW MEDS BY RX/DR IN RCRD: CPT | Mod: CPTII,S$GLB,, | Performed by: FAMILY MEDICINE

## 2025-05-06 PROCEDURE — 99999 PR PBB SHADOW E&M-EST. PATIENT-LVL IV: CPT | Mod: PBBFAC,,, | Performed by: FAMILY MEDICINE

## 2025-05-06 PROCEDURE — 99215 OFFICE O/P EST HI 40 MIN: CPT | Mod: S$GLB,,, | Performed by: FAMILY MEDICINE

## 2025-05-06 PROCEDURE — 1159F MED LIST DOCD IN RCRD: CPT | Mod: CPTII,S$GLB,, | Performed by: FAMILY MEDICINE

## 2025-05-06 NOTE — PROGRESS NOTES
Subjective     Patient ID: Erin Clark is a 89 y.o. female.    Chief Complaint: Breast Pain    89 years old female came to the clinic with bleeding from her left breast for the last couple of weeks.  Patient currently on dialysis previously diagnosed with dementia.  No flare ups of congestive heart failure.  No recent A1c.  Family is concerned about possible breast cancer.    Hypertension  This is a chronic problem. The current episode started more than 1 year ago. The problem is unchanged. The problem is controlled. Pertinent negatives include no chest pain, orthopnea, palpitations or peripheral edema. There are no associated agents to hypertension. There are no known risk factors for coronary artery disease. Past treatments include beta blockers, direct vasodilators and diuretics. The current treatment provides significant improvement. Compliance problems include exercise.  There is no history of angina. There is no history of a hypertension causing med or a thyroid problem.     Review of Systems   Constitutional: Negative.    HENT: Negative.     Eyes: Negative.    Respiratory: Negative.     Cardiovascular:  Negative for chest pain, palpitations, orthopnea, leg swelling and claudication.   Gastrointestinal: Negative.    Genitourinary: Negative.    Musculoskeletal: Negative.    Neurological: Negative.    Psychiatric/Behavioral: Negative.            Objective     Physical Exam  Constitutional:       General: She is not in acute distress.     Appearance: She is well-developed. She is not diaphoretic.   HENT:      Head: Normocephalic and atraumatic.      Right Ear: External ear normal.      Left Ear: External ear normal.      Nose: Nose normal.      Mouth/Throat:      Pharynx: No oropharyngeal exudate.   Eyes:      General: No scleral icterus.        Right eye: No discharge.         Left eye: No discharge.      Conjunctiva/sclera: Conjunctivae normal.      Pupils: Pupils are equal, round, and reactive to light.    Neck:      Thyroid: No thyromegaly.      Vascular: No JVD.      Trachea: No tracheal deviation.   Cardiovascular:      Rate and Rhythm: Normal rate and regular rhythm.      Heart sounds: Normal heart sounds. No murmur heard.     No friction rub. No gallop.   Pulmonary:      Effort: Pulmonary effort is normal. No respiratory distress.      Breath sounds: Normal breath sounds. No stridor. No wheezing or rales.   Chest:      Chest wall: No tenderness.   Breasts:     Right: No bleeding, inverted nipple, nipple discharge or tenderness.      Left: No bleeding, inverted nipple, nipple discharge or tenderness.      Comments: Fibroglandular tissue palpated.  Abdominal:      General: Bowel sounds are normal. There is no distension.      Palpations: Abdomen is soft. There is no mass.      Tenderness: There is no abdominal tenderness. There is no guarding or rebound.   Musculoskeletal:         General: No tenderness. Normal range of motion.      Cervical back: Normal range of motion and neck supple.   Lymphadenopathy:      Cervical: No cervical adenopathy.   Skin:     General: Skin is warm and dry.      Coloration: Skin is not pale.      Findings: No erythema or rash.   Neurological:      Mental Status: She is alert and oriented to person, place, and time.      Cranial Nerves: No cranial nerve deficit.      Motor: Weakness present. No abnormal muscle tone.      Coordination: Coordination abnormal.      Gait: Gait abnormal.      Deep Tendon Reflexes: Reflexes are normal and symmetric.   Psychiatric:         Behavior: Behavior normal.         Thought Content: Thought content normal.         Cognition and Memory: Cognition is impaired.         Judgment: Judgment normal.            Assessment and Plan     1. Bleeding from breast  -     Mammo Digital Diagnostic Bilat with Sriram (XPD); Future; Expected date: 05/06/2025  -     US Breast Left Limited; Future; Expected date: 05/06/2025  -     US Breast Right Limited; Future; Expected  date: 05/06/2025    2. Senile dementia  -     Comprehensive Metabolic Panel; Future; Expected date: 05/06/2025  -     CBC Auto Differential; Future; Expected date: 05/06/2025  -     TSH; Future; Expected date: 05/06/2025    3. Chronic diastolic congestive heart failure  -     Lipid Panel; Future; Expected date: 05/06/2025  -     Comprehensive Metabolic Panel; Future; Expected date: 05/06/2025  -     CBC Auto Differential; Future; Expected date: 05/06/2025    4. Type 2 diabetes mellitus with chronic kidney disease on chronic dialysis, with long-term current use of insulin  -     Hemoglobin A1C; Future; Expected date: 05/06/2025    5. Encounter for screening mammogram for breast cancer  -     Mammo Digital Diagnostic Bilat with Sriarm (XPD); Future; Expected date: 05/06/2025  -     US Breast Left Limited; Future; Expected date: 05/06/2025  -     US Breast Right Limited; Future; Expected date: 05/06/2025      Continue with hemodialysis.  Continue monitoring blood pressure at home, low sodium diet.   Continue monitoring blood sugar at home,ADA diet.   I spent a total of 41 minutes on the day of the visit.This includes face to face time and non-face to face time preparing to see the patient (eg, review of tests), obtaining and/or reviewing separately obtained history, documenting clinical information in the electronic or other health record, independently interpreting results and communicating results to the patient/family/caregiver, or care coordinator.        Follow up if symptoms worsen or fail to improve.

## 2025-05-10 ENCOUNTER — LAB VISIT (OUTPATIENT)
Dept: LAB | Facility: HOSPITAL | Age: 89
End: 2025-05-10
Attending: FAMILY MEDICINE
Payer: MEDICARE

## 2025-05-10 DIAGNOSIS — N18.6 TYPE 2 DIABETES MELLITUS WITH CHRONIC KIDNEY DISEASE ON CHRONIC DIALYSIS, WITH LONG-TERM CURRENT USE OF INSULIN: ICD-10-CM

## 2025-05-10 DIAGNOSIS — E11.22 TYPE 2 DIABETES MELLITUS WITH CHRONIC KIDNEY DISEASE ON CHRONIC DIALYSIS, WITH LONG-TERM CURRENT USE OF INSULIN: ICD-10-CM

## 2025-05-10 DIAGNOSIS — Z79.4 TYPE 2 DIABETES MELLITUS WITH CHRONIC KIDNEY DISEASE ON CHRONIC DIALYSIS, WITH LONG-TERM CURRENT USE OF INSULIN: ICD-10-CM

## 2025-05-10 DIAGNOSIS — F03.90 SENILE DEMENTIA: ICD-10-CM

## 2025-05-10 DIAGNOSIS — I50.32 CHRONIC DIASTOLIC CONGESTIVE HEART FAILURE: ICD-10-CM

## 2025-05-10 DIAGNOSIS — Z99.2 TYPE 2 DIABETES MELLITUS WITH CHRONIC KIDNEY DISEASE ON CHRONIC DIALYSIS, WITH LONG-TERM CURRENT USE OF INSULIN: ICD-10-CM

## 2025-05-10 LAB
ABSOLUTE EOSINOPHIL (OHS): 0.28 K/UL
ABSOLUTE MONOCYTE (OHS): 0.78 K/UL (ref 0.3–1)
ABSOLUTE NEUTROPHIL COUNT (OHS): 5.09 K/UL (ref 1.8–7.7)
ALBUMIN SERPL BCP-MCNC: 3.3 G/DL (ref 3.5–5.2)
ALP SERPL-CCNC: 106 UNIT/L (ref 40–150)
ALT SERPL W/O P-5'-P-CCNC: 10 UNIT/L (ref 10–44)
ANION GAP (OHS): 18 MMOL/L (ref 8–16)
AST SERPL-CCNC: 20 UNIT/L (ref 11–45)
BASOPHILS # BLD AUTO: 0.06 K/UL
BASOPHILS NFR BLD AUTO: 0.7 %
BILIRUB SERPL-MCNC: 0.8 MG/DL (ref 0.1–1)
BUN SERPL-MCNC: 32 MG/DL (ref 8–23)
CALCIUM SERPL-MCNC: 9.5 MG/DL (ref 8.7–10.5)
CHLORIDE SERPL-SCNC: 91 MMOL/L (ref 95–110)
CHOLEST SERPL-MCNC: 142 MG/DL (ref 120–199)
CHOLEST/HDLC SERPL: 2.4 {RATIO} (ref 2–5)
CO2 SERPL-SCNC: 26 MMOL/L (ref 23–29)
CREAT SERPL-MCNC: 7.4 MG/DL (ref 0.5–1.4)
EAG (OHS): 105 MG/DL (ref 68–131)
ERYTHROCYTE [DISTWIDTH] IN BLOOD BY AUTOMATED COUNT: 16.2 % (ref 11.5–14.5)
GFR SERPLBLD CREATININE-BSD FMLA CKD-EPI: 5 ML/MIN/1.73/M2
GLUCOSE SERPL-MCNC: 105 MG/DL (ref 70–110)
HBA1C MFR BLD: 5.3 % (ref 4–5.6)
HCT VFR BLD AUTO: 37.7 % (ref 37–48.5)
HDLC SERPL-MCNC: 60 MG/DL (ref 40–75)
HDLC SERPL: 42.3 % (ref 20–50)
HGB BLD-MCNC: 12.1 GM/DL (ref 12–16)
IMM GRANULOCYTES # BLD AUTO: 0.05 K/UL (ref 0–0.04)
IMM GRANULOCYTES NFR BLD AUTO: 0.6 % (ref 0–0.5)
LDLC SERPL CALC-MCNC: 53.6 MG/DL (ref 63–159)
LYMPHOCYTES # BLD AUTO: 2.09 K/UL (ref 1–4.8)
MCH RBC QN AUTO: 31.4 PG (ref 27–31)
MCHC RBC AUTO-ENTMCNC: 32.1 G/DL (ref 32–36)
MCV RBC AUTO: 98 FL (ref 82–98)
NONHDLC SERPL-MCNC: 82 MG/DL
NUCLEATED RBC (/100WBC) (OHS): 0 /100 WBC
PLATELET # BLD AUTO: 195 K/UL (ref 150–450)
PMV BLD AUTO: 10 FL (ref 9.2–12.9)
POTASSIUM SERPL-SCNC: 5.4 MMOL/L (ref 3.5–5.1)
PROT SERPL-MCNC: 7.4 GM/DL (ref 6–8.4)
RBC # BLD AUTO: 3.85 M/UL (ref 4–5.4)
RELATIVE EOSINOPHIL (OHS): 3.4 %
RELATIVE LYMPHOCYTE (OHS): 25 % (ref 18–48)
RELATIVE MONOCYTE (OHS): 9.3 % (ref 4–15)
RELATIVE NEUTROPHIL (OHS): 61 % (ref 38–73)
SODIUM SERPL-SCNC: 135 MMOL/L (ref 136–145)
TRIGL SERPL-MCNC: 142 MG/DL (ref 30–150)
TSH SERPL-ACNC: 3.17 UIU/ML (ref 0.4–4)
WBC # BLD AUTO: 8.35 K/UL (ref 3.9–12.7)

## 2025-05-10 PROCEDURE — 83036 HEMOGLOBIN GLYCOSYLATED A1C: CPT

## 2025-05-10 PROCEDURE — 84443 ASSAY THYROID STIM HORMONE: CPT

## 2025-05-10 PROCEDURE — 80053 COMPREHEN METABOLIC PANEL: CPT

## 2025-05-10 PROCEDURE — 36415 COLL VENOUS BLD VENIPUNCTURE: CPT | Mod: PO

## 2025-05-10 PROCEDURE — 85025 COMPLETE CBC W/AUTO DIFF WBC: CPT

## 2025-05-10 PROCEDURE — 80061 LIPID PANEL: CPT

## 2025-05-14 ENCOUNTER — RESULTS FOLLOW-UP (OUTPATIENT)
Dept: FAMILY MEDICINE | Facility: CLINIC | Age: 89
End: 2025-05-14

## 2025-06-06 NOTE — TELEPHONE ENCOUNTER
No care due was identified.  [facility] Embedded Care Due Messages. Reference number: [id number].   6/06/2025 12:28:08 PM CDT

## 2025-06-07 NOTE — TELEPHONE ENCOUNTER
Refill Routing Note   Medication(s) are not appropriate for processing by [facility] for the following reason(s):        No active prescription written by provider  Required vitals abnormal    [facility] action(s):  Defer             Appointments  past 12m or future 3m with PCP    Date Provider   Last Visit   5/6/2025 Bayron Golden MD   Next Visit   11/6/2025 Bayron Golden MD   ED visits in past 90 days: 0        Note composed:10:17 PM 06/06/2025

## 2025-06-09 RX ORDER — METOPROLOL SUCCINATE 25 MG/1
25 TABLET, EXTENDED RELEASE ORAL
Qty: 90 TABLET | Refills: 3 | Status: SHIPPED | OUTPATIENT
Start: 2025-06-09

## 2025-06-12 ENCOUNTER — TELEPHONE (OUTPATIENT)
Dept: FAMILY MEDICINE | Facility: CLINIC | Age: 89
End: 2025-06-12
Payer: MEDICARE

## 2025-06-12 ENCOUNTER — HOSPITAL ENCOUNTER (INPATIENT)
Facility: HOSPITAL | Age: 89
LOS: 2 days | Discharge: HOME | DRG: 314 | End: 2025-06-14
Attending: EMERGENCY MEDICINE | Admitting: INTERNAL MEDICINE
Payer: MEDICARE

## 2025-06-12 DIAGNOSIS — N18.6 ANEMIA IN ESRD (END-STAGE RENAL DISEASE): ICD-10-CM

## 2025-06-12 DIAGNOSIS — I50.20 HFREF (HEART FAILURE WITH REDUCED EJECTION FRACTION): ICD-10-CM

## 2025-06-12 DIAGNOSIS — D63.1 ANEMIA IN ESRD (END-STAGE RENAL DISEASE): ICD-10-CM

## 2025-06-12 DIAGNOSIS — N18.6 ESRD (END STAGE RENAL DISEASE): ICD-10-CM

## 2025-06-12 DIAGNOSIS — I48.91 NEW ONSET A-FIB: ICD-10-CM

## 2025-06-12 DIAGNOSIS — L08.9 TOE INFECTION: ICD-10-CM

## 2025-06-12 DIAGNOSIS — R07.9 CHEST PAIN: ICD-10-CM

## 2025-06-12 DIAGNOSIS — D64.9 ANEMIA, UNSPECIFIED TYPE: ICD-10-CM

## 2025-06-12 DIAGNOSIS — S91.105A: ICD-10-CM

## 2025-06-12 DIAGNOSIS — I99.8 LIMB ISCHEMIA: Primary | ICD-10-CM

## 2025-06-12 DIAGNOSIS — I73.9 PAD (PERIPHERAL ARTERY DISEASE): ICD-10-CM

## 2025-06-12 LAB
ABSOLUTE EOSINOPHIL (OHS): 0.2 K/UL
ABSOLUTE MONOCYTE (OHS): 0.9 K/UL (ref 0.3–1)
ABSOLUTE NEUTROPHIL COUNT (OHS): 8.61 K/UL (ref 1.8–7.7)
ALBUMIN SERPL BCP-MCNC: 3.3 G/DL (ref 3.5–5.2)
ALP SERPL-CCNC: 114 UNIT/L (ref 40–150)
ALT SERPL W/O P-5'-P-CCNC: 12 UNIT/L (ref 10–44)
ANION GAP (OHS): 15 MMOL/L (ref 8–16)
APTT PPP: 25.4 SECONDS (ref 21–32)
APTT PPP: 27 SECONDS (ref 21–32)
AST SERPL-CCNC: 22 UNIT/L (ref 11–45)
BASOPHILS # BLD AUTO: 0.05 K/UL
BASOPHILS NFR BLD AUTO: 0.4 %
BILIRUB SERPL-MCNC: 0.9 MG/DL (ref 0.1–1)
BUN SERPL-MCNC: 21 MG/DL (ref 8–23)
CALCIUM SERPL-MCNC: 9.1 MG/DL (ref 8.7–10.5)
CHLORIDE SERPL-SCNC: 94 MMOL/L (ref 95–110)
CO2 SERPL-SCNC: 24 MMOL/L (ref 23–29)
CREAT SERPL-MCNC: 6 MG/DL (ref 0.5–1.4)
CRP SERPL-MCNC: 13 MG/L
ERYTHROCYTE [DISTWIDTH] IN BLOOD BY AUTOMATED COUNT: 15.1 % (ref 11.5–14.5)
GFR SERPLBLD CREATININE-BSD FMLA CKD-EPI: 6 ML/MIN/1.73/M2
GLUCOSE SERPL-MCNC: 87 MG/DL (ref 70–110)
HCT VFR BLD AUTO: 33.6 % (ref 37–48.5)
HGB BLD-MCNC: 11.1 GM/DL (ref 12–16)
IMM GRANULOCYTES # BLD AUTO: 0.07 K/UL (ref 0–0.04)
IMM GRANULOCYTES NFR BLD AUTO: 0.6 % (ref 0–0.5)
INR PPP: 1 (ref 0.8–1.2)
LACTATE SERPL-SCNC: 1.7 MMOL/L (ref 0.5–2.2)
LYMPHOCYTES # BLD AUTO: 1.38 K/UL (ref 1–4.8)
MCH RBC QN AUTO: 31.4 PG (ref 27–31)
MCHC RBC AUTO-ENTMCNC: 33 G/DL (ref 32–36)
MCV RBC AUTO: 95 FL (ref 82–98)
NUCLEATED RBC (/100WBC) (OHS): 0 /100 WBC
PLATELET # BLD AUTO: 233 K/UL (ref 150–450)
PMV BLD AUTO: 9.7 FL (ref 9.2–12.9)
POCT GLUCOSE: 88 MG/DL (ref 70–110)
POTASSIUM SERPL-SCNC: 4.2 MMOL/L (ref 3.5–5.1)
PROT SERPL-MCNC: 8.4 GM/DL (ref 6–8.4)
PROTHROMBIN TIME: 11.9 SECONDS (ref 9–12.5)
RBC # BLD AUTO: 3.53 M/UL (ref 4–5.4)
RELATIVE EOSINOPHIL (OHS): 1.8 %
RELATIVE LYMPHOCYTE (OHS): 12.3 % (ref 18–48)
RELATIVE MONOCYTE (OHS): 8 % (ref 4–15)
RELATIVE NEUTROPHIL (OHS): 76.9 % (ref 38–73)
SODIUM SERPL-SCNC: 133 MMOL/L (ref 136–145)
WBC # BLD AUTO: 11.21 K/UL (ref 3.9–12.7)

## 2025-06-12 PROCEDURE — 85610 PROTHROMBIN TIME: CPT

## 2025-06-12 PROCEDURE — 80053 COMPREHEN METABOLIC PANEL: CPT | Performed by: NURSE PRACTITIONER

## 2025-06-12 PROCEDURE — 93005 ELECTROCARDIOGRAM TRACING: CPT

## 2025-06-12 PROCEDURE — 63600175 PHARM REV CODE 636 W HCPCS

## 2025-06-12 PROCEDURE — 25000003 PHARM REV CODE 250

## 2025-06-12 PROCEDURE — 83605 ASSAY OF LACTIC ACID: CPT | Performed by: NURSE PRACTITIONER

## 2025-06-12 PROCEDURE — 85025 COMPLETE CBC W/AUTO DIFF WBC: CPT | Performed by: NURSE PRACTITIONER

## 2025-06-12 PROCEDURE — 87040 BLOOD CULTURE FOR BACTERIA: CPT | Performed by: NURSE PRACTITIONER

## 2025-06-12 PROCEDURE — 85730 THROMBOPLASTIN TIME PARTIAL: CPT | Mod: 91

## 2025-06-12 PROCEDURE — 96374 THER/PROPH/DIAG INJ IV PUSH: CPT

## 2025-06-12 PROCEDURE — 99285 EMERGENCY DEPT VISIT HI MDM: CPT | Mod: 25

## 2025-06-12 PROCEDURE — 82962 GLUCOSE BLOOD TEST: CPT

## 2025-06-12 PROCEDURE — 12000002 HC ACUTE/MED SURGE SEMI-PRIVATE ROOM

## 2025-06-12 PROCEDURE — 93010 ELECTROCARDIOGRAM REPORT: CPT | Mod: ,,, | Performed by: INTERNAL MEDICINE

## 2025-06-12 PROCEDURE — 86140 C-REACTIVE PROTEIN: CPT | Performed by: NURSE PRACTITIONER

## 2025-06-12 RX ORDER — GLUCAGON 1 MG
1 KIT INJECTION
Status: DISCONTINUED | OUTPATIENT
Start: 2025-06-12 | End: 2025-06-14 | Stop reason: HOSPADM

## 2025-06-12 RX ORDER — POLYETHYLENE GLYCOL 3350 17 G/17G
17 POWDER, FOR SOLUTION ORAL DAILY
Status: DISCONTINUED | OUTPATIENT
Start: 2025-06-13 | End: 2025-06-14 | Stop reason: HOSPADM

## 2025-06-12 RX ORDER — ATORVASTATIN CALCIUM 40 MG/1
40 TABLET, FILM COATED ORAL NIGHTLY
Status: DISCONTINUED | OUTPATIENT
Start: 2025-06-12 | End: 2025-06-14 | Stop reason: HOSPADM

## 2025-06-12 RX ORDER — HEPARIN SODIUM,PORCINE/D5W 25000/250
0-40 INTRAVENOUS SOLUTION INTRAVENOUS CONTINUOUS
Status: DISCONTINUED | OUTPATIENT
Start: 2025-06-12 | End: 2025-06-13

## 2025-06-12 RX ORDER — NALOXONE HCL 0.4 MG/ML
0.02 VIAL (ML) INJECTION
Status: DISCONTINUED | OUTPATIENT
Start: 2025-06-12 | End: 2025-06-14 | Stop reason: HOSPADM

## 2025-06-12 RX ORDER — IBUPROFEN 200 MG
16 TABLET ORAL
Status: DISCONTINUED | OUTPATIENT
Start: 2025-06-12 | End: 2025-06-14 | Stop reason: HOSPADM

## 2025-06-12 RX ORDER — ACETAMINOPHEN 325 MG/1
650 TABLET ORAL ONCE
Status: COMPLETED | OUTPATIENT
Start: 2025-06-12 | End: 2025-06-12

## 2025-06-12 RX ORDER — PANTOPRAZOLE SODIUM 40 MG/1
40 TABLET, DELAYED RELEASE ORAL
Status: DISCONTINUED | OUTPATIENT
Start: 2025-06-13 | End: 2025-06-14 | Stop reason: HOSPADM

## 2025-06-12 RX ORDER — SODIUM CHLORIDE 0.9 % (FLUSH) 0.9 %
10 SYRINGE (ML) INJECTION EVERY 12 HOURS PRN
Status: DISCONTINUED | OUTPATIENT
Start: 2025-06-12 | End: 2025-06-14 | Stop reason: HOSPADM

## 2025-06-12 RX ORDER — ASPIRIN 81 MG/1
81 TABLET ORAL DAILY
Status: DISCONTINUED | OUTPATIENT
Start: 2025-06-13 | End: 2025-06-12

## 2025-06-12 RX ORDER — TALC
6 POWDER (GRAM) TOPICAL NIGHTLY PRN
Status: DISCONTINUED | OUTPATIENT
Start: 2025-06-12 | End: 2025-06-14 | Stop reason: HOSPADM

## 2025-06-12 RX ORDER — FUROSEMIDE 40 MG/1
40 TABLET ORAL DAILY
Status: DISCONTINUED | OUTPATIENT
Start: 2025-06-13 | End: 2025-06-14 | Stop reason: HOSPADM

## 2025-06-12 RX ORDER — ISOSORBIDE MONONITRATE 30 MG/1
30 TABLET, EXTENDED RELEASE ORAL DAILY
Status: DISCONTINUED | OUTPATIENT
Start: 2025-06-13 | End: 2025-06-14 | Stop reason: HOSPADM

## 2025-06-12 RX ORDER — OLANZAPINE 5 MG/1
10 TABLET, ORALLY DISINTEGRATING ORAL ONCE
Status: COMPLETED | OUTPATIENT
Start: 2025-06-12 | End: 2025-06-12

## 2025-06-12 RX ORDER — HYDRALAZINE HYDROCHLORIDE 25 MG/1
25 TABLET, FILM COATED ORAL EVERY 12 HOURS
Status: DISCONTINUED | OUTPATIENT
Start: 2025-06-12 | End: 2025-06-14 | Stop reason: HOSPADM

## 2025-06-12 RX ORDER — METOPROLOL SUCCINATE 25 MG/1
25 TABLET, EXTENDED RELEASE ORAL DAILY
Status: DISCONTINUED | OUTPATIENT
Start: 2025-06-13 | End: 2025-06-14 | Stop reason: HOSPADM

## 2025-06-12 RX ORDER — IBUPROFEN 200 MG
24 TABLET ORAL
Status: DISCONTINUED | OUTPATIENT
Start: 2025-06-12 | End: 2025-06-14 | Stop reason: HOSPADM

## 2025-06-12 RX ADMIN — ACETAMINOPHEN 650 MG: 325 TABLET ORAL at 11:06

## 2025-06-12 RX ADMIN — OLANZAPINE 10 MG: 5 TABLET, ORALLY DISINTEGRATING ORAL at 11:06

## 2025-06-12 RX ADMIN — HEPARIN SODIUM 12 UNITS/KG/HR: 10000 INJECTION, SOLUTION INTRAVENOUS at 09:06

## 2025-06-12 RX ADMIN — ATORVASTATIN CALCIUM 40 MG: 40 TABLET, FILM COATED ORAL at 11:06

## 2025-06-12 NOTE — ED PROVIDER NOTES
Encounter Date: 6/12/2025       History     Chief Complaint   Patient presents with    Wound Infection     Blacked left 3rd toe noticed ~1 week ago. CMS intact in affected extremity. Faint pedal pulse. CBG 88     89-year-old female with PMH of CHF, CAD, DM, ESRD, GERD, HTN, HLD presenting to ED accompanied by daughter presenting with concerns of a left foot wound.  Her daughter states that she noticed her left 3rd toe was turning black and painful about 5 days ago.  No known injury or trauma to her foot.  She denies any other symptoms at this time such as fever, chills, chest pain, shortness of breath, or drainage from her toe.    The history is provided by the patient. No  was used.     Review of patient's allergies indicates:   Allergen Reactions    Aspirin Nausea Only and Swelling     Able to tolerated in small doses       Past Medical History:   Diagnosis Date    CHF (congestive heart failure)     Colon polyps 06/03/2013    Coronary artery disease     Diabetes mellitus     Encounter for blood transfusion     ESRD (end stage renal disease) 03/2014    dialysis M-F    GERD (gastroesophageal reflux disease)     High cholesterol     Hypertension      Past Surgical History:   Procedure Laterality Date    AV FISTULA PLACEMENT Left 9/2014    bilateral fem-pop      CENTRAL VENOUS CATHETER TUNNELED INSERTION DOUBLE LUMEN Right 2/2014    CORONARY ARTERY BYPASS GRAFT  2/14/2014     x 3 vessels    LEFT HEART CATHETERIZATION N/A 5/31/2021    Procedure: Left heart cath;  Surgeon: Brian Sanchez MD;  Location: [facility] CATH LAB/EP;  Service: Cardiology;  Laterality: N/A;    PERCUTANEOUS TRANSLUMINAL ANGIOPLASTY OF ARTERIOVENOUS FISTULA Left 5/5/2020    Procedure: PTA, Repair left upper arm anuerysm AV FISTULA;  Surgeon: Doris Mary MD;  Location: [facility] OR;  Service: General;  Laterality: Left;    VASCULAR SURGERY       Family History   Problem Relation Name Age of Onset    Hypertension Mother       Heart disease Mother      Heart attack Mother       Social History[1]  Review of Systems   Constitutional:  Negative for chills and fever.   HENT:  Negative for congestion and sore throat.    Respiratory:  Negative for shortness of breath.    Cardiovascular:  Negative for chest pain.   Gastrointestinal:  Negative for abdominal pain.   Genitourinary:  Negative for dysuria.   Musculoskeletal:  Positive for myalgias. Negative for neck pain and neck stiffness.   Skin:  Positive for wound.   Neurological:  Negative for syncope, weakness and headaches.   All other systems reviewed and are negative.      Physical Exam     Initial Vitals [06/12/25 1521]   BP Pulse Resp Temp SpO2   (!) 130/58 83 18 98.1 °F (36.7 °C) 100 %      MAP       --         Physical Exam    Vitals reviewed.  Constitutional: She appears well-developed and well-nourished.   HENT:   Head: Normocephalic and atraumatic.   Eyes: Conjunctivae and EOM are normal.   Neck: Neck supple.   Normal range of motion.  Cardiovascular:  Normal rate.           Unable to palpate DP pulses in bilateral feet.  Ultrasound ordered.   Pulmonary/Chest: Breath sounds normal. No respiratory distress. She has no wheezes.   Abdominal: Abdomen is soft. She exhibits no distension. There is no abdominal tenderness.   Musculoskeletal:      Cervical back: Normal range of motion and neck supple.     Neurological: She is alert.   Skin: Skin is warm.   See image of left foot.   Psychiatric: She has a normal mood and affect. Her behavior is normal. Judgment and thought content normal.               ED Course   Critical Care    Date/Time: 6/12/2025 5:00 PM    Performed by: Niecy Cates PA-C  Authorized by: En Montalvo III, MD  Direct patient critical care time: 60 minutes  Total critical care time (exclusive of procedural time) : 60 minutes  Critical care time was exclusive of separately billable procedures and treating other patients.  Critical care was necessary to treat or  prevent imminent or life-threatening deterioration of the following conditions: renal failure.  Critical care was time spent personally by me on the following activities: blood draw for specimens, development of treatment plan with patient or surrogate, discussions with consultants, interpretation of cardiac output measurements, evaluation of patient's response to treatment, examination of patient, obtaining history from patient or surrogate, ordering and performing treatments and interventions, ordering and review of laboratory studies, ordering and review of radiographic studies, pulse oximetry, re-evaluation of patient's condition and review of old charts.        Labs Reviewed   COMPREHENSIVE METABOLIC PANEL - Abnormal       Result Value    Sodium 133 (*)     Potassium 4.2      Chloride 94 (*)     CO2 24      Glucose 87      BUN 21      Creatinine 6.0 (*)     Calcium 9.1      Protein Total 8.4      Albumin 3.3 (*)     Bilirubin Total 0.9            AST 22      ALT 12      Anion Gap 15      eGFR 6 (*)    C-REACTIVE PROTEIN - Abnormal    CRP 13.0 (*)    CBC WITH DIFFERENTIAL - Abnormal    WBC 11.21      RBC 3.53 (*)     HGB 11.1 (*)     HCT 33.6 (*)     MCV 95      MCH 31.4 (*)     MCHC 33.0      RDW 15.1 (*)     Platelet Count 233      MPV 9.7      Nucleated RBC 0      Neut % 76.9 (*)     Lymph % 12.3 (*)     Mono % 8.0      Eos % 1.8      Basophil % 0.4      Imm Grans % 0.6 (*)     Neut # 8.61 (*)     Lymph # 1.38      Mono # 0.90      Eos # 0.20      Baso # 0.05      Imm Grans # 0.07 (*)    LACTIC ACID, PLASMA - Normal    Lactic Acid Level 1.7      Narrative:     Falsely low lactic acid results can be found in samples containing >=13.0 mg/dL total bilirubin and/or >=3.5 mg/dL direct bilirubin.    APTT - Normal    PTT 27.0     APTT - Normal    PTT 25.4     PROTIME-INR - Normal    PT 11.9      INR 1.0     CULTURE, BLOOD   CULTURE, BLOOD   CBC W/ AUTO DIFFERENTIAL    Narrative:     The following orders were  created for panel order CBC auto differential.  Procedure                               Abnormality         Status                     ---------                               -----------         ------                     CBC with Differential[[id number]]       Abnormal            Final result                 Please view results for these tests on the individual orders.   POCT GLUCOSE    POCT Glucose 88            Imaging Results              US Lower Extremity Arteries Bilateral (Final result)  Result time 06/12/25 19:09:28      Final result by Eleazar Zhou DO (06/12/25 19:09:28)                   Impression:      Complete occlusion of the right SFA (proximal, mid, distal), the left proximal SFA, and the left PTA.      Electronically signed by: Eleazar Zhou  Date:    06/12/2025  Time:    19:09               Narrative:    EXAMINATION:  US LOWER EXTREMITY ARTERIES BILATERAL    CLINICAL HISTORY:  Peripheral vascular disease, unspecified    TECHNIQUE:  Ultrasound arterial lower extremity bilateral.    COMPARISON:  Lower extremity arterial ultrasound from 10/01/2009.    FINDINGS:  Right lower extremity.    CFA: 95.6 cm/sec, biphasic.    DFA: 83.1 cm/sec, biphasic.    Prox SFA: Occluded, with collateral flow.    Mid SFA: Occluded, with collateral flow.    Dist SFA: Occluded, with collateral flow.    Prox pop A: 21.2 cm/sec, monophasic.    Distal pop A: 29.0 cm/sec, monophasic.    ROSS: 31.4 cm/sec, monophasic.    PTA: 36.1 cm/sec, monophasic.    Left lower extremity    CFA: 130 cm/sec, monophasic.    DFA: 98.8 cm/sec, monophasic.    Prox SFA: Occluded, with collateral flow.    Mid SFA: 10.8 cm/sec, monophasic.    Dist SFA: 19.7 cm/sec, monophasic.    Prox pop A: 94.4 cm/sec, monophasic.  There is collateral flow.    Distal pop A: 35.5 cm/sec, monophasic.  There is collateral flow.    ROSS: 79.7 cm/sec, monophasic.    PTA: Occluded.    Bilateral femoropopliteal bypass grafts are not seen, likely chronically  occluded.                                       X-Ray Foot Complete Left (Final result)  Result time 06/12/25 17:13:01      Final result by Eleazar Zhou DO (06/12/25 17:13:01)                   Impression:      1. Acute, nondisplaced fracture of the 4th proximal phalangeal base.  Overlying infection not excluded.  2. Plantar lateral soft tissue ulceration.      Electronically signed by: Eleazar Zhou  Date:    06/12/2025  Time:    17:13               Narrative:    EXAMINATION:  XR FOOT COMPLETE 3 VIEW LEFT    CLINICAL HISTORY:  .  Local infection of the skin and subcutaneous tissue, unspecified    TECHNIQUE:  AP, lateral and oblique views of the left foot were performed.    COMPARISON:  03/09/2009.    FINDINGS:  There is acute osteopenia.  There is cortical disruption of the 4th proximal phalangeal base, compatible with an acute, nondisplaced fracture, overlying infection is not excluded.  Otherwise, no radiographic evidence of acute osteomyelitis.  There is a soft tissue ulceration of the plantar/lateral midfoot.  Joint spaces are preserved.  The Lisfranc articulation is congruent.  There are vascular calcifications.                                       Medications   heparin 25,000 units in dextrose 5% (100 units/ml) IV bolus from bag LOW INTENSITY nomogram - [facility] (has no administration in time range)   heparin 25,000 units in dextrose 5% 250 mL (100 units/mL) infusion LOW INTENSITY nomogram - [facility] (has no administration in time range)   heparin 25,000 units in dextrose 5% (100 units/ml) IV bolus from bag LOW INTENSITY nomogram - [facility] (has no administration in time range)   heparin 25,000 units in dextrose 5% (100 units/ml) IV bolus from bag LOW INTENSITY nomogram - [facility] (has no administration in time range)   atorvastatin tablet 40 mg (has no administration in time range)   furosemide tablet 40 mg (has no administration in time range)   isosorbide mononitrate 24 hr tablet 30 mg (has no administration in time  range)   metoprolol succinate (TOPROL-XL) 24 hr tablet 25 mg (has no administration in time range)   hydrALAZINE tablet 25 mg (has no administration in time range)   pantoprazole EC tablet 40 mg (has no administration in time range)   polyethylene glycol packet 17 g (has no administration in time range)   vitamin renal formula (B-complex-vitamin c-folic acid) 1 mg per capsule 1 capsule (has no administration in time range)   sodium chloride 0.9% flush 10 mL (has no administration in time range)   naloxone 0.4 mg/mL injection 0.02 mg (has no administration in time range)   glucose chewable tablet 16 g (has no administration in time range)   glucose chewable tablet 24 g (has no administration in time range)   dextrose 50% injection 12.5 g (has no administration in time range)   dextrose 50% injection 25 g (has no administration in time range)   glucagon (human recombinant) injection 1 mg (has no administration in time range)     Medical Decision Making  Amount and/or Complexity of Data Reviewed  Labs: ordered. Decision-making details documented in ED Course.  Radiology: ordered. Decision-making details documented in ED Course.  ECG/medicine tests:  Decision-making details documented in ED Course.    Risk  Prescription drug management.               ED Course as of 06/12/25 2102   Thu Jun 12, 2025   1655 POCT glucose [DT]   1736    FINDINGS:  There is acute osteopenia.  There is cortical disruption of the 4th proximal phalangeal base, compatible with an acute, nondisplaced fracture, overlying infection is not excluded.  Otherwise, no radiographic evidence of acute osteomyelitis.  There is a soft tissue ulceration of the plantar/lateral midfoot.  Joint spaces are preserved.  The Lisfranc articulation is congruent.  There are vascular calcifications.     Impression:     1. Acute, nondisplaced fracture of the 4th proximal phalangeal base.  Overlying infection not excluded.  2. Plantar lateral soft tissue ulceration.    [DT]   1743 Lactic acid, plasma [DT]   1744 CBC auto differential(!) [DT]   1749 C-reactive protein(!) [DT]   1749 Pt down for arterial ultrasound at this time.  [DT]   1820 Comprehensive metabolic panel(!) [KG]   1916    Impression:     Complete occlusion of the right SFA (proximal, mid, distal), the left proximal SFA, and the left PTA.      [DT]   1924 · The left ventricle is normal in size with eccentric hypertrophy and mildly decreased systolic function.  · The estimated ejection fraction is 45%.  · Grade II left ventricular diastolic dysfunction.  · There is abnormal septal wall motion consistent with post-operative status.  · Normal right ventricular size with normal right ventricular systolic function.  · Mild right atrial enlargement.  · Mild left atrial enlargement.  · Mild mitral regurgitation.  · Mild tricuspid regurgitation.  · The estimated PA systolic pressure is 52 mmHg.  · Normal central venous pressure (3 mmHg).  · There is pulmonary hypertension.      [DT]   1924 Pt and family notified of the need for admission for interventional cards and podiatry. Will also consult nephrology as the pt requires dialysis on MWF [DT]   1929 Spoke with Dr. Hardin with cardiology, recommended heparin drip and NPO at midnight [KG]   1951 EKG with rate of 76, Afib with no stemi noted.  [DT]   1953 Secure chat sent to Dr. Baez with podiatry who will see patient in the morning [KG]   1958 Spoke with [facility] who will be accepting patient for admission [KG]   2057 EKG 12-lead  Per my interpretation, atrial flutter with a ventricular rate of 73 beats per minute and AV block.  No STEMI.  Cardiology aware. Heparin drip ordered and patient on metoprolol. [KG]      ED Course User Index  [DT] Shara Gallagher, NP  [KG] Niecy Cates, DEE DEE                           Clinical Impression:  Final diagnoses:  [L08.9] Toe infection  [I73.9] PAD (peripheral artery disease)  [N18.6] ESRD (end stage renal  disease)  [I48.91] New onset a-fib (Primary)  [D64.9] Anemia, unspecified type          ED Disposition Condition    Observation                     Niecy Cates PA-C  06/12/25 2102         [1]   Social History  Tobacco Use    Smoking status: Never    Smokeless tobacco: Never   Substance Use Topics    Alcohol use: No    Drug use: No        Niecy Cates PA-C  06/12/25 2106

## 2025-06-12 NOTE — TELEPHONE ENCOUNTER
Spoke to daughter moms toe is black, she is not sure if she is having any pain or tingling, daughter advised to bring her to er to be evaluated , verb understanding

## 2025-06-12 NOTE — TELEPHONE ENCOUNTER
----- Message from Mandie sent at 6/12/2025 11:43 AM CDT -----  Type:  Same Day Appointment RequestCaller is requesting a same day appointment.  Caller declined first available appointment listed below.  Name of Caller:pts  daughter Michelle When is the first available appointment?07/13Symptoms:black toe painful Best Call Back Number:[phone] Information: pt is also diabetic

## 2025-06-13 ENCOUNTER — TELEPHONE (OUTPATIENT)
Dept: CARDIOLOGY | Facility: CLINIC | Age: 89
End: 2025-06-13
Payer: MEDICARE

## 2025-06-13 PROBLEM — S92.415A CLOSED NONDISPLACED FRACTURE OF PROXIMAL PHALANX OF LEFT GREAT TOE: Status: ACTIVE | Noted: 2025-06-13

## 2025-06-13 PROBLEM — S91.105A OPEN WOUND OF FOURTH TOE OF LEFT FOOT: Status: RESOLVED | Noted: 2025-06-12 | Resolved: 2025-06-13

## 2025-06-13 PROBLEM — I48.91 NEW ONSET A-FIB: Status: ACTIVE | Noted: 2025-06-13

## 2025-06-13 PROBLEM — I96 GANGRENE: Status: ACTIVE | Noted: 2025-06-13

## 2025-06-13 LAB
ABSOLUTE EOSINOPHIL (OHS): 0.22 K/UL
ABSOLUTE MONOCYTE (OHS): 0.72 K/UL (ref 0.3–1)
ABSOLUTE NEUTROPHIL COUNT (OHS): 6.53 K/UL (ref 1.8–7.7)
ALBUMIN SERPL BCP-MCNC: 2.7 G/DL (ref 3.5–5.2)
ALP SERPL-CCNC: 92 UNIT/L (ref 40–150)
ALT SERPL W/O P-5'-P-CCNC: 8 UNIT/L (ref 10–44)
ANION GAP (OHS): 14 MMOL/L (ref 8–16)
AORTIC SIZE INDEX (SOV): 2.1 CM/M2
AORTIC SIZE INDEX: 2.4 CM/M2
APICAL FOUR CHAMBER EJECTION FRACTION: 59 %
APICAL TWO CHAMBER EJECTION FRACTION: 56 %
APTT PPP: 45.2 SECONDS (ref 21–32)
APTT PPP: 50.7 SECONDS (ref 21–32)
ASCENDING AORTA: 3.6 CM
AST SERPL-CCNC: 17 UNIT/L (ref 11–45)
AV INDEX (PROSTH): 0.68
AV MEAN GRADIENT: 4 MMHG
AV PEAK GRADIENT: 8 MMHG
AV VALVE AREA BY VELOCITY RATIO: 1.6 CM²
AV VALVE AREA: 1.7 CM²
AV VELOCITY RATIO: 0.64
BASOPHILS # BLD AUTO: 0.04 K/UL
BASOPHILS NFR BLD AUTO: 0.4 %
BILIRUB SERPL-MCNC: 0.8 MG/DL (ref 0.1–1)
BSA FOR ECHO PROCEDURE: 1.48 M2
BUN SERPL-MCNC: 26 MG/DL (ref 8–23)
CALCIUM SERPL-MCNC: 8.3 MG/DL (ref 8.7–10.5)
CHLORIDE SERPL-SCNC: 95 MMOL/L (ref 95–110)
CHOLEST SERPL-MCNC: 107 MG/DL (ref 120–199)
CHOLEST/HDLC SERPL: 1.7 {RATIO} (ref 2–5)
CO2 SERPL-SCNC: 24 MMOL/L (ref 23–29)
CREAT SERPL-MCNC: 6.4 MG/DL (ref 0.5–1.4)
CV ECHO LV RWT: 0.5 CM
DOP CALC AO PEAK VEL: 1.4 M/S
DOP CALC AO VTI: 32.5 CM
DOP CALC LVOT AREA: 2.5 CM2
DOP CALC LVOT DIAMETER: 1.8 CM
DOP CALC LVOT PEAK VEL: 0.9 M/S
DOP CALC LVOT STROKE VOLUME: 56 CM3
DOP CALC MV VTI: 34.4 CM
DOP CALCLVOT PEAK VEL VTI: 22 CM
E WAVE DECELERATION TIME: 155 MSEC
E/A RATIO: 3.41
E/E' RATIO: 32 M/S
ECHO LV POSTERIOR WALL: 0.9 CM (ref 0.6–1.1)
ERYTHROCYTE [DISTWIDTH] IN BLOOD BY AUTOMATED COUNT: 14.9 % (ref 11.5–14.5)
FERRITIN SERPL-MCNC: 1741.2 NG/ML (ref 20–300)
FOLATE SERPL-MCNC: 15.8 NG/ML (ref 4–24)
FRACTIONAL SHORTENING: 27.8 % (ref 28–44)
GFR SERPLBLD CREATININE-BSD FMLA CKD-EPI: 6 ML/MIN/1.73/M2
GLUCOSE SERPL-MCNC: 75 MG/DL (ref 70–110)
HBV SURFACE AG SERPL QL IA: NORMAL
HCT VFR BLD AUTO: 30.7 % (ref 37–48.5)
HDLC SERPL-MCNC: 62 MG/DL (ref 40–75)
HDLC SERPL: 57.9 % (ref 20–50)
HGB BLD-MCNC: 9.9 GM/DL (ref 12–16)
HR MV ECHO: 87 BPM
IMM GRANULOCYTES # BLD AUTO: 0.06 K/UL (ref 0–0.04)
IMM GRANULOCYTES NFR BLD AUTO: 0.7 % (ref 0–0.5)
INTERVENTRICULAR SEPTUM: 1.1 CM (ref 0.6–1.1)
IRON SATN MFR SERPL: 44 % (ref 20–50)
IRON SERPL-MCNC: 65 UG/DL (ref 30–160)
IVC DIAMETER: 1.6 CM
LDLC SERPL CALC-MCNC: 32.8 MG/DL (ref 63–159)
LEFT ATRIUM AREA SYSTOLIC (APICAL 2 CHAMBER): 18.93 CM2
LEFT ATRIUM AREA SYSTOLIC (APICAL 4 CHAMBER): 20.67 CM2
LEFT ATRIUM VOLUME INDEX MOD: 38 ML/M2
LEFT ATRIUM VOLUME MOD: 57 ML
LEFT INTERNAL DIMENSION IN SYSTOLE: 2.6 CM (ref 2.1–4)
LEFT VENTRICLE DIASTOLIC VOLUME INDEX: 36.24 ML/M2
LEFT VENTRICLE DIASTOLIC VOLUME: 54 ML
LEFT VENTRICLE END DIASTOLIC VOLUME APICAL 2 CHAMBER: 35.34 ML
LEFT VENTRICLE END DIASTOLIC VOLUME APICAL 4 CHAMBER: 32.56 ML
LEFT VENTRICLE END SYSTOLIC VOLUME APICAL 2 CHAMBER: 51.58 ML
LEFT VENTRICLE END SYSTOLIC VOLUME APICAL 4 CHAMBER: 62.17 ML
LEFT VENTRICLE MASS INDEX: 72.4 G/M2
LEFT VENTRICLE SYSTOLIC VOLUME INDEX: 16.8 ML/M2
LEFT VENTRICLE SYSTOLIC VOLUME: 25 ML
LEFT VENTRICULAR INTERNAL DIMENSION IN DIASTOLE: 3.6 CM (ref 3.5–6)
LEFT VENTRICULAR MASS: 107.9 G
LV LATERAL E/E' RATIO: 29 M/S
LV SEPTAL E/E' RATIO: 34.8 M/S
LVED V (TEICH): 54.44 ML
LVES V (TEICH): 25.2 ML
LVOT MG: 1.69 MMHG
LVOT MV: 0.62 CM/S
LYMPHOCYTES # BLD AUTO: 1.6 K/UL (ref 1–4.8)
MAGNESIUM SERPL-MCNC: 2 MG/DL (ref 1.6–2.6)
MCH RBC QN AUTO: 30.9 PG (ref 27–31)
MCHC RBC AUTO-ENTMCNC: 32.2 G/DL (ref 32–36)
MCV RBC AUTO: 96 FL (ref 82–98)
MV MEAN GRADIENT: 5 MMHG
MV PEAK A VEL: 0.51 M/S
MV PEAK E VEL: 1.74 M/S
MV PEAK GRADIENT: 14 MMHG
MV STENOSIS PRESSURE HALF TIME: 44.94 MS
MV VALVE AREA BY CONTINUITY EQUATION: 1.63 CM2
MV VALVE AREA P 1/2 METHOD: 4.9 CM2
NONHDLC SERPL-MCNC: 45 MG/DL
NUCLEATED RBC (/100WBC) (OHS): 0 /100 WBC
OHS CV RV/LV RATIO: 0.83 CM
OHS LV EJECTION FRACTION SIMPSONS BIPLANE MOD: 58 %
PHOSPHATE SERPL-MCNC: 5 MG/DL (ref 2.7–4.5)
PISA MRMAX VEL: 4.91 M/S
PISA TR MAX VEL: 3.5 M/S
PLATELET # BLD AUTO: 209 K/UL (ref 150–450)
PMV BLD AUTO: 9.5 FL (ref 9.2–12.9)
POCT GLUCOSE: 160 MG/DL (ref 70–110)
POTASSIUM SERPL-SCNC: 3.5 MMOL/L (ref 3.5–5.1)
PROT SERPL-MCNC: 6.8 GM/DL (ref 6–8.4)
PV PEAK GRADIENT: 5 MMHG
PV PEAK VELOCITY: 1.1 M/S
RA PRESSURE ESTIMATED: 3 MMHG
RA VOL SYS: 33.12 ML
RBC # BLD AUTO: 3.2 M/UL (ref 4–5.4)
RELATIVE EOSINOPHIL (OHS): 2.4 %
RELATIVE LYMPHOCYTE (OHS): 17.4 % (ref 18–48)
RELATIVE MONOCYTE (OHS): 7.9 % (ref 4–15)
RELATIVE NEUTROPHIL (OHS): 71.2 % (ref 38–73)
RIGHT ATRIAL AREA: 15.2 CM2
RIGHT ATRIUM END SYSTOLIC VOLUME APICAL 4 CHAMBER INDEX BSA: 23.11 ML/M2
RIGHT ATRIUM VOLUME AREA LENGTH APICAL 4 CHAMBER: 34.43 ML
RIGHT VENTRICLE DIASTOLIC BASEL DIMENSION: 3 CM
RV TB RVSP: 7 MMHG
RV TISSUE DOPPLER FREE WALL SYSTOLIC VELOCITY 1 (APICAL 4 CHAMBER VIEW): 5.34 CM/S
SINUS: 3.14 CM
SODIUM SERPL-SCNC: 133 MMOL/L (ref 136–145)
STJ: 2.8 CM
TDI LATERAL: 0.06 M/S
TDI SEPTAL: 0.05 M/S
TDI: 0.06 M/S
TIBC SERPL-MCNC: 147 UG/DL (ref 250–450)
TR MAX PG: 50 MMHG
TRANSFERRIN SERPL-MCNC: 99 MG/DL (ref 200–375)
TRICUSPID ANNULAR PLANE SYSTOLIC EXCURSION: 0.7 CM
TRIGL SERPL-MCNC: 61 MG/DL (ref 30–150)
TV REST PULMONARY ARTERY PRESSURE: 52 MMHG
VIT B12 SERPL-MCNC: 498 PG/ML (ref 210–950)
WBC # BLD AUTO: 9.17 K/UL (ref 3.9–12.7)
Z-SCORE OF LEFT VENTRICULAR DIMENSION IN END DIASTOLE: -2.06
Z-SCORE OF LEFT VENTRICULAR DIMENSION IN END SYSTOLE: -0.43

## 2025-06-13 PROCEDURE — 25000003 PHARM REV CODE 250: Performed by: INTERNAL MEDICINE

## 2025-06-13 PROCEDURE — 25500020 PHARM REV CODE 255: Performed by: STUDENT IN AN ORGANIZED HEALTH CARE EDUCATION/TRAINING PROGRAM

## 2025-06-13 PROCEDURE — 25000003 PHARM REV CODE 250

## 2025-06-13 PROCEDURE — 82607 VITAMIN B-12: CPT

## 2025-06-13 PROCEDURE — 87340 HEPATITIS B SURFACE AG IA: CPT | Performed by: INTERNAL MEDICINE

## 2025-06-13 PROCEDURE — 80053 COMPREHEN METABOLIC PANEL: CPT

## 2025-06-13 PROCEDURE — 85025 COMPLETE CBC W/AUTO DIFF WBC: CPT

## 2025-06-13 PROCEDURE — 84100 ASSAY OF PHOSPHORUS: CPT

## 2025-06-13 PROCEDURE — 99223 1ST HOSP IP/OBS HIGH 75: CPT | Mod: ,,, | Performed by: STUDENT IN AN ORGANIZED HEALTH CARE EDUCATION/TRAINING PROGRAM

## 2025-06-13 PROCEDURE — 63600175 PHARM REV CODE 636 W HCPCS: Mod: JZ,TB | Performed by: INTERNAL MEDICINE

## 2025-06-13 PROCEDURE — 99223 1ST HOSP IP/OBS HIGH 75: CPT | Mod: 25,,, | Performed by: INTERNAL MEDICINE

## 2025-06-13 PROCEDURE — 63600175 PHARM REV CODE 636 W HCPCS

## 2025-06-13 PROCEDURE — 85730 THROMBOPLASTIN TIME PARTIAL: CPT | Performed by: INTERNAL MEDICINE

## 2025-06-13 PROCEDURE — 82746 ASSAY OF FOLIC ACID SERUM: CPT

## 2025-06-13 PROCEDURE — 80061 LIPID PANEL: CPT | Performed by: INTERNAL MEDICINE

## 2025-06-13 PROCEDURE — 11000001 HC ACUTE MED/SURG PRIVATE ROOM

## 2025-06-13 PROCEDURE — 83540 ASSAY OF IRON: CPT

## 2025-06-13 PROCEDURE — 80100016 HC MAINTENANCE HEMODIALYSIS

## 2025-06-13 PROCEDURE — 36415 COLL VENOUS BLD VENIPUNCTURE: CPT | Performed by: INTERNAL MEDICINE

## 2025-06-13 PROCEDURE — 86706 HEP B SURFACE ANTIBODY: CPT | Performed by: INTERNAL MEDICINE

## 2025-06-13 PROCEDURE — 83735 ASSAY OF MAGNESIUM: CPT

## 2025-06-13 PROCEDURE — 5A1D70Z PERFORMANCE OF URINARY FILTRATION, INTERMITTENT, LESS THAN 6 HOURS PER DAY: ICD-10-PCS | Performed by: INTERNAL MEDICINE

## 2025-06-13 PROCEDURE — 82728 ASSAY OF FERRITIN: CPT

## 2025-06-13 RX ORDER — SODIUM CHLORIDE 9 MG/ML
INJECTION, SOLUTION INTRAVENOUS ONCE
Status: CANCELLED | OUTPATIENT
Start: 2025-06-13 | End: 2025-06-13

## 2025-06-13 RX ORDER — HALOPERIDOL LACTATE 5 MG/ML
0.5 INJECTION, SOLUTION INTRAMUSCULAR ONCE
Status: COMPLETED | OUTPATIENT
Start: 2025-06-13 | End: 2025-06-13

## 2025-06-13 RX ORDER — CLOPIDOGREL BISULFATE 75 MG/1
75 TABLET ORAL DAILY
Status: DISCONTINUED | OUTPATIENT
Start: 2025-06-13 | End: 2025-06-14 | Stop reason: HOSPADM

## 2025-06-13 RX ORDER — OLANZAPINE 10 MG/2ML
5 INJECTION, POWDER, FOR SOLUTION INTRAMUSCULAR ONCE AS NEEDED
Status: COMPLETED | OUTPATIENT
Start: 2025-06-13 | End: 2025-06-13

## 2025-06-13 RX ORDER — PANTOPRAZOLE SODIUM 40 MG/1
40 TABLET, DELAYED RELEASE ORAL
COMMUNITY

## 2025-06-13 RX ORDER — HYDRALAZINE HYDROCHLORIDE 25 MG/1
25 TABLET, FILM COATED ORAL EVERY 12 HOURS
COMMUNITY

## 2025-06-13 RX ORDER — CLOPIDOGREL BISULFATE 75 MG/1
75 TABLET ORAL DAILY
Qty: 30 TABLET | Refills: 0 | Status: SHIPPED | OUTPATIENT
Start: 2025-06-13 | End: 2025-07-13

## 2025-06-13 RX ORDER — MUPIROCIN 20 MG/G
OINTMENT TOPICAL 2 TIMES DAILY
Status: CANCELLED | OUTPATIENT
Start: 2025-06-13 | End: 2025-06-18

## 2025-06-13 RX ORDER — CLOPIDOGREL BISULFATE 75 MG/1
75 TABLET ORAL DAILY
Qty: 30 TABLET | Refills: 0 | Status: SHIPPED | OUTPATIENT
Start: 2025-06-13 | End: 2025-06-13

## 2025-06-13 RX ORDER — SEVELAMER CARBONATE 800 MG/1
800 TABLET, FILM COATED ORAL
Status: DISCONTINUED | OUTPATIENT
Start: 2025-06-13 | End: 2025-06-14

## 2025-06-13 RX ADMIN — PANTOPRAZOLE SODIUM 40 MG: 40 TABLET, DELAYED RELEASE ORAL at 06:06

## 2025-06-13 RX ADMIN — ATORVASTATIN CALCIUM 40 MG: 40 TABLET, FILM COATED ORAL at 08:06

## 2025-06-13 RX ADMIN — ISOSORBIDE MONONITRATE 30 MG: 30 TABLET, EXTENDED RELEASE ORAL at 10:06

## 2025-06-13 RX ADMIN — FUROSEMIDE 40 MG: 40 TABLET ORAL at 09:06

## 2025-06-13 RX ADMIN — SEVELAMER CARBONATE 800 MG: 800 TABLET, FILM COATED ORAL at 12:06

## 2025-06-13 RX ADMIN — OLANZAPINE 5 MG: 10 INJECTION, POWDER, FOR SOLUTION INTRAMUSCULAR at 11:06

## 2025-06-13 RX ADMIN — CLOPIDOGREL 75 MG: 75 TABLET ORAL at 12:06

## 2025-06-13 RX ADMIN — EPOETIN ALFA-EPBX 10000 UNITS: 10000 INJECTION, SOLUTION INTRAVENOUS; SUBCUTANEOUS at 06:06

## 2025-06-13 RX ADMIN — METOPROLOL SUCCINATE 25 MG: 25 TABLET, EXTENDED RELEASE ORAL at 09:06

## 2025-06-13 RX ADMIN — Medication 6 MG: at 08:06

## 2025-06-13 RX ADMIN — HYDRALAZINE HYDROCHLORIDE 25 MG: 25 TABLET ORAL at 08:06

## 2025-06-13 RX ADMIN — Medication 1 CAPSULE: at 10:06

## 2025-06-13 RX ADMIN — HUMAN ALBUMIN MICROSPHERES AND PERFLUTREN 0.11 MG: 10; .22 INJECTION, SOLUTION INTRAVENOUS at 11:06

## 2025-06-13 RX ADMIN — HALOPERIDOL LACTATE 0.5 MG: 5 INJECTION, SOLUTION INTRAMUSCULAR at 04:06

## 2025-06-13 RX ADMIN — SEVELAMER CARBONATE 800 MG: 800 TABLET, FILM COATED ORAL at 06:06

## 2025-06-13 NOTE — H&P
[facility] Medicine H&P Note     Admitting Team: [facility] Hospitalist Team B  Attending Physician: Candido Ricardo MD  Resident: Estephania  Intern: Daryl     Date of Admit: 6/12/2025    Chief Complaint     Left toe infection    Subjective:      History of Present Illness:  Erin Clark is a 89 y.o.  female with a PMH of HFrEF (45%), CAD, T2DM, ESRD on MWF dialysis via left fistula, GERD, HTN, HLD who presented to  [facility] on 6/12/2025 with a left toe wound. Pt lives with her  and son. Pt states that about 3-4 days ago she started having increased pain in her foot and noticed that her toe was getting darker. Pt's daughter saw the toe today and brought pt to the ED. Denies any recent fevers, cough, congestion, nause or vomiting. Pt endorses pain in her left foot but dies any drainage from the toe. Endorse a good appetite at home.    At her baseline pt is able to ambulate with a cane but has been limited due to pain recently. Pt is mostly independent of ADLs but needs help with making meals.     In the ED an ultrasound of pt's left foot showed complete occlusion of the right SFA, the left proximal SFA, and left PTA. Vascular cardiology was consulted who recommended putting pt on a hep ggt. Podiatry was consulted as well. Pt was then admitted to [facility] Team B for further work-up.     Past Medical History:  Past Medical History:   Diagnosis Date    CHF (congestive heart failure)     Colon polyps 06/03/2013    Coronary artery disease     Diabetes mellitus     Encounter for blood transfusion     ESRD (end stage renal disease) 03/2014    dialysis M-F    GERD (gastroesophageal reflux disease)     High cholesterol     Hypertension        Past Surgical History:  Past Surgical History:   Procedure Laterality Date    AV FISTULA PLACEMENT Left 9/2014    bilateral fem-pop      CENTRAL VENOUS CATHETER TUNNELED INSERTION DOUBLE LUMEN Right 2/2014    CORONARY ARTERY BYPASS GRAFT  2/14/2014     x 3 vessels    LEFT  HEART CATHETERIZATION N/A 5/31/2021    Procedure: Left heart cath;  Surgeon: Brian Sanchez MD;  Location: [facility] CATH LAB/EP;  Service: Cardiology;  Laterality: N/A;    PERCUTANEOUS TRANSLUMINAL ANGIOPLASTY OF ARTERIOVENOUS FISTULA Left 5/5/2020    Procedure: PTA, Repair left upper arm anuerysm AV FISTULA;  Surgeon: Doris Mary MD;  Location: [facility] OR;  Service: General;  Laterality: Left;    VASCULAR SURGERY         Allergies:  Review of patient's allergies indicates:   Allergen Reactions    Aspirin Nausea Only and Swelling     Able to tolerated in small doses         Home Medications:  Prior to Admission medications    Medication Sig Start Date End Date Taking? Authorizing Provider   hydrALAZINE (APRESOLINE) 25 MG tablet Take 1 tablet (25 mg total) by mouth every 12 (twelve) hours. 10/13/20 6/12/25 Yes Brian Sanchez MD   albuterol (PROAIR HFA) 90 mcg/actuation inhaler Inhale 2 puffs into the lungs every 6 (six) hours as needed for Wheezing. Rescue  Patient not taking: Reported on 5/6/2025 6/18/20   Bayron Golden MD   aspirin (ECOTRIN) 81 MG EC tablet TAKE ONE TABLET BY MOUTH ONCE DAILY 4/2/15   Neda Singletary APRN, ANP   atorvastatin (LIPITOR) 40 MG tablet TAKE 1 TABLET BY MOUTH ONCE DAILY IN THE EVENING 10/25/24   Bayron Goledn MD   furosemide (LASIX) 40 MG tablet Take 1 tablet (40 mg total) by mouth once daily. 5/21/25   Kat Alanis MD   isosorbide mononitrate (IMDUR) 30 MG 24 hr tablet Take 1 tablet (30 mg total) by mouth once daily. 4/7/25   Bayron Golden MD   metoprolol succinate (TOPROL-XL) 25 MG 24 hr tablet Take 1 tablet by mouth once daily 6/9/25   Bayron Golden MD   mupirocin (BACTROBAN) 2 % ointment Apply topically 3 (three) times daily.  Patient taking differently: Apply topically as needed. 1/25/24   Bayron Golden MD   nitroGLYCERIN (NITROSTAT) 0.4 MG SL tablet Place 0.4 mg under the tongue every 5 (five) minutes as needed  for Chest pain.    Provider, Historical   pantoprazole (PROTONIX) 40 MG tablet Take 1 tablet (40 mg total) by mouth before breakfast. 20  Bayron Golden MD   polyethylene glycol (GLYCOLAX) 17 gram/dose powder Take 17 g by mouth daily as needed (Estrenimiento (constipation)). 23   Lexus El MD   vitamin renal formula, B-complex-vitamin c-folic acid, (TRIPHROCAPS) 1 mg Cap Take 1 capsule by mouth once daily.  Patient taking differently: Take 1 capsule by mouth as needed. 25   Jazmin Kaba MD       Family History:  Family History   Problem Relation Name Age of Onset    Hypertension Mother      Heart disease Mother      Heart attack Mother         Social History:  Social History[1]    Review of Systems:  As stated in HPI, all other systems are reviewed and are negative.    Health Maintaince :   Primary Care Physician: Dr. Golden    Immunizations:   Immunization History   Administered Date(s) Administered    COVID-19 MRNA, LN-S PF (MODERNA HALF 0.25 ML DOSE) 01/15/2022    COVID-19, MRNA, LN-S, PF (MODERNA FULL 0.5 ML DOSE) 2021, 2021    Hepatitis B, Adult 2022, 2022, 2022, 10/05/2022    Influenza 2010, 10/13/2011, 2013, 10/17/2013    Influenza - Quadrivalent - PF *Preferred* (6 months and older) 10/17/2013    Influenza - Trivalent - Afluria, Fluzone MDV 2017    Influenza - Trivalent - Fluzone High Dose - PF (65 years and older) 11/15/2016, 2017, 2018, 2019, 2020, 2021, 10/07/2022    Influenza Split 2014, 2015, 2016    PPD Test 2014, 2015, 2016, 2018, 2019, 2021, 2021, 2022    Pneumococcal 2014, 2019    Pneumococcal Conjugate - 13 Valent 2019    Pneumococcal Polysaccharide - 23 Valent 2019         Objective:   Last 24 Hour Vital Signs:  BP  Min: 112/76  Max: 130/58  Temp  Av.1 °F (36.7 °C)  Min:  "98.1 °F (36.7 °C)  Max: 98.1 °F (36.7 °C)  Pulse  Av.5  Min: 78  Max: 83  Resp  Av.5  Min: 18  Max: 25  SpO2  Av %  Min: 100 %  Max: 100 %  Height  Av' 3" (160 cm)  Min: 5' 3" (160 cm)  Max: 5' 3" (160 cm)  Weight  Av kg (108 lb)  Min: 49 kg (108 lb)  Max: 49 kg (108 lb)  Body mass index is 19.13 kg/m².  No intake/output data recorded.    Physical Examination:  General: Alert, appears stated age, no acute distress  HENT: Atraumatic, normocephalic, EOMI and PERRLA, no scleral icterus, dry MM, no nasal discharge  CV: RRR; no murmurs, rubs, or gallops. Normal s1, s2  Resp: CTAB with normal work of breathing and chest excursion. No rhonchi, rales, or wheezing appreciated  Abd: Soft, NTND. Normoactive BS x4. No organomegaly or masses appreciated upon palpation.   MSK: unable to palpate DP pulses in left foot. Left third toe with necrosis.Erythema around left plantar foot.   Neuro: ANOx3. CNII-XII grossly intact. Normal appearing coordination and sensory function.   Skin: No rashes or lesions noted.      Laboratory:  Most Recent Data:  CBC:   Lab Results   Component Value Date    WBC 11.21 2025    HGB 11.1 (L) 2025    HCT 33.6 (L) 2025     2025    MCV 95 2025    RDW 15.1 (H) 2025     BMP:   Lab Results   Component Value Date     (L) 2025    K 4.2 2025    CL 94 (L) 2025    CO2 24 2025    BUN 21 2025    CREATININE 6.0 (H) 2025    GLU 87 2025    CALCIUM 9.1 2025    MG 1.9 2023    PHOS 3.4 2023    PHOS 3.4 2023     LFTs:   Lab Results   Component Value Date    PROT 8.4 2025    ALBUMIN 3.3 (L) 2025    BILITOT 0.9 2025    AST 22 2025    ALKPHOS 114 2025    ALT 12 2025     Coags:   Lab Results   Component Value Date    INR 1.0 2025    PROTIME 11.9 2025     FLP:   Lab Results   Component Value Date    CHOL 142 05/10/2025    HDL 60 05/10/2025    " "LDLCALC 53.6 (L) 05/10/2025    TRIG 142 05/10/2025    CHOLHDL 42.3 05/10/2025     DM:   Lab Results   Component Value Date    HGBA1C 5.3 05/10/2025    HGBA1C 6.9 (H) 01/25/2024    HGBA1C 6.1 (H) 03/16/2023    LDLCALC 53.6 (L) 05/10/2025    CREATININE 6.0 (H) 06/12/2025     Thyroid:   Lab Results   Component Value Date    TSH 3.171 05/10/2025    FREET4 0.83 01/25/2024     Anemia:   Lab Results   Component Value Date    IRON 12 (L) 07/24/2020    TIBC 186 (L) 07/24/2020    FERRITIN 2,003 (H) 07/24/2020    LXANLCKJ02 703 01/25/2024    FOLATE 39.4 (H) 01/25/2024     Cardiac:   Lab Results   Component Value Date    TROPONINI 0.039 (H) 08/24/2023    BNP >4,900 (H) 08/24/2023     Urinalysis:   Lab Results   Component Value Date    LABURIN ESCHERICHIA COLI  10,000 - 49,999 cfu/ml 04/15/2014    COLORU Yellow 07/24/2020    SPECGRAV 1.015 07/24/2020    NITRITE Negative 07/24/2020    KETONESU Negative 07/24/2020    UROBILINOGEN Negative 07/24/2020    WBCUA 2 07/24/2020       Trended Lab Data:  Recent Labs   Lab 06/12/25  1715   WBC 11.21   HGB 11.1*   HCT 33.6*      MCV 95   RDW 15.1*   *   K 4.2   CL 94*   CO2 24   BUN 21   CREATININE 6.0*   GLU 87   PROT 8.4   ALBUMIN 3.3*   BILITOT 0.9   AST 22   ALKPHOS 114   ALT 12       Trended Cardiac Data:  No results for input(s): "TROPONINI", "CKTOTAL", "CKMB", "BNP" in the last 168 hours.    Microbiology Data:  none    Other Results:  EKG (my interpretation): Atrial flutter without RVR     Radiology:  CXR  Impression:  1. Acute, nondisplaced fracture of the 4th proximal phalangeal base.  Overlying infection not excluded.  2. Plantar lateral soft tissue ulceration.    LE US  Impression:  Complete occlusion of the right SFA (proximal, mid, distal), the left proximal SFA, and the left PTA.       Assessment:     Erin Clark is a 89 y.o.  female with a PMH of HFrEF (45%), CAD, T2DM, ESRD on MWF dialysis via left fistula, GERD, HTN, HLD who presented to  [facility] on 6/12/2025 with a left toe wound. In the ED pt was found to have Complete occlusion of the right SFA (proximal, mid, distal), the left proximal SFA, and the left PTA. Vascular cardiology was consulted and pt was placed on a hep ggt. Podiatry was consulted as well. Pt was admitted to [facility] Team B.        Plan:     Left Third Toe Wound  PAD with complete occulusion of R SFA, L SFA, and PTA  - left toe with necrosis  - WBC WNL  - CRP elevated to 13  - LA WNL  - Xray of left foot without acute signs of osteo  - LE US: complete occlusion of the right SFA (proximal, mid, distal), the left proximal SFA, and the left PTA.  Plan  - Consulted vascular cardiology. Started hep ggt  - Consulted podiatry  - Pt HDS, will hold off on antibiotics at this time    Nondisplaced fracture of the 4th proximal phalangeal base  - Xray of left foot with nondisplaced fracture  - Consulted podiatry  - NWB on left foot at this time  - Will consulted PT/OT once pt is evaluated by podiatry     Hx of paroxysmal Afib  - Pt found to be in Afib in the ED( HR 70s). No hx of afib on chart review but per daughter pt carries diagnosis of afib  - Continue home toprol-xl 25 mg daily   - CHADVASC of 6: Will need to consider DOAC on discharge. Continue hep ggt for now    ESRD on MWF HD  - Denies missing any sessions. No signs of volume overload  - Electrolytes WNL  - Consulted nephrology. Will get pt dialysis while inpatient  - Continue renal vitamins  - Daily BMP, mag, phos    HFrEF (EF 45%)  - TTE from 5/2023 with an EF of 45%  - Ordered repeat TTE  - Continue home med: Toprol 25 mg daily, lasix 40 mg    Normocytic Anemia   - Hgb low 11.1 MCV 95  - Ordered iron studies, B12, folate    CAD  - Ordered lipid panel   - Continue home statin atorvastatin 40 mg and imdur 30 mg     T2DM (HbA1c 5.3 1 month ago)  - Pt no longer on any diabetic medications  - Will continue to monitor    GERD  - Continue home PPI 40 mg daily    HTN  - Continue home  hydralazine 25 mg TID and toprol 25 mg daily     Diet: Reg  DVT ppx: on hep ggt for limb ischemia  Dispo: pending cards and podiatry recs  Code: Full    Code Status:     Full    Dee Best MD  [facility] Internal Medicine- Pediatrics -III    [facility] Medicine Hospitalist Pager numbers:   [facility] Hospitalist Medicine Team A (Marti/Bruno): [phone]  [facility] Hospitalist Medicine Team B (Davion/Areli):  [phone]            [1]   Social History  Tobacco Use    Smoking status: Never    Smokeless tobacco: Never   Substance Use Topics    Alcohol use: No    Drug use: No

## 2025-06-13 NOTE — ED NOTES
Received report from KAYLYNN Child patient is lying in bed with eyes closed, equal rise and fall of chest noted

## 2025-06-13 NOTE — PLAN OF CARE
The sw met with the pt and her dtr Erin Bailey [phone] in the ED to complete the assessment. The pt is Cymraes speaking only so her dtr translated for the pt with her permissions. They declined to use the Tatyana. The pt lives in [place] with her spouse Tor Clark [phone] and her brother Efrain Ambrose. The pt goes to dialysis MWF ta 10 at [facility]. The pt's family transports her to dialysis,dr garcia's and errands. The pt and her family are against any post acute placement but they're amendable to  if the person speaks Cymraes. The pt has a very supportive family. Erin states the pt has some dementia but is still able to ambulate and complete her ADL's. The pt has the dme listed below. The pt's son Leif Clark [phone] also checks on the pt during the week. The sw registered the pt and her spouse with [facility] on Aging [phone] for Meals on Wheels and other resources they will be available for. They will do an assessment on the couple to see what they qualify for and Erin acknowledged understanding they will be placed on a waiting list for Meals on Wheels. One of the pt's family members will transport her home at d/c. The sw gave Erin a business card with her name and contact info on it. The sw encouraged her to call if she has any further questions or concerns. The sw will continue to follow the pt throughout her transitions of care and will assist with any d/c needs. Erin states the pt is very stubborn and doesn't like to accept assistance from outsiders(part of her dementia).    [place] - Emergency Dept  Initial Discharge Assessment       Primary Care Provider: Bayron Golden MD    Admission Diagnosis: New onset a-fib [I48.91]    Admission Date: 6/12/2025  Expected Discharge Date:     Transition of Care Barriers: (P) None    Payor: "[employer] [facility]GD [facility] / Plan: "[employer] CHOICES / Product Type: Medicare Advantage /     Extended Emergency Contact  Information  Primary Emergency Contact: Erin Bailey  Mobile Phone: [phone]  Relation: Daughter  Secondary Emergency Contact: Tor Clark  Address: [address] [address] [facility]  Home Phone: [phone]  Relation: Spouse    Discharge Plan A: (P) Home Health  Discharge Plan B: (P) Other (TBD)      [facility]  [place] [facility] [address] [facility]  [address]  [place] [address]  Phone: [phone] Fax: [phone]      Initial Assessment (most recent)       Adult Discharge Assessment - 06/13/25 1000          Discharge Assessment    Assessment Type Discharge Planning Assessment (P)      Confirmed/corrected address, phone number and insurance Yes (P)      Confirmed Demographics Correct on Facesheet (P)      Source of Information family (P)      If unable to respond/provide information was family/caregiver contacted? Yes (P)      Contact Name/Number Erin Bailey(dtr)[phone] (P)      Communicated KATIE with patient/caregiver Yes (P)      People in Home sibling(s);spouse (P)      Name(s) of People in Home Tor Clark(spouse)[phone]/Efrain Ambrose(brother) (P)      Do you expect to return to your current living situation? Yes (P)      Do you have help at home or someone to help you manage your care at home? Yes (P)      Who are your caregiver(s) and their phone number(s)? oTr Clark(spouse)[phone]/Efrain Ambrose(brother)/Erin Bailey(dtr)[phone] (P)      Prior to hospitilization cognitive status: Alert/Oriented (P)      Current cognitive status: Alert/Oriented (P)      Walking or Climbing Stairs Difficulty yes (P)      Walking or Climbing Stairs ambulation difficulty, requires equipment;stair climbing difficulty, requires equipment;transferring difficulty, requires equipment (P)      Dressing/Bathing Difficulty no (P)      Home Accessibility wheelchair accessible (P)      Home Layout Able to live on 1st floor (P)      Equipment Currently Used  at Home walker, rolling;cane, straight (P)      Readmission within 30 days? No (P)      Patient currently being followed by outpatient case management? No (P)      Do you currently have service(s) that help you manage your care at home? No (P)      Do you take prescription medications? Yes (P)      Do you have prescription coverage? Yes (P)      Do you have any problems affording any of your prescribed medications? No (P)      Is the patient taking medications as prescribed? yes (P)      Who is going to help you get home at discharge? One of the pt's family memebers will transport her home at d/c. (P)      How do you get to doctors appointments? family or friend will provide (P)      Are you on dialysis? Yes (P)      Dialysis Name and Scheduled days [facility] at 10am (P)      Do you take coumadin? No (P)      Discharge Plan A Home Health (P)      Discharge Plan B Other (P)    TBD    DME Needed Upon Discharge  other (see comments) (P)    TBD    Discharge Plan discussed with: Patient;Adult children (P)      Transition of Care Barriers None (P)         Physical Activity    On average, how many days per week do you engage in moderate to strenuous exercise (like a brisk walk)? Patient unable to answer (P)      On average, how many minutes do you engage in exercise at this level? Patient unable to answer (P)         Financial Resource Strain    How hard is it for you to pay for the very basics like food, housing, medical care, and heating? Patient unable to answer (P)         Housing Stability    In the last 12 months, was there a time when you were not able to pay the mortgage or rent on time? Patient unable to answer (P)      At any time in the past 12 months, were you homeless or living in a shelter (including now)? Patient unable to answer (P)         Transportation Needs    In the past 12 months, has lack of transportation kept you from medical appointments or from getting medications? Patient unable to  answer (P)      In the past 12 months, has lack of transportation kept you from meetings, work, or from getting things needed for daily living? Patient unable to answer (P)         Food Insecurity    Within the past 12 months, you worried that your food would run out before you got the money to buy more. Patient unable to answer (P)      Within the past 12 months, the food you bought just didn't last and you didn't have money to get more. Patient unable to answer (P)         Stress    Do you feel stress - tense, restless, nervous, or anxious, or unable to sleep at night because your mind is troubled all the time - these days? Patient unable to answer (P)         Social Isolation    How often do you feel lonely or isolated from those around you?  Patient unable to answer (P)         Alcohol Use    Q1: How often do you have a drink containing alcohol? Patient unable to answer (P)      Q2: How many drinks containing alcohol do you have on a typical day when you are drinking? Patient unable to answer (P)         Utilities    In the past 12 months has the electric, gas, oil, or water company threatened to shut off services in your home? Patient unable to answer (P)         Health Literacy    How often do you need to have someone help you when you read instructions, pamphlets, or other written material from your doctor or pharmacy? Patient unable to respond (P)

## 2025-06-13 NOTE — CONSULTS
Nephrology Consult  H&P      Consult Requested By: Candido Ricardo MD  Reason for Consult: ESRD    SUBJECTIVE:     History of Present Illness:  Patient is a 89 y.o. female presents with left toe wound, with a complete occlusion of right SFA.  HD MWF with Dr. Kat Alanis in [facility] not  compliant with HD, but did complete her full treatment this Wednesday    Review of Systems   Constitutional:  Negative for chills and fever.   Eyes:  Negative for blurred vision and double vision.   Respiratory:  Negative for cough and shortness of breath.    Cardiovascular:  Negative for chest pain and leg swelling.   Gastrointestinal:  Negative for blood in stool, diarrhea, nausea and vomiting.   Genitourinary:  Negative for dysuria, frequency and urgency.   Musculoskeletal:  Negative for myalgias and neck pain.   Skin:  Negative for itching and rash.   Neurological:  Negative for dizziness.   Endo/Heme/Allergies:  Does not bruise/bleed easily.   Psychiatric/Behavioral:  Negative for depression.      Past Medical History:   Diagnosis Date    CHF (congestive heart failure)     Colon polyps 06/03/2013    Coronary artery disease     Diabetes mellitus     Encounter for blood transfusion     ESRD (end stage renal disease) 03/2014    dialysis M-F    GERD (gastroesophageal reflux disease)     High cholesterol     Hypertension      Past Surgical History:   Procedure Laterality Date    AV FISTULA PLACEMENT Left 9/2014    bilateral fem-pop      CENTRAL VENOUS CATHETER TUNNELED INSERTION DOUBLE LUMEN Right 2/2014    CORONARY ARTERY BYPASS GRAFT  2/14/2014     x 3 vessels    LEFT HEART CATHETERIZATION N/A 5/31/2021    Procedure: Left heart cath;  Surgeon: Brian Sanchez MD;  Location: [facility] CATH LAB/EP;  Service: Cardiology;  Laterality: N/A;    PERCUTANEOUS TRANSLUMINAL ANGIOPLASTY OF ARTERIOVENOUS FISTULA Left 5/5/2020    Procedure: PTA, Repair left upper arm anuerysm AV FISTULA;  Surgeon: Doris Mary MD;  Location: [facility]  "OR;  Service: General;  Laterality: Left;    VASCULAR SURGERY       Family History   Problem Relation Name Age of Onset    Hypertension Mother      Heart disease Mother      Heart attack Mother       Social History[1]  Review of patient's allergies indicates:   Allergen Reactions    Aspirin Nausea Only and Swelling     Able to tolerated in small doses        OBJECTIVE:     Vital Signs (Most Recent)  Vitals:    06/13/25 0300 06/13/25 0702 06/13/25 0935 06/13/25 1101   BP: 135/74 (!) 146/62 132/60 132/60   BP Location:  Right arm Right arm Right arm   Patient Position:  Sitting Sitting Sitting   Pulse: 86 80 86 78   Resp: 20 19 19    Temp:  98.2 °F (36.8 °C) 98.4 °F (36.9 °C)    TempSrc:  Oral Oral    SpO2: 100% 97% 100%    Weight:   49 kg (108 lb)    Height:   5' 3" (1.6 m)        Medications:   atorvastatin  40 mg Oral QHS    furosemide  40 mg Oral Daily    hydrALAZINE  25 mg Oral Q12H    isosorbide mononitrate  30 mg Oral Daily    metoprolol succinate  25 mg Oral Daily    pantoprazole  40 mg Oral Before breakfast    polyethylene glycol  17 g Oral Daily    vitamin renal formula (B-complex-vitamin c-folic acid)  1 capsule Oral Daily     Physical Exam  Constitutional:       General: She is not in acute distress.     Appearance: She is not diaphoretic.   HENT:      Head: Normocephalic and atraumatic.   Eyes:      General: No scleral icterus.  Neck:      Vascular: No JVD.   Cardiovascular:      Rate and Rhythm: Normal rate and regular rhythm.      Heart sounds: No murmur heard.     No friction rub.   Pulmonary:      Effort: Pulmonary effort is normal. No respiratory distress.      Breath sounds: Normal breath sounds. No wheezing or rales.   Abdominal:      General: Bowel sounds are normal. There is no distension.      Palpations: Abdomen is soft.      Tenderness: There is no abdominal tenderness.   Musculoskeletal:         General: Swelling present.      Cervical back: Neck supple.      Comments: Wound +   Skin:     " General: Skin is warm and dry.      Findings: No erythema or rash.   Neurological:      Mental Status: She is alert and oriented to person, place, and time.   Psychiatric:         Mood and Affect: Affect normal.       Diagnostic Results:  X-Ray: Reviewed  US: Reviewed  Echo: Reviewed  ASSESSMENT/PLAN:   presents with left toe wound, with a complete occlusion of right SFA. - cardiology, vascular and podiatry is on board    1. ESRD (N18.6 Z99.2) - HD MWF with Dr. Kat Alanis in [facility] not  compliant with HD, but did complete her full treatment this Wednesday    Plan for dialysis today as per regular schedule    Recent Labs   Lab 06/12/25 1715 06/13/25  0416   * 133*   K 4.2 3.5   BUN 21 26*   CREATININE 6.0* 6.4*     2. HTN (I10) - goal UF 2 L today and reassess blood pressure after ultrafiltration  Temp:  [98.2 °F (36.8 °C)-98.4 °F (36.9 °C)]   Pulse:  [78-88]   Resp:  [19-20]   BP: (132-158)/(60-74)   SpO2:  [97 %-100 %]     3. Anemia of chronic kidney disease treated with [place] (N18.9 D63.1) - EPogen 10K with each HD  Recent Labs   Lab 06/12/25 1715 06/13/25  0416   HGB 11.1* 9.9*   HCT 33.6* 30.7*    209     Lab Results   Component Value Date    IRON 12 (L) 07/24/2020    TIBC 186 (L) 07/24/2020    FERRITIN 1,741.2 (H) 06/13/2025     4. MBD (E88.9 M90.80) - sevelamer with each meal  Recent Labs   Lab 06/13/25  0416   CALCIUM 8.3*   PHOS 5.0*     Recent Labs   Lab 06/13/25  0416   MG 2.0     Lab Results   Component Value Date    FAIQIYHO66AF 17 (L) 02/23/2014     5. Acidosis  Recent Labs   Lab 06/12/25 1715 06/13/25  0416   CL 94* 95   CO2 24 24     6. Hemodialysis Access (Z99.2 V45.11)-   7. Nutrition/Hypoalbuminemia (E88.09) -   Recent Labs   Lab 06/12/25  1715 06/13/25  0416   ALBUMIN 3.3* 2.7*     Nepro with meals TID. Renal vitamins daily        Thank you for allowing me to participate in care of your patient  With any question please call Jazmin Kaba    [facility]  L.G.  Vicki SOLIS, FACP,   MAlyson Alanis MD,   MD MIHAI Baugh MD E. V. Harmon, NP  [address]  [place], [address] [phone]  After hours answering service: [phone]       [1]   Social History  Tobacco Use    Smoking status: Never    Smokeless tobacco: Never   Substance Use Topics    Alcohol use: No    Drug use: No

## 2025-06-13 NOTE — PROGRESS NOTES
06/13/25 1730   Required for all Hemodialysis Patients   Hepatitis Status other (see comments)  (unknown)   Treatment Type   Treatment Type Maintenance   Vital Signs   Temp 97.5 °F (36.4 °C)   Pulse 95   Resp 18   /89   Assessments (Pre/Post)   Consent Obtained yes   Safety vein preservation armband present   Blood Liters Processed (BLP) 52.5   Dialyzer Clearance clear   Pre-Hemodialysis Assessment   Treatment Status Completed        Hemodialysis AV Graft Left upper arm   No Placement Date or Time found.   Location: Left upper arm   Site Assessment Clean;Dry;Intact;No swelling;No redness   Patency Present;Thrill;Bruit   Status Deaccessed   Flows Good   Dressing Intervention Integrity maintained   Dressing Status Dry;Clean;Intact   Site Condition No complications   Dressing Gauze   Post-Hemodialysis Assessment   Rinseback Volume (mL) 250 mL   Blood Volume Processed (Liters) 52.5 L   Dialyzer Clearance Clear   Duration of Treatment 180 minutes   Total UF (mL) 1800 mL   Net Fluid Removal 1300   Patient Response to Treatment Meghna well   Post-Hemodialysis Comments HD complete. Blood returned. Needles pulled. Pressure held per P/P. Gauze applied. Hemodynamic status stable.

## 2025-06-13 NOTE — CONSULTS
[facility] - Emergency Dept  Cardiology  Consult Note    Patient Name: Erin Clark  MRN: [id number]  Admission Date: 6/12/2025  Hospital Length of Stay: 1 days  Code Status: Full Code   Attending Provider: Candido Ricardo MD   Consulting Provider: Viral Franks NP  Primary Care Physician: Bayron Golden MD  Principal Problem:Open wound of fourth toe of left foot    Patient information was obtained from patient, past medical records, and ER records.     Inpatient consult to Cardiology  Consult performed by: Viral Franks NP  Consult ordered by: Niecy Cates PA-C        Subjective:     Chief Complaint:  Foot pain     HPI:   89 y.o.  female with HFrEF (45%), CAD, T2DM, ESRD on MWF dialysis via left fistula, GERD, HTN, HLD, PAD s/p fempop bypass, dementia. Patient presented to the ED with left 3rd toe wound. Patient is confused and refusing medical care this AM. Her daughter is at bedside who is not familiar with her care. Patient lives with her 93 year old  and her son. Patient is a poor historian and is unsure when her wound began. She is unsure if she sustained any injury. In the ED an ultrasound of pt's LLE showed complete occlusion of the right SFA, the left proximal SFA, and left PTA. Bilateral femoropopliteal bypass grafts are not seen, likely chronically occluded.   Patient currently on a heparin gtt.     Past Medical History:   Diagnosis Date    CHF (congestive heart failure)     Colon polyps 06/03/2013    Coronary artery disease     Diabetes mellitus     Encounter for blood transfusion     ESRD (end stage renal disease) 03/2014    dialysis M-F    GERD (gastroesophageal reflux disease)     High cholesterol     Hypertension        Past Surgical History:   Procedure Laterality Date    AV FISTULA PLACEMENT Left 9/2014    bilateral fem-pop      CENTRAL VENOUS CATHETER TUNNELED INSERTION DOUBLE LUMEN Right 2/2014    CORONARY ARTERY BYPASS GRAFT  2/14/2014     x 3 vessels    LEFT HEART  CATHETERIZATION N/A 5/31/2021    Procedure: Left heart cath;  Surgeon: Brian Sanchez MD;  Location: [facility] CATH LAB/EP;  Service: Cardiology;  Laterality: N/A;    PERCUTANEOUS TRANSLUMINAL ANGIOPLASTY OF ARTERIOVENOUS FISTULA Left 5/5/2020    Procedure: PTA, Repair left upper arm anuerysm AV FISTULA;  Surgeon: Doris Mary MD;  Location: [facility] OR;  Service: General;  Laterality: Left;    VASCULAR SURGERY         Review of patient's allergies indicates:   Allergen Reactions    Aspirin Nausea Only and Swelling     Able to tolerated in small doses         No current facility-administered medications on file prior to encounter.     Current Outpatient Medications on File Prior to Encounter   Medication Sig    hydrALAZINE (APRESOLINE) 25 MG tablet Take 1 tablet (25 mg total) by mouth every 12 (twelve) hours.    albuterol (PROAIR HFA) 90 mcg/actuation inhaler Inhale 2 puffs into the lungs every 6 (six) hours as needed for Wheezing. Rescue (Patient not taking: Reported on 5/6/2025)    aspirin (ECOTRIN) 81 MG EC tablet TAKE ONE TABLET BY MOUTH ONCE DAILY    atorvastatin (LIPITOR) 40 MG tablet TAKE 1 TABLET BY MOUTH ONCE DAILY IN THE EVENING    furosemide (LASIX) 40 MG tablet Take 1 tablet (40 mg total) by mouth once daily.    isosorbide mononitrate (IMDUR) 30 MG 24 hr tablet Take 1 tablet (30 mg total) by mouth once daily.    metoprolol succinate (TOPROL-XL) 25 MG 24 hr tablet Take 1 tablet by mouth once daily    mupirocin (BACTROBAN) 2 % ointment Apply topically 3 (three) times daily. (Patient taking differently: Apply topically as needed.)    nitroGLYCERIN (NITROSTAT) 0.4 MG SL tablet Place 0.4 mg under the tongue every 5 (five) minutes as needed for Chest pain.    pantoprazole (PROTONIX) 40 MG tablet Take 1 tablet (40 mg total) by mouth before breakfast.    polyethylene glycol (GLYCOLAX) 17 gram/dose powder Take 17 g by mouth daily as needed (Estrenimiento (constipation)).    vitamin renal formula,  B-complex-vitamin c-folic acid, (TRIPHROCAPS) 1 mg Cap Take 1 capsule by mouth once daily. (Patient taking differently: Take 1 capsule by mouth as needed.)     Family History       Problem Relation (Age of Onset)    Heart attack Mother    Heart disease Mother    Hypertension Mother          Tobacco Use    Smoking status: Never    Smokeless tobacco: Never   Substance and Sexual Activity    Alcohol use: No    Drug use: No    Sexual activity: Never     Review of Systems   Unable to perform ROS: Dementia   Objective:     Vital Signs (Most Recent):  Temp: 98.4 °F (36.9 °C) (06/13/25 0935)  Pulse: 78 (06/13/25 1101)  Resp: 19 (06/13/25 0935)  BP: 132/60 (06/13/25 1101)  SpO2: 100 % (06/13/25 0935) Vital Signs (24h Range):  Temp:  [98.1 °F (36.7 °C)-98.4 °F (36.9 °C)] 98.4 °F (36.9 °C)  Pulse:  [78-88] 78  Resp:  [18-25] 19  SpO2:  [97 %-100 %] 100 %  BP: (112-158)/(56-76) 132/60     Weight: 49 kg (108 lb)  Body mass index is 19.13 kg/m².    SpO2: 100 %       No intake or output data in the 24 hours ending 06/13/25 1126    Lines/Drains/Airways       Drain  Duration                  Hemodialysis AV Graft Left upper arm -- days    Female External Urinary Catheter w/ Suction 06/13/25 0910 <1 day              Peripheral Intravenous Line  Duration                  Peripheral IV - Single Lumen 06/13/25 0508 20 G Posterior;Right Forearm <1 day                     Physical Exam  Eyes:      General:         Right eye: No discharge.         Left eye: No discharge.   Cardiovascular:      Rate and Rhythm: Normal rate and regular rhythm.   Pulmonary:      Effort: Pulmonary effort is normal.      Breath sounds: Normal breath sounds.   Abdominal:      General: Bowel sounds are normal.      Palpations: Abdomen is soft.   Skin:     General: Skin is warm and dry.   Neurological:      Mental Status: She is alert. Mental status is at baseline.      Significant Labs: BMP:   Recent Labs   Lab 06/12/25  1715 06/13/25  0416   GLU 87 75   *  "133*   K 4.2 3.5   CL 94* 95   CO2 24 24   BUN 21 26*   CREATININE 6.0* 6.4*   CALCIUM 9.1 8.3*   MG  --  2.0   , CMP   Recent Labs   Lab 06/12/25  1715 06/13/25  0416   * 133*   K 4.2 3.5   CL 94* 95   CO2 24 24   GLU 87 75   BUN 21 26*   CREATININE 6.0* 6.4*   CALCIUM 9.1 8.3*   PROT 8.4 6.8   ALBUMIN 3.3* 2.7*   BILITOT 0.9 0.8   ALKPHOS 114 92   AST 22 17   ALT 12 8*   ANIONGAP 15 14   , CBC   Recent Labs   Lab 06/12/25  1715 06/13/25  0416   WBC 11.21 9.17   HGB 11.1* 9.9*   HCT 33.6* 30.7*    209   , INR   Recent Labs   Lab 06/12/25 2003   INR 1.0   PROTIME 11.9   , Lipid Panel   Recent Labs   Lab 06/13/25  0416   CHOL 107*   HDL 62   LDLCALC 32.8*   TRIG 61   CHOLHDL 57.9*   , and Troponin No results for input(s): "TROPONINIHS" in the last 48 hours.    Significant Imaging: Echocardiogram: Transthoracic echo (TTE) complete (Cupid Only):   Results for orders placed or performed during the hospital encounter of 06/12/25   Echo   Result Value Ref Range    BSA 1.48 m2    Patton's Biplane MOD Ejection Fraction 58 %    A2C EF 56 %    A4C EF 59 %    LVOT stroke volume 56.0 cm3    LVIDd 3.6 3.5 - 6.0 cm    LV Systolic Volume 25 mL    LV Systolic Volume Index 16.8 mL/m2    LVIDs 2.6 2.1 - 4.0 cm    LV ESV A2C 51.58 mL    LV Diastolic Volume 54 mL    LV ESV A4C 62.17 mL    LV Diastolic Volume Index 36.24 mL/m2    LV EDV A2C 35.214668607617149 mL    LV EDV A4C 32.56 mL    Left Ventricular End Systolic Volume by Teichholz Method 25.20 mL    Left Ventricular End Diastolic Volume by Teichholz Method 54.44 mL    IVS 1.1 0.6 - 1.1 cm    LVOT diameter 1.8 cm    LVOT area 2.5 cm2    FS 27.8 (A) 28 - 44 %    Left Ventricle Relative Wall Thickness 0.50 cm    PW 0.9 0.6 - 1.1 cm    LV mass 107.9 g    LV Mass Index 72.4 g/m2    MV Peak E Kev 1.74 m/s    TDI LATERAL 0.06 m/s    TDI SEPTAL 0.05 m/s    E/E' ratio 32 m/s    MV Peak A Kev 0.51 m/s    TR Max Kev 3.5 m/s    E/A ratio 3.41     E wave deceleration time 155 " msec    LV SEPTAL E/E' RATIO 34.8 m/s    LV LATERAL E/E' RATIO 29.0 m/s    LVOT peak freida 0.9 m/s    Left Ventricular Outflow Tract Mean Velocity 0.62 cm/s    Left Ventricular Outflow Tract Mean Gradient 1.69 mmHg    RV- linn basal diam 3.0 cm    RV S' 5.34 cm/s    TAPSE 0.7 cm    RV/LV Ratio 0.83 cm    LA Vol (MOD) 57 mL    GABBY (MOD) 38 mL/m2    RA area length vol 34.43 mL    RA Area 15.2 cm2    RA vol index 23.11 mL/m2    RA Vol 33.12 mL    AV mean gradient 4 mmHg    AV peak gradient 8 mmHg    Ao peak freida 1.4 m/s    Ao VTI 32.5 cm    LVOT peak VTI 22.0 cm    AV valve area 1.7 cm²    AV Velocity Ratio 0.64     AV index (prosthetic) 0.68     ZITA by Velocity Ratio 1.6 cm²    Mr max freida 4.91 m/s    MV mean gradient 5 mmHg    MV peak gradient 14 mmHg    MV stenosis pressure 1/2 time 44.94 ms    MV valve area p 1/2 method 4.90 cm2    MV valve area by continuity eq 1.63 cm2    MV VTI 34.4 cm    Triscuspid Valve Regurgitation Peak Gradient 50 mmHg    PV PEAK VELOCITY 1.10 m/s    PV peak gradient 5 mmHg    Sinus 3.14 cm    ASI 2.1 cm/m2    STJ 2.8 cm    Ascending aorta 3.6 cm    ASI 2.4 cm/m2    IVC diameter 1.60 cm    Mean e' 0.06 m/s    ZLVIDS -0.43     ZLVIDD -2.06     LA area A4C 20.67 cm2    LA area A2C 18.93 cm2     Assessment and Plan:     Chronic diastolic congestive heart failure  Volume status maintained by HD  Continue BB, Lasix        Essential hypertension  Patient's blood pressure range in the last 24 hours was: BP  Min: 112/76  Max: 158/66.The patient's inpatient anti-hypertensive regimen is listed below:  Current Antihypertensives  furosemide tablet 40 mg, Daily, Oral  isosorbide mononitrate 24 hr tablet 30 mg, Daily, Oral  metoprolol succinate (TOPROL-XL) 24 hr tablet 25 mg, Daily, Oral  hydrALAZINE tablet 25 mg, Every 12 hours, Oral    Plan  - BP is controlled, no changes needed to their regimen      Peripheral arterial occlusive disease  Evaluated by podiatry - no plans for acute intervention  US with  complete occlusion of the right SFA, the left proximal SFA, and left PTA. Bilateral femoropopliteal bypass grafts are not seen, likely chronically occluded.   Patient currently on a heparin gtt. Convert to Plavix and Xarelto 2.5 mg BID  Will closely monitor as OP  No plans for IP intervention            VTE Risk Mitigation (From admission, onward)           Ordered     Reason for No Pharmacological VTE Prophylaxis  Once        Question:  Reasons:  Answer:  IV Heparin w/in 24 hrs. Pre or Post-Op    06/12/25 2051     heparin 25,000 units in dextrose 5% (100 units/ml) IV bolus from bag LOW INTENSITY nomogram - [facility]  As needed (PRN)        Question:  Heparin Infusion Adjustment (DO NOT MODIFY ANSWER)  Answer:  \\[email or web address]\Images\[email or web address]\HeparinInfusions\heparin LOW INTENSITY nomogram for [facility] [id number].pdf    06/12/25 1948     heparin 25,000 units in dextrose 5% (100 units/ml) IV bolus from bag LOW INTENSITY nomogram - [facility]  As needed (PRN)        Question:  Heparin Infusion Adjustment (DO NOT MODIFY ANSWER)  Answer:  \\[email or web address]\epic\Images\Pharmacy\HeparinInfusions\heparin LOW INTENSITY nomogram for [facility] [id number].pdf    06/12/25 1948     IP VTE HIGH RISK PATIENT  Once         06/12/25 2051     Place sequential compression device  Until discontinued         06/12/25 2051     heparin 25,000 units in dextrose 5% 250 mL (100 units/mL) infusion LOW INTENSITY nomogram - [facility]  Continuous        Question:  Begin at (units/kg/hr)  Answer:  12    06/12/25 1948                    Thank you for your consult. I will follow-up with patient. Please contact us if you have any additional questions.    Viral Franks NP  Cardiology   [facility] - Emergency Dept

## 2025-06-13 NOTE — ASSESSMENT & PLAN NOTE
Erin Clark is a 89 y.o. 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 Stable, no acute infection suspected. Xray shows no OM. Art US shows stenosis of left vasculature, cardiology consulted. WBC wnl, VSS, patient resting comfortably in bed.     - No acute intervention needed by podiatry  - Betadine paint to left 3rd toe, nursing wound care orders in  - Cardiology consulted for stenosis of LLE.   - WBAT to left foot with darco shoe, no limit restriction. Ordered  - Podiatry will sign off, patient needs routine observation/foot care in outpatient setting.     Future discharge recommendations  - patient to follow up with Podiatry, Dr. Baez, in wound care clinic, case mgmt to schedule       - no wound care needed at home, patient can betadine paint at home if preferred, and apply a band-aid.   - weightbearing as tolerated to left foot with postop shoe.   - all other care per primary.

## 2025-06-13 NOTE — SUBJECTIVE & OBJECTIVE
Past Medical History:   Diagnosis Date    CHF (congestive heart failure)     Colon polyps 06/03/2013    Coronary artery disease     Diabetes mellitus     Encounter for blood transfusion     ESRD (end stage renal disease) 03/2014    dialysis M-F    GERD (gastroesophageal reflux disease)     High cholesterol     Hypertension        Past Surgical History:   Procedure Laterality Date    AV FISTULA PLACEMENT Left 9/2014    bilateral fem-pop      CENTRAL VENOUS CATHETER TUNNELED INSERTION DOUBLE LUMEN Right 2/2014    CORONARY ARTERY BYPASS GRAFT  2/14/2014     x 3 vessels    LEFT HEART CATHETERIZATION N/A 5/31/2021    Procedure: Left heart cath;  Surgeon: Brian Sanchez MD;  Location: [facility] CATH LAB/EP;  Service: Cardiology;  Laterality: N/A;    PERCUTANEOUS TRANSLUMINAL ANGIOPLASTY OF ARTERIOVENOUS FISTULA Left 5/5/2020    Procedure: PTA, Repair left upper arm anuerysm AV FISTULA;  Surgeon: Doris Mary MD;  Location: [facility] OR;  Service: General;  Laterality: Left;    VASCULAR SURGERY         Review of patient's allergies indicates:   Allergen Reactions    Aspirin Nausea Only and Swelling     Able to tolerated in small doses         No current facility-administered medications on file prior to encounter.     Current Outpatient Medications on File Prior to Encounter   Medication Sig    hydrALAZINE (APRESOLINE) 25 MG tablet Take 1 tablet (25 mg total) by mouth every 12 (twelve) hours.    albuterol (PROAIR HFA) 90 mcg/actuation inhaler Inhale 2 puffs into the lungs every 6 (six) hours as needed for Wheezing. Rescue (Patient not taking: Reported on 5/6/2025)    aspirin (ECOTRIN) 81 MG EC tablet TAKE ONE TABLET BY MOUTH ONCE DAILY    atorvastatin (LIPITOR) 40 MG tablet TAKE 1 TABLET BY MOUTH ONCE DAILY IN THE EVENING    furosemide (LASIX) 40 MG tablet Take 1 tablet (40 mg total) by mouth once daily.    isosorbide mononitrate (IMDUR) 30 MG 24 hr tablet Take 1 tablet (30 mg total) by mouth once daily.    metoprolol  succinate (TOPROL-XL) 25 MG 24 hr tablet Take 1 tablet by mouth once daily    mupirocin (BACTROBAN) 2 % ointment Apply topically 3 (three) times daily. (Patient taking differently: Apply topically as needed.)    nitroGLYCERIN (NITROSTAT) 0.4 MG SL tablet Place 0.4 mg under the tongue every 5 (five) minutes as needed for Chest pain.    pantoprazole (PROTONIX) 40 MG tablet Take 1 tablet (40 mg total) by mouth before breakfast.    polyethylene glycol (GLYCOLAX) 17 gram/dose powder Take 17 g by mouth daily as needed (Estrenimiento (constipation)).    vitamin renal formula, B-complex-vitamin c-folic acid, (TRIPHROCAPS) 1 mg Cap Take 1 capsule by mouth once daily. (Patient taking differently: Take 1 capsule by mouth as needed.)     Family History       Problem Relation (Age of Onset)    Heart attack Mother    Heart disease Mother    Hypertension Mother          Tobacco Use    Smoking status: Never    Smokeless tobacco: Never   Substance and Sexual Activity    Alcohol use: No    Drug use: No    Sexual activity: Never     Review of Systems   Unable to perform ROS: Dementia   Objective:     Vital Signs (Most Recent):  Temp: 98.4 °F (36.9 °C) (06/13/25 0935)  Pulse: 78 (06/13/25 1101)  Resp: 19 (06/13/25 0935)  BP: 132/60 (06/13/25 1101)  SpO2: 100 % (06/13/25 0935) Vital Signs (24h Range):  Temp:  [98.1 °F (36.7 °C)-98.4 °F (36.9 °C)] 98.4 °F (36.9 °C)  Pulse:  [78-88] 78  Resp:  [18-25] 19  SpO2:  [97 %-100 %] 100 %  BP: (112-158)/(56-76) 132/60     Weight: 49 kg (108 lb)  Body mass index is 19.13 kg/m².    SpO2: 100 %       No intake or output data in the 24 hours ending 06/13/25 1126    Lines/Drains/Airways       Drain  Duration                  Hemodialysis AV Graft Left upper arm -- days    Female External Urinary Catheter w/ Suction 06/13/25 0910 <1 day              Peripheral Intravenous Line  Duration                  Peripheral IV - Single Lumen 06/13/25 0508 20 G Posterior;Right Forearm <1 day                    "  Physical Exam  Eyes:      General:         Right eye: No discharge.         Left eye: No discharge.   Cardiovascular:      Rate and Rhythm: Normal rate and regular rhythm.   Pulmonary:      Effort: Pulmonary effort is normal.      Breath sounds: Normal breath sounds.   Abdominal:      General: Bowel sounds are normal.      Palpations: Abdomen is soft.   Skin:     General: Skin is warm and dry.   Neurological:      Mental Status: She is alert. Mental status is at baseline.      Significant Labs: BMP:   Recent Labs   Lab 06/12/25  1715 06/13/25  0416   GLU 87 75   * 133*   K 4.2 3.5   CL 94* 95   CO2 24 24   BUN 21 26*   CREATININE 6.0* 6.4*   CALCIUM 9.1 8.3*   MG  --  2.0   , CMP   Recent Labs   Lab 06/12/25  1715 06/13/25  0416   * 133*   K 4.2 3.5   CL 94* 95   CO2 24 24   GLU 87 75   BUN 21 26*   CREATININE 6.0* 6.4*   CALCIUM 9.1 8.3*   PROT 8.4 6.8   ALBUMIN 3.3* 2.7*   BILITOT 0.9 0.8   ALKPHOS 114 92   AST 22 17   ALT 12 8*   ANIONGAP 15 14   , CBC   Recent Labs   Lab 06/12/25 1715 06/13/25  0416   WBC 11.21 9.17   HGB 11.1* 9.9*   HCT 33.6* 30.7*    209   , INR   Recent Labs   Lab 06/12/25 2003   INR 1.0   PROTIME 11.9   , Lipid Panel   Recent Labs   Lab 06/13/25 0416   CHOL 107*   HDL 62   LDLCALC 32.8*   TRIG 61   CHOLHDL 57.9*   , and Troponin No results for input(s): "TROPONINIHS" in the last 48 hours.    Significant Imaging: Echocardiogram: Transthoracic echo (TTE) complete (Cupid Only):   Results for orders placed or performed during the hospital encounter of 06/12/25   Echo   Result Value Ref Range    BSA 1.48 m2    Patton's Biplane MOD Ejection Fraction 58 %    A2C EF 56 %    A4C EF 59 %    LVOT stroke volume 56.0 cm3    LVIDd 3.6 3.5 - 6.0 cm    LV Systolic Volume 25 mL    LV Systolic Volume Index 16.8 mL/m2    LVIDs 2.6 2.1 - 4.0 cm    LV ESV A2C 51.58 mL    LV Diastolic Volume 54 mL    LV ESV A4C 62.17 mL    LV Diastolic Volume Index 36.24 mL/m2    LV EDV A2C " 35.939866726429172 mL    LV EDV A4C 32.56 mL    Left Ventricular End Systolic Volume by Teichholz Method 25.20 mL    Left Ventricular End Diastolic Volume by Teichholz Method 54.44 mL    IVS 1.1 0.6 - 1.1 cm    LVOT diameter 1.8 cm    LVOT area 2.5 cm2    FS 27.8 (A) 28 - 44 %    Left Ventricle Relative Wall Thickness 0.50 cm    PW 0.9 0.6 - 1.1 cm    LV mass 107.9 g    LV Mass Index 72.4 g/m2    MV Peak E Kev 1.74 m/s    TDI LATERAL 0.06 m/s    TDI SEPTAL 0.05 m/s    E/E' ratio 32 m/s    MV Peak A Kev 0.51 m/s    TR Max Kev 3.5 m/s    E/A ratio 3.41     E wave deceleration time 155 msec    LV SEPTAL E/E' RATIO 34.8 m/s    LV LATERAL E/E' RATIO 29.0 m/s    LVOT peak kev 0.9 m/s    Left Ventricular Outflow Tract Mean Velocity 0.62 cm/s    Left Ventricular Outflow Tract Mean Gradient 1.69 mmHg    RV- linn basal diam 3.0 cm    RV S' 5.34 cm/s    TAPSE 0.7 cm    RV/LV Ratio 0.83 cm    LA Vol (MOD) 57 mL    GABBY (MOD) 38 mL/m2    RA area length vol 34.43 mL    RA Area 15.2 cm2    RA vol index 23.11 mL/m2    RA Vol 33.12 mL    AV mean gradient 4 mmHg    AV peak gradient 8 mmHg    Ao peak kev 1.4 m/s    Ao VTI 32.5 cm    LVOT peak VTI 22.0 cm    AV valve area 1.7 cm²    AV Velocity Ratio 0.64     AV index (prosthetic) 0.68     ZITA by Velocity Ratio 1.6 cm²    Mr max kev 4.91 m/s    MV mean gradient 5 mmHg    MV peak gradient 14 mmHg    MV stenosis pressure 1/2 time 44.94 ms    MV valve area p 1/2 method 4.90 cm2    MV valve area by continuity eq 1.63 cm2    MV VTI 34.4 cm    Triscuspid Valve Regurgitation Peak Gradient 50 mmHg    PV PEAK VELOCITY 1.10 m/s    PV peak gradient 5 mmHg    Sinus 3.14 cm    ASI 2.1 cm/m2    STJ 2.8 cm    Ascending aorta 3.6 cm    ASI 2.4 cm/m2    IVC diameter 1.60 cm    Mean e' 0.06 m/s    ZLVIDS -0.43     ZLVIDD -2.06     LA area A4C 20.67 cm2    LA area A2C 18.93 cm2

## 2025-06-13 NOTE — HPI
89 y.o.  female with HFrEF (45%), CAD, T2DM, ESRD on MWF dialysis via left fistula, GERD, HTN, HLD, PAD s/p fempop bypass, dementia. Patient presented to the ED with left 3rd toe wound. Patient is confused and refusing medical care this AM. Her daughter is at bedside who is not familiar with her care. Patient lives with her 93 year old  and her son. Patient is a poor historian and is unsure when her wound began. She is unsure if she sustained any injury. In the ED an ultrasound of pt's LLE showed complete occlusion of the right SFA, the left proximal SFA, and left PTA. Bilateral femoropopliteal bypass grafts are not seen, likely chronically occluded.   Patient currently on a heparin gtt.

## 2025-06-13 NOTE — TELEPHONE ENCOUNTER
----- Message from  Alessandra sent at 6/13/2025 12:53 PM CDT -----  Regarding: HSP F/U  The pt is discharging today and will need a hsp f/u. The pt goes to dialysis [facility],so she will need a Thursday or Friday appt. Please call her dtr Erin at [phone] with the appt. Thanks in advance.

## 2025-06-13 NOTE — ASSESSMENT & PLAN NOTE
Patient's blood pressure range in the last 24 hours was: BP  Min: 112/76  Max: 158/66.The patient's inpatient anti-hypertensive regimen is listed below:  Current Antihypertensives  furosemide tablet 40 mg, Daily, Oral  isosorbide mononitrate 24 hr tablet 30 mg, Daily, Oral  metoprolol succinate (TOPROL-XL) 24 hr tablet 25 mg, Daily, Oral  hydrALAZINE tablet 25 mg, Every 12 hours, Oral    Plan  - BP is controlled, no changes needed to their regimen

## 2025-06-13 NOTE — CONSULTS
"[place] - Emergency Dept  Podiatry  Consult Note    Patient Name: Erin Clark  MRN: [id number]  Admission Date: 6/12/2025  Hospital Length of Stay: 1 days  Attending Physician: Candido Ricardo MD  Primary Care Provider: Bayron Golden MD     Inpatient consult to Podiatry  Consult performed by: John Skinner DPM  Consult ordered by: Dee Best MD  Reason for consult: left 4th toe fracture, left 3rd toe gangrene        Subjective:     History of Present Illness:  "Erin Clark is a 89 y.o.  female with a PMH of HFrEF (45%), CAD, T2DM, ESRD on MWF dialysis via left fistula, GERD, HTN, HLD who presented to  [facility] on 6/12/2025 with a left toe wound. Pt lives with her  and son. Pt states that about 3-4 days ago she started having increased pain in her foot and noticed that her toe was getting darker. Pt's daughter saw the toe today and brought pt to the ED. Denies any recent fevers, cough, congestion, nause or vomiting. Pt endorses pain in her left foot but dies any drainage from the toe. Endorse a good appetite at home.     At her baseline pt is able to ambulate with a cane but has been limited due to pain recently. Pt is mostly independent of ADLs but needs help with making meals.      In the ED an ultrasound of pt's left foot showed complete occlusion of the right SFA, the left proximal SFA, and left PTA. "    Podiatry consulted for left foot 3rd toe wound/eschar. Patients daughter bedside relays most history, unknown how long 3rd toe has been like. Denies any purulent drainage. Patient denies any tenderness to 3rd toe. Denies any known trauma.     Scheduled Meds:   atorvastatin  40 mg Oral QHS    furosemide  40 mg Oral Daily    hydrALAZINE  25 mg Oral Q12H    isosorbide mononitrate  30 mg Oral Daily    metoprolol succinate  25 mg Oral Daily    pantoprazole  40 mg Oral Before breakfast    polyethylene glycol  17 g Oral Daily    vitamin renal formula (B-complex-vitamin c-folic " acid)  1 capsule Oral Daily     Continuous Infusions:   heparin (porcine) in D5W  0-40 Units/kg/hr Intravenous Continuous 5.9 mL/hr at 06/13/25 0618 12 Units/kg/hr at 06/13/25 0618     PRN Meds:  Current Facility-Administered Medications:     dextrose 50%, 12.5 g, Intravenous, PRN    dextrose 50%, 25 g, Intravenous, PRN    glucagon (human recombinant), 1 mg, Intramuscular, PRN    glucose, 16 g, Oral, PRN    glucose, 24 g, Oral, PRN    heparin (PORCINE), 60 Units/kg, Intravenous, PRN    heparin (PORCINE), 30 Units/kg, Intravenous, PRN    melatonin, 6 mg, Oral, Nightly PRN    naloxone, 0.02 mg, Intravenous, PRN    sodium chloride 0.9%, 10 mL, Intravenous, Q12H PRN    Review of patient's allergies indicates:   Allergen Reactions    Aspirin Nausea Only and Swelling     Able to tolerated in small doses          Past Medical History:   Diagnosis Date    CHF (congestive heart failure)     Colon polyps 06/03/2013    Coronary artery disease     Diabetes mellitus     Encounter for blood transfusion     ESRD (end stage renal disease) 03/2014    dialysis M-F    GERD (gastroesophageal reflux disease)     High cholesterol     Hypertension      Past Surgical History:   Procedure Laterality Date    AV FISTULA PLACEMENT Left 9/2014    bilateral fem-pop      CENTRAL VENOUS CATHETER TUNNELED INSERTION DOUBLE LUMEN Right 2/2014    CORONARY ARTERY BYPASS GRAFT  2/14/2014     x 3 vessels    LEFT HEART CATHETERIZATION N/A 5/31/2021    Procedure: Left heart cath;  Surgeon: Brian Sanchez MD;  Location: [facility] CATH LAB/EP;  Service: Cardiology;  Laterality: N/A;    PERCUTANEOUS TRANSLUMINAL ANGIOPLASTY OF ARTERIOVENOUS FISTULA Left 5/5/2020    Procedure: PTA, Repair left upper arm anuerysm AV FISTULA;  Surgeon: Doris Mary MD;  Location: [facility] OR;  Service: General;  Laterality: Left;    VASCULAR SURGERY         Family History       Problem Relation (Age of Onset)    Heart attack Mother    Heart disease Mother    Hypertension  Mother          Tobacco Use    Smoking status: Never    Smokeless tobacco: Never   Substance and Sexual Activity    Alcohol use: No    Drug use: No    Sexual activity: Never     Review of Systems   Constitutional:  Negative for chills and fever.   Respiratory:  Negative for shortness of breath.    Cardiovascular:  Negative for chest pain.   Gastrointestinal:  Negative for diarrhea, nausea and vomiting.   Skin:  Positive for wound.   Neurological:  Negative for headaches.     Objective:     Vital Signs (Most Recent):  Temp: 98.4 °F (36.9 °C) (06/13/25 0935)  Pulse: 86 (06/13/25 0935)  Resp: 19 (06/13/25 0935)  BP: 132/60 (06/13/25 0935)  SpO2: 100 % (06/13/25 0935) Vital Signs (24h Range):  Temp:  [98.1 °F (36.7 °C)-98.4 °F (36.9 °C)] 98.4 °F (36.9 °C)  Pulse:  [78-88] 86  Resp:  [18-25] 19  SpO2:  [97 %-100 %] 100 %  BP: (112-158)/(56-76) 132/60     Weight: 49 kg (108 lb)  Body mass index is 19.13 kg/m².    Foot Exam    Right Foot/Ankle     Inspection and Palpation  Skin Exam: no ulcer       Left Foot/Ankle      Inspection and Palpation  Skin Exam: dry skin, skin changes and ulcer; no drainage, no cellulitis and no erythema     Neurovascular  Saphenous nerve sensation: diminished  Tibial nerve sensation: diminished  Superficial peroneal nerve sensation: diminished  Deep peroneal nerve sensation: diminished  Sural nerve sensation: diminished    Comments  Left foot: 3rd digit with medial well adhered eschar/ possible early gangrenous changes. No malodor, crepitus, no fluctuance. No purulence on manual expression. No tenderness to palpation.   4th toe with no tenderness.             Laboratory:  A1C:   Recent Labs   Lab 05/10/25  0841   HGBA1C 5.3     CBC:   Recent Labs   Lab 06/13/25 0416   WBC 9.17   RBC 3.20*   HGB 9.9*   HCT 30.7*      MCV 96   MCH 30.9   MCHC 32.2     CMP:   Recent Labs   Lab 06/13/25 0416   GLU 75   CALCIUM 8.3*   ALBUMIN 2.7*   PROT 6.8   *   K 3.5   CO2 24   CL 95   BUN 26*  "  CREATININE 6.4*   ALKPHOS 92   ALT 8*   AST 17   BILITOT 0.8     CRP:   Recent Labs   Lab 06/12/25  1715   CRP 13.0*     ESR: No results for input(s): "SEDRATE" in the last 168 hours.  Wound Cultures: No results for input(s): "LABAERO" in the last 4320 hours.  Microbiology Results (last 7 days)       Procedure Component Value Units Date/Time    Blood culture #2 **CANNOT BE ORDERED STAT** [[id number]]  (Normal) Collected: 06/12/25 1722    Order Status: Completed Specimen: Blood Updated: 06/13/25 0500     Blood Culture No Growth After 6 Hours    Blood culture #1 **CANNOT BE ORDERED STAT** [[id number]]  (Normal) Collected: 06/12/25 1700    Order Status: Completed Specimen: Blood from Peripheral, Antecubital, Right Updated: 06/13/25 0500     Blood Culture No Growth After 6 Hours          Specimen (24h ago, onward)      None            Diagnostic Results:  I have reviewed all pertinent imaging results/findings within the past 24 hours.    Assessment/Plan:     Orthopedic  Gangrene  Erin Clark is a 89 y.o. 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 Stable, no acute infection suspected. Xray shows no OM. Art US shows stenosis of left vasculature, cardiology consulted. WBC wnl, VSS, patient resting comfortably in bed.     - No acute intervention needed by podiatry  - Betadine paint to left 3rd toe, nursing wound care orders in  - Cardiology consulted for stenosis of LLE.   - WBAT to left foot with darco shoe, no limit restriction. Ordered  - Podiatry will sign off, patient needs routine observation/foot care in outpatient setting.     Future discharge recommendations  - patient to follow up with Podiatry, Dr. Baez, in wound care clinic, case mgmt to schedule       - no wound care needed at home, patient can betadine paint at home if preferred, and apply a band-aid.   - weightbearing as tolerated to left foot with postop shoe.   - all other care per primary.      Closed " nondisplaced fracture of proximal phalanx of left great toe  Erin Clark is a 89 y.o. female presents with left 4th toe fracture, non displaced. Stable, and non tender on exam.     - No acute intervention planned/warranted.   - Podiatry will monitor in the outpatient setting.         Thank you for your consult. I will sign off. Please contact us if you have any additional questions.    John Siknner DPM  Podiatry  [facility] - Emergency Dept

## 2025-06-13 NOTE — PROGRESS NOTES
The sw spoke to the pt's dtr Erin [phone] and informed her none of the  agencies can accommodate the pt with a [place] speaking nurse or therapist. She acknowledged understanding and states they will bring the pt home. The sw asked the [place] to place a referral for [facility] at Home for the pt to see if they will be able to accommodate the pt. Erin states the pt will d/c home tomorrow. She thanked the sw for all her assistance with the pt.

## 2025-06-13 NOTE — SUBJECTIVE & OBJECTIVE
Scheduled Meds:   atorvastatin  40 mg Oral QHS    furosemide  40 mg Oral Daily    hydrALAZINE  25 mg Oral Q12H    isosorbide mononitrate  30 mg Oral Daily    metoprolol succinate  25 mg Oral Daily    pantoprazole  40 mg Oral Before breakfast    polyethylene glycol  17 g Oral Daily    vitamin renal formula (B-complex-vitamin c-folic acid)  1 capsule Oral Daily     Continuous Infusions:   heparin (porcine) in D5W  0-40 Units/kg/hr Intravenous Continuous 5.9 mL/hr at 06/13/25 0618 12 Units/kg/hr at 06/13/25 0618     PRN Meds:  Current Facility-Administered Medications:     dextrose 50%, 12.5 g, Intravenous, PRN    dextrose 50%, 25 g, Intravenous, PRN    glucagon (human recombinant), 1 mg, Intramuscular, PRN    glucose, 16 g, Oral, PRN    glucose, 24 g, Oral, PRN    heparin (PORCINE), 60 Units/kg, Intravenous, PRN    heparin (PORCINE), 30 Units/kg, Intravenous, PRN    melatonin, 6 mg, Oral, Nightly PRN    naloxone, 0.02 mg, Intravenous, PRN    sodium chloride 0.9%, 10 mL, Intravenous, Q12H PRN    Review of patient's allergies indicates:   Allergen Reactions    Aspirin Nausea Only and Swelling     Able to tolerated in small doses          Past Medical History:   Diagnosis Date    CHF (congestive heart failure)     Colon polyps 06/03/2013    Coronary artery disease     Diabetes mellitus     Encounter for blood transfusion     ESRD (end stage renal disease) 03/2014    dialysis M-F    GERD (gastroesophageal reflux disease)     High cholesterol     Hypertension      Past Surgical History:   Procedure Laterality Date    AV FISTULA PLACEMENT Left 9/2014    bilateral fem-pop      CENTRAL VENOUS CATHETER TUNNELED INSERTION DOUBLE LUMEN Right 2/2014    CORONARY ARTERY BYPASS GRAFT  2/14/2014     x 3 vessels    LEFT HEART CATHETERIZATION N/A 5/31/2021    Procedure: Left heart cath;  Surgeon: Brian Sanchez MD;  Location: [facility] CATH LAB/EP;  Service: Cardiology;  Laterality: N/A;    PERCUTANEOUS TRANSLUMINAL ANGIOPLASTY OF  ARTERIOVENOUS FISTULA Left 5/5/2020    Procedure: PTA, Repair left upper arm anuerysm AV FISTULA;  Surgeon: Doris Mary MD;  Location: [facility];  Service: General;  Laterality: Left;    VASCULAR SURGERY         Family History       Problem Relation (Age of Onset)    Heart attack Mother    Heart disease Mother    Hypertension Mother          Tobacco Use    Smoking status: Never    Smokeless tobacco: Never   Substance and Sexual Activity    Alcohol use: No    Drug use: No    Sexual activity: Never     Review of Systems   Constitutional:  Negative for chills and fever.   Respiratory:  Negative for shortness of breath.    Cardiovascular:  Negative for chest pain.   Gastrointestinal:  Negative for diarrhea, nausea and vomiting.   Skin:  Positive for wound.   Neurological:  Negative for headaches.     Objective:     Vital Signs (Most Recent):  Temp: 98.4 °F (36.9 °C) (06/13/25 0935)  Pulse: 86 (06/13/25 0935)  Resp: 19 (06/13/25 0935)  BP: 132/60 (06/13/25 0935)  SpO2: 100 % (06/13/25 0935) Vital Signs (24h Range):  Temp:  [98.1 °F (36.7 °C)-98.4 °F (36.9 °C)] 98.4 °F (36.9 °C)  Pulse:  [78-88] 86  Resp:  [18-25] 19  SpO2:  [97 %-100 %] 100 %  BP: (112-158)/(56-76) 132/60     Weight: 49 kg (108 lb)  Body mass index is 19.13 kg/m².    Foot Exam    Right Foot/Ankle     Inspection and Palpation  Skin Exam: no ulcer       Left Foot/Ankle      Inspection and Palpation  Skin Exam: dry skin, skin changes and ulcer; no drainage, no cellulitis and no erythema     Neurovascular  Saphenous nerve sensation: diminished  Tibial nerve sensation: diminished  Superficial peroneal nerve sensation: diminished  Deep peroneal nerve sensation: diminished  Sural nerve sensation: diminished    Comments  Left foot: 3rd digit with medial well adhered eschar/ possible early gangrenous changes. No malodor, crepitus, no fluctuance. No purulence on manual expression. No tenderness to palpation.   4th toe with no tenderness.  "            Laboratory:  A1C:   Recent Labs   Lab 05/10/25  0841   HGBA1C 5.3     CBC:   Recent Labs   Lab 06/13/25  0416   WBC 9.17   RBC 3.20*   HGB 9.9*   HCT 30.7*      MCV 96   MCH 30.9   MCHC 32.2     CMP:   Recent Labs   Lab 06/13/25  0416   GLU 75   CALCIUM 8.3*   ALBUMIN 2.7*   PROT 6.8   *   K 3.5   CO2 24   CL 95   BUN 26*   CREATININE 6.4*   ALKPHOS 92   ALT 8*   AST 17   BILITOT 0.8     CRP:   Recent Labs   Lab 06/12/25  1715   CRP 13.0*     ESR: No results for input(s): "SEDRATE" in the last 168 hours.  Wound Cultures: No results for input(s): "LABAERO" in the last 4320 hours.  Microbiology Results (last 7 days)       Procedure Component Value Units Date/Time    Blood culture #2 **CANNOT BE ORDERED STAT** [[id number]]  (Normal) Collected: 06/12/25 1722    Order Status: Completed Specimen: Blood Updated: 06/13/25 0500     Blood Culture No Growth After 6 Hours    Blood culture #1 **CANNOT BE ORDERED STAT** [[id number]]  (Normal) Collected: 06/12/25 1700    Order Status: Completed Specimen: Blood from Peripheral, Antecubital, Right Updated: 06/13/25 0500     Blood Culture No Growth After 6 Hours          Specimen (24h ago, onward)      None            Diagnostic Results:  I have reviewed all pertinent imaging results/findings within the past 24 hours.    "

## 2025-06-13 NOTE — PHARMACY MED REC
"  [facility]           Pharmacy  Admission Medication History     The home medication history was taken by Dejah Shanks.      Medication history obtained from Medications listed below were obtained from: Patient/family    Based on information gathered for medication list, you may go to "Admission" then "Reconcile Home Medications" tabs to review and/or act upon those items.     The home medication list has been updated by the Pharmacy department.   Please read ALL comments highlighted in yellow.   Please address this information as you see fit.    Feel free to contact us if you have any questions or require assistance.    The medications listed below were removed from the home medication list.  Please reorder if appropriate:    Patient reports NOT TAKING the following medication(s):  Proair hfa  Bactroban ointment  Glycolax 17g      No current facility-administered medications on file prior to encounter.     Current Outpatient Medications on File Prior to Encounter   Medication Sig Dispense Refill    aspirin (ECOTRIN) 81 MG EC tablet TAKE ONE TABLET BY MOUTH ONCE DAILY (Patient taking differently: Take 81 mg by mouth once daily.) 30 tablet 6    atorvastatin (LIPITOR) 40 MG tablet TAKE 1 TABLET BY MOUTH ONCE DAILY IN THE EVENING 90 tablet 3    furosemide (LASIX) 40 MG tablet Take 1 tablet (40 mg total) by mouth once daily. 90 tablet 0    hydrALAZINE (APRESOLINE) 25 MG tablet Take 25 mg by mouth every 12 (twelve) hours.      isosorbide mononitrate (IMDUR) 30 MG 24 hr tablet Take 1 tablet (30 mg total) by mouth once daily. 90 tablet 3    metoprolol succinate (TOPROL-XL) 25 MG 24 hr tablet Take 1 tablet by mouth once daily (Patient taking differently: Take 25 mg by mouth once daily.) 90 tablet 3    nitroGLYCERIN (NITROSTAT) 0.4 MG SL tablet Place 0.4 mg under the tongue every 5 (five) minutes as needed for Chest pain.      pantoprazole (PROTONIX) 40 MG tablet Take 40 mg by mouth before breakfast. "      vitamin renal formula, B-complex-vitamin c-folic acid, (TRIPHROCAPS) 1 mg Cap Take 1 capsule by mouth once daily. (Patient taking differently: Take 1 capsule by mouth as needed.) 90 capsule 0       Please address this information as you see fit.  Feel free to contact us if you have any questions or require assistance.    Dejah Shanks  [phone]      .

## 2025-06-13 NOTE — TELEPHONE ENCOUNTER
Pt still inpatient time appointment was made. Placed on the wait list and scheduled for first available M-Wed or Friday appointment.

## 2025-06-13 NOTE — ED NOTES
Patient had a bowel movement on BSC with minimal assistance.  Perineal care was performed and bed was changed.

## 2025-06-13 NOTE — PROGRESS NOTES
"[facility] Medicine Progress Note    Primary Team: [facility] Hospitalist Team B  Attending Physician: Candido Ricardo MD  Resident: Estephania  Intern: Daryl    Subjective:      No acute events overnight. Pt states her pain is well controlled. Has had a good appetite. Pt had a BM today. Denies any N/V/SOB/CP/palpitations     Objective:     Last 24 Hour Vital Signs:  BP  Min: 112/76  Max: 158/66  Temp  Av.2 °F (36.8 °C)  Min: 98.1 °F (36.7 °C)  Max: 98.4 °F (36.9 °C)  Pulse  Av.1  Min: 78  Max: 88  Resp  Av  Min: 18  Max: 25  SpO2  Av.3 %  Min: 97 %  Max: 100 %  Height  Av' 3" (160 cm)  Min: 5' 3" (160 cm)  Max: 5' 3" (160 cm)  Weight  Av kg (108 lb)  Min: 49 kg (108 lb)  Max: 49 kg (108 lb)  No intake/output data recorded.    Physical Examination:  General: Alert, appears stated age, no acute distress  HENT: Atraumatic, normocephalic, EOMI and PERRLA, no scleral icterus, dry MM, no nasal discharge  CV: RRR; no murmurs, rubs, or gallops. Normal s1, s2  Resp: CTAB with normal work of breathing and chest excursion. No rhonchi, rales, or wheezing appreciated  Abd: Soft, NTND. Normoactive BS x4. No organomegaly or masses appreciated upon palpation.   MSK: unable to palpate DP pulses in left foot. Left third toe with necrosis.Erythema around left plantar foot.   Neuro: ANOx3. CNII-XII grossly intact. Normal appearing coordination and sensory function.   Skin: No rashes or lesions noted.      Laboratory:  Laboratory Data Reviewed: yes  Pertinent Findings:  Recent Labs   Lab 25  1715 25  0416   WBC 11.21 9.17   HGB 11.1* 9.9*   HCT 33.6* 30.7*    209   MCV 95 96   RDW 15.1* 14.9*   * 133*   K 4.2 3.5   CL 94* 95   CO2 24 24   BUN 21 26*   CREATININE 6.0* 6.4*   GLU 87 75   PROT 8.4 6.8   ALBUMIN 3.3* 2.7*   BILITOT 0.9 0.8   AST 22 17   ALKPHOS 114 92   ALT 12 8*     No results for input(s): "TROPONINI", "CKTOTAL", "CKMB", "BNP" in the last 168 hours.     Microbiology Data " Reviewed: yes  Pertinent Findings:  Microbiology Results (last 7 days)       Procedure Component Value Units Date/Time    Blood culture #2 **CANNOT BE ORDERED STAT** [[id number]]  (Normal) Collected: 06/12/25 1722    Order Status: Completed Specimen: Blood Updated: 06/13/25 0500     Blood Culture No Growth After 6 Hours    Blood culture #1 **CANNOT BE ORDERED STAT** [[id number]]  (Normal) Collected: 06/12/25 1700    Order Status: Completed Specimen: Blood from Peripheral, Antecubital, Right Updated: 06/13/25 0500     Blood Culture No Growth After 6 Hours             Other Results:    Radiology Data Reviewed: yes  Pertinent Findings:  US Lower Extremity Arteries Bilateral   Final Result      Complete occlusion of the right SFA (proximal, mid, distal), the left proximal SFA, and the left PTA.         Electronically signed by: Eleazar Zhou   Date:    06/12/2025   Time:    19:09      X-Ray Foot Complete Left   Final Result      1. Acute, nondisplaced fracture of the 4th proximal phalangeal base.  Overlying infection not excluded.   2. Plantar lateral soft tissue ulceration.         Electronically signed by: Eleazar Zhou   Date:    06/12/2025   Time:    17:13           Current Medications:     Infusions:   heparin (porcine) in D5W  0-40 Units/kg/hr Intravenous Continuous 5.9 mL/hr at 06/13/25 0618 12 Units/kg/hr at 06/13/25 0618        Scheduled:   atorvastatin  40 mg Oral QHS    furosemide  40 mg Oral Daily    hydrALAZINE  25 mg Oral Q12H    isosorbide mononitrate  30 mg Oral Daily    metoprolol succinate  25 mg Oral Daily    pantoprazole  40 mg Oral Before breakfast    polyethylene glycol  17 g Oral Daily    vitamin renal formula (B-complex-vitamin c-folic acid)  1 capsule Oral Daily        PRN:    Current Facility-Administered Medications:     dextrose 50%, 12.5 g, Intravenous, PRN    dextrose 50%, 25 g, Intravenous, PRN    glucagon (human recombinant), 1 mg, Intramuscular, PRN    glucose, 16 g, Oral, PRN     glucose, 24 g, Oral, PRN    heparin (PORCINE), 60 Units/kg, Intravenous, PRN    heparin (PORCINE), 30 Units/kg, Intravenous, PRN    melatonin, 6 mg, Oral, Nightly PRN    naloxone, 0.02 mg, Intravenous, PRN    sodium chloride 0.9%, 10 mL, Intravenous, Q12H PRN    Antibiotics and Day Number of Therapy:  Antibiotics (72h ago, onward)      None             Lines and Day Number of Therapy:  PIV 6/12 -   Fistula        Assessment and Plan:     Erin Clark is a 89 y.o.  female with a PMH of HFrEF (45%), CAD, T2DM, ESRD on MWF dialysis via left fistula, GERD, HTN, HLD who presented to  [facility] on 6/12/2025 with a left toe wound. In the ED pt was found to have Complete occlusion of the right SFA (proximal, mid, distal), the left proximal SFA, and the left PTA. Vascular cardiology was consulted and pt was placed on a hep ggt. Podiatry was consulted as well. Pt was admitted to [facility] Team B.     Left Third Toe Wound  PAD with complete occulusion of R SFA, L SFA, and PTA  - left toe with necrosis  - WBC WNL  - CRP elevated to 13  - LA WNL  - Bcx NGTD  - Xray of left foot without acute signs of osteo  - LE US: complete occlusion of the right SFA (proximal, mid, distal), the left proximal SFA, and the left PTA.  Plan  - Consulted vascular cardiology. Transitioned from hep ggt to plavix  - Consulted podiatry, recommend no acute intervention with OP follow-up  - Pt HDS, will hold off on antibiotics at this time     Nondisplaced fracture of the 4th proximal phalangeal base  - Xray of left foot with nondisplaced fracture  - Consulted podiatry, recommended placing a boot on left foot  - Will consulted PT/OT      Hx of paroxysmal Afib  - Pt found to be in Afib in the ED( HR 70s). No hx of afib on chart review but per daughter pt carries diagnosis of afib  - Continue home toprol-xl 25 mg daily   - CHADVASC of 6. Pt has a hx of GI bleeding on anticoagulation and high risk of falls. Will hold off on DOAC now.       ESRD on MWF HD  - Denies missing any sessions. No signs of volume overload  - Electrolytes WNL  - Consulted nephrology. Will get pt dialysis while inpatient  - Continue renal vitamins  - Daily BMP, mag, phos     HFrEF (EF 45%)  - TTE from 5/2023 with an EF of 45%  - Ordered repeat TTE  - Continue home med: Toprol 25 mg daily, lasix 40 mg     Normocytic Anemia   - Hgb low 11.1 MCV 95  - Ordered iron studies, B12, folate     CAD  - Lipid panel Chol 107, TG 61, LDL 32, HDL 62   - Continue home statin atorvastatin 40 mg and imdur 30 mg      T2DM (HbA1c 5.3 1 month ago)  - Pt no longer on any diabetic medications  - Will continue to monitor     GERD  - Continue home PPI 40 mg daily     HTN  - Continue home hydralazine 25 mg TID and toprol 25 mg daily      Diet: Reg  DVT ppx: on hep ggt for limb ischemia  Dispo: pending cards and podiatry recs  Code: Full    Dee Best MD  [facility] Internal Medicine-Pedaitrics, PGY-III  [facility] Hospital Medicine Team A    [facility] Medicine Hospitalist Phone numbers:   [facility] Hospitalist Medicine Team A (Marti/Bruno): [phone]  [facility] Hospitalist Medicine Team B (Davion/Areli):  [phone]

## 2025-06-13 NOTE — HPI
""Erin Clark is a 89 y.o.  female with a PMH of HFrEF (45%), CAD, T2DM, ESRD on MWF dialysis via left fistula, GERD, HTN, HLD who presented to  [facility] on 6/12/2025 with a left toe wound. Pt lives with her  and son. Pt states that about 3-4 days ago she started having increased pain in her foot and noticed that her toe was getting darker. Pt's daughter saw the toe today and brought pt to the ED. Denies any recent fevers, cough, congestion, nause or vomiting. Pt endorses pain in her left foot but dies any drainage from the toe. Endorse a good appetite at home.     At her baseline pt is able to ambulate with a cane but has been limited due to pain recently. Pt is mostly independent of ADLs but needs help with making meals.      In the ED an ultrasound of pt's left foot showed complete occlusion of the right SFA, the left proximal SFA, and left PTA. "    Podiatry consulted for left foot 3rd toe wound/eschar. Patients daughter bedside relays most history, unknown how long 3rd toe has been like. Denies any purulent drainage. Patient denies any tenderness to 3rd toe. Denies any known trauma.   "

## 2025-06-13 NOTE — ASSESSMENT & PLAN NOTE
Erin Clark is a 89 y.o. female presents with left 4th toe fracture, non displaced. Stable, and non tender on exam.     - No acute intervention planned/warranted.   - Podiatry will monitor in the outpatient setting.

## 2025-06-13 NOTE — ASSESSMENT & PLAN NOTE
Evaluated by podiatry - no plans for acute intervention  US with complete occlusion of the right SFA, the left proximal SFA, and left PTA. Bilateral femoropopliteal bypass grafts are not seen, likely chronically occluded.   Patient currently on a heparin gtt. Convert to Plavix and Xarelto 2.5 mg BID  Will closely monitor as OP  No plans for IP intervention

## 2025-06-14 VITALS
SYSTOLIC BLOOD PRESSURE: 112 MMHG | WEIGHT: 99.19 LBS | RESPIRATION RATE: 16 BRPM | BODY MASS INDEX: 18.25 KG/M2 | HEIGHT: 62 IN | OXYGEN SATURATION: 98 % | HEART RATE: 83 BPM | TEMPERATURE: 98 F | DIASTOLIC BLOOD PRESSURE: 59 MMHG

## 2025-06-14 LAB
ABSOLUTE EOSINOPHIL (OHS): 0.14 K/UL
ABSOLUTE MONOCYTE (OHS): 0.66 K/UL (ref 0.3–1)
ABSOLUTE NEUTROPHIL COUNT (OHS): 7.74 K/UL (ref 1.8–7.7)
ALBUMIN SERPL BCP-MCNC: 2.8 G/DL (ref 3.5–5.2)
ALP SERPL-CCNC: 99 UNIT/L (ref 40–150)
ALT SERPL W/O P-5'-P-CCNC: 10 UNIT/L (ref 10–44)
ANION GAP (OHS): 12 MMOL/L (ref 8–16)
AST SERPL-CCNC: 21 UNIT/L (ref 11–45)
BASOPHILS # BLD AUTO: 0.05 K/UL
BASOPHILS NFR BLD AUTO: 0.5 %
BILIRUB SERPL-MCNC: 0.9 MG/DL (ref 0.1–1)
BUN SERPL-MCNC: 15 MG/DL (ref 8–23)
CALCIUM SERPL-MCNC: 8.6 MG/DL (ref 8.7–10.5)
CHLORIDE SERPL-SCNC: 99 MMOL/L (ref 95–110)
CO2 SERPL-SCNC: 23 MMOL/L (ref 23–29)
CREAT SERPL-MCNC: 4.1 MG/DL (ref 0.5–1.4)
ERYTHROCYTE [DISTWIDTH] IN BLOOD BY AUTOMATED COUNT: 15 % (ref 11.5–14.5)
GFR SERPLBLD CREATININE-BSD FMLA CKD-EPI: 10 ML/MIN/1.73/M2
GLUCOSE SERPL-MCNC: 84 MG/DL (ref 70–110)
HCT VFR BLD AUTO: 34.4 % (ref 37–48.5)
HGB BLD-MCNC: 11.2 GM/DL (ref 12–16)
IMM GRANULOCYTES # BLD AUTO: 0.05 K/UL (ref 0–0.04)
IMM GRANULOCYTES NFR BLD AUTO: 0.5 % (ref 0–0.5)
LYMPHOCYTES # BLD AUTO: 0.8 K/UL (ref 1–4.8)
MAGNESIUM SERPL-MCNC: 2 MG/DL (ref 1.6–2.6)
MCH RBC QN AUTO: 31.1 PG (ref 27–31)
MCHC RBC AUTO-ENTMCNC: 32.6 G/DL (ref 32–36)
MCV RBC AUTO: 96 FL (ref 82–98)
NUCLEATED RBC (/100WBC) (OHS): 0 /100 WBC
PHOSPHATE SERPL-MCNC: 2.8 MG/DL (ref 2.7–4.5)
PLATELET # BLD AUTO: 255 K/UL (ref 150–450)
PMV BLD AUTO: 9.5 FL (ref 9.2–12.9)
POTASSIUM SERPL-SCNC: 3.4 MMOL/L (ref 3.5–5.1)
PROT SERPL-MCNC: 7.1 GM/DL (ref 6–8.4)
RBC # BLD AUTO: 3.6 M/UL (ref 4–5.4)
RELATIVE EOSINOPHIL (OHS): 1.5 %
RELATIVE LYMPHOCYTE (OHS): 8.5 % (ref 18–48)
RELATIVE MONOCYTE (OHS): 7 % (ref 4–15)
RELATIVE NEUTROPHIL (OHS): 82 % (ref 38–73)
SODIUM SERPL-SCNC: 134 MMOL/L (ref 136–145)
WBC # BLD AUTO: 9.44 K/UL (ref 3.9–12.7)

## 2025-06-14 PROCEDURE — 84100 ASSAY OF PHOSPHORUS: CPT

## 2025-06-14 PROCEDURE — 63600175 PHARM REV CODE 636 W HCPCS

## 2025-06-14 PROCEDURE — 83735 ASSAY OF MAGNESIUM: CPT

## 2025-06-14 PROCEDURE — 25000003 PHARM REV CODE 250

## 2025-06-14 PROCEDURE — 25000003 PHARM REV CODE 250: Performed by: INTERNAL MEDICINE

## 2025-06-14 PROCEDURE — 84460 ALANINE AMINO (ALT) (SGPT): CPT

## 2025-06-14 PROCEDURE — 36415 COLL VENOUS BLD VENIPUNCTURE: CPT

## 2025-06-14 PROCEDURE — 85025 COMPLETE CBC W/AUTO DIFF WBC: CPT

## 2025-06-14 RX ORDER — HALOPERIDOL LACTATE 5 MG/ML
0.5 INJECTION, SOLUTION INTRAMUSCULAR ONCE
Status: COMPLETED | OUTPATIENT
Start: 2025-06-14 | End: 2025-06-14

## 2025-06-14 RX ORDER — HALOPERIDOL LACTATE 5 MG/ML
1 INJECTION, SOLUTION INTRAMUSCULAR EVERY 6 HOURS PRN
Status: DISCONTINUED | OUTPATIENT
Start: 2025-06-14 | End: 2025-06-14

## 2025-06-14 RX ORDER — HALOPERIDOL LACTATE 5 MG/ML
0.5 INJECTION, SOLUTION INTRAMUSCULAR EVERY 6 HOURS PRN
Status: DISCONTINUED | OUTPATIENT
Start: 2025-06-14 | End: 2025-06-14

## 2025-06-14 RX ADMIN — HALOPERIDOL LACTATE 0.5 MG: 5 INJECTION, SOLUTION INTRAMUSCULAR at 04:06

## 2025-06-14 RX ADMIN — SEVELAMER CARBONATE 800 MG: 800 TABLET, FILM COATED ORAL at 10:06

## 2025-06-14 RX ADMIN — METOPROLOL SUCCINATE 25 MG: 25 TABLET, EXTENDED RELEASE ORAL at 09:06

## 2025-06-14 RX ADMIN — ISOSORBIDE MONONITRATE 30 MG: 30 TABLET, EXTENDED RELEASE ORAL at 09:06

## 2025-06-14 RX ADMIN — HYDRALAZINE HYDROCHLORIDE 25 MG: 25 TABLET ORAL at 09:06

## 2025-06-14 RX ADMIN — Medication 1 CAPSULE: at 09:06

## 2025-06-14 RX ADMIN — CLOPIDOGREL 75 MG: 75 TABLET ORAL at 09:06

## 2025-06-14 RX ADMIN — PANTOPRAZOLE SODIUM 40 MG: 40 TABLET, DELAYED RELEASE ORAL at 06:06

## 2025-06-14 RX ADMIN — FUROSEMIDE 40 MG: 40 TABLET ORAL at 09:06

## 2025-06-14 NOTE — PLAN OF CARE
Problem: Adult Inpatient Plan of Care  Goal: Plan of Care Review  Outcome: Met  Goal: Patient-Specific Goal (Individualized)  Outcome: Met  Goal: Absence of Hospital-Acquired Illness or Injury  Outcome: Met  Goal: Optimal Comfort and Wellbeing  Outcome: Met  Goal: Readiness for Transition of Care  Outcome: Met     Problem: Hemodialysis  Goal: Safe, Effective Therapy Delivery  Outcome: Met  Goal: Effective Tissue Perfusion  Outcome: Met  Goal: Absence of Infection Signs and Symptoms  Outcome: Met     Problem: Diabetes Comorbidity  Goal: Blood Glucose Level Within Targeted Range  Outcome: Met     Problem: Wound  Goal: Optimal Coping  Outcome: Met  Goal: Optimal Functional Ability  Outcome: Met  Goal: Absence of Infection Signs and Symptoms  Outcome: Met  Goal: Improved Oral Intake  Outcome: Met  Goal: Optimal Pain Control and Function  Outcome: Met  Goal: Skin Health and Integrity  Outcome: Met  Goal: Optimal Wound Healing  Outcome: Met     Problem: Fall Injury Risk  Goal: Absence of Fall and Fall-Related Injury  Outcome: Met     Problem: Comorbidity Management  Goal: Maintenance of Behavioral Health Symptom Control  Outcome: Met  Goal: Maintenance of Heart Failure Symptom Control  Outcome: Met  Goal: Blood Pressure in Desired Range  Outcome: Met     Problem: Confusion Acute  Goal: Optimal Cognitive Function  Outcome: Met

## 2025-06-14 NOTE — PROGRESS NOTES
No concerns per patient's son. Patient lives at home with family     06/13/25 2000   Financial Resource Strain   How hard is it for you to pay for the very basics like food, housing, medical care, and heating? Not hard   Housing Stability   In the last 12 months, was there a time when you were not able to pay the mortgage or rent on time? N   In the past 12 months, how many times have you moved where you were living? 0   At any time in the past 12 months, were you homeless or living in a shelter (including now)? N   Transportation Needs   In the past 12 months, has lack of transportation kept you from medical appointments or from getting medications? no   In the past 12 months, has lack of transportation kept you from meetings, work, or from getting things needed for daily living? No   Food Insecurity   Within the past 12 months, you worried that your food would run out before you got the money to buy more. Never true   Within the past 12 months, the food you bought just didn't last and you didn't have money to get more. Never true   Stress   Do you feel stress - tense, restless, nervous, or anxious, or unable to sleep at night because your mind is troubled all the time - these days? Not at all   Utilities   In the past 12 months has the electric, gas, oil, or water company threatened to shut off services in your home? No   Health Literacy   How often do you need to have someone help you when you read instructions, pamphlets, or other written material from your doctor or pharmacy? Always

## 2025-06-14 NOTE — PROGRESS NOTES
"[facility] Medicine Progress Note    Primary Team: [facility] Hospitalist Team B  Attending Physician: Candido Ricardo MD  Resident: Gonzalez  Intern: Rolanda    Subjective:      Delierious overnight requiring zyprexa x1 and haldol x1. VSS. Pt remained afebrile. Seen and examined with daughter at bedside. No acute complaints this morning.    Objective:     Last 24 Hour Vital Signs:  BP  Min: 106/54  Max: 132/75  Temp  Av.8 °F (36.6 °C)  Min: 96.6 °F (35.9 °C)  Max: 98.4 °F (36.9 °C)  Pulse  Av.7  Min: 78  Max: 117  Resp  Av.3  Min: 16  Max: 21  SpO2  Av.8 %  Min: 93 %  Max: 100 %  Height  Av' 2.5" (158.8 cm)  Min: 5' 2" (157.5 cm)  Max: 5' 3" (160 cm)  Weight  Av kg (103 lb 9.7 oz)  Min: 45 kg (99 lb 3.3 oz)  Max: 49 kg (108 lb)  I/O last 3 completed shifts:  In: -   Out: 1800 [Other:1800]    Physical Examination:  General: Alert, appears stated age, no acute distress  HENT: Atraumatic, normocephalic,  no scleral icterus, dry MM, no nasal discharge  CV: RRR; no murmurs, rubs, or gallops. Normal s1, s2  Resp: CTAB with normal work of breathing and chest excursion. No rhonchi, rales, or wheezing appreciated  Abd: Soft, NTND. Normoactive BS x4. No organomegaly or masses appreciated upon palpation.   MSK: unable to palpate DP pulses in left foot. Left third toe with necrosis.Erythema around left plantar foot.  No drainage from site of dry gangrene. No evidence of infection  Skin: No rashes or lesions noted.      Laboratory:  Laboratory Data Reviewed: yes  Pertinent Findings:  Recent Labs   Lab 25  1715 25  0416 25  0548   WBC 11.21 9.17 9.44   HGB 11.1* 9.9* 11.2*   HCT 33.6* 30.7* 34.4*    209 255   MCV 95 96 96   RDW 15.1* 14.9* 15.0*   * 133* 134*   K 4.2 3.5 3.4*   CL 94* 95 99   CO2 24 24 23   BUN 21 26* 15   CREATININE 6.0* 6.4* 4.1*   GLU 87 75 84   PROT 8.4 6.8 7.1   ALBUMIN 3.3* 2.7* 2.8*   BILITOT 0.9 0.8 0.9   AST 22 17 21   ALKPHOS 114 92 99   ALT 12 8* 10 " "    No results for input(s): "TROPONINI", "CKTOTAL", "CKMB", "BNP" in the last 168 hours.     Microbiology Data Reviewed: yes  Pertinent Findings:  Microbiology Results (last 7 days)       Procedure Component Value Units Date/Time    Blood culture #2 **CANNOT BE ORDERED STAT** [[id number]]  (Normal) Collected: 06/12/25 1722    Order Status: Completed Specimen: Blood Updated: 06/13/25 2300     Blood Culture No Growth After 24 Hours    Blood culture #1 **CANNOT BE ORDERED STAT** [[id number]]  (Normal) Collected: 06/12/25 1700    Order Status: Completed Specimen: Blood from Peripheral, Antecubital, Right Updated: 06/13/25 2300     Blood Culture No Growth After 24 Hours             Other Results:    Radiology Data Reviewed: yes  Pertinent Findings:  US Lower Extremity Arteries Bilateral   Final Result      Complete occlusion of the right SFA (proximal, mid, distal), the left proximal SFA, and the left PTA.         Electronically signed by: Eleazar Zhou   Date:    06/12/2025   Time:    19:09      X-Ray Foot Complete Left   Final Result      1. Acute, nondisplaced fracture of the 4th proximal phalangeal base.  Overlying infection not excluded.   2. Plantar lateral soft tissue ulceration.         Electronically signed by: Eleazar Zhou   Date:    06/12/2025   Time:    17:13           Current Medications:     Infusions:         Scheduled:   atorvastatin  40 mg Oral QHS    clopidogreL  75 mg Oral Daily    epoetin roberto-epbx  10,000 Units Subcutaneous Every Mon, Wed, Fri    furosemide  40 mg Oral Daily    hydrALAZINE  25 mg Oral Q12H    isosorbide mononitrate  30 mg Oral Daily    metoprolol succinate  25 mg Oral Daily    pantoprazole  40 mg Oral Before breakfast    polyethylene glycol  17 g Oral Daily    sevelamer carbonate  800 mg Oral TID WM    vitamin renal formula (B-complex-vitamin c-folic acid)  1 capsule Oral Daily        PRN:    Current Facility-Administered Medications:     dextrose 50%, 12.5 g, Intravenous, PRN    " dextrose 50%, 25 g, Intravenous, PRN    glucagon (human recombinant), 1 mg, Intramuscular, PRN    glucose, 16 g, Oral, PRN    glucose, 24 g, Oral, PRN    melatonin, 6 mg, Oral, Nightly PRN    naloxone, 0.02 mg, Intravenous, PRN    sodium chloride 0.9%, 10 mL, Intravenous, Q12H PRN    Antibiotics and Day Number of Therapy:  Antibiotics (72h ago, onward)      None             Lines and Day Number of Therapy:  PIV 6/12 -   Fistula        Assessment and Plan:     Erin Clark is a 89 y.o.  female with a PMH of HFrEF (45%), CAD, T2DM, ESRD on MWF dialysis via left fistula, GERD, HTN, HLD who presented to  [facility] on 6/12/2025 with a left toe wound. In the ED pt was found to have Complete occlusion of the right SFA (proximal, mid, distal), the left proximal SFA, and the left PTA. Vascular cardiology was consulted and pt was placed on a hep ggt. No acute inpatient intervention per podiatry and vascular cardiology. On Plavix and statin. Plan for discharge.     Left Third Toe Dry Gangrene  PAD with complete occulusion of R SFA, L SFA, and PTA  - left toe with necrosis  - WBC WNL  - CRP elevated to 13  - LA WNL  - Bcx NGTD at 24 hours  - Xray of left foot without acute signs of osteo  - LE US: complete occlusion of the right SFA (proximal, mid, distal), the left proximal SFA, and the left PTA.  Plan  - Consulted vascular cardiology. Transitioned from hep ggt to Plavix monotherapy with high intensity statin  - Consulted podiatry; betadine paint to left 3rd toe; wound cultures placed; no acute inpatient intervention; will follow inpatient     Nondisplaced fracture of the 4th proximal phalangeal base  - Xray of left foot with nondisplaced fracture  - Consulted podiatry, recommended placing a boot on left foot with outpatient follow up  - Will consulted PT/Ot pending eval     Hx of paroxysmal Afib  - Pt found to be in Afib in the ED( HR 70s). No hx of afib on chart review but per daughter pt carries  diagnosis of afib  - Continue home toprol-xl 25 mg daily   - CHADVASC of 6. Pt has a hx of GI bleeding on anticoagulation and high risk of falls. Will hold off on DOAC now.      ESRD on MWF HD  - Denies missing any sessions. No signs of volume overload  - Electrolytes WNL  - Consulted nephrology. Will get pt dialysis while inpatient  - Continue renal vitamins  - Daily BMP, mag, phos     HFrEF (EF 45%)  Moderate Pulmonary Hypertension  - TTE from 5/2023 with an EF of 45%  - Ordered repeat TTE; EF 58% with indeterminate diastolic function  - TTE also shows moderate pulm hypertension with estimated pulmonary artery systolic pressure of 52  - Continue home med: Toprol 25 mg daily, lasix 40 mg  - will refer to Pulm on discharge     Normocytic Anemia   - Hgb low 11.1 MCV 95  - iron studies consistent with ACD with ferritin 1741  - monitor with daily CBC     CAD s/p CABG  - Lipid panel Chol 107, TG 61, LDL 32, HDL 62   - Continue home statin atorvastatin 40 mg and imdur 30 mg      T2DM (HbA1c 5.3 1 month ago)  - Pt no longer on any diabetic medications  - Will continue to monitor     GERD  - Continue home PPI 40 mg daily     HTN  - Continue home hydralazine 25 mg TID and toprol 25 mg daily      Diet: Reg  DVT ppx: not currently on DVT prophylaxis  Dispo:  Discharge today pending blood cultures  Code: Shahbaz Orantes DO  [facility] Medicine PGY1    \[facility] [place] [facility]\"" Medicine Hospitalist Phone numbers:   \[facility] [place] [facility]\"" Hospitalist Medicine Team A (Marti/Bruno): [phone]  [facility] Hospitalist Medicine Team B (Davion/Areli):  [phone]

## 2025-06-14 NOTE — PLAN OF CARE
SW communicated with pts daughter via vidyo.  declined. SW discussed dc planning. Pts daughter declining HH at this time. SW expressed if she will like HH at a later please follow up at [facility] follow up apt. Pts daughter will provide transport home. Pt is provided assistance with ADLs as needed by her family. Pt has rw and cane at home for use. No adduitional cm needs.     Pts daughter aware to review mychart for future follow up appointments.     Cleared from CM . Bedside Nurse and VN notified.    SW requested wound care follow up.     Future Appointments   Date Time Provider Department Center   6/26/2025  3:00 PM Ada Gregory MD [facility] IMPR[facility]   8/20/2025  3:40 PM Leif Veliz MD [facility] CARDIO [facility]   11/6/2025  4:00 PM Bayron Golden MD [facility] MED [place]        06/14/25 1318   Final Note   Assessment Type Final Discharge Note   Anticipated Discharge Disposition Home   Hospital Resources/Appts/Education Provided Appointments scheduled and added to AVS   Post-Acute Status   Discharge Delays None known at this time

## 2025-06-14 NOTE — PLAN OF CARE
VIRTUAL NURSE: Pt/son arrived to unit. Permission received to turn camera to view patient/son. VIP model explained; Patient/son informed this VN will be working with bedside nurse and the rest of the care team.      Admission questions completed.  Plan of care reviewed with patient/son.  Educated patient/son on VTE and fall risk. Safety precautions in place. Call light within reach, side rails up x2.     Patient/son instucted to ask staff for assistance. Patient/son verbalized complete understanding. Will continue to be available and intervene as needed.    Labs, notes, orders, and careplan reviewed.       Problem: Adult Inpatient Plan of Care  Goal: Plan of Care Review  Outcome: Progressing  Goal: Patient-Specific Goal (Individualized)  Outcome: Progressing          06/13/25 2017   Patient Request   Patient Requested Son would like her to have the melatonin tonight & for her to be checked on frequently due to her confusion   Nurse Notification   Bedside Nurse Notified? Yes   Name of Bedside Nurse Esperanza   Nurse Notfication Method Secure Chat   Nurse Notified Of Patient Request;Other  (Admission profile is completed)   Admission   Initial VN Admission Questions Complete   Communication Issues? None   Shift   Virtual Nurse - Rounding Complete   Virtual Nurse - Patient Verbalized Approval Of Camera Use;VN Rounding   Type of Frequent Check   Type Patient Rounds;Telemetry Monitoring   Safety/Activity   Patient Rounds bed in low position;placement of personal items at bedside;call light in patient/parent reach;clutter free environment maintained;visualized patient   Safety Promotion/Fall Prevention bed alarm set;Fall Risk reviewed with patient/family;family expresses understanding of fall risk and prevention;instructed to call staff for mobility;medications reviewed;side rails raised x 3;observed patient noncompliance with fall prevention instructions   Positioning   Body Position position changed independently   Head  of Bed (HOB) Positioning HOB at 30 degrees   Pain/Comfort/Sleep   Preferred Pain Scale number (Numeric Rating Pain Scale)   Comfort/Acceptable Pain Level 0   Pain Rating (0-10): Rest 0   Pain Rating (0-10): Activity 0   Sleep/Rest/Relaxation awake   Cardiac   Cardiac/Telemetry Monitor On Yes          06/13/25 2020   Admission Complete   Admission Complete by VN Complete

## 2025-06-14 NOTE — PROGRESS NOTES
"[facility]           Pharmacy  Admission Medication Reconciliation     Based on information gathered for medication list, you may go to "Admission" then "Reconcile Home Medications" tabs to review and/or act upon those items.     The home medication list has been updated by the Pharmacy department.   Please read ALL comments highlighted in red.   Please address this information as you see fit.    Feel free to contact us if you have any questions or require assistance.    Home medication list has been compared to current inpatient medications. Please review the following discrepancies noted below:    Patient reports STILL TAKING the following medication(s) which was not ordered upon admit  Atorvastatin 40mg daily    Feel free to contact us if you have any questions or require assistance.    Luis Fried, PharmD  [phone]    "

## 2025-06-14 NOTE — PROGRESS NOTES
Nephrology Consult      Consult Requested By: Candido Ricardo MD  Reason for Consult: ESRD    SUBJECTIVE:     Review of Systems   Constitutional:  Negative for chills and fever.   Respiratory:  Negative for cough and shortness of breath.    Cardiovascular:  Negative for chest pain and leg swelling.   Gastrointestinal:  Negative for nausea.          OBJECTIVE:     Vital Signs (Most Recent)  Vitals:    06/14/25 0430 06/14/25 0445 06/14/25 0739 06/14/25 0741   BP: (!) 106/54   132/75   BP Location:       Patient Position:       Pulse: 97  82 82   Resp: 18   16   Temp: 98.1 °F (36.7 °C)   98.3 °F (36.8 °C)   TempSrc:    Oral   SpO2:  (!) 93%  98%   Weight:       Height:           Medications:   atorvastatin  40 mg Oral QHS    clopidogreL  75 mg Oral Daily    epoetin roberto-epbx  10,000 Units Subcutaneous Every Mon, Wed, Fri    furosemide  40 mg Oral Daily    hydrALAZINE  25 mg Oral Q12H    isosorbide mononitrate  30 mg Oral Daily    metoprolol succinate  25 mg Oral Daily    pantoprazole  40 mg Oral Before breakfast    polyethylene glycol  17 g Oral Daily    sevelamer carbonate  800 mg Oral TID WM    vitamin renal formula (B-complex-vitamin c-folic acid)  1 capsule Oral Daily     Physical Exam  Constitutional:       General: She is not in acute distress.     Appearance: She is not diaphoretic.   HENT:      Head: Normocephalic and atraumatic.   Eyes:      General: No scleral icterus.  Neck:      Vascular: No JVD.   Cardiovascular:      Rate and Rhythm: Normal rate and regular rhythm.      Heart sounds: No murmur heard.     No friction rub.   Pulmonary:      Effort: Pulmonary effort is normal. No respiratory distress.      Breath sounds: Normal breath sounds. No wheezing or rales.   Abdominal:      General: Bowel sounds are normal. There is no distension.      Palpations: Abdomen is soft.      Tenderness: There is no abdominal tenderness.   Musculoskeletal:         General: Swelling present.      Cervical back: Neck supple.       Comments: Wound +   Skin:     General: Skin is warm and dry.      Findings: No erythema or rash.   Neurological:      Mental Status: She is alert and oriented to person, place, and time.   Psychiatric:         Mood and Affect: Affect normal.       Diagnostic Results:  X-Ray: Reviewed  US: Reviewed  Echo: Reviewed  ASSESSMENT/PLAN:   presents with left toe wound, with a complete occlusion of right SFA. - cardiology, vascular and podiatry is on board    1. ESRD (N18.6 Z99.2) - HD MWF with Dr. Kat Alanis in [facility] not  compliant with HD, completed dialysis yesterday with no issues.  Next dialysis planned as per usual schedule on Monday    Recent Labs   Lab 06/12/25 1715 06/13/25 0416 06/14/25  0548   * 133* 134*   K 4.2 3.5 3.4*   BUN 21 26* 15   CREATININE 6.0* 6.4* 4.1*     2. HTN (I10) - blood pressure is much better today was the pain control and after ultrafiltration.    Continue hydralazine 25 b.i.d., metoprolol 25 daily and Imdur 30 daily  Temp:  [97.8 °F (36.6 °C)-98.3 °F (36.8 °C)]   Pulse:  [82-98]   Resp:  [16-18]   BP: (106-132)/(54-75)   SpO2:  [93 %-98 %]     3. Anemia of chronic kidney disease treated with [place] (N18.9 D63.1) - EPogen 10K with each HD  Recent Labs   Lab 06/12/25 1715 06/13/25 0416 06/14/25  0548   HGB 11.1* 9.9* 11.2*   HCT 33.6* 30.7* 34.4*    209 255     Lab Results   Component Value Date    IRON 65 06/13/2025    TIBC 147 (L) 06/13/2025    FERRITIN 1,741.2 (H) 06/13/2025     4. MBD (E88.9 M90.80) - sevelamer with each meal -      hold for now while inpatient.  Phosphorus dropped below goal.  Resume at home as was previously taking  Recent Labs   Lab 06/14/25  0548   CALCIUM 8.6*   PHOS 2.8     Recent Labs   Lab 06/13/25 0416 06/14/25  0548   MG 2.0 2.0     Lab Results   Component Value Date    IQKASPTW42GJ 17 (L) 02/23/2014     5. Acidosis  Recent Labs   Lab 06/12/25  1715 06/13/25  0416 06/14/25  0548   CL 94* 95 99   CO2 24 24 23     6. Hemodialysis  Access (Z99.2 V45.11)-   7. Nutrition/Hypoalbuminemia (E88.09) -   Recent Labs   Lab 06/13/25  0416 06/14/25  0548   ALBUMIN 2.7* 2.8*     Nepro with meals TID. Renal vitamins daily        Thank you for allowing me to participate in care of your patient  With any question please call Jazmin Kaba    [facility]  RENATE Cohen MD, FACARSEN,   CANDY Alanis MD,   MD MIHAI Baugh MD E. V. Harmon, NP  [address]  [place], [address]  [phone]  After hours answering service: [phone]

## 2025-06-14 NOTE — PROGRESS NOTES
AVS virtually reviewed with patient and daughter at the bedside in its entirety with emphasis on diet, medications, follow-up appointments and reasons to return to the ED. Patient also encouraged to utilize their patient portal. Ease and convenience of use reiterated. Education complete and daughter voiced understanding. All questions answered. Discharge teaching complete.

## 2025-06-14 NOTE — DISCHARGE INSTRUCTIONS
Our goal at [facility] is to always give you outstanding care and exceptional service. You may receive a survey from [employer] by mail, text or e-mail in the next 24-48 hours asking about the care you received with us. The survey should only take 5-10 minutes to complete and is very important to us.     Your feedback provides us with a way to recognize our staff who work tirelessly to provide the best care! Also, your responses help us learn how to improve when your experience was below our aspiration of excellence. We are always looking for ways to improve your stay. We WILL use your feedback to continue making improvements to help us provide the highest quality care. We keep your personal information and feedback confidential. We appreciate your time completing this survey and can't wait to hear from you!!!    We look forward to your continued care with us! Thanks so much for choosing [facility] for your healthcare needs!

## 2025-06-15 LAB
OHS QRS DURATION: 72 MS
OHS QTC CALCULATION: 431 MS

## 2025-06-16 ENCOUNTER — TELEPHONE (OUTPATIENT)
Dept: PODIATRY | Facility: CLINIC | Age: 89
End: 2025-06-16
Payer: MEDICARE

## 2025-06-16 ENCOUNTER — PATIENT MESSAGE (OUTPATIENT)
Dept: PODIATRY | Facility: CLINIC | Age: 89
End: 2025-06-16
Payer: MEDICARE

## 2025-06-16 ENCOUNTER — TELEPHONE (OUTPATIENT)
Dept: HOME HEALTH SERVICES | Facility: CLINIC | Age: 89
End: 2025-06-16
Payer: MEDICARE

## 2025-06-16 LAB
W HEPATITIS B SURFACE ANTIBODY, QUALITATIVE: POSITIVE
W HEPATITIS B SURFACE ANTIBODY, QUANTITATIVE: 18 MIU/ML

## 2025-06-16 NOTE — TELEPHONE ENCOUNTER
Contacted pt daughter Erin to schedule an appointment regarding a referral placed for a tcc home visit with a nurse practitioner.    Daughter will call back after speaking with brother regarding scheduling

## 2025-06-16 NOTE — TELEPHONE ENCOUNTER
Spoke with Ms Clark's daughter, Erin, who accepted an appt on behalf of her mother. Accepted appt date and time for 6/25/25 at 3:30 pm due to dialysis schedule. No other needs voiced at this time. Verbalized understanding of clinic location. Call back encouraged if needed.

## 2025-06-16 NOTE — TELEPHONE ENCOUNTER
----- Message from  Alessandra sent at 6/13/2025 12:58 PM CDT -----  Regarding: HSP F/U  The pt is discharging today and will need a hsp f/u. The pt goes to dialysis [facility],so she will need a Thursday or Friday appt. Please call her dtr Erin at [phone] with the appt. Thanks in advance.

## 2025-06-16 NOTE — DISCHARGE SUMMARY
[facility] Hospital Medicine Discharge Summary    Primary Team: [facility] Hospitalist Team B  Attending Physician: MD Davion  Resident: Gonzalez  Intern: Rolanda     Date of Admit: 6/12/2025  Date of Discharge: 6/14/2025    Discharge to: home with family  Condition: Stable and improved    Discharge Diagnoses     Problem List[1]    Consultants and Procedures     Consultants:  Cardiology  Nephrology  Podiatry     Procedures:   HD  US arterial LE  XR foot  Brief History of Present Illness      HPI:    Erin Clark is a 89 y.o.  female with a PMH of HFrEF (45%), CAD, T2DM, ESRD on MWF dialysis via left fistula, GERD, HTN, HLD who presented to  [facility] on 6/12/2025 with a left toe wound. Pt lives with her  and son. Pt states that about 3-4 days ago she started having increased pain in her foot and noticed that her toe was getting darker. Pt's daughter saw the toe today and brought pt to the ED. Denies any recent fevers, cough, congestion, nause or vomiting. Pt endorses pain in her left foot but dies any drainage from the toe. Endorse a good appetite at home.     At her baseline pt is able to ambulate with a cane but has been limited due to pain recently. Pt is mostly independent of ADLs but needs help with making meals.      In the ED an ultrasound of pt's left foot showed complete occlusion of the right SFA, the left proximal SFA, and left PTA. Vascular cardiology was consulted who recommended putting pt on a hep ggt. Podiatry was consulted as well. Pt was then admitted to [facility] Team B for further work-up.     For the full HPI please refer to the History & Physical from this admission.    Hospital Course By Problem with Pertinent Findings     Left Third Toe Dry Gangrene  PAD with complete occulusion of R SFA, L SFA, and PTA  - left toe with necrosis  - WBC WNL  - CRP elevated to 13  - LA WNL  - Bcx NGTD at 24 hours  - Xray of left foot without acute signs of osteo  - LE US: complete occlusion of the right  SFA (proximal, mid, distal), the left proximal SFA, and the left PTA.  Plan  - Consulted vascular cardiology. Transitioned from hep ggt to Plavix monotherapy with high intensity statin  - Consulted podiatry; betadine paint to left 3rd toe; wound cultures placed; no acute inpatient intervention; will follow outpatient  - patient to follow with vascular cardiology as outpatient     Nondisplaced fracture of the 4th proximal phalangeal base  - Xray of left foot with nondisplaced fracture  - Consulted podiatry, recommended placing a boot on left foot with outpatient follow up  - home health referral sent for home pT     Hx of paroxysmal Afib  - Pt found to be in Afib in the ED( HR 70s). No hx of afib on chart review but per daughter pt carries diagnosis of afib  - Continue home toprol-xl 25 mg daily   - CHADVASC of 6. Pt has a hx of GI bleeding on anticoagulation and high risk of falls. Will hold off on DOAC now.   - patient to have this discussion with outpatient PCP/ cardiology apt     ESRD on MWF HD  - Denies missing any sessions. No signs of volume overload  - Electrolytes WNL  - Consulted nephrology. Will get pt dialysis while inpatient  - Continue renal vitamins  - Daily BMP, mag, phos     HFrEF (EF 45%)  Moderate Pulmonary Hypertension  - TTE from 5/2023 with an EF of 45%  - Ordered repeat TTE; EF 58% with indeterminate diastolic function  - TTE also shows moderate pulm hypertension with estimated pulmonary artery systolic pressure of 52  - Continue home med: Toprol 25 mg daily, lasix 40 mg       Normocytic Anemia   - Hgb low 11.1 MCV 95  - iron studies consistent with ACD with ferritin 1741  - monitor with daily CBC     CAD s/p CABG  - Lipid panel Chol 107, TG 61, LDL 32, HDL 62   - Continue home statin atorvastatin 40 mg and imdur 30 mg      T2DM (HbA1c 5.3 1 month ago)  - Pt no longer on any diabetic medications  - Will continue to monitor     GERD  - Continue home PPI 40 mg daily     HTN  - Continue home  hydralazine 25 mg TID and toprol 25 mg daily     Discharge Medications        Medication List        START taking these medications      clopidogreL 75 mg tablet  Commonly known as: PLAVIX  [place] willow tableta (75 mg en total) por vía oral diariamente.  (Take 1 tablet (75 mg total) by mouth once daily.)            CHANGE how you take these medications      aspirin 81 MG EC tablet  Commonly known as: ECOTRIN  TAKE ONE TABLET BY MOUTH ONCE DAILY  What changed: when to take this     metoprolol succinate 25 MG 24 hr tablet  Commonly known as: TOPROL-XL  Take 1 tablet by mouth once daily  What changed: when to take this     TRIPHROCAPS 1 mg Cap  Generic drug: vitamin renal formula (B-complex-vitamin c-folic acid)  Take 1 capsule by mouth once daily.  What changed:   when to take this  reasons to take this            CONTINUE taking these medications      atorvastatin 40 MG tablet  Commonly known as: LIPITOR  TAKE 1 TABLET BY MOUTH ONCE DAILY IN THE EVENING     furosemide 40 MG tablet  Commonly known as: LASIX  Take 1 tablet (40 mg total) by mouth once daily.     hydrALAZINE 25 MG tablet  Commonly known as: APRESOLINE     isosorbide mononitrate 30 MG 24 hr tablet  Commonly known as: IMDUR  Take 1 tablet (30 mg total) by mouth once daily.     nitroGLYCERIN 0.4 MG SL tablet  Commonly known as: NITROSTAT     pantoprazole 40 MG tablet  Commonly known as: PROTONIX               Where to Get Your Medications        These medications were sent to [facility] [place] [address] [address]      Phone: [phone]   clopidogreL 75 mg tablet         Discharge Information:   Diet:  Heart healthy    Physical Activity:  As tolerated by patient.             Instructions:  1. Take all medications as prescribed  2. Keep all follow-up appointments  3. Return to the hospital or call your primary care physicians if any worsening symptoms such as fever, chest pain, shortness of breath, return  of symptoms, or any other concerns.    Follow-Up Appointments:  Podiatry  Vascular cardiology  Nephrology (already established)    Rach Orantes DO  [facility] Medicine PGY1         [1]   Patient Active Problem List  Diagnosis    Type 2 diabetes mellitus with chronic kidney disease on chronic dialysis, with long-term current use of insulin    Peripheral arterial occlusive disease    Hypertension    CAD (coronary artery disease)    Anemia    S/P CABG x 1    SOB (shortness of breath)    End-stage renal disease on hemodialysis    Hyperlipidemia LDL goal <70    Essential hypertension    Aneurysm of arteriovenous dialysis fistula    Sleep apnea    Malfunction of device    Frailty    Secondary hyperparathyroidism of renal origin    Dependence on renal dialysis    Senile dementia    Chronic diastolic congestive heart failure    Closed nondisplaced fracture of proximal phalanx of left great toe    Gangrene    New onset a-fib

## 2025-06-17 ENCOUNTER — PATIENT OUTREACH (OUTPATIENT)
Dept: ADMINISTRATIVE | Facility: CLINIC | Age: 89
End: 2025-06-17
Payer: MEDICARE

## 2025-06-17 LAB
BACTERIA BLD CULT: NORMAL
BACTERIA BLD CULT: NORMAL

## 2025-06-17 NOTE — PROGRESS NOTES
C3 nurse spoke with patient's daughter who is unable to complete the call at this time. Patient's daughter requested a callback at 3pm. Patient has a HOSFU with Ada Gregory on 06/26/2025 @ 1500.

## 2025-06-17 NOTE — PROGRESS NOTES
C3 nurse spoke with Erin Clark (Daughter) for a TCC post hospital discharge follow up call. The patient has a scheduled HOSFU appointment with Ada Gregory on 06/26/2025 @ 1500.

## 2025-06-25 ENCOUNTER — TELEPHONE (OUTPATIENT)
Dept: PODIATRY | Facility: CLINIC | Age: 89
End: 2025-06-25
Payer: MEDICARE

## 2025-06-25 NOTE — TELEPHONE ENCOUNTER
"Called patient to discuss appointment. Patient's daughter answered phone. Patient's daughter stated she was confused on appointment dates and thought the appointment was for 6/26/25 and not today 6/25/25. Patient was offered multiple appointment dates on 6/26, 6/27, 6/30, 7/1, 7/2 at multiple different hour slots in order to best accommodate the patient and their schedule. Patient's daughter stated "none of these dates work". Patient was offered to see wound care directly and was given wound care's phone number. Patient was encouraged to schedule an appointment for tomorrow. Patient's daughter agreed and scheduled appointment for 6/26 @2:30. Patient was advised if she is unable to make any appointment for the wounds present to go to emergency room to seek further care.  "

## 2025-06-26 ENCOUNTER — OFFICE VISIT (OUTPATIENT)
Dept: PODIATRY | Facility: CLINIC | Age: 89
End: 2025-06-26
Payer: MEDICARE

## 2025-06-26 VITALS
DIASTOLIC BLOOD PRESSURE: 58 MMHG | HEIGHT: 62 IN | HEART RATE: 98 BPM | BODY MASS INDEX: 18.15 KG/M2 | SYSTOLIC BLOOD PRESSURE: 107 MMHG

## 2025-06-26 DIAGNOSIS — B35.1 ONYCHOMYCOSIS DUE TO DERMATOPHYTE: ICD-10-CM

## 2025-06-26 DIAGNOSIS — I96 GANGRENE: Primary | ICD-10-CM

## 2025-06-26 DIAGNOSIS — I73.9 PERIPHERAL VASCULAR DISEASE: ICD-10-CM

## 2025-06-26 PROCEDURE — 99999 PR PBB SHADOW E&M-EST. PATIENT-LVL III: CPT | Mod: PBBFAC,,, | Performed by: STUDENT IN AN ORGANIZED HEALTH CARE EDUCATION/TRAINING PROGRAM

## 2025-06-26 RX ORDER — POVIDONE-IODINE 10 %
SOLUTION, NON-ORAL TOPICAL
Qty: 59 ML | Refills: 0 | Status: SHIPPED | OUTPATIENT
Start: 2025-06-26

## 2025-06-26 NOTE — PROCEDURES
"Routine Foot Care    Date/Time: 6/26/2025 2:30 PM    Performed by: Christie Baez DPM  Authorized by: Christie Baez DPM    Time out: Immediately prior to procedure a "time out" was called to verify the correct patient, procedure, equipment, support staff and site/side marked as required.    Consent Done?:  Yes (Verbal)  Hyperkeratotic Skin Lesions?: No      Nail Care Type:  Debride(Left 1st Toe, Left 2nd Toe, Left 4th Toe, Left 5th Toe, Right 1st Toe, Right 2nd Toe, Right 3rd Toe, Right 4th Toe and Right 5th Toe)  Patient tolerance:  Patient tolerated the procedure well with no immediate complications    "

## 2025-06-26 NOTE — PROGRESS NOTES
Subjective:     Patient ID: Erin Clark is a 89 y.o. female.    Chief Complaint: Toe Injury (Lt foot 3rd toe )    Erin is a 89 y.o. female who presents to the clinic for evaluation and treatment of high risk feet. Erin has a past medical history of CHF (congestive heart failure), Colon polyps (06/03/2013), Coronary artery disease, Diabetes mellitus, Encounter for blood transfusion, ESRD (end stage renal disease) (03/2014), GERD (gastroesophageal reflux disease), High cholesterol, and Hypertension. The patient's chief complaint is foot ulcer, left foot, 3rd toe. This patient has documented high risk feet requiring routine maintenance secondary to diabetes mellitis and those secondary complications of diabetes, as mentioned..    PCP: Bayron Golden MD    Date Last Seen by PCP:Dr Ricardo 06/12/25    Hemoglobin A1C   Date Value Ref Range Status   01/25/2024 6.9 (H) 4.0 - 5.6 % Final     Comment:     ADA Screening Guidelines:  5.7-6.4%  Consistent with prediabetes  >or=6.5%  Consistent with diabetes    High levels of fetal hemoglobin interfere with the HbA1C  assay. Heterozygous hemoglobin variants (HbS, HgC, etc)do  not significantly interfere with this assay.   However, presence of multiple variants may affect accuracy.     03/16/2023 6.1 (H) 4.0 - 5.6 % Final     Comment:     ADA Screening Guidelines:  5.7-6.4%  Consistent with prediabetes  >or=6.5%  Consistent with diabetes    High levels of fetal hemoglobin interfere with the HbA1C  assay. Heterozygous hemoglobin variants (HbS, HgC, etc)do  not significantly interfere with this assay.   However, presence of multiple variants may affect accuracy.     06/15/2021 4.9 4.0 - 5.6 % Final     Comment:     ADA Screening Guidelines:  5.7-6.4%  Consistent with prediabetes  >or=6.5%  Consistent with diabetes    High levels of fetal hemoglobin interfere with the HbA1C  assay. Heterozygous hemoglobin variants (HbS, HgC, etc)do  not significantly interfere with  this assay.   However, presence of multiple variants may affect accuracy.       Hemoglobin A1c   Date Value Ref Range Status   05/10/2025 5.3 4.0 - 5.6 % Final     Comment:     ADA Screening Guidelines:  5.7-6.4%  Consistent with prediabetes  >=6.5%  Consistent with diabetes    High levels of fetal hemoglobin interfere with the HbA1C  assay. Heterozygous hemoglobin variants (HbS, HgC, etc)do  not significantly interfere with this assay.   However, presence of multiple variants may affect accuracy.       Review of Systems   Unable to perform ROS: Dementia (ROS provided per chart review and family member)   Constitutional: Negative for chills, decreased appetite, diaphoresis and fever.   HENT:  Negative for congestion and hearing loss.    Cardiovascular:  Negative for chest pain, claudication, leg swelling and syncope.   Respiratory:  Negative for cough and shortness of breath.    Skin:  Positive for dry skin and nail changes. Negative for color change, flushing, itching, poor wound healing and rash.   Musculoskeletal:  Negative for arthritis, back pain, joint pain and joint swelling.   Gastrointestinal:  Negative for nausea and vomiting.   Neurological:  Negative for focal weakness and weakness.        Objective:     Physical Exam  Constitutional:       General: She is not in acute distress.     Appearance: She is well-developed. She is not diaphoretic.   Cardiovascular:      Comments: Dorsalis pedis and posterior tibial pulses are diminished. Skin temperature is within normal limits. Toes are cool to touch and feet are warm proximally. Hair growth is diminished. Skin is mildly atrophic and with mild hyperpigmentation. Mild edema noted, bilaterally. Telangiectasias bilaterally.  Musculoskeletal:      Comments: Adequate joint range of motion without pain, limitation, nor crepitation to bilateral feet and ankle joints. Muscle strength is 5/5 in all groups bilaterally.       Lymphadenopathy:      Comments: Negative  lymphangitic streaking    Skin:     General: Skin is warm and dry.      Findings: No lesion.      Comments: Skin is warm and dry, no acute signs of infection noted. No open wounds, macerations or hyperkeratotic lesions, bilaterally.     Dry gangrene to left 3rd toe    Toenails are thickened by 2-4 mm's, dystrophic, and are darkened in coloration with subungual fungal debris, bilaterally.  Skin is very dry, bilaterally.      Neurological:      Mental Status: She is alert and oriented to person, place, and time.      Sensory: Sensory deficit present.      Motor: No abnormal muscle tone.   Psychiatric:         Behavior: Behavior normal.         Thought Content: Thought content normal.         Judgment: Judgment normal.           Assessment:      Encounter Diagnoses   Name Primary?    Gangrene Yes    Peripheral vascular disease     Onychomycosis due to dermatophyte      Plan:     Erin was seen today for toe injury.    Diagnoses and all orders for this visit:    Gangrene  -     Ambulatory referral/consult to Wound Clinic; Future    Peripheral vascular disease  -     Routine Foot Care    Onychomycosis due to dermatophyte  -     Routine Foot Care    Other orders  -     povidone-iodine (BETADINE) 10 % external solution; Apply topically as needed for Wound Care.      I counseled the patient on her conditions, their implications and medical management.  Routine foot care per attached note  Referral wound clinic for dry gangrene of left 3rd toe, may require amputation in future. Instructed to keep site dry, paint with betadine 2x per day.   I discussed with the  patient  signs and symptoms of infection including redness, drainage, purulence, odor, pain, elevated BS, streaking, fever, chills, etc . Patient is to seek medical attention (ER or urgent care) if these symptoms occur    Follow up in wound clinic

## 2025-06-30 ENCOUNTER — HOSPITAL ENCOUNTER (INPATIENT)
Facility: HOSPITAL | Age: 89
LOS: 7 days | Discharge: HOSPICE | DRG: 871 | End: 2025-07-07
Attending: EMERGENCY MEDICINE | Admitting: FAMILY MEDICINE
Payer: MEDICARE

## 2025-06-30 DIAGNOSIS — R07.9 CHEST PAIN: ICD-10-CM

## 2025-06-30 DIAGNOSIS — D63.1 ANEMIA IN ESRD (END-STAGE RENAL DISEASE): ICD-10-CM

## 2025-06-30 DIAGNOSIS — R00.0 TACHYCARDIA: ICD-10-CM

## 2025-06-30 DIAGNOSIS — I48.91 NEW ONSET A-FIB: ICD-10-CM

## 2025-06-30 DIAGNOSIS — I96 GANGRENE: ICD-10-CM

## 2025-06-30 DIAGNOSIS — I48.91 ATRIAL FIBRILLATION WITH RAPID VENTRICULAR RESPONSE: ICD-10-CM

## 2025-06-30 DIAGNOSIS — N18.6 ESRD (END STAGE RENAL DISEASE) ON DIALYSIS: ICD-10-CM

## 2025-06-30 DIAGNOSIS — I96 GANGRENE OF TOE OF LEFT FOOT: Primary | ICD-10-CM

## 2025-06-30 DIAGNOSIS — N18.6 ANEMIA IN ESRD (END-STAGE RENAL DISEASE): ICD-10-CM

## 2025-06-30 DIAGNOSIS — Z99.2 ESRD (END STAGE RENAL DISEASE) ON DIALYSIS: ICD-10-CM

## 2025-06-30 DIAGNOSIS — M86.172 ACUTE OSTEOMYELITIS OF TOE OF LEFT FOOT: ICD-10-CM

## 2025-06-30 PROBLEM — A41.9 SEPSIS: Status: ACTIVE | Noted: 2025-06-30

## 2025-06-30 PROBLEM — E11.21 TYPE 2 DIABETES MELLITUS WITH DIABETIC NEPHROPATHY, WITHOUT LONG-TERM CURRENT USE OF INSULIN: Status: ACTIVE | Noted: 2025-06-30

## 2025-06-30 LAB
ABSOLUTE EOSINOPHIL (OHS): 0.05 K/UL
ABSOLUTE MONOCYTE (OHS): 1.12 K/UL (ref 0.3–1)
ABSOLUTE NEUTROPHIL COUNT (OHS): 17.27 K/UL (ref 1.8–7.7)
ALBUMIN SERPL BCP-MCNC: 2.7 G/DL (ref 3.5–5.2)
ALP SERPL-CCNC: 101 UNIT/L (ref 40–150)
ALT SERPL W/O P-5'-P-CCNC: 12 UNIT/L (ref 10–44)
ANION GAP (OHS): 13 MMOL/L (ref 8–16)
AST SERPL-CCNC: 29 UNIT/L (ref 11–45)
BASOPHILS # BLD AUTO: 0.05 K/UL
BASOPHILS NFR BLD AUTO: 0.3 %
BILIRUB SERPL-MCNC: 0.7 MG/DL (ref 0.1–1)
BNP SERPL-MCNC: 891 PG/ML (ref 0–99)
BUN SERPL-MCNC: 15 MG/DL (ref 8–23)
CALCIUM SERPL-MCNC: 8.7 MG/DL (ref 8.7–10.5)
CHLORIDE SERPL-SCNC: 95 MMOL/L (ref 95–110)
CO2 SERPL-SCNC: 27 MMOL/L (ref 23–29)
CREAT SERPL-MCNC: 3.8 MG/DL (ref 0.5–1.4)
CRP SERPL-MCNC: 148.9 MG/L
ERYTHROCYTE [DISTWIDTH] IN BLOOD BY AUTOMATED COUNT: 15 % (ref 11.5–14.5)
GFR SERPLBLD CREATININE-BSD FMLA CKD-EPI: 11 ML/MIN/1.73/M2
GLUCOSE SERPL-MCNC: 122 MG/DL (ref 70–110)
HCT VFR BLD AUTO: 29.4 % (ref 37–48.5)
HGB BLD-MCNC: 9.7 GM/DL (ref 12–16)
IMM GRANULOCYTES # BLD AUTO: 0.13 K/UL (ref 0–0.04)
IMM GRANULOCYTES NFR BLD AUTO: 0.7 % (ref 0–0.5)
LACTATE SERPL-SCNC: 1.3 MMOL/L (ref 0.5–2.2)
LACTATE SERPL-SCNC: 2.6 MMOL/L (ref 0.5–2.2)
LYMPHOCYTES # BLD AUTO: 0.99 K/UL (ref 1–4.8)
MAGNESIUM SERPL-MCNC: 1.7 MG/DL (ref 1.6–2.6)
MCH RBC QN AUTO: 31.2 PG (ref 27–31)
MCHC RBC AUTO-ENTMCNC: 33 G/DL (ref 32–36)
MCV RBC AUTO: 95 FL (ref 82–98)
NUCLEATED RBC (/100WBC) (OHS): 0 /100 WBC
OHS QRS DURATION: 68 MS
OHS QTC CALCULATION: 470 MS
PLATELET # BLD AUTO: 298 K/UL (ref 150–450)
PMV BLD AUTO: 9.3 FL (ref 9.2–12.9)
POTASSIUM SERPL-SCNC: 4.3 MMOL/L (ref 3.5–5.1)
PROCALCITONIN SERPL-MCNC: 0.34 NG/ML
PROT SERPL-MCNC: 7.9 GM/DL (ref 6–8.4)
RBC # BLD AUTO: 3.11 M/UL (ref 4–5.4)
RELATIVE EOSINOPHIL (OHS): 0.3 %
RELATIVE LYMPHOCYTE (OHS): 5 % (ref 18–48)
RELATIVE MONOCYTE (OHS): 5.7 % (ref 4–15)
RELATIVE NEUTROPHIL (OHS): 88 % (ref 38–73)
SODIUM SERPL-SCNC: 135 MMOL/L (ref 136–145)
TROPONIN I SERPL DL<=0.01 NG/ML-MCNC: 0.03 NG/ML
TSH SERPL-ACNC: 2.64 UIU/ML (ref 0.4–4)
WBC # BLD AUTO: 19.61 K/UL (ref 3.9–12.7)

## 2025-06-30 PROCEDURE — 11000001 HC ACUTE MED/SURG PRIVATE ROOM

## 2025-06-30 PROCEDURE — 93010 ELECTROCARDIOGRAM REPORT: CPT | Mod: ,,, | Performed by: INTERNAL MEDICINE

## 2025-06-30 PROCEDURE — 84145 PROCALCITONIN (PCT): CPT | Performed by: EMERGENCY MEDICINE

## 2025-06-30 PROCEDURE — 96365 THER/PROPH/DIAG IV INF INIT: CPT

## 2025-06-30 PROCEDURE — 96367 TX/PROPH/DG ADDL SEQ IV INF: CPT

## 2025-06-30 PROCEDURE — 63600175 PHARM REV CODE 636 W HCPCS: Performed by: EMERGENCY MEDICINE

## 2025-06-30 PROCEDURE — 25000003 PHARM REV CODE 250: Performed by: EMERGENCY MEDICINE

## 2025-06-30 PROCEDURE — 84484 ASSAY OF TROPONIN QUANT: CPT | Performed by: EMERGENCY MEDICINE

## 2025-06-30 PROCEDURE — 93005 ELECTROCARDIOGRAM TRACING: CPT

## 2025-06-30 PROCEDURE — 86140 C-REACTIVE PROTEIN: CPT | Performed by: EMERGENCY MEDICINE

## 2025-06-30 PROCEDURE — 83605 ASSAY OF LACTIC ACID: CPT | Performed by: EMERGENCY MEDICINE

## 2025-06-30 PROCEDURE — 83735 ASSAY OF MAGNESIUM: CPT | Performed by: EMERGENCY MEDICINE

## 2025-06-30 PROCEDURE — 25000003 PHARM REV CODE 250: Performed by: NURSE PRACTITIONER

## 2025-06-30 PROCEDURE — 84443 ASSAY THYROID STIM HORMONE: CPT | Performed by: EMERGENCY MEDICINE

## 2025-06-30 PROCEDURE — 82435 ASSAY OF BLOOD CHLORIDE: CPT | Performed by: EMERGENCY MEDICINE

## 2025-06-30 PROCEDURE — 83880 ASSAY OF NATRIURETIC PEPTIDE: CPT | Performed by: EMERGENCY MEDICINE

## 2025-06-30 PROCEDURE — 85025 COMPLETE CBC W/AUTO DIFF WBC: CPT | Performed by: EMERGENCY MEDICINE

## 2025-06-30 PROCEDURE — 63600175 PHARM REV CODE 636 W HCPCS: Performed by: NURSE PRACTITIONER

## 2025-06-30 PROCEDURE — 87040 BLOOD CULTURE FOR BACTERIA: CPT | Performed by: EMERGENCY MEDICINE

## 2025-06-30 PROCEDURE — 96375 TX/PRO/DX INJ NEW DRUG ADDON: CPT

## 2025-06-30 PROCEDURE — 83605 ASSAY OF LACTIC ACID: CPT | Performed by: NURSE PRACTITIONER

## 2025-06-30 PROCEDURE — 99291 CRITICAL CARE FIRST HOUR: CPT

## 2025-06-30 RX ORDER — SODIUM CHLORIDE 0.9 % (FLUSH) 0.9 %
10 SYRINGE (ML) INJECTION EVERY 12 HOURS PRN
Status: DISCONTINUED | OUTPATIENT
Start: 2025-06-30 | End: 2025-07-07 | Stop reason: HOSPADM

## 2025-06-30 RX ORDER — CLINDAMYCIN PHOSPHATE 900 MG/50ML
900 INJECTION, SOLUTION INTRAVENOUS
Status: COMPLETED | OUTPATIENT
Start: 2025-06-30 | End: 2025-06-30

## 2025-06-30 RX ORDER — DILTIAZEM HYDROCHLORIDE 30 MG/1
30 TABLET, FILM COATED ORAL
Status: COMPLETED | OUTPATIENT
Start: 2025-06-30 | End: 2025-06-30

## 2025-06-30 RX ORDER — PANTOPRAZOLE SODIUM 40 MG/1
40 TABLET, DELAYED RELEASE ORAL
Status: DISCONTINUED | OUTPATIENT
Start: 2025-07-01 | End: 2025-07-03

## 2025-06-30 RX ORDER — ISOSORBIDE MONONITRATE 30 MG/1
30 TABLET, EXTENDED RELEASE ORAL DAILY
Status: DISCONTINUED | OUTPATIENT
Start: 2025-07-01 | End: 2025-07-07 | Stop reason: HOSPADM

## 2025-06-30 RX ORDER — DILTIAZEM HYDROCHLORIDE 5 MG/ML
0.25 INJECTION INTRAVENOUS
Status: COMPLETED | OUTPATIENT
Start: 2025-06-30 | End: 2025-06-30

## 2025-06-30 RX ORDER — INSULIN ASPART 100 [IU]/ML
0-5 INJECTION, SOLUTION INTRAVENOUS; SUBCUTANEOUS
Status: DISCONTINUED | OUTPATIENT
Start: 2025-06-30 | End: 2025-07-07 | Stop reason: HOSPADM

## 2025-06-30 RX ORDER — POLYETHYLENE GLYCOL 3350 17 G/17G
17 POWDER, FOR SOLUTION ORAL DAILY
Status: DISCONTINUED | OUTPATIENT
Start: 2025-07-01 | End: 2025-07-07 | Stop reason: HOSPADM

## 2025-06-30 RX ORDER — METOPROLOL SUCCINATE 25 MG/1
25 TABLET, EXTENDED RELEASE ORAL DAILY
Status: DISCONTINUED | OUTPATIENT
Start: 2025-07-01 | End: 2025-07-02

## 2025-06-30 RX ORDER — IBUPROFEN 200 MG
16 TABLET ORAL
Status: DISCONTINUED | OUTPATIENT
Start: 2025-06-30 | End: 2025-07-07 | Stop reason: HOSPADM

## 2025-06-30 RX ORDER — GLUCAGON 1 MG
1 KIT INJECTION
Status: DISCONTINUED | OUTPATIENT
Start: 2025-06-30 | End: 2025-07-07 | Stop reason: HOSPADM

## 2025-06-30 RX ORDER — GLUCAGON 1 MG
1 KIT INJECTION
Status: DISCONTINUED | OUTPATIENT
Start: 2025-06-30 | End: 2025-07-01

## 2025-06-30 RX ORDER — ATORVASTATIN CALCIUM 40 MG/1
40 TABLET, FILM COATED ORAL NIGHTLY
Status: DISCONTINUED | OUTPATIENT
Start: 2025-06-30 | End: 2025-06-30

## 2025-06-30 RX ORDER — IBUPROFEN 200 MG
24 TABLET ORAL
Status: DISCONTINUED | OUTPATIENT
Start: 2025-06-30 | End: 2025-07-07 | Stop reason: HOSPADM

## 2025-06-30 RX ORDER — FUROSEMIDE 40 MG/1
40 TABLET ORAL DAILY
Status: DISCONTINUED | OUTPATIENT
Start: 2025-07-01 | End: 2025-07-07 | Stop reason: HOSPADM

## 2025-06-30 RX ORDER — IBUPROFEN 200 MG
16 TABLET ORAL
Status: DISCONTINUED | OUTPATIENT
Start: 2025-06-30 | End: 2025-07-01

## 2025-06-30 RX ORDER — TALC
6 POWDER (GRAM) TOPICAL NIGHTLY PRN
Status: DISCONTINUED | OUTPATIENT
Start: 2025-06-30 | End: 2025-07-07 | Stop reason: HOSPADM

## 2025-06-30 RX ORDER — ATORVASTATIN CALCIUM 40 MG/1
40 TABLET, FILM COATED ORAL NIGHTLY
Status: DISCONTINUED | OUTPATIENT
Start: 2025-06-30 | End: 2025-07-03

## 2025-06-30 RX ORDER — MORPHINE SULFATE 4 MG/ML
3 INJECTION, SOLUTION INTRAMUSCULAR; INTRAVENOUS
Refills: 0 | Status: COMPLETED | OUTPATIENT
Start: 2025-06-30 | End: 2025-06-30

## 2025-06-30 RX ORDER — IBUPROFEN 200 MG
24 TABLET ORAL
Status: DISCONTINUED | OUTPATIENT
Start: 2025-06-30 | End: 2025-07-01

## 2025-06-30 RX ORDER — CLOPIDOGREL BISULFATE 75 MG/1
75 TABLET ORAL DAILY
Status: DISCONTINUED | OUTPATIENT
Start: 2025-07-01 | End: 2025-07-07 | Stop reason: HOSPADM

## 2025-06-30 RX ORDER — HYDRALAZINE HYDROCHLORIDE 25 MG/1
25 TABLET, FILM COATED ORAL EVERY 12 HOURS
Status: DISCONTINUED | OUTPATIENT
Start: 2025-06-30 | End: 2025-07-02

## 2025-06-30 RX ADMIN — ATORVASTATIN CALCIUM 40 MG: 40 TABLET, FILM COATED ORAL at 10:06

## 2025-06-30 RX ADMIN — MORPHINE SULFATE 3 MG: 4 INJECTION INTRAVENOUS at 04:06

## 2025-06-30 RX ADMIN — DILTIAZEM HYDROCHLORIDE 15 MG: 5 INJECTION INTRAVENOUS at 03:06

## 2025-06-30 RX ADMIN — DILTIAZEM HYDROCHLORIDE 30 MG: 30 TABLET, FILM COATED ORAL at 03:06

## 2025-06-30 RX ADMIN — Medication 6 MG: at 10:06

## 2025-06-30 RX ADMIN — CLINDAMYCIN PHOSPHATE 900 MG: 900 INJECTION, SOLUTION INTRAVENOUS at 07:06

## 2025-06-30 RX ADMIN — VANCOMYCIN HYDROCHLORIDE 1500 MG: 1.5 INJECTION, POWDER, LYOPHILIZED, FOR SOLUTION INTRAVENOUS at 05:06

## 2025-06-30 RX ADMIN — PIPERACILLIN AND TAZOBACTAM 4.5 G: 4; .5 INJECTION, POWDER, LYOPHILIZED, FOR SOLUTION INTRAVENOUS; PARENTERAL at 04:06

## 2025-06-30 NOTE — ED NOTES
Pt. Brought in by brother from dialysis with tachycardia. Pt. Received full hemodialysis. Pt. Has hemodialysis fistula in left upper arm. Thrill and bruit present. Pt. Put into gown and placed on monitor.

## 2025-06-30 NOTE — ASSESSMENT & PLAN NOTE
Patient has Diastolic (HFpEF) heart failure that is Chronic. On presentation their CHF was well compensated. Most recent BNP and echo results are listed below.  Recent Labs     06/30/25  1511   *     Latest ECHO  Results for orders placed during the hospital encounter of 06/12/25    Echo    Interpretation Summary    Left Ventricle: The left ventricle is normal in size. Mildly increased wall thickness. Normal wall motion. There is normal systolic function. Quantitated ejection fraction is 58%. Diastolic function cannot be reliably determined in the presence of mitral annular calcification.    Right Ventricle: The right ventricle is normal in size measuring 3.0 cm. Systolic function is normal.    Left Atrium: The left atrium is mildly dilated measuring 38 mL/m2.    Aortic Valve: The aortic valve is a trileaflet valve. There is aortic valve sclerosis.    Mitral Valve: There is mild stenosis. The mean pressure gradient across the mitral valve is 5 mmHg at a heart rate of 87 bpm. There is mild regurgitation.    Tricuspid Valve: There is mild regurgitation.    Pulmonary Artery: There is moderate pulmonary hypertension. The estimated pulmonary artery systolic pressure is 52 mmHg.    Current Heart Failure Medications  hydrALAZINE tablet 25 mg, Every 12 hours, Oral  furosemide tablet 40 mg, Daily, Oral  metoprolol succinate (TOPROL-XL) 24 hr tablet 25 mg, Daily, Oral    Plan  - Monitor strict I&Os and daily weights.    - Place on telemetry  - Low sodium diet  - Place on fluid restriction of 1 L.   - Cardiology has been consulted  - The patient's volume status is stable but not at their baseline as indicated by

## 2025-06-30 NOTE — ED PROVIDER NOTES
Encounter Date: 6/30/2025       History     Chief Complaint   Patient presents with    Tachycardia     Patient was in [facility] and received entire treatment per son when she became tachy. Patient was transported via son from [facility]. No complaints of pain. Patient is biting down grinding teeth and EKG performed in triage rate was 98.         82-year-old female with a history of end-stage renal disease on Monday Friday dialysis was at dialysis today.  She finished dialysis.  Her brother went to pick her up and was told that the patient was tachycardic.  We brought her into the emergency room to get evaluated for this tachycardia.  History is somewhat limited from the patient due to language interpretation.  History obtained from brother.  No other new complaints.  No fevers or chills.  No cough.  No shortness of breath.  No chest pain.  No abdominal pain.  No GI symptoms.  He states that she does not drink water or have a good diet.  She drinks mostly coffee.  She is compliant with her medications.  No known history of irregular heart rhythms although she has had  triple bypass approximately 15 years ago.  She does have a history of CHF.      Review of patient's allergies indicates:   Allergen Reactions    Aspirin Nausea Only and Swelling     Able to tolerated in small doses       Past Medical History:   Diagnosis Date    CHF (congestive heart failure)     Colon polyps 06/03/2013    Coronary artery disease     Diabetes mellitus     Encounter for blood transfusion     ESRD (end stage renal disease) 03/2014    dialysis M-F    GERD (gastroesophageal reflux disease)     High cholesterol     Hypertension      Past Surgical History:   Procedure Laterality Date    AV FISTULA PLACEMENT Left 9/2014    bilateral fem-pop      CENTRAL VENOUS CATHETER TUNNELED INSERTION DOUBLE LUMEN Right 2/2014    CORONARY ARTERY BYPASS GRAFT  2/14/2014     x 3 vessels    LEFT HEART CATHETERIZATION N/A 5/31/2021    Procedure: Left heart cath;   Surgeon: Brian Sanchez MD;  Location: [facility] CATH LAB/EP;  Service: Cardiology;  Laterality: N/A;    PERCUTANEOUS TRANSLUMINAL ANGIOPLASTY OF ARTERIOVENOUS FISTULA Left 5/5/2020    Procedure: PTA, Repair left upper arm anuerysm AV FISTULA;  Surgeon: Doris Mary MD;  Location: [facility] OR;  Service: General;  Laterality: Left;    VASCULAR SURGERY       Family History   Problem Relation Name Age of Onset    Hypertension Mother      Heart disease Mother      Heart attack Mother       Social History[1]  Review of Systems    Physical Exam     Initial Vitals   BP Pulse Resp Temp SpO2   06/30/25 1418 06/30/25 1418 06/30/25 1418 06/30/25 1418 06/30/25 1600   (!) 130/59 98 16 99.3 °F (37.4 °C) 96 %      MAP       --                Physical Exam    Nursing note and vitals reviewed.  Constitutional: She appears well-developed and well-nourished.   Eyes: EOM are normal. Pupils are equal, round, and reactive to light.   Neck: Neck supple. No thyromegaly present. No JVD present.   Normal range of motion.  Cardiovascular:  Normal rate.     Exam reveals no gallop and no friction rub.       No murmur heard.  Irregular HR 90   Pulmonary/Chest: Breath sounds normal. No respiratory distress. She has no wheezes. She has no rhonchi. She has no rales.   Abdominal: Abdomen is soft. Bowel sounds are normal. She exhibits no distension. There is no abdominal tenderness. There is no rebound.   Musculoskeletal:         General: No tenderness or edema. Normal range of motion.      Cervical back: Normal range of motion and neck supple.     Neurological: She is alert and oriented to person, place, and time. She has normal strength.   Skin: Skin is warm and dry. Capillary refill takes less than 2 seconds.         ED Course   Critical Care    Date/Time: 6/30/2025 7:30 PM    Performed by: Azael Jacobson MD  Authorized by: Azael Jacobson MD  Direct patient critical care time: 20 minutes  Additional history critical care time: 5  minutes  Ordering / reviewing critical care time: 10 minutes  Documentation critical care time: 10 minutes  Consulting other physicians critical care time: 10 minutes  Total critical care time (exclusive of procedural time) : 55 minutes  Critical care time was exclusive of teaching time and separately billable procedures and treating other patients.  Critical care was necessary to treat or prevent imminent or life-threatening deterioration of the following conditions: cardiac failure.  Critical care was time spent personally by me on the following activities: development of treatment plan with patient or surrogate, discussions with consultants, evaluation of patient's response to treatment, examination of patient, obtaining history from patient or surrogate, ordering and performing treatments and interventions, ordering and review of laboratory studies, ordering and review of radiographic studies, pulse oximetry, re-evaluation of patient's condition and review of old charts.        Labs Reviewed   COMPREHENSIVE METABOLIC PANEL - Abnormal       Result Value    Sodium 135 (*)     Potassium 4.3      Chloride 95      CO2 27      Glucose 122 (*)     BUN 15      Creatinine 3.8 (*)     Calcium 8.7      Protein Total 7.9      Albumin 2.7 (*)     Bilirubin Total 0.7            AST 29      ALT 12      Anion Gap 13      eGFR 11 (*)    TROPONIN I - Abnormal    Troponin-I 0.027 (*)    B-TYPE NATRIURETIC PEPTIDE - Abnormal     (*)    CBC WITH DIFFERENTIAL - Abnormal    WBC 19.61 (*)     RBC 3.11 (*)     HGB 9.7 (*)     HCT 29.4 (*)     MCV 95      MCH 31.2 (*)     MCHC 33.0      RDW 15.0 (*)     Platelet Count 298      MPV 9.3      Nucleated RBC 0      Neut % 88.0 (*)     Lymph % 5.0 (*)     Mono % 5.7      Eos % 0.3      Basophil % 0.3      Imm Grans % 0.7 (*)     Neut # 17.27 (*)     Lymph # 0.99 (*)     Mono # 1.12 (*)     Eos # 0.05      Baso # 0.05      Imm Grans # 0.13 (*)    LACTIC ACID, PLASMA - Abnormal     Lactic Acid Level 2.6 (*)     Narrative:     Falsely low lactic acid results can be found in samples containing >=13.0 mg/dL total bilirubin and/or >=3.5 mg/dL direct bilirubin.    C-REACTIVE PROTEIN - Abnormal    .9 (*)    PROCALCITONIN - Abnormal    Procalcitonin 0.34 (*)    TSH - Normal    TSH 2.644     MAGNESIUM - Normal    Magnesium  1.7     LACTIC ACID, PLASMA - Normal    Lactic Acid Level 1.3      Narrative:     Falsely low lactic acid results can be found in samples containing >=13.0 mg/dL total bilirubin and/or >=3.5 mg/dL direct bilirubin.    CBC W/ AUTO DIFFERENTIAL    Narrative:     The following orders were created for panel order CBC auto differential.  Procedure                               Abnormality         Status                     ---------                               -----------         ------                     CBC with Differential[[id number]]       Abnormal            Final result                 Please view results for these tests on the individual orders.   POCT GLUCOSE MONITORING CONTINUOUS     EKG Readings: (Independently Interpreted)   Atrial fibrillation with RVR rate of 116.  Rightward axis.  Inferior ST and T-wave abnormalities.     ECG Results              EKG 12-lead (Final result)        Collection Time Result Time QRS Duration [facility] QTC Calculation    06/30/25 15:25:40 06/30/25 18:04:42 68 470                     Final result by Interface, Lab In [facility] (06/30/25 18:04:50)                   Narrative:    Test Reason :    Vent. Rate : 125 BPM     Atrial Rate : 150 BPM     P-R Int :    ms          QRS Dur :  68 ms      QT Int : 326 ms       P-R-T Axes :    104 -78 degrees    QTcB Int : 470 ms    Atrial fibrillation with rapid ventricular response  Rightward axis  Low voltage QRS  Cannot rule out Anterior infarct (cited on or before 30-Jun-2025)  ST and T wave abnormality, consider inferior ischemia  Abnormal ECG  When compared with ECG of 30-Jun-2025 14:53,  Inverted T  waves have replaced nonspecific T wave abnormality in Lateral  leads  Confirmed by Debbie Milton ([id number]) on 6/30/2025 6:04:39 PM    Referred By: AAAREFERRAL SELF           Confirmed By: Debbie Milton                                     EKG 12-lead (Final result)        Collection Time Result Time QRS Duration OHS QTC Calculation    06/30/25 14:53:41 07/03/25 21:45:56 72 475                     Final result by Interface, Lab In [facility] (07/03/25 21:45:59)                   Narrative:    Test Reason :    Vent. Rate : 116 BPM     Atrial Rate : 312 BPM     P-R Int :    ms          QRS Dur :  72 ms      QT Int : 342 ms       P-R-T Axes :    103  -7 degrees    QTcB Int : 475 ms    Atrial fibrillation with rapid ventricular response  Rightward axis  Low voltage QRS  Nonspecific T wave abnormality  Abnormal ECG  When compared with ECG of 30-Jun-2025 14:34,  Significant changes have occurred  Confirmed by Debbie Milton ([id number]) on 7/3/2025 9:45:51 PM    Referred By: AAAREFERRAL SELF           Confirmed By: Debbie Milton                                     EKG 12-lead (Final result)        Collection Time Result Time QRS Duration [facility]HS QTC Calculation    06/30/25 14:34:23 07/03/25 21:45:34 64 423                     Final result by Interface, Lab In [facility] (07/03/25 21:45:42)                   Narrative:    Test Reason : R00.0,    Vent. Rate :  98 BPM     Atrial Rate : 120 BPM     P-R Int : 194 ms          QRS Dur :  64 ms      QT Int : 332 ms       P-R-T Axes :  44 119 117 degrees    QTcB Int : 423 ms    Atrial fibrillation  Right axis deviation  Marked ST abnormality, possible lateral subendocardial injury  Abnormal ECG  When compared with ECG of 12-Jun-2025 19:46,  Borderline criteria for Lateral infarct are no longer Present  Non-specific change in ST segment in Inferior leads  Confirmed by Debbie Milton ([id number]) on 7/3/2025 9:45:32 PM    Referred By: THERESE SELF           Confirmed By: eDbbie Milton                                   Imaging Results              X-Ray Foot Complete Left (Final result)  Result time 06/30/25 17:22:22      Final result by Shraddha Chanel MD (06/30/25 17:22:22)                   Impression:      Osteopenia.    Partial progressive interval healing of the known 4th toe proximal phalangeal base fracture.    Ulceration at the tip of the 3rd toe distal phalanx.  Questionable slight erosive irregularity 3rd toe distal phalanx representing osteomyelitis otherwise.  Regional soft tissue swelling and soft tissue gas representing infection.  Recommend MRI.      Electronically signed by: Shraddha Chanel  Date:    06/30/2025  Time:    17:22               Narrative:    EXAMINATION:  XR FOOT COMPLETE 3 VIEW LEFT    CLINICAL HISTORY:  .  Gangrene, not elsewhere classified    TECHNIQUE:  AP, lateral and oblique views of the left foot were performed.    COMPARISON:  06/12/2025    FINDINGS:  See below                                       X-Ray Chest AP Portable (Final result)  Result time 06/30/25 15:55:35      Final result by Adam Mullen MD (06/30/25 15:55:35)                   Impression:      As above.      Electronically signed by: Adam Mullen MD  Date:    06/30/2025  Time:    15:55               Narrative:    EXAMINATION:  XR CHEST AP PORTABLE    CLINICAL HISTORY:  Tachycardia, unspecified    TECHNIQUE:  Single frontal view of the chest was performed.    FINDINGS:  There are bilateral perihilar opacities with interstitial prominence suspicious for edema.  There is no focal consolidative change.  There is no pneumothorax or pleural fluid identified.  There is elevation of the left hemidiaphragm.  The cardiac silhouette appears enlarged.  There is calcification of the aorta and patient is status post sternotomy.  The osseous structures demonstrate degenerative change.                                       Medications   isosorbide mononitrate 24 hr tablet 30 mg (0 mg Oral Hold 7/3/25 0900)    furosemide tablet 40 mg (40 mg Oral Given 7/3/25 0808)   epoetin roberto-epbx injection 10,000 Units (has no administration in time range)   clopidogreL tablet 75 mg (75 mg Oral Given 7/3/25 0808)   melatonin tablet 6 mg (6 mg Oral Given 7/4/25 0115)   polyethylene glycol packet 17 g (17 g Oral Given 7/3/25 0819)   sodium chloride 0.9% flush 10 mL (has no administration in time range)   vitamin renal formula (B-complex-vitamin c-folic acid) 1 mg per capsule 1 capsule (1 capsule Oral Given 7/3/25 0808)   glucose chewable tablet 16 g (has no administration in time range)   glucose chewable tablet 24 g (has no administration in time range)   dextrose 50% injection 12.5 g (has no administration in time range)   dextrose 50% injection 25 g (has no administration in time range)   glucagon (human recombinant) injection 1 mg (has no administration in time range)   insulin aspart U-100 pen 0-5 Units ( Subcutaneous Not Given 7/2/25 0645)   HYDROmorphone injection 1 mg (1 mg Intravenous Given 7/4/25 0508)   heparin (porcine) injection 5,000 Units (5,000 Units Subcutaneous Given 7/4/25 0508)   piperacillin-tazobactam (ZOSYN) 4.5 g in D5W 100 mL IVPB (MB+) (0 g Intravenous Stopped 7/4/25 0511)   mupirocin 2 % ointment ( Nasal Given 7/3/25 2014)   metoprolol succinate (TOPROL-XL) 24 hr tablet 25 mg (25 mg Oral Given 7/3/25 2014)   acetaminophen tablet 650 mg (0 mg Oral Return to [facility] 7/2/25 1829)   acetaminophen suppository 650 mg (has no administration in time range)   diltiaZEM injection 15 mg (15 mg Intravenous Given 6/30/25 1544)   diltiaZEM tablet 30 mg (30 mg Oral Given 6/30/25 1550)   piperacillin-tazobactam (ZOSYN) 4.5 g in D5W 100 mL IVPB (MB+) (0 g Intravenous Stopped 6/30/25 1652)   vancomycin 1,500 mg in 0.9% NaCl 250 mL IVPB (admixture device) (0 mg Intravenous Stopped 6/30/25 1844)   morphine injection 3 mg (3 mg Intravenous Given 6/30/25 1635)   clindamycin in D5W 900 mg/50 mL IVPB 900 mg (0 mg Intravenous  Stopped 6/30/25 2002)   iohexoL (OMNIPAQUE 350) injection 75 mL (75 mLs Intravenous Given 7/1/25 1653)   HYDROmorphone injection 0.2 mg (0.2 mg Intravenous Given 7/1/25 2206)   potassium bicarbonate disintegrating tablet 20 mEq (20 mEq Oral Given 7/2/25 1434)   simethicone 40 mg/0.6 mL drops 40 mg (40 mg Oral Given 7/3/25 1641)     Medical Decision Making  Amount and/or Complexity of Data Reviewed  Labs: ordered.  Radiology: ordered.    Risk  Prescription drug management.  Decision regarding hospitalization.    History by the patient's brother.  Patient is in usual state of health.  Went to dialysis today.  Finished dialysis however during dialysis they noticed her heart rate was elevated as high in the 130s.  On arrival here heart rate was in the 90s.  It seemed to be irregular.  She then went up into the 120s.  EKG shows AFib with rapid ventricular response.  AFib is a new rhythm for the patient.  Atrial fibrillation easily controlled with IV Cardizem.  However patient does have a significant leukocytosis and elevated lactic acid.  Patient appears to have dry gangrene to the 3rd digit of the left toe.  The base appears to be developing wet gangrene with a cellulitis of the foot.  X-ray suggests osteomyelitis changes.  Will admit for IV antibiotics and either ortho or Podiatry consultation.  Discuss with Hospital Medicine who accepted the patient.  Antibiotics initiated.  Full sepsis fluids not given due to end-stage renal disease on dialysis and concern for volume overload.  Repeat per referral perfusion exam is normal.  Good cap refill.  Repeat lactic acid is normalized.                                  Clinical Impression:  Final diagnoses:  [R00.0] Tachycardia  [I96] Gangrene  [I96] Gangrene of toe of left foot (Primary)  [I48.91] Atrial fibrillation with rapid ventricular response  [N18.6, Z99.2] ESRD (end stage renal disease) on dialysis          ED Disposition Condition    Admit                         [1]    Social History  Tobacco Use    Smoking status: Never    Smokeless tobacco: Never   Substance Use Topics    Alcohol use: No    Drug use: No        Azael Jacobson MD  07/04/25 0756

## 2025-06-30 NOTE — ED NOTES
Patient brought in after dialysis d/t patient becoming tachycardic at the end of triage. Patient does MWF dialysis, fistula to left upper arm. Patient was roomed and code sepsis was called. Patient has gangrenous toe on left side. Repetitive clicking/grinding of teeth noted. Patient is anuric.

## 2025-06-30 NOTE — SUBJECTIVE & OBJECTIVE
Past Medical History:   Diagnosis Date    CHF (congestive heart failure)     Colon polyps 06/03/2013    Coronary artery disease     Diabetes mellitus     Encounter for blood transfusion     ESRD (end stage renal disease) 03/2014    dialysis M-F    GERD (gastroesophageal reflux disease)     High cholesterol     Hypertension        Past Surgical History:   Procedure Laterality Date    AV FISTULA PLACEMENT Left 9/2014    bilateral fem-pop      CENTRAL VENOUS CATHETER TUNNELED INSERTION DOUBLE LUMEN Right 2/2014    CORONARY ARTERY BYPASS GRAFT  2/14/2014     x 3 vessels    LEFT HEART CATHETERIZATION N/A 5/31/2021    Procedure: Left heart cath;  Surgeon: Brian Sanchez MD;  Location: [facility] CATH LAB/EP;  Service: Cardiology;  Laterality: N/A;    PERCUTANEOUS TRANSLUMINAL ANGIOPLASTY OF ARTERIOVENOUS FISTULA Left 5/5/2020    Procedure: PTA, Repair left upper arm anuerysm AV FISTULA;  Surgeon: Doris Mary MD;  Location: [facility] OR;  Service: General;  Laterality: Left;    VASCULAR SURGERY         Review of patient's allergies indicates:   Allergen Reactions    Aspirin Nausea Only and Swelling     Able to tolerated in small doses         No current facility-administered medications on file prior to encounter.     Current Outpatient Medications on File Prior to Encounter   Medication Sig    aspirin (ECOTRIN) 81 MG EC tablet TAKE ONE TABLET BY MOUTH ONCE DAILY (Patient not taking: Reported on 6/26/2025)    atorvastatin (LIPITOR) 40 MG tablet TAKE 1 TABLET BY MOUTH ONCE DAILY IN THE EVENING    clopidogreL (PLAVIX) 75 mg tablet Take 1 tablet (75 mg total) by mouth once daily.    furosemide (LASIX) 40 MG tablet Take 1 tablet (40 mg total) by mouth once daily.    hydrALAZINE (APRESOLINE) 25 MG tablet Take 25 mg by mouth every 12 (twelve) hours. (Patient not taking: Reported on 6/26/2025)    isosorbide mononitrate (IMDUR) 30 MG 24 hr tablet Take 1 tablet (30 mg total) by mouth once daily. (Patient not taking: Reported on  6/26/2025)    metoprolol succinate (TOPROL-XL) 25 MG 24 hr tablet Take 1 tablet by mouth once daily    nitroGLYCERIN (NITROSTAT) 0.4 MG SL tablet Place 0.4 mg under the tongue every 5 (five) minutes as needed for Chest pain.    pantoprazole (PROTONIX) 40 MG tablet Take 40 mg by mouth before breakfast. (Patient not taking: Reported on 6/26/2025)    povidone-iodine (BETADINE) 10 % external solution Apply topically as needed for Wound Care.    vitamin renal formula, B-complex-vitamin c-folic acid, (TRIPHROCAPS) 1 mg Cap Take 1 capsule by mouth once daily.     Family History       Problem Relation (Age of Onset)    Heart attack Mother    Heart disease Mother    Hypertension Mother          Tobacco Use    Smoking status: Never    Smokeless tobacco: Never   Substance and Sexual Activity    Alcohol use: No    Drug use: No    Sexual activity: Never     Review of Systems   Constitutional:  Positive for activity change.   HENT: Negative.     Eyes: Negative.    Respiratory: Negative.     Cardiovascular:  Positive for palpitations.   Gastrointestinal: Negative.    Endocrine: Negative.    Genitourinary: Negative.    Musculoskeletal: Negative.    Skin:         Discoloration to left toes   Allergic/Immunologic: Negative.    Neurological: Negative.    Hematological: Negative.    Psychiatric/Behavioral: Negative.       Objective:     Vital Signs (Most Recent):  Temp: 99.3 °F (37.4 °C) (06/30/25 1418)  Pulse: 68 (06/30/25 1802)  Resp: (!) 22 (06/30/25 1802)  BP: (!) 105/51 (06/30/25 1802)  SpO2: 96 % (06/30/25 1802) Vital Signs (24h Range):  Temp:  [99.3 °F (37.4 °C)] 99.3 °F (37.4 °C)  Pulse:  [] 68  Resp:  [14-42] 22  SpO2:  [95 %-96 %] 96 %  BP: ()/(50-78) 105/51     Weight: 59 kg (130 lb)  Body mass index is 23.78 kg/m².     Physical Exam  Constitutional:       Appearance: She is ill-appearing and toxic-appearing.   HENT:      Head: Normocephalic and atraumatic.      Right Ear: Tympanic membrane normal.      Left Ear:  Tympanic membrane normal.      Nose: Nose normal.      Mouth/Throat:      Mouth: Mucous membranes are moist.   Eyes:      Extraocular Movements: Extraocular movements intact.      Pupils: Pupils are equal, round, and reactive to light.   Cardiovascular:      Rate and Rhythm: Rhythm irregular.      Comments: AVF to left upper extremity; + bruit/thrill  Pulmonary:      Effort: Pulmonary effort is normal.   Musculoskeletal:         General: Normal range of motion.      Cervical back: Normal range of motion and neck supple.   Skin:     Capillary Refill: Capillary refill takes less than 2 seconds.      Comments: Gangrene to left third toe   Neurological:      Mental Status: She is alert and oriented to person, place, and time.   Psychiatric:         Mood and Affect: Mood normal.              CRANIAL NERVES     CN III, IV, VI   Pupils are equal, round, and reactive to light.       Significant Labs: All pertinent labs within the past 24 hours have been reviewed.  Recent Lab Results         06/30/25  1839   06/30/25  1532   06/30/25  1525   06/30/25  1511        Procalcitonin   0.34  Comment: A concentration < 0.25 ng/mL represents a low risk of bacterial infection.  Procalcitonin may not be accurate among patients with localized   infection, recent trauma or major surgery, immunosuppressed state,   invasive fungal infection, renal dysfunction. Decisions regarding   initiation or continuation of antibiotic therapy should not be based   solely on procalcitonin levels.           Albumin       2.7       ALP       101       ALT       12       Anion Gap       13       AST       29       Baso #       0.05       Basophil %       0.3       BILIRUBIN TOTAL       0.7  Comment: For infants and newborns, interpretation of results should be based   on gestational age, weight and in agreement with clinical   observations.    Premature Infant recommended reference ranges:   0-24 hours:  <8.0 mg/dL   24-48 hours: <12.0 mg/dL   3-5 days:     <15.0 mg/dL   6-29 days:   <15.0 mg/dL       BNP       891  Comment: Values of less than 100 pg/ml are consistent with non-CHF populations.        BUN       15       Calcium       8.7       Chloride       95       CO2       27       Creatinine       3.8       CRP       148.9       eGFR       11  Comment: Estimated GFR calculated using the CKD-EPI creatinine (2021) equation.       Eos #       0.05       Eos %       0.3       Glucose       122       Gran # (ANC)       17.27       Hematocrit       29.4       Hemoglobin       9.7       Immature Grans (Abs)       0.13  Comment: Mild elevation in immature granulocytes is non specific and can be seen in a variety of conditions including stress response, acute inflammation, trauma and pregnancy. Correlation with other laboratory and clinical findings is essential.       Immature Granulocytes       0.7       Lactic Acid Level 1.3   2.6  Comment:   A repeat order for Lactic Acid has been placed for collection in 3 hours.           Lymph #       0.99       Lymph %       5.0       Magnesium        1.7       MCH       31.2       MCHC       33.0       MCV       95       Mono #       1.12       Mono %       5.7       MPV       9.3       Neut %       88.0       nRBC       0       QRS Duration     68         [facility] QTC Calculation     470         Platelet Count       298       Potassium       4.3       PROTEIN TOTAL       7.9       RBC       3.11       RDW       15.0       Sodium       135       Troponin I       0.027  Comment: The reference interval for Troponin I represents the 99th percentile cutoff for our facility and is consistent with 3rd generation assay performance.       TSH       2.644       WBC       19.61               Significant Imaging: I have reviewed all pertinent imaging results/findings within the past 24 hours.  I have reviewed and interpreted all pertinent imaging results/findings within the past 24 hours.

## 2025-06-30 NOTE — ASSESSMENT & PLAN NOTE
Patient's blood pressure range in the last 24 hours was: BP  Min: 99/50  Max: 162/78.The patient's inpatient anti-hypertensive regimen is listed below:  Current Antihypertensives  hydrALAZINE tablet 25 mg, Every 12 hours, Oral  isosorbide mononitrate 24 hr tablet 30 mg, Daily, Oral  furosemide tablet 40 mg, Daily, Oral  metoprolol succinate (TOPROL-XL) 24 hr tablet 25 mg, Daily, Oral    Plan  - BP is controlled, no changes needed to their regimen

## 2025-06-30 NOTE — PROGRESS NOTES
"Pharmacokinetic Initial Assessment: IV Vancomycin    Assessment/Plan:    Initiate intravenous vancomycin with loading dose of 1500 mg once given in ER with subsequent doses when random concentrations are less than 20 mcg/mL  Desired empiric serum trough concentration is 15 to 20 mcg/mL  Draw vancomycin random level on 7/1 at 1600.  Pharmacy will continue to follow and monitor vancomycin.      Please contact pharmacy at extension [phone] with any questions regarding this assessment.     Thank you for the consult,   Naz Castro       Patient brief summary:  Erin Clark is a 89 y.o. female initiated on antimicrobial therapy with IV Vancomycin for treatment of suspected sepsis    Drug Allergies:   Review of patient's allergies indicates:   Allergen Reactions    Aspirin Nausea Only and Swelling     Able to tolerated in small doses         Actual Body Weight:   59 kg     Renal Function:   Estimated Creatinine Clearance: 8.1 mL/min (A) (based on SCr of 3.8 mg/dL (H)).,       CBC (last 72 hours):  Recent Labs   Lab Result Units 06/30/25  1511   WBC K/uL 19.61*   HGB gm/dL 9.7*   HCT % 29.4*   Platelet Count K/uL 298   Lymph % % 5.0*   Mono % % 5.7   Eos % % 0.3   Basophil % % 0.3       Metabolic Panel (last 72 hours):  Recent Labs   Lab Result Units 06/30/25  1511   Sodium mmol/L 135*   Potassium mmol/L 4.3   Chloride mmol/L 95   CO2 mmol/L 27   Glucose mg/dL 122*   BUN mg/dL 15   Creatinine mg/dL 3.8*   Albumin g/dL 2.7*   Bilirubin Total mg/dL 0.7   ALP unit/L 101   AST unit/L 29   ALT unit/L 12   Magnesium  mg/dL 1.7       Drug levels (last 3 results):  No results for input(s): "VANCOMYCINRA", "VANCORANDOM", "VANCOMYCINPE", "VANCOPEAK", "VANCOMYCINTR", "VANCOTROUGH" in the last 72 hours.    Microbiologic Results:  Microbiology Results (last 7 days)       Procedure Component Value Units Date/Time    Blood Culture #1 **CANNOT BE ORDERED STAT** [[id number]] Collected: 06/30/25 1532    Order Status: Sent Specimen: Blood " from Peripheral, Forearm, Right Updated: 06/30/25 1605    Blood Culture #2 **CANNOT BE ORDERED STAT** [[id number]] Collected: 06/30/25 1537    Order Status: Sent Specimen: Blood Updated: 06/30/25 1536

## 2025-07-01 PROBLEM — M86.172 ACUTE OSTEOMYELITIS OF TOE OF LEFT FOOT: Status: ACTIVE | Noted: 2025-07-01

## 2025-07-01 PROBLEM — I73.9 PVD (PERIPHERAL VASCULAR DISEASE): Chronic | Status: ACTIVE | Noted: 2025-07-01

## 2025-07-01 LAB
ABSOLUTE EOSINOPHIL (OHS): 0.12 K/UL
ABSOLUTE MONOCYTE (OHS): 1.05 K/UL (ref 0.3–1)
ABSOLUTE NEUTROPHIL COUNT (OHS): 15.69 K/UL (ref 1.8–7.7)
ALBUMIN SERPL BCP-MCNC: 2.3 G/DL (ref 3.5–5.2)
ALP SERPL-CCNC: 85 UNIT/L (ref 40–150)
ALT SERPL W/O P-5'-P-CCNC: 11 UNIT/L (ref 10–44)
ANION GAP (OHS): 12 MMOL/L (ref 8–16)
AST SERPL-CCNC: 26 UNIT/L (ref 11–45)
BASOPHILS # BLD AUTO: 0.05 K/UL
BASOPHILS NFR BLD AUTO: 0.3 %
BILIRUB SERPL-MCNC: 0.8 MG/DL (ref 0.1–1)
BUN SERPL-MCNC: 21 MG/DL (ref 8–23)
CALCIUM SERPL-MCNC: 8.3 MG/DL (ref 8.7–10.5)
CHLORIDE SERPL-SCNC: 95 MMOL/L (ref 95–110)
CO2 SERPL-SCNC: 26 MMOL/L (ref 23–29)
CREAT SERPL-MCNC: 4.8 MG/DL (ref 0.5–1.4)
ERYTHROCYTE [DISTWIDTH] IN BLOOD BY AUTOMATED COUNT: 15 % (ref 11.5–14.5)
GFR SERPLBLD CREATININE-BSD FMLA CKD-EPI: 8 ML/MIN/1.73/M2
GLUCOSE SERPL-MCNC: 87 MG/DL (ref 70–110)
HCT VFR BLD AUTO: 27.7 % (ref 37–48.5)
HGB BLD-MCNC: 9 GM/DL (ref 12–16)
IMM GRANULOCYTES # BLD AUTO: 0.1 K/UL (ref 0–0.04)
IMM GRANULOCYTES NFR BLD AUTO: 0.6 % (ref 0–0.5)
LYMPHOCYTES # BLD AUTO: 0.57 K/UL (ref 1–4.8)
MCH RBC QN AUTO: 31.3 PG (ref 27–31)
MCHC RBC AUTO-ENTMCNC: 32.5 G/DL (ref 32–36)
MCV RBC AUTO: 96 FL (ref 82–98)
NUCLEATED RBC (/100WBC) (OHS): 0 /100 WBC
PHOSPHATE SERPL-MCNC: 3.2 MG/DL (ref 2.7–4.5)
PLATELET # BLD AUTO: 277 K/UL (ref 150–450)
PMV BLD AUTO: 9.4 FL (ref 9.2–12.9)
POCT GLUCOSE: 129 MG/DL (ref 70–110)
POCT GLUCOSE: 148 MG/DL (ref 70–110)
POCT GLUCOSE: 48 MG/DL (ref 70–110)
POCT GLUCOSE: 78 MG/DL (ref 70–110)
POCT GLUCOSE: 92 MG/DL (ref 70–110)
POTASSIUM SERPL-SCNC: 4.5 MMOL/L (ref 3.5–5.1)
PROT SERPL-MCNC: 6.8 GM/DL (ref 6–8.4)
RBC # BLD AUTO: 2.88 M/UL (ref 4–5.4)
RELATIVE EOSINOPHIL (OHS): 0.7 %
RELATIVE LYMPHOCYTE (OHS): 3.2 % (ref 18–48)
RELATIVE MONOCYTE (OHS): 6 % (ref 4–15)
RELATIVE NEUTROPHIL (OHS): 89.2 % (ref 38–73)
SODIUM SERPL-SCNC: 133 MMOL/L (ref 136–145)
VANCOMYCIN SERPL-MCNC: 28.2 UG/ML (ref ?–80)
WBC # BLD AUTO: 17.58 K/UL (ref 3.9–12.7)

## 2025-07-01 PROCEDURE — 25000003 PHARM REV CODE 250: Performed by: INTERNAL MEDICINE

## 2025-07-01 PROCEDURE — 84100 ASSAY OF PHOSPHORUS: CPT | Performed by: NURSE PRACTITIONER

## 2025-07-01 PROCEDURE — 87076 CULTURE ANAEROBE IDENT EACH: CPT

## 2025-07-01 PROCEDURE — 99222 1ST HOSP IP/OBS MODERATE 55: CPT | Mod: ,,, | Performed by: PODIATRIST

## 2025-07-01 PROCEDURE — 11000001 HC ACUTE MED/SURG PRIVATE ROOM

## 2025-07-01 PROCEDURE — 85025 COMPLETE CBC W/AUTO DIFF WBC: CPT | Performed by: NURSE PRACTITIONER

## 2025-07-01 PROCEDURE — 87186 SC STD MICRODIL/AGAR DIL: CPT

## 2025-07-01 PROCEDURE — 87102 FUNGUS ISOLATION CULTURE: CPT

## 2025-07-01 PROCEDURE — 51798 US URINE CAPACITY MEASURE: CPT

## 2025-07-01 PROCEDURE — 25000003 PHARM REV CODE 250: Performed by: NURSE PRACTITIONER

## 2025-07-01 PROCEDURE — 63600175 PHARM REV CODE 636 W HCPCS: Performed by: NURSE PRACTITIONER

## 2025-07-01 PROCEDURE — 63600175 PHARM REV CODE 636 W HCPCS

## 2025-07-01 PROCEDURE — 87116 MYCOBACTERIA CULTURE: CPT

## 2025-07-01 PROCEDURE — 36415 COLL VENOUS BLD VENIPUNCTURE: CPT | Performed by: NURSE PRACTITIONER

## 2025-07-01 PROCEDURE — 82040 ASSAY OF SERUM ALBUMIN: CPT | Performed by: NURSE PRACTITIONER

## 2025-07-01 PROCEDURE — 25000003 PHARM REV CODE 250

## 2025-07-01 PROCEDURE — 25500020 PHARM REV CODE 255

## 2025-07-01 PROCEDURE — 80202 ASSAY OF VANCOMYCIN: CPT | Performed by: NURSE PRACTITIONER

## 2025-07-01 PROCEDURE — 87206 SMEAR FLUORESCENT/ACID STAI: CPT

## 2025-07-01 RX ORDER — MUPIROCIN 20 MG/G
OINTMENT TOPICAL 2 TIMES DAILY
Status: CANCELLED | OUTPATIENT
Start: 2025-07-01 | End: 2025-07-06

## 2025-07-01 RX ORDER — MUPIROCIN 20 MG/G
OINTMENT TOPICAL 2 TIMES DAILY
Status: DISPENSED | OUTPATIENT
Start: 2025-07-01 | End: 2025-07-06

## 2025-07-01 RX ORDER — HEPARIN SODIUM 5000 [USP'U]/ML
5000 INJECTION, SOLUTION INTRAVENOUS; SUBCUTANEOUS EVERY 8 HOURS
Status: DISCONTINUED | OUTPATIENT
Start: 2025-07-01 | End: 2025-07-07 | Stop reason: HOSPADM

## 2025-07-01 RX ORDER — ACETAMINOPHEN 325 MG/1
650 TABLET ORAL ONCE
Status: DISCONTINUED | OUTPATIENT
Start: 2025-07-01 | End: 2025-07-02

## 2025-07-01 RX ORDER — HYDROMORPHONE HYDROCHLORIDE 1 MG/ML
0.2 INJECTION, SOLUTION INTRAMUSCULAR; INTRAVENOUS; SUBCUTANEOUS ONCE
Status: COMPLETED | OUTPATIENT
Start: 2025-07-01 | End: 2025-07-01

## 2025-07-01 RX ORDER — SODIUM CHLORIDE 9 MG/ML
INJECTION, SOLUTION INTRAVENOUS ONCE
Status: CANCELLED | OUTPATIENT
Start: 2025-07-01 | End: 2025-07-01

## 2025-07-01 RX ORDER — HYDROMORPHONE HYDROCHLORIDE 1 MG/ML
1 INJECTION, SOLUTION INTRAMUSCULAR; INTRAVENOUS; SUBCUTANEOUS EVERY 6 HOURS PRN
Status: DISCONTINUED | OUTPATIENT
Start: 2025-07-01 | End: 2025-07-07 | Stop reason: HOSPADM

## 2025-07-01 RX ORDER — SODIUM CHLORIDE 9 MG/ML
INJECTION, SOLUTION INTRAVENOUS
Status: CANCELLED | OUTPATIENT
Start: 2025-07-01

## 2025-07-01 RX ADMIN — IOHEXOL 75 ML: 350 INJECTION, SOLUTION INTRAVENOUS at 04:07

## 2025-07-01 RX ADMIN — HEPARIN SODIUM 5000 UNITS: 5000 INJECTION INTRAVENOUS; SUBCUTANEOUS at 04:07

## 2025-07-01 RX ADMIN — PIPERACILLIN SODIUM AND TAZOBACTAM SODIUM 4.5 G: 4; .5 INJECTION, POWDER, LYOPHILIZED, FOR SOLUTION INTRAVENOUS at 04:07

## 2025-07-01 RX ADMIN — PANTOPRAZOLE SODIUM 40 MG: 40 TABLET, DELAYED RELEASE ORAL at 09:07

## 2025-07-01 RX ADMIN — MUPIROCIN: 20 OINTMENT TOPICAL at 08:07

## 2025-07-01 RX ADMIN — HYDROMORPHONE HYDROCHLORIDE 1 MG: 1 INJECTION, SOLUTION INTRAMUSCULAR; INTRAVENOUS; SUBCUTANEOUS at 09:07

## 2025-07-01 RX ADMIN — FUROSEMIDE 40 MG: 40 TABLET ORAL at 09:07

## 2025-07-01 RX ADMIN — DEXTROSE MONOHYDRATE 25 G: 25 INJECTION, SOLUTION INTRAVENOUS at 06:07

## 2025-07-01 RX ADMIN — CLOPIDOGREL 75 MG: 75 TABLET ORAL at 09:07

## 2025-07-01 RX ADMIN — HEPARIN SODIUM 5000 UNITS: 5000 INJECTION INTRAVENOUS; SUBCUTANEOUS at 10:07

## 2025-07-01 RX ADMIN — HYDROMORPHONE HYDROCHLORIDE 0.2 MG: 1 INJECTION, SOLUTION INTRAMUSCULAR; INTRAVENOUS; SUBCUTANEOUS at 10:07

## 2025-07-01 RX ADMIN — METOPROLOL SUCCINATE 25 MG: 25 TABLET, EXTENDED RELEASE ORAL at 09:07

## 2025-07-01 RX ADMIN — ISOSORBIDE MONONITRATE 30 MG: 30 TABLET, EXTENDED RELEASE ORAL at 09:07

## 2025-07-01 RX ADMIN — HYDRALAZINE HYDROCHLORIDE 25 MG: 25 TABLET ORAL at 09:07

## 2025-07-01 RX ADMIN — Medication 1 CAPSULE: at 09:07

## 2025-07-01 NOTE — PLAN OF CARE
06/30/25 2231   Admission   Initial VN Admission Questions Complete   Communication Issues? None   Shift   Pain Management Interventions relaxation techniques promoted;quiet environment facilitated   Virtual Nurse - Patient Verbalized Approval Of VN Rounding;Camera Use   Type of Frequent Check   Type Patient Rounds;Telemetry Monitoring   Safety/Activity   Patient Rounds bed in low position;visualized patient;call light in patient/parent reach;clutter free environment maintained;ID band on;placement of personal items at bedside   Safety Promotion/Fall Prevention assistive device/personal item within reach;bed alarm set;family to remain at bedside;nonskid shoes/socks when out of bed;instructed to call staff for mobility;medications reviewed;Fall Risk reviewed with patient/family;family expresses understanding of fall risk and prevention;side rails raised x 2;room near unit station;patient expresses understanding of fall risk and prevention   Safety Precautions emergency equipment at bedside   Safety Bands on Patient Fall Risk Band;Allergy Band   Activity Management Ambulated in room - L4   Activity Assistance Provided assistance, stand-by   Positioning   Body Position position changed independently   Head of Bed (HOB) Positioning HOB elevated   Positioning/Transfer Devices pillows;in use        VIRTUAL NURSE: Pt arrived to unit. Permission received per patient to turn camera to view patient. VIP model explained; patient informed this VN will be working with bedside nurse and the rest of the care team. Plan of care reviewed with patient.  Educated patient on VTE, fall risk and fall risk precautions in place. Call light within reach, side rails up x2. Admission questions completed. Patient instucted to ask staff for assistance. Patient verbalized complete understanding. Patient denies complaints or any needs at this time. Will continue to be available and intervene as needed.

## 2025-07-01 NOTE — ASSESSMENT & PLAN NOTE
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 Dementia is moderate. The patient does have signs of behavioral disturbance. Home dementia medications are Held or Continued: continued.. Continue non-pharmacologic interventions to prevent delirium (No VS between 11PM-5AM, activity during day, opening blinds, providing glasses/hearing aids, and up in chair during daytime). Will avoid narcotics and benzos unless absolutely necessary. PRN anti-psychotics are not prescribed to avoid self harm behaviors.  Frequent reorientation, lowering the bed levels, lifting a bed rails, have video monitor camera in place, continuous redirection

## 2025-07-01 NOTE — HPI
89-year-old female with PMH CHF, DM, ESRD/HD, HLD, HTN presented to ED 6/30/25 for concerns of tachycardia while undergoing hemodialysis. Podiatry and ID consulted for concerns of wound of left 3rd digit. She was admitted to  for further management.  On encounter, patient is Estonian-speaking and has senile dementia; family members at bedside providing partial history, stating that patient has had discoloration and a wound on the distal aspect of her left 3rd digit for a period of 2-3 weeks; over time, coloration had changed. She endorses pain to the bilateral forefoot, L > R. No history of smoking nor drinking.  Family members attest that patient feels cold as baseline.  Denies fevers, nausea. She is able to ambulate with use of a cane or walker, only for short distances.  She appears to have no other pedal complaints at this time. Labs on encounter demonstrating leukocytosis WBC 17.58 (downtrend from 19K); RBC, H&H diminished. Inflammatory marker elevated .9; procalcitonin 0.34 elevated. Lactic acid elevated 2.9; /58; saturating on 93% O2 room air; otherwise afebrile and vital signs stable on encounter.

## 2025-07-01 NOTE — ASSESSMENT & PLAN NOTE
"This patient does have evidence of infective focus  My overall impression is sepsis with Encephalopathy.  Source: Skin and Soft Tissue Infection: gangrene  Antibiotics given-   Antibiotics (72h ago, onward)      Start     Stop Route Frequency Ordered    06/30/25 1830  clindamycin in D5W 900 mg/50 mL IVPB 900 mg         07/01/25 0629 IV ED 1 Time 06/30/25 1821    06/30/25 1621  vancomycin - pharmacy to dose  (vancomycin IVPB (PEDS and ADULTS))        Placed in "And" Linked Group    -- IV pharmacy to manage frequency 06/30/25 1521          Latest lactate reviewed-  Recent Labs   Lab 06/30/25  1839   LACTATE 1.3     Organ dysfunction indicated by Encephalopathy    Fluid challenge Contraindicated- Fluid bolus is contraindicated in this patient due to End Stage Renal Disease     Post- resuscitation assessment Yes - I attest a sepsis perfusion exam was performed within 6 hours of sepsis, severe sepsis, or septic shock presentation, following fluid resuscitation.      Will Not start Pressors- Levophed for MAP of 65  Source control achieved by: Antibiotic therapy  "

## 2025-07-01 NOTE — PLAN OF CARE
Problem: Diabetes Comorbidity  Goal: Blood Glucose Level Within Targeted Range  Outcome: Progressing     Problem: Comorbidity Management  Goal: Blood Pressure in Desired Range  Outcome: Progressing     Problem: Fall Injury Risk  Goal: Absence of Fall and Fall-Related Injury  Outcome: Progressing     Problem: Infection  Goal: Absence of Infection Signs and Symptoms  Outcome: Not Progressing

## 2025-07-01 NOTE — H&P
[place] - Emergency Dept  [facility] Medicine  History & Physical    Patient Name: Erin Clark  MRN: [id number]  Patient Class: IP- Inpatient  Admission Date: 6/30/2025  Attending Physician: Pippa Mckeon*   Primary Care Provider: Bayron Golden MD         Patient information was obtained from relative(s) and ER records.  Interpretations services provided by Brando()    Subjective:     Principal Problem:Sepsis    Chief Complaint:   Chief Complaint   Patient presents with    Tachycardia     Patient was in diaylsis and received entire treatment per son when she became tachy. Patient was transported via son from diaylsis. No complaints of pain. Patient is biting down grinding teeth and EKG performed in triage rate was 98.          HPI: Erin Clark is a chronically-ill 89-year-old female that presented to the ED at [facility] this afternoon for tachycardia. The patient presented to the ED from a local dialysis center today after the completion of her HD treatment. Son served as historian; interpretation services provided by (Brando- 123). Apparently, the patient experienced tachycardia throughout the entire HD session.   The nursing staff advised the patient's son of the issues and he transported the patient to the ED for further evaluation.     Past Medical History:   Diagnosis Date    CHF (congestive heart failure)     Colon polyps 06/03/2013    Coronary artery disease     Diabetes mellitus     Encounter for blood transfusion     ESRD (end stage renal disease) 03/2014    dialysis M-F    GERD (gastroesophageal reflux disease)     High cholesterol     Hypertension        Past Surgical History:   Procedure Laterality Date    AV FISTULA PLACEMENT Left 9/2014    bilateral fem-pop      CENTRAL VENOUS CATHETER TUNNELED INSERTION DOUBLE LUMEN Right 2/2014    CORONARY ARTERY BYPASS GRAFT  2/14/2014     x 3 vessels    LEFT HEART CATHETERIZATION N/A 5/31/2021    Procedure: Left heart cath;  Surgeon:  Brian Sanchez MD;  Location: [facility] CATH LAB/EP;  Service: Cardiology;  Laterality: N/A;    PERCUTANEOUS TRANSLUMINAL ANGIOPLASTY OF ARTERIOVENOUS FISTULA Left 5/5/2020    Procedure: PTA, Repair left upper arm anuerysm AV FISTULA;  Surgeon: Doris Mary MD;  Location: [facility] OR;  Service: General;  Laterality: Left;    VASCULAR SURGERY         Review of patient's allergies indicates:   Allergen Reactions    Aspirin Nausea Only and Swelling     Able to tolerated in small doses         No current facility-administered medications on file prior to encounter.     Current Outpatient Medications on File Prior to Encounter   Medication Sig    aspirin (ECOTRIN) 81 MG EC tablet TAKE ONE TABLET BY MOUTH ONCE DAILY (Patient not taking: Reported on 6/26/2025)    atorvastatin (LIPITOR) 40 MG tablet TAKE 1 TABLET BY MOUTH ONCE DAILY IN THE EVENING    clopidogreL (PLAVIX) 75 mg tablet Take 1 tablet (75 mg total) by mouth once daily.    furosemide (LASIX) 40 MG tablet Take 1 tablet (40 mg total) by mouth once daily.    hydrALAZINE (APRESOLINE) 25 MG tablet Take 25 mg by mouth every 12 (twelve) hours. (Patient not taking: Reported on 6/26/2025)    isosorbide mononitrate (IMDUR) 30 MG 24 hr tablet Take 1 tablet (30 mg total) by mouth once daily. (Patient not taking: Reported on 6/26/2025)    metoprolol succinate (TOPROL-XL) 25 MG 24 hr tablet Take 1 tablet by mouth once daily    nitroGLYCERIN (NITROSTAT) 0.4 MG SL tablet Place 0.4 mg under the tongue every 5 (five) minutes as needed for Chest pain.    pantoprazole (PROTONIX) 40 MG tablet Take 40 mg by mouth before breakfast. (Patient not taking: Reported on 6/26/2025)    povidone-iodine (BETADINE) 10 % external solution Apply topically as needed for Wound Care.    vitamin renal formula, B-complex-vitamin c-folic acid, (TRIPHROCAPS) 1 mg Cap Take 1 capsule by mouth once daily.     Family History       Problem Relation (Age of Onset)    Heart attack Mother    Heart disease  Mother    Hypertension Mother          Tobacco Use    Smoking status: Never    Smokeless tobacco: Never   Substance and Sexual Activity    Alcohol use: No    Drug use: No    Sexual activity: Never     Review of Systems   Constitutional:  Positive for activity change.   HENT: Negative.     Eyes: Negative.    Respiratory: Negative.     Cardiovascular:  Positive for palpitations.   Gastrointestinal: Negative.    Endocrine: Negative.    Genitourinary: Negative.    Musculoskeletal: Negative.    Skin:         Discoloration to left toes   Allergic/Immunologic: Negative.    Neurological: Negative.    Hematological: Negative.    Psychiatric/Behavioral: Negative.       Objective:     Vital Signs (Most Recent):  Temp: 99.3 °F (37.4 °C) (06/30/25 1418)  Pulse: 68 (06/30/25 1802)  Resp: (!) 22 (06/30/25 1802)  BP: (!) 105/51 (06/30/25 1802)  SpO2: 96 % (06/30/25 1802) Vital Signs (24h Range):  Temp:  [99.3 °F (37.4 °C)] 99.3 °F (37.4 °C)  Pulse:  [] 68  Resp:  [14-42] 22  SpO2:  [95 %-96 %] 96 %  BP: ()/(50-78) 105/51     Weight: 59 kg (130 lb)  Body mass index is 23.78 kg/m².     Physical Exam  Constitutional:       Appearance: She is ill-appearing and toxic-appearing.   HENT:      Head: Normocephalic and atraumatic.      Right Ear: Tympanic membrane normal.      Left Ear: Tympanic membrane normal.      Nose: Nose normal.      Mouth/Throat:      Mouth: Mucous membranes are moist.   Eyes:      Extraocular Movements: Extraocular movements intact.      Pupils: Pupils are equal, round, and reactive to light.   Cardiovascular:      Rate and Rhythm: Rhythm irregular.      Comments: AVF to left upper extremity; + bruit/thrill  Pulmonary:      Effort: Pulmonary effort is normal.   Musculoskeletal:         General: Normal range of motion.      Cervical back: Normal range of motion and neck supple.   Skin:     Capillary Refill: Capillary refill takes less than 2 seconds.      Comments: Gangrene to left third toe    Neurological:      Mental Status: She is alert and oriented to person, place, and time.   Psychiatric:         Mood and Affect: Mood normal.              CRANIAL NERVES     CN III, IV, VI   Pupils are equal, round, and reactive to light.       Significant Labs: All pertinent labs within the past 24 hours have been reviewed.  Recent Lab Results         06/30/25  1839   06/30/25  1532   06/30/25  1525   06/30/25  1511        Procalcitonin   0.34  Comment: A concentration < 0.25 ng/mL represents a low risk of bacterial infection.  Procalcitonin may not be accurate among patients with localized   infection, recent trauma or major surgery, immunosuppressed state,   invasive fungal infection, renal dysfunction. Decisions regarding   initiation or continuation of antibiotic therapy should not be based   solely on procalcitonin levels.           Albumin       2.7       ALP       101       ALT       12       Anion Gap       13       AST       29       Baso #       0.05       Basophil %       0.3       BILIRUBIN TOTAL       0.7  Comment: For infants and newborns, interpretation of results should be based   on gestational age, weight and in agreement with clinical   observations.    Premature Infant recommended reference ranges:   0-24 hours:  <8.0 mg/dL   24-48 hours: <12.0 mg/dL   3-5 days:    <15.0 mg/dL   6-29 days:   <15.0 mg/dL       BNP       891  Comment: Values of less than 100 pg/ml are consistent with non-CHF populations.        BUN       15       Calcium       8.7       Chloride       95       CO2       27       Creatinine       3.8       CRP       148.9       eGFR       11  Comment: Estimated GFR calculated using the CKD-EPI creatinine (2021) equation.       Eos #       0.05       Eos %       0.3       Glucose       122       Gran # (ANC)       17.27       Hematocrit       29.4       Hemoglobin       9.7       Immature Grans (Abs)       0.13  Comment: Mild elevation in immature granulocytes is non specific and  "can be seen in a variety of conditions including stress response, acute inflammation, trauma and pregnancy. Correlation with other laboratory and clinical findings is essential.       Immature Granulocytes       0.7       Lactic Acid Level 1.3   2.6  Comment:   A repeat order for Lactic Acid has been placed for collection in 3 hours.           Lymph #       0.99       Lymph %       5.0       Magnesium        1.7       MCH       31.2       MCHC       33.0       MCV       95       Mono #       1.12       Mono %       5.7       MPV       9.3       Neut %       88.0       nRBC       0       QRS Duration     68         OHS QTC Calculation     470         Platelet Count       298       Potassium       4.3       PROTEIN TOTAL       7.9       RBC       3.11       RDW       15.0       Sodium       135       Troponin I       0.027  Comment: The reference interval for Troponin I represents the 99th percentile cutoff for our facility and is consistent with 3rd generation assay performance.       TSH       2.644       WBC       19.61               Significant Imaging: I have reviewed all pertinent imaging results/findings within the past 24 hours.  I have reviewed and interpreted all pertinent imaging results/findings within the past 24 hours.  Assessment/Plan:     Assessment & Plan  Sepsis  This patient does have evidence of infective focus  My overall impression is sepsis with Encephalopathy.  Source: Skin and Soft Tissue Infection: gangrene  Antibiotics given-   Antibiotics (72h ago, onward)      Start     Stop Route Frequency Ordered    06/30/25 1830  clindamycin in D5W 900 mg/50 mL IVPB 900 mg         07/01/25 0629 IV ED 1 Time 06/30/25 1821    06/30/25 1621  vancomycin - pharmacy to dose  (vancomycin IVPB (PEDS and ADULTS))        Placed in "And" Linked Group    -- IV pharmacy to manage frequency 06/30/25 1521          Latest lactate reviewed-  Recent Labs   Lab 06/30/25  1839   LACTATE 1.3     Organ dysfunction indicated " by Encephalopathy    Fluid challenge Contraindicated- Fluid bolus is contraindicated in this patient due to End Stage Renal Disease     Post- resuscitation assessment Yes - I attest a sepsis perfusion exam was performed within 6 hours of sepsis, severe sepsis, or septic shock presentation, following fluid resuscitation.      Will Not start Pressors- Levophed for MAP of 65  Source control achieved by: Antibiotic therapy  End-stage renal disease on hemodialysis  Creatine stable for now. BMP reviewed- noted Estimated Creatinine Clearance: 8.1 mL/min (A) (based on SCr of 3.8 mg/dL (H)). according to latest data. Based on current GFR, CKD stage is end stage.  Monitor UOP and serial BMP and adjust therapy as needed. Renally dose meds. Avoid nephrotoxic medications and procedures.  Essential hypertension  Patient's blood pressure range in the last 24 hours was: BP  Min: 99/50  Max: 162/78.The patient's inpatient anti-hypertensive regimen is listed below:  Current Antihypertensives  hydrALAZINE tablet 25 mg, Every 12 hours, Oral  isosorbide mononitrate 24 hr tablet 30 mg, Daily, Oral  furosemide tablet 40 mg, Daily, Oral  metoprolol succinate (TOPROL-XL) 24 hr tablet 25 mg, Daily, Oral    Plan  - BP is controlled, no changes needed to their regimen    Chronic diastolic congestive heart failure  Patient has Diastolic (HFpEF) heart failure that is Chronic. On presentation their CHF was well compensated. Most recent BNP and echo results are listed below.  Recent Labs     06/30/25  1511   *     Latest ECHO  Results for orders placed during the hospital encounter of 06/12/25    Echo    Interpretation Summary    Left Ventricle: The left ventricle is normal in size. Mildly increased wall thickness. Normal wall motion. There is normal systolic function. Quantitated ejection fraction is 58%. Diastolic function cannot be reliably determined in the presence of mitral annular calcification.    Right Ventricle: The right  ventricle is normal in size measuring 3.0 cm. Systolic function is normal.    Left Atrium: The left atrium is mildly dilated measuring 38 mL/m2.    Aortic Valve: The aortic valve is a trileaflet valve. There is aortic valve sclerosis.    Mitral Valve: There is mild stenosis. The mean pressure gradient across the mitral valve is 5 mmHg at a heart rate of 87 bpm. There is mild regurgitation.    Tricuspid Valve: There is mild regurgitation.    Pulmonary Artery: There is moderate pulmonary hypertension. The estimated pulmonary artery systolic pressure is 52 mmHg.    Current Heart Failure Medications  hydrALAZINE tablet 25 mg, Every 12 hours, Oral  furosemide tablet 40 mg, Daily, Oral  metoprolol succinate (TOPROL-XL) 24 hr tablet 25 mg, Daily, Oral    Plan  - Monitor strict I&Os and daily weights.    - Place on telemetry  - Low sodium diet  - Place on fluid restriction of 1 L.   - Cardiology has been consulted  - The patient's volume status is stable but not at their baseline as indicated by              New onset a-fib  Patient has paroxysmal (<7 days) atrial fibrillation. Patient is currently in atrial fibrillation. [id number] Score: 5. The patients heart rate in the last 24 hours is as follows:  Pulse  Min: 68  Max: 120     Antiarrhythmics  metoprolol succinate (TOPROL-XL) 24 hr tablet 25 mg, Daily, Oral    Anticoagulants       Plan  - Replete lytes with a goal of K>4, Mg >2  - Patient is anticoagulated, see medications listed above.  - Patient's afib is currently controlled  - Plan to start heparin       Type 2 diabetes mellitus with diabetic nephropathy, without long-term current use of insulin  -Start accuchecks with Novolog correction    VTE Risk Mitigation (From admission, onward)           Ordered     Place sequential compression device  Until discontinued         06/30/25 1830                                           Pharmacokinetic Initial Assessment: IV Vancomycin    Assessment/Plan:    Initiate intravenous  "vancomycin with loading dose of 1500 mg once given in ER with subsequent doses when random concentrations are less than 20 mcg/mL  Desired empiric serum trough concentration is 15 to 20 mcg/mL  Draw vancomycin random level on 7/1 at 1600.  Pharmacy will continue to follow and monitor vancomycin.      Please contact pharmacy at extension [phone] with any questions regarding this assessment.     Thank you for the consult,   Naz Castro       Patient brief summary:  Erin Clark is a 89 y.o. female initiated on antimicrobial therapy with IV Vancomycin for treatment of suspected sepsis    Drug Allergies:   Review of patient's allergies indicates:   Allergen Reactions    Aspirin Nausea Only and Swelling     Able to tolerated in small doses         Actual Body Weight:   59 kg     Renal Function:   Estimated Creatinine Clearance: 8.1 mL/min (A) (based on SCr of 3.8 mg/dL (H)).,       CBC (last 72 hours):  Recent Labs   Lab Result Units 06/30/25  1511   WBC K/uL 19.61*   HGB gm/dL 9.7*   HCT % 29.4*   Platelet Count K/uL 298   Lymph % % 5.0*   Mono % % 5.7   Eos % % 0.3   Basophil % % 0.3       Metabolic Panel (last 72 hours):  Recent Labs   Lab Result Units 06/30/25  1511   Sodium mmol/L 135*   Potassium mmol/L 4.3   Chloride mmol/L 95   CO2 mmol/L 27   Glucose mg/dL 122*   BUN mg/dL 15   Creatinine mg/dL 3.8*   Albumin g/dL 2.7*   Bilirubin Total mg/dL 0.7   ALP unit/L 101   AST unit/L 29   ALT unit/L 12   Magnesium  mg/dL 1.7       Drug levels (last 3 results):  No results for input(s): "VANCOMYCINRA", "VANCORANDOM", "VANCOMYCINPE", "VANCOPEAK", "VANCOMYCINTR", "VANCOTROUGH" in the last 72 hours.    Microbiologic Results:  Microbiology Results (last 7 days)       Procedure Component Value Units Date/Time    Blood Culture #1 **CANNOT BE ORDERED STAT** [[id number]] Collected: 06/30/25 1532    Order Status: Sent Specimen: Blood from Peripheral, Forearm, Right Updated: 06/30/25 1605    Blood Culture #2 **CANNOT BE ORDERED " STAT** [[id number]] Collected: 06/30/25 1537    Order Status: Sent Specimen: Blood Updated: 06/30/25 1537              Sherrell Bowers NP  Department of Hospital Medicine  [place] - Emergency Dept

## 2025-07-01 NOTE — ASSESSMENT & PLAN NOTE
Creatine stable for now. BMP reviewed- noted Estimated Creatinine Clearance: 8.1 mL/min (A) (based on SCr of 3.8 mg/dL (H)). according to latest data. Based on current GFR, CKD stage is end stage.  Monitor UOP and serial BMP and adjust therapy as needed. Renally dose meds. Avoid nephrotoxic medications and procedures.

## 2025-07-01 NOTE — ASSESSMENT & PLAN NOTE
Creatine stable for now. BMP reviewed- noted Estimated Creatinine Clearance: 5.9 mL/min (A) (based on SCr of 4.8 mg/dL (H)). according to latest data. Based on current GFR, CKD stage is end stage.  Monitor UOP and serial BMP and adjust therapy as needed. Renally dose meds. Avoid nephrotoxic medications and procedures.  Nephrology is consulted to continue on her dialysis

## 2025-07-01 NOTE — SUBJECTIVE & OBJECTIVE
Scheduled Meds:   atorvastatin  40 mg Oral QHS    clopidogreL  75 mg Oral Daily    [START ON 7/2/2025] epoetin roberto-epbx  10,000 Units Subcutaneous Every Mon, Wed, Fri    furosemide  40 mg Oral Daily    heparin (porcine)  5,000 Units Subcutaneous Q8H    hydrALAZINE  25 mg Oral Q12H    isosorbide mononitrate  30 mg Oral Daily    metoprolol succinate  25 mg Oral Daily    pantoprazole  40 mg Oral Before breakfast    piperacillin-tazobactam (Zosyn) IV (PEDS and ADULTS) (extended infusion is not appropriate)  4.5 g Intravenous Q12H    polyethylene glycol  17 g Oral Daily    vitamin renal formula (B-complex-vitamin c-folic acid)  1 capsule Oral Daily     Continuous Infusions:  PRN Meds:  Current Facility-Administered Medications:     dextrose 50%, 12.5 g, Intravenous, PRN    dextrose 50%, 25 g, Intravenous, PRN    glucagon (human recombinant), 1 mg, Intramuscular, PRN    glucose, 16 g, Oral, PRN    glucose, 24 g, Oral, PRN    HYDROmorphone, 1 mg, Intravenous, Q6H PRN    insulin aspart U-100, 0-5 Units, Subcutaneous, QID (AC + HS) PRN    melatonin, 6 mg, Oral, Nightly PRN    sodium chloride 0.9%, 10 mL, Intravenous, Q12H PRN    Pharmacy to dose Vancomycin consult, , , Once **AND** vancomycin - pharmacy to dose, , Intravenous, pharmacy to manage frequency    Review of patient's allergies indicates:   Allergen Reactions    Aspirin Nausea Only and Swelling     Able to tolerated in small doses          Past Medical History:   Diagnosis Date    CHF (congestive heart failure)     Colon polyps 06/03/2013    Coronary artery disease     Diabetes mellitus     Encounter for blood transfusion     ESRD (end stage renal disease) 03/2014    dialysis M-F    GERD (gastroesophageal reflux disease)     High cholesterol     Hypertension      Past Surgical History:   Procedure Laterality Date    AV FISTULA PLACEMENT Left 9/2014    bilateral fem-pop      CENTRAL VENOUS CATHETER TUNNELED INSERTION DOUBLE LUMEN Right 2/2014    CORONARY ARTERY  BYPASS GRAFT  2/14/2014     x 3 vessels    LEFT HEART CATHETERIZATION N/A 5/31/2021    Procedure: Left heart cath;  Surgeon: Brian Sanchez MD;  Location: [facility] CATH LAB/EP;  Service: Cardiology;  Laterality: N/A;    PERCUTANEOUS TRANSLUMINAL ANGIOPLASTY OF ARTERIOVENOUS FISTULA Left 5/5/2020    Procedure: PTA, Repair left upper arm anuerysm AV FISTULA;  Surgeon: Doris Mary MD;  Location: [facility] OR;  Service: General;  Laterality: Left;    VASCULAR SURGERY         Family History       Problem Relation (Age of Onset)    Heart attack Mother    Heart disease Mother    Hypertension Mother          Tobacco Use    Smoking status: Never    Smokeless tobacco: Never   Substance and Sexual Activity    Alcohol use: No    Drug use: No    Sexual activity: Never     Review of Systems   Unable to perform ROS: Dementia     Objective:     Vital Signs (Most Recent):  Temp: 98 °F (36.7 °C) (07/01/25 1610)  Pulse: 106 (07/01/25 1610)  Resp: 18 (07/01/25 1610)  BP: (!) 102/58 (07/01/25 1610)  SpO2: (!) 93 % (07/01/25 1610) Vital Signs (24h Range):  Temp:  [97.7 °F (36.5 °C)-99.8 °F (37.7 °C)] 98 °F (36.7 °C)  Pulse:  [] 106  Resp:  [17-22] 18  SpO2:  [93 %-98 %] 93 %  BP: (101-118)/(49-66) 102/58     Weight: 45.8 kg (100 lb 15.5 oz)  Body mass index is 18.47 kg/m².    Foot Exam    General  Orientation: disoriented   Assistance: walker use       Right Foot/Ankle     Inspection and Palpation  Tenderness: (Forefoot)  Swelling: none   Skin Exam: no maceration, no cellulitis, no ulcer and no erythema     Neurovascular  Dorsalis pedis: doppler  Posterior tibial: doppler    Comments  No discernible open wounds in the right lower extremity     Doppler signals:  Multiphasic DP, multiphasic PT    Left Foot/Ankle      Inspection and Palpation  Tenderness: (Forefoot)  Swelling: none   Skin Exam: dry skin, maceration, skin changes, ulcer and erythema; no drainage and no cellulitis     Neurovascular  Dorsalis pedis: doppler  Posterior  tibial: doppler  Saphenous nerve sensation: diminished  Tibial nerve sensation: diminished  Superficial peroneal nerve sensation: diminished  Deep peroneal nerve sensation: diminished  Sural nerve sensation: diminished    Comments  Left foot: 3rd digit with dry gangrenous changes.  Malodor is present; negative for crepitus, no fluctuance. No purulence on manual expression.  Very tender to palpation.    2nd and 3rd interspace is present with maceration.    Second and 4th, 5th digits present with erythematous changes.  Erythema extending to the dorsal forefoot.    Doppler signals:  Monophasic DP, multiphasic PT      Laboratory:  CBC:   Recent Labs   Lab 07/01/25  1002   WBC 17.58*   RBC 2.88*   HGB 9.0*   HCT 27.7*      MCV 96   MCH 31.3*   MCHC 32.5     CMP:   Recent Labs   Lab 07/01/25  1002   GLU 87   CALCIUM 8.3*   ALBUMIN 2.3*   PROT 6.8   *   K 4.5   CO2 26   CL 95   BUN 21   CREATININE 4.8*   ALKPHOS 85   ALT 11   AST 26   BILITOT 0.8     CRP:   Recent Labs   Lab 06/30/25  1511   .9*     Microbiology Results (last 7 days)       Procedure Component Value Units Date/Time    AFB Culture & Smear [[id number]] Collected: 07/01/25 1716    Order Status: Sent Specimen: Wound from Toe, Left Foot Updated: 07/01/25 1729    Fungus culture [[id number]] Collected: 07/01/25 1716    Order Status: Sent Specimen: Wound from Toe, Left Foot Updated: 07/01/25 1726    Gram stain [[id number]] Collected: 07/01/25 1716    Order Status: Sent Specimen: Wound from Toe, Left Foot Updated: 07/01/25 1726    Culture, Anaerobic [[id number]] Collected: 07/01/25 1715    Order Status: Sent Specimen: Wound from Toe, Left Foot Updated: 07/01/25 1726    Aerobic culture [[id number]] Collected: 07/01/25 1715    Order Status: Sent Specimen: Wound from Toe, Left Foot Updated: 07/01/25 1726    Blood Culture #1 **CANNOT BE ORDERED STAT** [[id number]]  (Normal) Collected: 06/30/25 1532    Order Status: Completed Specimen: Blood from  Peripheral, Forearm, Right Updated: 07/01/25 0700     Blood Culture No Growth After 6 Hours    Blood Culture #2 **CANNOT BE ORDERED STAT** [[id number]]  (Normal) Collected: 06/30/25 1537    Order Status: Completed Specimen: Blood Updated: 07/01/25 0700     Blood Culture No Growth After 6 Hours            Diagnostic Results:  I have reviewed all pertinent imaging results/findings within the past 24 hours.    Clinical Findings:  Left foot 2nd digit

## 2025-07-01 NOTE — ASSESSMENT & PLAN NOTE
Detected by x-ray  Podiatry consulted  ID consulted  Arterial Doppler of lower limbs ordered  Currently on board spectrum antibiotics  Cultures are so far negative

## 2025-07-01 NOTE — HPI
Erin Clark is a chronically-ill 89-year-old female that presented to the ED at [facility] this afternoon for tachycardia. The patient presented to the ED from a local dialysis center today after the completion of her HD treatment. Son served as historian; interpretation services provided by (Hong Vance). Apparently, the patient experienced tachycardia throughout the entire HD session.   The nursing staff advised the patient's son of the issues and he transported the patient to the ED for further evaluation.

## 2025-07-01 NOTE — ASSESSMENT & PLAN NOTE
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

## 2025-07-01 NOTE — PHARMACY MED REC
"  [facility]           Pharmacy  Admission Medication History     The home medication history was taken by Dejah Shanks.      Medication history obtained from Medications listed below were obtained from: Analytic software- zkipster;Patient and family unable to verify patients home meds    Based on information gathered for medication list, you may go to "Admission" then "Reconcile Home Medications" tabs to review and/or act upon those items.     The home medication list has been updated by the Pharmacy department.   Please read ALL comments highlighted in yellow.   Please address this information as you see fit.    Feel free to contact us if you have any questions or require assistance.        No current facility-administered medications on file prior to encounter.     Current Outpatient Medications on File Prior to Encounter   Medication Sig Dispense Refill    clopidogreL (PLAVIX) 75 mg tablet Take 1 tablet (75 mg total) by mouth once daily. 30 tablet 0    metoprolol succinate (TOPROL-XL) 25 MG 24 hr tablet Take 1 tablet by mouth once daily (Patient taking differently: Take 25 mg by mouth once daily.) 90 tablet 3    vitamin renal formula, B-complex-vitamin c-folic acid, (TRIPHROCAPS) 1 mg Cap Take 1 capsule by mouth once daily. 90 capsule 0    atorvastatin (LIPITOR) 40 MG tablet TAKE 1 TABLET BY MOUTH ONCE DAILY IN THE EVENING 90 tablet 3    furosemide (LASIX) 40 MG tablet Take 1 tablet (40 mg total) by mouth once daily. 90 tablet 0    nitroGLYCERIN (NITROSTAT) 0.4 MG SL tablet Place 0.4 mg under the tongue every 5 (five) minutes as needed for Chest pain.      povidone-iodine (BETADINE) 10 % external solution Apply topically as needed for Wound Care. 59 mL 0       Please address this information as you see fit.  Feel free to contact us if you have any questions or require assistance.    Dejah Shanks  [phone]                .          "

## 2025-07-01 NOTE — ASSESSMENT & PLAN NOTE
Patient has paroxysmal (<7 days) atrial fibrillation. Patient is currently in atrial fibrillation. [id number] Score: 5. The patients heart rate in the last 24 hours is as follows:  Pulse  Min: 68  Max: 120     Antiarrhythmics  metoprolol succinate (TOPROL-XL) 24 hr tablet 25 mg, Daily, Oral    Anticoagulants  heparin (porcine) injection 5,000 Units, Every 8 hours, Subcutaneous    Plan  - Replete lytes with a goal of K>4, Mg >2  - Patient is anticoagulated, see medications listed above.  - Patient's afib is currently controlled  - Plan to start heparin

## 2025-07-01 NOTE — ASSESSMENT & PLAN NOTE
89-year-old female with PMH CHF, DM, ESRD/HD, HLD, HTN presented to ED 6/30/25 for concerns of tachycardia while undergoing hemodialysis. Podiatry and ID consulted for concerns of wound of left 3rd digit.    Assessment:  Dry, gangrenous changes noted to the 3rd digit of the left foot.  Concern for osteomyelitis of the 3rd digit distal phalanx per radiographic imaging. SFA occlusion bilaterally per arterial ultrasound.    Plan:   - Patient seen and evaluated bedside by Podiatry.  Exam, imaging, treatment options discussed  - Culture swab was obtained of the 2nd interspace left foot, sent to microbiology  - ID following, appreciate recommendations  - Recommend [place] consultation for severe peripheral vascular disease, if amenable.   - Betadine wet-to-dry dressing applied to the left foot. Nursing wound care to apply Betadine air to dry, no dressings after today  - Patient may WBAT in Darco shoe LLE with walker  - TBI ordered for assessment of inflow to digits  - Podiatry will follow

## 2025-07-01 NOTE — ASSESSMENT & PLAN NOTE
Patient has paroxysmal (<7 days) atrial fibrillation. Patient is currently in atrial fibrillation. [id number] Score: 5. The patients heart rate in the last 24 hours is as follows:  Pulse  Min: 68  Max: 120     Antiarrhythmics  metoprolol succinate (TOPROL-XL) 24 hr tablet 25 mg, Daily, Oral    Anticoagulants       Plan  - Replete lytes with a goal of K>4, Mg >2  - Patient is anticoagulated, see medications listed above.  - Patient's afib is currently controlled  - Plan to start heparin

## 2025-07-01 NOTE — CONSULTS
[facility] - Telemetry  Podiatry  Consult Note    Patient Name: Erin Clark  MRN: [id number]  Admission Date: 6/30/2025  Hospital Length of Stay: 1 days  Attending Physician: Chung Kennedy MD  Primary Care Provider: Bayron Golden MD     Inpatient consult to Podiatry  Consult performed by: Edwardo Marie MD  Consult ordered by: Chung Kennedy MD        Subjective:     History of Present Illness:  89-year-old female with PMH CHF, DM, ESRD/HD, HLD, HTN presented to ED 6/30/25 for concerns of tachycardia while undergoing hemodialysis. Podiatry and ID consulted for concerns of wound of left 3rd digit. She was admitted to  for further management.  On encounter, patient is Yakut-speaking and has senile dementia; family members at bedside providing partial history, stating that patient has had discoloration and a wound on the distal aspect of her left 3rd digit for a period of 2-3 weeks; over time, coloration had changed. She endorses pain to the bilateral forefoot, L > R. No history of smoking nor drinking.  Family members attest that patient feels cold as baseline.  Denies fevers, nausea. She is able to ambulate with use of a cane or walker, only for short distances.  She appears to have no other pedal complaints at this time. Labs on encounter demonstrating leukocytosis WBC 17.58 (downtrend from 19K); RBC, H&H diminished. Inflammatory marker elevated .9; procalcitonin 0.34 elevated. Lactic acid elevated 2.9; /58; saturating on 93% O2 room air; otherwise afebrile and vital signs stable on encounter.    Scheduled Meds:   atorvastatin  40 mg Oral QHS    clopidogreL  75 mg Oral Daily    [START ON 7/2/2025] epoetin roberto-epbx  10,000 Units Subcutaneous Every Mon, Wed, Fri    furosemide  40 mg Oral Daily    heparin (porcine)  5,000 Units Subcutaneous Q8H    hydrALAZINE  25 mg Oral Q12H    isosorbide mononitrate  30 mg Oral Daily    metoprolol succinate  25 mg Oral Daily    pantoprazole  40 mg Oral  Before breakfast    piperacillin-tazobactam (Zosyn) IV (PEDS and ADULTS) (extended infusion is not appropriate)  4.5 g Intravenous Q12H    polyethylene glycol  17 g Oral Daily    vitamin renal formula (B-complex-vitamin c-folic acid)  1 capsule Oral Daily     Continuous Infusions:  PRN Meds:  Current Facility-Administered Medications:     dextrose 50%, 12.5 g, Intravenous, PRN    dextrose 50%, 25 g, Intravenous, PRN    glucagon (human recombinant), 1 mg, Intramuscular, PRN    glucose, 16 g, Oral, PRN    glucose, 24 g, Oral, PRN    HYDROmorphone, 1 mg, Intravenous, Q6H PRN    insulin aspart U-100, 0-5 Units, Subcutaneous, QID (AC + HS) PRN    melatonin, 6 mg, Oral, Nightly PRN    sodium chloride 0.9%, 10 mL, Intravenous, Q12H PRN    Pharmacy to dose Vancomycin consult, , , Once **AND** vancomycin - pharmacy to dose, , Intravenous, pharmacy to manage frequency    Review of patient's allergies indicates:   Allergen Reactions    Aspirin Nausea Only and Swelling     Able to tolerated in small doses          Past Medical History:   Diagnosis Date    CHF (congestive heart failure)     Colon polyps 06/03/2013    Coronary artery disease     Diabetes mellitus     Encounter for blood transfusion     ESRD (end stage renal disease) 03/2014    dialysis M-F    GERD (gastroesophageal reflux disease)     High cholesterol     Hypertension      Past Surgical History:   Procedure Laterality Date    AV FISTULA PLACEMENT Left 9/2014    bilateral fem-pop      CENTRAL VENOUS CATHETER TUNNELED INSERTION DOUBLE LUMEN Right 2/2014    CORONARY ARTERY BYPASS GRAFT  2/14/2014     x 3 vessels    LEFT HEART CATHETERIZATION N/A 5/31/2021    Procedure: Left heart cath;  Surgeon: Brian Sanchez MD;  Location: [facility] CATH LAB/EP;  Service: Cardiology;  Laterality: N/A;    PERCUTANEOUS TRANSLUMINAL ANGIOPLASTY OF ARTERIOVENOUS FISTULA Left 5/5/2020    Procedure: PTA, Repair left upper arm anuerysm AV FISTULA;  Surgeon: Doris Mary MD;   Location: [facility] OR;  Service: General;  Laterality: Left;    VASCULAR SURGERY         Family History       Problem Relation (Age of Onset)    Heart attack Mother    Heart disease Mother    Hypertension Mother          Tobacco Use    Smoking status: Never    Smokeless tobacco: Never   Substance and Sexual Activity    Alcohol use: No    Drug use: No    Sexual activity: Never     Review of Systems   Unable to perform ROS: Dementia     Objective:     Vital Signs (Most Recent):  Temp: 98 °F (36.7 °C) (07/01/25 1610)  Pulse: 106 (07/01/25 1610)  Resp: 18 (07/01/25 1610)  BP: (!) 102/58 (07/01/25 1610)  SpO2: (!) 93 % (07/01/25 1610) Vital Signs (24h Range):  Temp:  [97.7 °F (36.5 °C)-99.8 °F (37.7 °C)] 98 °F (36.7 °C)  Pulse:  [] 106  Resp:  [17-22] 18  SpO2:  [93 %-98 %] 93 %  BP: (101-118)/(49-66) 102/58     Weight: 45.8 kg (100 lb 15.5 oz)  Body mass index is 18.47 kg/m².    Foot Exam    General  Orientation: disoriented   Assistance: walker use       Right Foot/Ankle     Inspection and Palpation  Tenderness: (Forefoot)  Swelling: none   Skin Exam: no maceration, no cellulitis, no ulcer and no erythema     Neurovascular  Dorsalis pedis: doppler  Posterior tibial: doppler    Comments  No discernible open wounds in the right lower extremity     Doppler signals:  Multiphasic DP, multiphasic PT    Left Foot/Ankle      Inspection and Palpation  Tenderness: (Forefoot)  Swelling: none   Skin Exam: dry skin, maceration, skin changes, ulcer and erythema; no drainage and no cellulitis     Neurovascular  Dorsalis pedis: doppler  Posterior tibial: doppler  Saphenous nerve sensation: diminished  Tibial nerve sensation: diminished  Superficial peroneal nerve sensation: diminished  Deep peroneal nerve sensation: diminished  Sural nerve sensation: diminished    Comments  Left foot: 3rd digit with dry gangrenous changes.  Malodor is present; negative for crepitus, no fluctuance. No purulence on manual expression.  Very tender to  palpation.    2nd and 3rd interspace is present with maceration.    Second and 4th, 5th digits present with erythematous changes.  Erythema extending to the dorsal forefoot.    Doppler signals:  Monophasic DP, multiphasic PT      Laboratory:  CBC:   Recent Labs   Lab 07/01/25  1002   WBC 17.58*   RBC 2.88*   HGB 9.0*   HCT 27.7*      MCV 96   MCH 31.3*   MCHC 32.5     CMP:   Recent Labs   Lab 07/01/25  1002   GLU 87   CALCIUM 8.3*   ALBUMIN 2.3*   PROT 6.8   *   K 4.5   CO2 26   CL 95   BUN 21   CREATININE 4.8*   ALKPHOS 85   ALT 11   AST 26   BILITOT 0.8     CRP:   Recent Labs   Lab 06/30/25  1511   .9*     Microbiology Results (last 7 days)       Procedure Component Value Units Date/Time    AFB Culture & Smear [[id number]] Collected: 07/01/25 1716    Order Status: Sent Specimen: Wound from Toe, Left Foot Updated: 07/01/25 1729    Fungus culture [[id number]] Collected: 07/01/25 1716    Order Status: Sent Specimen: Wound from Toe, Left Foot Updated: 07/01/25 1726    Gram stain [[id number]] Collected: 07/01/25 1716    Order Status: Sent Specimen: Wound from Toe, Left Foot Updated: 07/01/25 1726    Culture, Anaerobic [[id number]] Collected: 07/01/25 1715    Order Status: Sent Specimen: Wound from Toe, Left Foot Updated: 07/01/25 1726    Aerobic culture [[id number]] Collected: 07/01/25 1715    Order Status: Sent Specimen: Wound from Toe, Left Foot Updated: 07/01/25 1726    Blood Culture #1 **CANNOT BE ORDERED STAT** [[id number]]  (Normal) Collected: 06/30/25 1532    Order Status: Completed Specimen: Blood from Peripheral, Forearm, Right Updated: 07/01/25 0700     Blood Culture No Growth After 6 Hours    Blood Culture #2 **CANNOT BE ORDERED STAT** [[id number]]  (Normal) Collected: 06/30/25 1537    Order Status: Completed Specimen: Blood Updated: 07/01/25 0700     Blood Culture No Growth After 6 Hours            Diagnostic Results:  I have reviewed all pertinent imaging results/findings within the  past 24 hours.    Clinical Findings:  Left foot 2nd digit              Assessment/Plan:     ID  * Acute osteomyelitis of toe of left foot  89-year-old female with PMH CHF, DM, ESRD/HD, HLD, HTN presented to ED 6/30/25 for concerns of tachycardia while undergoing hemodialysis. Podiatry and ID consulted for concerns of wound of left 3rd digit.    Assessment:  Dry, gangrenous changes noted to the 3rd digit of the left foot.  Concern for osteomyelitis of the 3rd digit distal phalanx per radiographic imaging. SFA occlusion bilaterally per arterial ultrasound.    Plan:   - Patient seen and evaluated bedside by Podiatry.  Exam, imaging, treatment options discussed  - Culture swab was obtained of the 2nd interspace left foot, sent to microbiology  - ID consulted, appreciate recommendations.  - Cardiology consulted, appreciate recommendations.   - Betadine wet-to-dry dressing applied to the left foot. Nursing wound care to apply Betadine air to dry, no dressings after today  - Patient may WBAT in Darco shoe LLE with walker  - TBI ordered for assessment of inflow to digits  - Podiatry will follow        Thank you for your consult. I will follow-up with patient. Please contact us if you have any additional questions.    Edwardo Marie DPM PGY-3  Podiatric Medicine & Surgery  [facility]  Secure Chat Preferred  Pager: [phone]    [facility] - Telemetry

## 2025-07-01 NOTE — CONSULTS
NEPHROLOGY CONSULT NOTE    HPI & INTERVAL HISTORY:    Past Medical History:   Diagnosis Date    CHF (congestive heart failure)     Colon polyps 06/03/2013    Coronary artery disease     Diabetes mellitus     Encounter for blood transfusion     ESRD (end stage renal disease) 03/2014    dialysis M-F    GERD (gastroesophageal reflux disease)     High cholesterol     Hypertension       Past Surgical History:   Procedure Laterality Date    AV FISTULA PLACEMENT Left 9/2014    bilateral fem-pop      CENTRAL VENOUS CATHETER TUNNELED INSERTION DOUBLE LUMEN Right 2/2014    CORONARY ARTERY BYPASS GRAFT  2/14/2014     x 3 vessels    LEFT HEART CATHETERIZATION N/A 5/31/2021    Procedure: Left heart cath;  Surgeon: Brian Sanchez MD;  Location: [facility] CATH LAB/EP;  Service: Cardiology;  Laterality: N/A;    PERCUTANEOUS TRANSLUMINAL ANGIOPLASTY OF ARTERIOVENOUS FISTULA Left 5/5/2020    Procedure: PTA, Repair left upper arm anuerysm AV FISTULA;  Surgeon: Doris Mary MD;  Location: [facility] OR;  Service: General;  Laterality: Left;    VASCULAR SURGERY        Review of patient's allergies indicates:   Allergen Reactions    Aspirin Nausea Only and Swelling     Able to tolerated in small doses        Prescriptions Prior to Admission[1]    Social History[2]     MEDS   atorvastatin  40 mg Oral QHS    clopidogreL  75 mg Oral Daily    [START ON 7/2/2025] epoetin roberto-epbx  10,000 Units Subcutaneous Every Mon, Wed, Fri    furosemide  40 mg Oral Daily    heparin (porcine)  5,000 Units Subcutaneous Q8H    hydrALAZINE  25 mg Oral Q12H    isosorbide mononitrate  30 mg Oral Daily    metoprolol succinate  25 mg Oral Daily    pantoprazole  40 mg Oral Before breakfast    piperacillin-tazobactam (Zosyn) IV (PEDS and ADULTS) (extended infusion is not appropriate)  4.5 g Intravenous Q12H    polyethylene glycol  17 g Oral Daily    vitamin renal formula (B-complex-vitamin c-folic acid)  1 capsule Oral Daily             CONTINOUS  "INFUSIONS:      Intake/Output Summary (Last 24 hours) at 7/1/2025 1821  Last data filed at 7/1/2025 0639  Gross per 24 hour   Intake 44.61 ml   Output 0 ml   Net 44.61 ml        HEMODYNAMICS:    Temp:  [97.7 °F (36.5 °C)-99.8 °F (37.7 °C)] 98 °F (36.7 °C)  Pulse:  [] 106  Resp:  [17-21] 18  SpO2:  [93 %-98 %] 93 %  BP: (101-118)/(49-66) 102/58   General:   Cardiology : pulse 100  Pulmonary : RR 18  Pulse oximeter 93 % O2  Abdomen soft   Extremities :  edema   Skin: dry   LABS   Lab Results   Component Value Date    WBC 17.58 (H) 07/01/2025    HGB 9.0 (L) 07/01/2025    HCT 27.7 (L) 07/01/2025    MCV 96 07/01/2025     07/01/2025        Recent Labs   Lab 07/01/25  1002   GLU 87   CALCIUM 8.3*   ALBUMIN 2.3*   PROT 6.8   *   K 4.5   CO2 26   CL 95   BUN 21   CREATININE 4.8*   ALKPHOS 85   ALT 11   AST 26   BILITOT 0.8      Lab Results   Component Value Date     (H) 02/23/2014    CALCIUM 8.3 (L) 07/01/2025    CAION 1.17 03/11/2014    PHOS 3.2 07/01/2025      Lab Results   Component Value Date    IRON 65 06/13/2025    TIBC 147 (L) 06/13/2025    FERRITIN 1,741.2 (H) 06/13/2025        ABG  No results for input(s): "PH", "PO2", "PCO2", "HCO3", "BE" in the last 168 hours.      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 Mildly increased wall thickness. Normal wall motion. There is normal systolic function. Quantitated ejection fraction is 58%. Diastolic function cannot be reliably determined in the presence of mitral annular calcification.    Right Ventricle: The right ventricle is normal in size measuring 3.0 cm. Systolic function is normal.    Left Atrium: The left atrium is mildly dilated measuring 38 mL/m2.    Aortic Valve: The aortic valve is a trileaflet valve. There is aortic valve " sclerosis.    Mitral Valve: There is mild stenosis. The mean pressure gradient across the mitral valve is 5 mmHg at a heart rate of 87 bpm. There is mild regurgitation.    Tricuspid Valve: There is mild regurgitation.    Pulmonary Artery: There is moderate pulmonary hypertension. The estimated pulmonary artery systolic pressure is 52 mmHg.     Renal diet  Dialysis in am       [1]   Medications Prior to Admission   Medication Sig Dispense Refill Last Dose/Taking    atorvastatin (LIPITOR) 40 MG tablet TAKE 1 TABLET BY MOUTH ONCE DAILY IN THE EVENING 90 tablet 3 6/29/2025    clopidogreL (PLAVIX) 75 mg tablet Take 1 tablet (75 mg total) by mouth once daily. 30 tablet 0 6/30/2025    furosemide (LASIX) 40 MG tablet Take 1 tablet (40 mg total) by mouth once daily. 90 tablet 0 6/30/2025    hydrALAZINE (APRESOLINE) 25 MG tablet Take 25 mg by mouth every 12 (twelve) hours.   6/30/2025    isosorbide mononitrate (IMDUR) 30 MG 24 hr tablet Take 1 tablet (30 mg total) by mouth once daily. 90 tablet 3 6/30/2025    metoprolol succinate (TOPROL-XL) 25 MG 24 hr tablet Take 1 tablet by mouth once daily (Patient taking differently: Take 25 mg by mouth once daily.) 90 tablet 3 6/30/2025    pantoprazole (PROTONIX) 40 MG tablet Take 40 mg by mouth before breakfast.   6/30/2025    vitamin renal formula, B-complex-vitamin c-folic acid, (TRIPHROCAPS) 1 mg Cap Take 1 capsule by mouth once daily. 90 capsule 0 6/29/2025    aspirin (ECOTRIN) 81 MG EC tablet TAKE ONE TABLET BY MOUTH ONCE DAILY (Patient not taking: Reported on 6/17/2025) 30 tablet 6 Unknown    nitroGLYCERIN (NITROSTAT) 0.4 MG SL tablet Place 0.4 mg under the tongue every 5 (five) minutes as needed for Chest pain.   Unknown    povidone-iodine (BETADINE) 10 % external solution Apply topically as needed for Wound Care. 59 mL 0 Unknown   [2]   Social History  Socioeconomic History    Marital status:    Tobacco Use    Smoking status: Never    Smokeless tobacco: Never   Substance  and Sexual Activity    Alcohol use: No    Drug use: No    Sexual activity: Never     Social Drivers of Health     Financial Resource Strain: Patient Unable To Answer (7/1/2025)    Overall Financial Resource Strain (CARDIA)     Difficulty of Paying Living Expenses: Patient unable to answer   Food Insecurity: Patient Unable To Answer (7/1/2025)    Hunger Vital Sign     Worried About Running Out of Food in the Last Year: Patient unable to answer     Ran Out of Food in the Last Year: Patient unable to answer   Transportation Needs: Patient Unable To Answer (7/1/2025)    PRAPARE - Transportation     Lack of Transportation (Medical): Patient unable to answer     Lack of Transportation (Non-Medical): Patient unable to answer   Physical Activity: Patient Unable To Answer (7/1/2025)    Exercise Vital Sign     Days of Exercise per Week: Patient unable to answer     Minutes of Exercise per Session: Patient unable to answer   Stress: Patient Unable To Answer (7/1/2025)    [employer] - Occupational Stress Questionnaire     Feeling of Stress : Patient unable to answer   Housing Stability: Patient Unable To Answer (7/1/2025)    Housing Stability Vital Sign     Unable to Pay for Housing in the Last Year: Patient unable to answer     Number of Times Moved in the Last Year: 0     Homeless in the Last Year: Patient unable to answer

## 2025-07-01 NOTE — PLAN OF CARE
met with patient on rounds with MD team, will contact family to complete assessment. Patient was in pain at the time.    1400-- attempted to contact patient's patient's son and daughter. Unable to reach, will follow-up.    1520-- attempted to contact patient's daughter again. Voicemail left for call back. Patient recently admitted last month. Chart reviewed with assessment information. Patient lives at home with his son and brother. She has the DME listed below. Patient does not have Home Health. Per chart review, unable to secure [employer] staff as family requested. Patient does not drive, but family will transport for appointments and HD. Patient goes to HD MWF at [facility]. Referral noted in system family asking about home HD. Will update family that CM here does not set this up. They would need to speak with patient's HD clinic. Clinic would need to see if appropriate/educated etc.  will continue to follow patient through transitions of care and assist with any discharge needs.      received call from patient's daughter Rosa (Erin is her name, but prefers to be called by her middle name). She verified above information. Will place resources on AVS regarding meals on wheelchair and [facility]. She has numbers from last admission she will call to set up and AIDE.    Emergency Contacts    Name Relation Home Work Mobile   Erin Bailey Daughter   [phone]      Other Contacts    Name Relation Home Work Mobile   Tor Clark Spouse [phone]     Leif Clark Son [phone]        Future Appointments   Date Time Provider Department Center   7/8/2025  3:00 PM Christie Baez DPM [facility] WOUND [facility]   7/17/2025  3:00 PM Ada Gregory MD [facility] IMPRI [facility]   8/12/2025  3:00 PM Leif Veliz MD [facility] CARDIO [facility]   11/6/2025  4:00 PM Bayron Golden MD [facility]  [facility] [place]         07/01/25 1357   Rounds   Attendance Provider;Nurse    Discharge Plan A Home with family;Home Health   Why the patient remains in the hospital Requires continued medical care   Transition of Care Barriers None          07/01/25 1525   Discharge Assessment   Assessment Type Discharge Planning Assessment   Source of Information health record   People in Home sibling(s);spouse   Do you expect to return to your current living situation? Yes   Do you have help at home or someone to help you manage your care at home? Yes   Prior to hospitilization cognitive status: Unable to Assess   Current cognitive status: Unable to Assess   Walking or Climbing Stairs Difficulty yes   Walking or Climbing Stairs ambulation difficulty, requires equipment;ambulation difficulty, dependent   Dressing/Bathing Difficulty yes   Dressing/Bathing bathing difficulty, assistance 1 person;dressing difficulty, assistance 1 person   Equipment Currently Used at Home walker, rolling;cane, straight   Do you take prescription medications? Yes   Do you have prescription coverage? Yes   Do you have any problems affording any of your prescribed medications? TBD   How do you get to doctors appointments? family or friend will provide   Are you on dialysis? Yes   Dialysis Name and Scheduled days [facility]ud[facility]   Discharge Plan A   (TBD)   Discharge Plan B Home with family;Home Health   DME Needed Upon Discharge  other (see comments)  (TBD)   Physical Activity   On average, how many days per week do you engage in moderate to strenuous exercise (like a brisk walk)? Pt Unable   On average, how many minutes do you engage in exercise at this level? Pt Unable   Financial Resource Strain   How hard is it for you to pay for the very basics like food, housing, medical care, and heating? Pt Unable   Housing Stability   In the last 12 months, was there a time when you were not able to pay the mortgage or rent on time? Pt Unable    At any time in the past 12 months, were you homeless or living in a shelter (including now)? Pt Unable   Transportation Needs   In the past 12 months, has lack of transportation kept you from medical appointments or from getting medications? Pt Unable   In the past 12 months, has lack of transportation kept you from meetings, work, or from getting things needed for daily living? Pt Unable   Food Insecurity   Within the past 12 months, you worried that your food would run out before you got the money to buy more. Pt Unable   Within the past 12 months, the food you bought just didn't last and you didn't have money to get more. Pt Unable   Stress   Do you feel stress - tense, restless, nervous, or anxious, or unable to sleep at night because your mind is troubled all the time - these days? Pt Unable   Social Isolation   How often do you feel lonely or isolated from those around you?  Patient unable to answer   Alcohol Use   Q1: How often do you have a drink containing alcohol? Pt Unable   Q2: How many drinks containing alcohol do you have on a typical day when you are drinking? Pt Unable   Q3: How often do you have six or more drinks on one occasion? Pt Unable     Marlee Parker RN    [phone]

## 2025-07-01 NOTE — ASSESSMENT & PLAN NOTE
"This patient does have evidence of infective focus  My overall impression is sepsis with Encephalopathy.  Source: Skin and Soft Tissue Infection: gangrene  Antibiotics given-   Antibiotics (72h ago, onward)      Start     Stop Route Frequency Ordered    07/01/25 1400  piperacillin-tazobactam (ZOSYN) 4.5 g in D5W 100 mL IVPB (MB+)         -- IV Every 12 hours (non-standard times) 07/01/25 1335    06/30/25 1621  vancomycin - pharmacy to dose  (vancomycin IVPB (PEDS and ADULTS))        Placed in "And" Linked Group    -- IV pharmacy to manage frequency 06/30/25 1521          Latest lactate reviewed-  Recent Labs   Lab 06/30/25  1839   LACTATE 1.3     Organ dysfunction indicated by Encephalopathy    Fluid challenge Contraindicated- Fluid bolus is contraindicated in this patient due to End Stage Renal Disease     Post- resuscitation assessment Yes - I attest a sepsis perfusion exam was performed within 6 hours of sepsis, severe sepsis, or septic shock presentation, following fluid resuscitation.      Will Not start Pressors- Levophed for MAP of 65  Source control achieved by: Antibiotic therapy  "

## 2025-07-01 NOTE — PROGRESS NOTES
[facility] Medicine  Progress Note    Patient Name: Erin Clark  MRN: [id number]  Patient Class: IP- Inpatient   Admission Date: 6/30/2025  Length of Stay: 1 days  Attending Physician: Chung Kennedy MD  Primary Care Provider: Bayron Golden MD        Subjective     Principal Problem:Acute osteomyelitis of toe of left foot        HPI:  Erin Clark is a chronically-ill 89-year-old female that presented to the ED at [facility] this afternoon for tachycardia. The patient presented to the ED from a local dialysis center today after the completion of her HD treatment. Son served as historian; interpretation services provided by ([place] [id number]). Apparently, the patient experienced tachycardia throughout the entire HD session.   The nursing staff advised the patient's son of the issues and he transported the patient to the ED for further evaluation.     Overview/Hospital Course:  No notes on file    Interval History:  PATIENT WAS SEEN IN THE MORNING COMPLAINING OF ABDOMINAL PAIN VERY TEARFUL, PATIENT BASELINE HAS SENILE DEMENTIA, NEPHROLOGY, PODIATRY, ID, CARDIOLOGY OFF AND CONSULTED, X-RAY OF THE LEFT 3RD TOE SHOWING PICTURE OF OSTEOMYELITIS CURRENTLY ON VANC AND ZOSYN.  PENDING IMAGING OF THE ABDOMEN DUE TO ABDOMINAL PAIN  AND BLADDER SCAN    Review of Systems   Unable to perform ROS: Dementia     Objective:     Vital Signs (Most Recent):  Temp: 98 °F (36.7 °C) (07/01/25 1130)  Pulse: 100 (07/01/25 1208)  Resp: 18 (07/01/25 1130)  BP: (!) 112/58 (07/01/25 1215)  SpO2: 95 % (07/01/25 1130) Vital Signs (24h Range):  Temp:  [97.7 °F (36.5 °C)-99.8 °F (37.7 °C)] 98 °F (36.7 °C)  Pulse:  [] 100  Resp:  [14-42] 18  SpO2:  [95 %-98 %] 95 %  BP: ()/(49-78) 112/58     Weight: 45.8 kg (100 lb 15.5 oz)  Body mass index is 18.47 kg/m².    Intake/Output Summary (Last 24 hours) at 7/1/2025 5616  Last data filed at 7/1/2025 0639  Gross per 24 hour   Intake 44.61 ml   Output 0 ml   Net 44.61  ml         Physical Exam  Constitutional:       Appearance: She is ill-appearing and toxic-appearing.   HENT:      Head: Normocephalic and atraumatic.      Right Ear: Tympanic membrane normal.      Left Ear: Tympanic membrane normal.      Nose: Nose normal.      Mouth/Throat:      Mouth: Mucous membranes are moist.   Eyes:      Extraocular Movements: Extraocular movements intact.      Pupils: Pupils are equal, round, and reactive to light.   Cardiovascular:      Rate and Rhythm: Rhythm irregular.      Comments: AVF to left upper extremity; + bruit/thrill  Pulmonary:      Effort: Pulmonary effort is normal.   Abdominal:      General: Abdomen is flat.      Tenderness: There is abdominal tenderness.   Musculoskeletal:         General: Normal range of motion.      Cervical back: Normal range of motion and neck supple.   Skin:     Capillary Refill: Capillary refill takes less than 2 seconds.      Comments: Gangrene to left third toe   Neurological:      Mental Status: She is alert and oriented to person, place, and time.   Psychiatric:         Mood and Affect: Mood normal.               Significant Labs: All pertinent labs within the past 24 hours have been reviewed.  Recent Lab Results  (Last 5 results in the past 24 hours)        07/01/25  1126   07/01/25  1002   07/01/25  0651   07/01/25  0630   06/30/25  1839        Albumin   2.3             ALP   85             ALT   11             Anion Gap   12             AST   26             Baso #   0.05             Basophil %   0.3             BILIRUBIN TOTAL   0.8  Comment: For infants and newborns, interpretation of results should be based   on gestational age, weight and in agreement with clinical   observations.    Premature Infant recommended reference ranges:   0-24 hours:  <8.0 mg/dL   24-48 hours: <12.0 mg/dL   3-5 days:    <15.0 mg/dL   6-29 days:   <15.0 mg/dL             BUN   21             Calcium   8.3             Chloride   95             CO2   26              Creatinine   4.8             eGFR   8  Comment: Estimated GFR calculated using the CKD-EPI creatinine (2021) equation.             Eos #   0.12             Eos %   0.7             Glucose   87             Gran # (ANC)   15.69             Hematocrit   27.7             Hemoglobin   9.0             Immature Grans (Abs)   0.10  Comment: Mild elevation in immature granulocytes is non specific and can be seen in a variety of conditions including stress response, acute inflammation, trauma and pregnancy. Correlation with other laboratory and clinical findings is essential.             Immature Granulocytes   0.6             Lactic Acid Level         1.3       Lymph #   0.57             Lymph %   3.2             MCH   31.3             MCHC   32.5             MCV   96             Mono #   1.05             Mono %   6.0             MPV   9.4             Neut %   89.2             nRBC   0             Phosphorus Level   3.2             Platelet Count   277             POCT Glucose 78     148   48         Potassium   4.5             PROTEIN TOTAL   6.8             RBC   2.88             RDW   15.0             Sodium   133             WBC   17.58                                    Significant Imaging: I have reviewed all pertinent imaging results/findings within the past 24 hours.  Imaging Results              X-Ray Foot Complete Left (Final result)  Result time 06/30/25 17:22:22      Final result by Shraddha Chanel MD (06/30/25 17:22:22)                   Impression:      Osteopenia.    Partial progressive interval healing of the known 4th toe proximal phalangeal base fracture.    Ulceration at the tip of the 3rd toe distal phalanx.  Questionable slight erosive irregularity 3rd toe distal phalanx representing osteomyelitis otherwise.  Regional soft tissue swelling and soft tissue gas representing infection.  Recommend MRI.      Electronically signed by: Shraddha Chanel  Date:    06/30/2025  Time:    17:22                Narrative:    EXAMINATION:  XR FOOT COMPLETE 3 VIEW LEFT    CLINICAL HISTORY:  .  Gangrene, not elsewhere classified    TECHNIQUE:  AP, lateral and oblique views of the left foot were performed.    COMPARISON:  06/12/2025    FINDINGS:  See below                                       X-Ray Chest AP Portable (Final result)  Result time 06/30/25 15:55:35      Final result by Adam Mullen MD (06/30/25 15:55:35)                   Impression:      As above.      Electronically signed by: Adam Mullen MD  Date:    06/30/2025  Time:    15:55               Narrative:    EXAMINATION:  XR CHEST AP PORTABLE    CLINICAL HISTORY:  Tachycardia, unspecified    TECHNIQUE:  Single frontal view of the chest was performed.    FINDINGS:  There are bilateral perihilar opacities with interstitial prominence suspicious for edema.  There is no focal consolidative change.  There is no pneumothorax or pleural fluid identified.  There is elevation of the left hemidiaphragm.  The cardiac silhouette appears enlarged.  There is calcification of the aorta and patient is status post sternotomy.  The osseous structures demonstrate degenerative change.                                  Echo  Result Date: 6/13/2025    Left Ventricle: The left ventricle is normal in size. Mildly increased   wall thickness. Normal wall motion. There is normal systolic function.   Quantitated ejection fraction is 58%. Diastolic function cannot be   reliably determined in the presence of mitral annular calcification.    Right Ventricle: The right ventricle is normal in size measuring 3.0   cm. Systolic function is normal.    Left Atrium: The left atrium is mildly dilated measuring 38 mL/m2.    Aortic Valve: The aortic valve is a trileaflet valve. There is aortic   valve sclerosis.    Mitral Valve: There is mild stenosis. The mean pressure gradient across   the mitral valve is 5 mmHg at a heart rate of 87 bpm. There is mild   regurgitation.    Tricuspid Valve: There is  "mild regurgitation.    Pulmonary Artery: There is moderate pulmonary hypertension. The   estimated pulmonary artery systolic pressure is 52 mmHg.            Assessment & Plan  Sepsis  This patient does have evidence of infective focus  My overall impression is sepsis with Encephalopathy.  Source: Skin and Soft Tissue Infection: gangrene  Antibiotics given-   Antibiotics (72h ago, onward)      Start     Stop Route Frequency Ordered    07/01/25 1400  piperacillin-tazobactam (ZOSYN) 4.5 g in D5W 100 mL IVPB (MB+)         -- IV Every 12 hours (non-standard times) 07/01/25 1335    06/30/25 1621  vancomycin - pharmacy to dose  (vancomycin IVPB (PEDS and ADULTS))        Placed in "And" Linked Group    -- IV pharmacy to manage frequency 06/30/25 1521          Latest lactate reviewed-  Recent Labs   Lab 06/30/25  1839   LACTATE 1.3     Organ dysfunction indicated by Encephalopathy    Fluid challenge Contraindicated- Fluid bolus is contraindicated in this patient due to End Stage Renal Disease     Post- resuscitation assessment Yes - I attest a sepsis perfusion exam was performed within 6 hours of sepsis, severe sepsis, or septic shock presentation, following fluid resuscitation.      Will Not start Pressors- Levophed for MAP of 65  Source control achieved by: Antibiotic therapy  End-stage renal disease on hemodialysis  Creatine stable for now. BMP reviewed- noted Estimated Creatinine Clearance: 5.9 mL/min (A) (based on SCr of 4.8 mg/dL (H)). according to latest data. Based on current GFR, CKD stage is end stage.  Monitor UOP and serial BMP and adjust therapy as needed. Renally dose meds. Avoid nephrotoxic medications and procedures.  Nephrology is consulted to continue on her dialysis  Essential hypertension  Patient's blood pressure range in the last 24 hours was: BP  Min: 99/50  Max: 162/78.The patient's inpatient anti-hypertensive regimen is listed below:  Current Antihypertensives  hydrALAZINE tablet 25 mg, Every 12 " hours, Oral  isosorbide mononitrate 24 hr tablet 30 mg, Daily, Oral  furosemide tablet 40 mg, Daily, Oral  metoprolol succinate (TOPROL-XL) 24 hr tablet 25 mg, Daily, Oral    Plan  - BP is controlled, no changes needed to their regimen    Chronic diastolic congestive heart failure  Patient has Diastolic (HFpEF) heart failure that is Chronic. On presentation their CHF was well compensated. Most recent BNP and echo results are listed below.  Recent Labs     06/30/25  1511   *     Latest ECHO  Results for orders placed during the hospital encounter of 06/12/25    Echo    Interpretation Summary    Left Ventricle: The left ventricle is normal in size. Mildly increased wall thickness. Normal wall motion. There is normal systolic function. Quantitated ejection fraction is 58%. Diastolic function cannot be reliably determined in the presence of mitral annular calcification.    Right Ventricle: The right ventricle is normal in size measuring 3.0 cm. Systolic function is normal.    Left Atrium: The left atrium is mildly dilated measuring 38 mL/m2.    Aortic Valve: The aortic valve is a trileaflet valve. There is aortic valve sclerosis.    Mitral Valve: There is mild stenosis. The mean pressure gradient across the mitral valve is 5 mmHg at a heart rate of 87 bpm. There is mild regurgitation.    Tricuspid Valve: There is mild regurgitation.    Pulmonary Artery: There is moderate pulmonary hypertension. The estimated pulmonary artery systolic pressure is 52 mmHg.    Current Heart Failure Medications  hydrALAZINE tablet 25 mg, Every 12 hours, Oral  furosemide tablet 40 mg, Daily, Oral  metoprolol succinate (TOPROL-XL) 24 hr tablet 25 mg, Daily, Oral    Plan  - Monitor strict I&Os and daily weights.    - Place on telemetry  - Low sodium diet  - Place on fluid restriction of 1 L.   - Cardiology has been consulted  - The patient's volume status is stable but not at their baseline as indicated by              New onset  a-fib  Patient has paroxysmal (<7 days) atrial fibrillation. Patient is currently in atrial fibrillation. [id number] Score: 5. The patients heart rate in the last 24 hours is as follows:  Pulse  Min: 68  Max: 120     Antiarrhythmics  metoprolol succinate (TOPROL-XL) 24 hr tablet 25 mg, Daily, Oral    Anticoagulants  heparin (porcine) injection 5,000 Units, Every 8 hours, Subcutaneous    Plan  - Replete lytes with a goal of K>4, Mg >2  - Patient is anticoagulated, see medications listed above.  - Patient's afib is currently controlled  - Plan to start heparin       Type 2 diabetes mellitus with diabetic nephropathy, without long-term current use of insulin  -Start accuchecks with Novolog correction    Hypertension  Patient's blood pressure range in the last 24 hours was: BP  Min: 99/50  Max: 162/78.The patient's inpatient anti-hypertensive regimen is listed below:  Current Antihypertensives  hydrALAZINE tablet 25 mg, Every 12 hours, Oral  isosorbide mononitrate 24 hr tablet 30 mg, Daily, Oral  furosemide tablet 40 mg, Daily, Oral  metoprolol succinate (TOPROL-XL) 24 hr tablet 25 mg, Daily, Oral    Plan  - BP is controlled, no changes needed to their regimen    Frailty  Chronic/patient has a advanced dementia   PT OT ordered    Senile dementia  Patient with dementia 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 Dementia is moderate. The patient does have signs of behavioral disturbance. Home dementia medications are Held or Continued: continued.. Continue non-pharmacologic interventions to prevent delirium (No VS between 11PM-5AM, activity during day, opening blinds, providing glasses/hearing aids, and up in chair during daytime). Will avoid narcotics and benzos unless absolutely necessary. PRN anti-psychotics are not prescribed to avoid self harm behaviors.  Frequent reorientation, lowering the bed levels, lifting a bed rails, have video monitor camera in place, continuous redirection  Acute osteomyelitis of toe of  left foot  Detected by x-ray  Podiatry consulted  ID consulted  Arterial Doppler of lower limbs ordered  Currently on board spectrum antibiotics  Cultures are so far negative    PVD (peripheral vascular disease)  Patient has documented history of peripheral vascular disease  Nonhealing left 3rd toe gangrene  Cardiology is on board  Pending arterial Doppler ultrasound of the lower limbs  On aspirin and statin    VTE Risk Mitigation (From admission, onward)           Ordered     heparin (porcine) injection 5,000 Units  Every 8 hours         07/01/25 1335     Place sequential compression device  Until discontinued         06/30/25 1830                    Discharge Planning   KATIE:      Code Status: Full Code   Medical Readiness for Discharge Date:   Discharge Plan A: Home with family, Home Health                        Chung Dominique MD  Department of Hospital Medicine   [facility]

## 2025-07-01 NOTE — SUBJECTIVE & OBJECTIVE
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  PENDING IMAGING OF THE ABDOMEN DUE TO ABDOMINAL PAIN  AND BLADDER SCAN    Review of Systems   Unable to perform ROS: Dementia     Objective:     Vital Signs (Most Recent):  Temp: 98 °F (36.7 °C) (07/01/25 1130)  Pulse: 100 (07/01/25 1208)  Resp: 18 (07/01/25 1130)  BP: (!) 112/58 (07/01/25 1215)  SpO2: 95 % (07/01/25 1130) Vital Signs (24h Range):  Temp:  [97.7 °F (36.5 °C)-99.8 °F (37.7 °C)] 98 °F (36.7 °C)  Pulse:  [] 100  Resp:  [14-42] 18  SpO2:  [95 %-98 %] 95 %  BP: ()/(49-78) 112/58     Weight: 45.8 kg (100 lb 15.5 oz)  Body mass index is 18.47 kg/m².    Intake/Output Summary (Last 24 hours) at 7/1/2025 1445  Last data filed at 7/1/2025 0639  Gross per 24 hour   Intake 44.61 ml   Output 0 ml   Net 44.61 ml         Physical Exam  Constitutional:       Appearance: She is ill-appearing and toxic-appearing.   HENT:      Head: Normocephalic and atraumatic.      Right Ear: Tympanic membrane normal.      Left Ear: Tympanic membrane normal.      Nose: Nose normal.      Mouth/Throat:      Mouth: Mucous membranes are moist.   Eyes:      Extraocular Movements: Extraocular movements intact.      Pupils: Pupils are equal, round, and reactive to light.   Cardiovascular:      Rate and Rhythm: Rhythm irregular.      Comments: AVF to left upper extremity; + bruit/thrill  Pulmonary:      Effort: Pulmonary effort is normal.   Abdominal:      General: Abdomen is flat.      Tenderness: There is abdominal tenderness.   Musculoskeletal:         General: Normal range of motion.      Cervical back: Normal range of motion and neck supple.   Skin:     Capillary Refill: Capillary refill takes less than 2 seconds.      Comments: Gangrene to left third  toe   Neurological:      Mental Status: She is alert and oriented to person, place, and time.   Psychiatric:         Mood and Affect: Mood normal.               Significant Labs: All pertinent labs within the past 24 hours have been reviewed.  Recent Lab Results  (Last 5 results in the past 24 hours)        07/01/25  1126   07/01/25  1002   07/01/25  0651   07/01/25  0630   06/30/25  1839        Albumin   2.3             ALP   85             ALT   11             Anion Gap   12             AST   26             Baso #   0.05             Basophil %   0.3             BILIRUBIN TOTAL   0.8  Comment: For infants and newborns, interpretation of results should be based   on gestational age, weight and in agreement with clinical   observations.    Premature Infant recommended reference ranges:   0-24 hours:  <8.0 mg/dL   24-48 hours: <12.0 mg/dL   3-5 days:    <15.0 mg/dL   6-29 days:   <15.0 mg/dL             BUN   21             Calcium   8.3             Chloride   95             CO2   26             Creatinine   4.8             eGFR   8  Comment: Estimated GFR calculated using the CKD-EPI creatinine (2021) equation.             Eos #   0.12             Eos %   0.7             Glucose   87             Gran # (ANC)   15.69             Hematocrit   27.7             Hemoglobin   9.0             Immature Grans (Abs)   0.10  Comment: Mild elevation in immature granulocytes is non specific and can be seen in a variety of conditions including stress response, acute inflammation, trauma and pregnancy. Correlation with other laboratory and clinical findings is essential.             Immature Granulocytes   0.6             Lactic Acid Level         1.3       Lymph #   0.57             Lymph %   3.2             MCH   31.3             MCHC   32.5             MCV   96             Mono #   1.05             Mono %   6.0             MPV   9.4             Neut %   89.2             nRBC   0             Phosphorus Level   3.2              Platelet Count   277             POCT Glucose 78     148   48         Potassium   4.5             PROTEIN TOTAL   6.8             RBC   2.88             RDW   15.0             Sodium   133             WBC   17.58                                    Significant Imaging: I have reviewed all pertinent imaging results/findings within the past 24 hours.  Imaging Results              X-Ray Foot Complete Left (Final result)  Result time 06/30/25 17:22:22      Final result by Shraddha Chanel MD (06/30/25 17:22:22)                   Impression:      Osteopenia.    Partial progressive interval healing of the known 4th toe proximal phalangeal base fracture.    Ulceration at the tip of the 3rd toe distal phalanx.  Questionable slight erosive irregularity 3rd toe distal phalanx representing osteomyelitis otherwise.  Regional soft tissue swelling and soft tissue gas representing infection.  Recommend MRI.      Electronically signed by: Shraddha Chanel  Date:    06/30/2025  Time:    17:22               Narrative:    EXAMINATION:  XR FOOT COMPLETE 3 VIEW LEFT    CLINICAL HISTORY:  .  Gangrene, not elsewhere classified    TECHNIQUE:  AP, lateral and oblique views of the left foot were performed.    COMPARISON:  06/12/2025    FINDINGS:  See below                                       X-Ray Chest AP Portable (Final result)  Result time 06/30/25 15:55:35      Final result by Adam Mullen MD (06/30/25 15:55:35)                   Impression:      As above.      Electronically signed by: Adam Mullen MD  Date:    06/30/2025  Time:    15:55               Narrative:    EXAMINATION:  XR CHEST AP PORTABLE    CLINICAL HISTORY:  Tachycardia, unspecified    TECHNIQUE:  Single frontal view of the chest was performed.    FINDINGS:  There are bilateral perihilar opacities with interstitial prominence suspicious for edema.  There is no focal consolidative change.  There is no pneumothorax or pleural fluid identified.  There is elevation  of the left hemidiaphragm.  The cardiac silhouette appears enlarged.  There is calcification of the aorta and patient is status post sternotomy.  The osseous structures demonstrate degenerative change.                                  Echo  Result Date: 6/13/2025    Left Ventricle: The left ventricle is normal in size. Mildly increased   wall thickness. Normal wall motion. There is normal systolic function.   Quantitated ejection fraction is 58%. Diastolic function cannot be   reliably determined in the presence of mitral annular calcification.    Right Ventricle: The right ventricle is normal in size measuring 3.0   cm. Systolic function is normal.    Left Atrium: The left atrium is mildly dilated measuring 38 mL/m2.    Aortic Valve: The aortic valve is a trileaflet valve. There is aortic   valve sclerosis.    Mitral Valve: There is mild stenosis. The mean pressure gradient across   the mitral valve is 5 mmHg at a heart rate of 87 bpm. There is mild   regurgitation.    Tricuspid Valve: There is mild regurgitation.    Pulmonary Artery: There is moderate pulmonary hypertension. The   estimated pulmonary artery systolic pressure is 52 mmHg.

## 2025-07-02 ENCOUNTER — PATIENT OUTREACH (OUTPATIENT)
Dept: ADMINISTRATIVE | Facility: OTHER | Age: 89
End: 2025-07-02
Payer: MEDICARE

## 2025-07-02 PROBLEM — E87.6 HYPOKALEMIA: Status: ACTIVE | Noted: 2025-07-02

## 2025-07-02 PROBLEM — K57.90 DIVERTICULOSIS: Chronic | Status: ACTIVE | Noted: 2025-07-02

## 2025-07-02 PROBLEM — I77.4 CELIAC ARTERY STENOSIS: Chronic | Status: ACTIVE | Noted: 2025-07-02

## 2025-07-02 PROBLEM — S22.080A: Chronic | Status: ACTIVE | Noted: 2025-07-02

## 2025-07-02 PROBLEM — N83.8 OVARIAN MASS, LEFT: Chronic | Status: ACTIVE | Noted: 2025-07-02

## 2025-07-02 PROBLEM — I70.0 AORTIC ATHEROSCLEROSIS: Chronic | Status: ACTIVE | Noted: 2025-07-02

## 2025-07-02 LAB
ABSOLUTE EOSINOPHIL (OHS): 0.1 K/UL
ABSOLUTE MONOCYTE (OHS): 0.92 K/UL (ref 0.3–1)
ABSOLUTE NEUTROPHIL COUNT (OHS): 16.57 K/UL (ref 1.8–7.7)
ACID FAST MOD KINY STN SPEC: NORMAL
ALBUMIN SERPL BCP-MCNC: 2.3 G/DL (ref 3.5–5.2)
ALP SERPL-CCNC: 94 UNIT/L (ref 40–150)
ALT SERPL W/O P-5'-P-CCNC: 12 UNIT/L (ref 10–44)
ANION GAP (OHS): 9 MMOL/L (ref 8–16)
AST SERPL-CCNC: 30 UNIT/L (ref 11–45)
BASOPHILS # BLD AUTO: 0.05 K/UL
BASOPHILS NFR BLD AUTO: 0.3 %
BILIRUB SERPL-MCNC: 0.8 MG/DL (ref 0.1–1)
BUN SERPL-MCNC: 7 MG/DL (ref 8–23)
CALCIUM SERPL-MCNC: 8.4 MG/DL (ref 8.7–10.5)
CHLORIDE SERPL-SCNC: 97 MMOL/L (ref 95–110)
CO2 SERPL-SCNC: 32 MMOL/L (ref 23–29)
CREAT SERPL-MCNC: 1.6 MG/DL (ref 0.5–1.4)
ERYTHROCYTE [DISTWIDTH] IN BLOOD BY AUTOMATED COUNT: 15.1 % (ref 11.5–14.5)
GFR SERPLBLD CREATININE-BSD FMLA CKD-EPI: 31 ML/MIN/1.73/M2
GLUCOSE SERPL-MCNC: 95 MG/DL (ref 70–110)
GRAM STN SPEC: NORMAL
HCT VFR BLD AUTO: 27.5 % (ref 37–48.5)
HGB BLD-MCNC: 9.3 GM/DL (ref 12–16)
IMM GRANULOCYTES # BLD AUTO: 0.09 K/UL (ref 0–0.04)
IMM GRANULOCYTES NFR BLD AUTO: 0.5 % (ref 0–0.5)
LYMPHOCYTES # BLD AUTO: 1.07 K/UL (ref 1–4.8)
MCH RBC QN AUTO: 31.2 PG (ref 27–31)
MCHC RBC AUTO-ENTMCNC: 33.8 G/DL (ref 32–36)
MCV RBC AUTO: 92 FL (ref 82–98)
NUCLEATED RBC (/100WBC) (OHS): 0 /100 WBC
PHOSPHATE SERPL-MCNC: 1.4 MG/DL (ref 2.7–4.5)
PLATELET # BLD AUTO: 325 K/UL (ref 150–450)
PMV BLD AUTO: 9.8 FL (ref 9.2–12.9)
POCT GLUCOSE: 81 MG/DL (ref 70–110)
POCT GLUCOSE: 89 MG/DL (ref 70–110)
POCT GLUCOSE: 93 MG/DL (ref 70–110)
POTASSIUM SERPL-SCNC: 3.1 MMOL/L (ref 3.5–5.1)
PROT SERPL-MCNC: 7.4 GM/DL (ref 6–8.4)
RBC # BLD AUTO: 2.98 M/UL (ref 4–5.4)
RELATIVE EOSINOPHIL (OHS): 0.5 %
RELATIVE LYMPHOCYTE (OHS): 5.7 % (ref 18–48)
RELATIVE MONOCYTE (OHS): 4.9 % (ref 4–15)
RELATIVE NEUTROPHIL (OHS): 88.1 % (ref 38–73)
SODIUM SERPL-SCNC: 138 MMOL/L (ref 136–145)
WBC # BLD AUTO: 18.8 K/UL (ref 3.9–12.7)

## 2025-07-02 PROCEDURE — 21400001 HC TELEMETRY ROOM

## 2025-07-02 PROCEDURE — 25000003 PHARM REV CODE 250: Performed by: INTERNAL MEDICINE

## 2025-07-02 PROCEDURE — 99223 1ST HOSP IP/OBS HIGH 75: CPT | Mod: ,,, | Performed by: NURSE PRACTITIONER

## 2025-07-02 PROCEDURE — 80100016 HC MAINTENANCE HEMODIALYSIS

## 2025-07-02 PROCEDURE — 36415 COLL VENOUS BLD VENIPUNCTURE: CPT | Performed by: NURSE PRACTITIONER

## 2025-07-02 PROCEDURE — 63600175 PHARM REV CODE 636 W HCPCS

## 2025-07-02 PROCEDURE — 82947 ASSAY GLUCOSE BLOOD QUANT: CPT | Performed by: NURSE PRACTITIONER

## 2025-07-02 PROCEDURE — 5A1D70Z PERFORMANCE OF URINARY FILTRATION, INTERMITTENT, LESS THAN 6 HOURS PER DAY: ICD-10-PCS | Performed by: INTERNAL MEDICINE

## 2025-07-02 PROCEDURE — 25000003 PHARM REV CODE 250

## 2025-07-02 PROCEDURE — 11000001 HC ACUTE MED/SURG PRIVATE ROOM

## 2025-07-02 PROCEDURE — 25000003 PHARM REV CODE 250: Performed by: NURSE PRACTITIONER

## 2025-07-02 PROCEDURE — 85025 COMPLETE CBC W/AUTO DIFF WBC: CPT | Performed by: NURSE PRACTITIONER

## 2025-07-02 PROCEDURE — 84100 ASSAY OF PHOSPHORUS: CPT | Performed by: NURSE PRACTITIONER

## 2025-07-02 RX ORDER — METOPROLOL SUCCINATE 25 MG/1
25 TABLET, EXTENDED RELEASE ORAL 2 TIMES DAILY
Status: DISCONTINUED | OUTPATIENT
Start: 2025-07-02 | End: 2025-07-05

## 2025-07-02 RX ORDER — LORAZEPAM 0.5 MG/1
0.5 TABLET ORAL ONCE
Status: DISCONTINUED | OUTPATIENT
Start: 2025-07-02 | End: 2025-07-02

## 2025-07-02 RX ORDER — ACETAMINOPHEN 325 MG/1
650 TABLET ORAL EVERY 6 HOURS PRN
Status: DISCONTINUED | OUTPATIENT
Start: 2025-07-02 | End: 2025-07-07 | Stop reason: HOSPADM

## 2025-07-02 RX ORDER — ACETAMINOPHEN 650 MG/1
650 SUPPOSITORY RECTAL EVERY 6 HOURS PRN
Status: DISCONTINUED | OUTPATIENT
Start: 2025-07-02 | End: 2025-07-07 | Stop reason: HOSPADM

## 2025-07-02 RX ADMIN — HYDROMORPHONE HYDROCHLORIDE 1 MG: 1 INJECTION, SOLUTION INTRAMUSCULAR; INTRAVENOUS; SUBCUTANEOUS at 04:07

## 2025-07-02 RX ADMIN — HYDROMORPHONE HYDROCHLORIDE 1 MG: 1 INJECTION, SOLUTION INTRAMUSCULAR; INTRAVENOUS; SUBCUTANEOUS at 02:07

## 2025-07-02 RX ADMIN — HEPARIN SODIUM 5000 UNITS: 5000 INJECTION INTRAVENOUS; SUBCUTANEOUS at 02:07

## 2025-07-02 RX ADMIN — PIPERACILLIN SODIUM AND TAZOBACTAM SODIUM 4.5 G: 4; .5 INJECTION, POWDER, LYOPHILIZED, FOR SOLUTION INTRAVENOUS at 02:07

## 2025-07-02 RX ADMIN — ISOSORBIDE MONONITRATE 30 MG: 30 TABLET, EXTENDED RELEASE ORAL at 08:07

## 2025-07-02 RX ADMIN — MUPIROCIN: 20 OINTMENT TOPICAL at 08:07

## 2025-07-02 RX ADMIN — FUROSEMIDE 40 MG: 40 TABLET ORAL at 08:07

## 2025-07-02 RX ADMIN — METOPROLOL SUCCINATE 25 MG: 25 TABLET, EXTENDED RELEASE ORAL at 08:07

## 2025-07-02 RX ADMIN — POTASSIUM BICARBONATE 20 MEQ: 391 TABLET, EFFERVESCENT ORAL at 02:07

## 2025-07-02 RX ADMIN — HEPARIN SODIUM 5000 UNITS: 5000 INJECTION INTRAVENOUS; SUBCUTANEOUS at 09:07

## 2025-07-02 RX ADMIN — CLOPIDOGREL 75 MG: 75 TABLET ORAL at 08:07

## 2025-07-02 RX ADMIN — ATORVASTATIN CALCIUM 40 MG: 40 TABLET, FILM COATED ORAL at 08:07

## 2025-07-02 RX ADMIN — HEPARIN SODIUM 5000 UNITS: 5000 INJECTION INTRAVENOUS; SUBCUTANEOUS at 06:07

## 2025-07-02 NOTE — PLAN OF CARE
SW was informed of pt's anticipated discharge date.     Pt's plan for discharge is to discharge home with family. Pt's daughter will provide transportation home following discharge.     SW will continue to follow.     Future Appointments   Date Time Provider Department Center   7/8/2025  3:00 PM Christie Baez DPM [facility] WOUND [facility]   7/17/2025  3:00 PM Ada Gregory MD [facility] IMPRI [facility]   8/12/2025  3:00 PM Leif Veliz MD [facility] CARDIO [facility]   11/6/2025  4:00 PM Bayron Golden MD [facility] MED [facility]         07/02/25 1521   Discharge Reassessment   Assessment Type Discharge Planning Reassessment   Did the patient's condition or plan change since previous assessment? No   Discharge Plan A Home with family   Discharge Plan B Home Health   DME Needed Upon Discharge    (TBD)   Transition of Care Barriers None   Why the patient remains in the hospital Requires continued medical care   Post-Acute Status   Discharge Delays None known at this time

## 2025-07-02 NOTE — PLAN OF CARE
Problem: Adult Inpatient Plan of Care  Goal: Plan of Care Review  Outcome: Progressing     Problem: Diabetes Comorbidity  Goal: Blood Glucose Level Within Targeted Range  Outcome: Progressing     Problem: Wound  Goal: Optimal Pain Control and Function  Outcome: Progressing     Problem: Chronic Kidney Disease  Goal: Electrolyte Balance  Outcome: Progressing  Goal: Acceptable Pain Control  Outcome: Progressing     Problem: Fall Injury Risk  Goal: Absence of Fall and Fall-Related Injury  Outcome: Progressing

## 2025-07-02 NOTE — ASSESSMENT & PLAN NOTE
Patient has documented history of peripheral vascular disease  Nonhealing left 3rd toe gangrene  Cardiology is on board  Doppler of lower limb showed severe DVT  With severe aortic atherosclerosis and suspected high-grade stenosis at the origin of the celiac, superior mesenteric, bilateral renal and inferior mesenteric arteries, with occlusion of bilateral superficial femoral artery stents.  On Plavix and statin

## 2025-07-02 NOTE — ASSESSMENT & PLAN NOTE
Patient's blood pressure range in the last 24 hours was: BP  Min: 102/52  Max: 115/46.The patient's inpatient anti-hypertensive regimen is listed below:  Current Antihypertensives  isosorbide mononitrate 24 hr tablet 30 mg, Daily, Oral  furosemide tablet 40 mg, Daily, Oral  metoprolol succinate (TOPROL-XL) 24 hr tablet 25 mg, 2 times daily, Oral    Plan  - BP is controlled, no changes needed to their regimen

## 2025-07-02 NOTE — SUBJECTIVE & OBJECTIVE
Interval History:  Patient had HD today nephrology is on board, ID recommended current antibiotic regimen, CT abdomen showed picture of gallbladder enlargement and gallstone, pending ultrasound of the abdomen, cultures are negative podiatry collected wound cultures pending results    Review of Systems   Unable to perform ROS: Dementia     Objective:     Vital Signs (Most Recent):  Temp: 97.9 °F (36.6 °C) (07/02/25 1329)  Pulse: (!) 116 (07/02/25 1329)  Resp: 16 (07/02/25 0720)  BP: (!) 114/52 (07/02/25 1329)  SpO2: 97 % (07/02/25 0720) Vital Signs (24h Range):  Temp:  [97 °F (36.1 °C)-98.1 °F (36.7 °C)] 97.9 °F (36.6 °C)  Pulse:  [] 116  Resp:  [16-22] 16  SpO2:  [91 %-97 %] 97 %  BP: (102-115)/(46-64) 114/52     Weight: 45.8 kg (100 lb 15.5 oz)  Body mass index is 18.47 kg/m².  No intake or output data in the 24 hours ending 07/02/25 1348      Physical Exam  Constitutional:       Appearance: She is ill-appearing and toxic-appearing.   HENT:      Head: Normocephalic and atraumatic.      Right Ear: Tympanic membrane normal.      Left Ear: Tympanic membrane normal.      Nose: Nose normal.      Mouth/Throat:      Mouth: Mucous membranes are moist.   Eyes:      Extraocular Movements: Extraocular movements intact.      Pupils: Pupils are equal, round, and reactive to light.   Cardiovascular:      Rate and Rhythm: Rhythm irregular.      Comments: AVF to left upper extremity; + bruit/thrill  Pulmonary:      Effort: Pulmonary effort is normal.   Abdominal:      General: Abdomen is flat.      Tenderness: There is abdominal tenderness.   Musculoskeletal:         General: Normal range of motion.      Cervical back: Normal range of motion and neck supple.   Skin:     Capillary Refill: Capillary refill takes less than 2 seconds.      Comments: Gangrene to left third toe   Neurological:      Mental Status: She is alert and oriented to person, place, and time.   Psychiatric:         Mood and Affect: Mood normal.                Significant Labs: All pertinent labs within the past 24 hours have been reviewed.  Recent Lab Results  (Last 5 results in the past 24 hours)        07/02/25  1231   07/02/25  1030   07/02/25  0640   07/01/25  2112   07/01/25  1759        Albumin 2.3               ALP 94               ALT 12               Anion Gap 9               AST 30               Baso # 0.05               Basophil % 0.3               BILIRUBIN TOTAL 0.8  Comment: For infants and newborns, interpretation of results should be based   on gestational age, weight and in agreement with clinical   observations.    Premature Infant recommended reference ranges:   0-24 hours:  <8.0 mg/dL   24-48 hours: <12.0 mg/dL   3-5 days:    <15.0 mg/dL   6-29 days:   <15.0 mg/dL               BUN 7               Calcium 8.4               Chloride 97               CO2 32               Creatinine 1.6               eGFR 31  Comment: Estimated GFR calculated using the CKD-EPI creatinine (2021) equation.               Eos # 0.10               Eos % 0.5               Glucose 95               Gran # (ANC) 16.57               Hematocrit 27.5               Hemoglobin 9.3               Immature Grans (Abs) 0.09  Comment: Mild elevation in immature granulocytes is non specific and can be seen in a variety of conditions including stress response, acute inflammation, trauma and pregnancy. Correlation with other laboratory and clinical findings is essential.               Immature Granulocytes 0.5               Lymph # 1.07               Lymph % 5.7               MCH 31.2               MCHC 33.8               MCV 92               Mono # 0.92               Mono % 4.9               MPV 9.8               Neut % 88.1               nRBC 0               Phosphorus Level 1.4               Platelet Count 325               POCT Glucose   93   89   129         Potassium 3.1               PROTEIN TOTAL 7.4               RBC 2.98               RDW 15.1               Sodium 138                Vancomycin, Random         28.2       WBC 18.80                                      Significant Imaging: I have reviewed all pertinent imaging results/findings within the past 24 hours.    Imaging Results              X-Ray Foot Complete Left (Final result)  Result time 06/30/25 17:22:22      Final result by Shraddha Chanel MD (06/30/25 17:22:22)                   Impression:      Osteopenia.    Partial progressive interval healing of the known 4th toe proximal phalangeal base fracture.    Ulceration at the tip of the 3rd toe distal phalanx.  Questionable slight erosive irregularity 3rd toe distal phalanx representing osteomyelitis otherwise.  Regional soft tissue swelling and soft tissue gas representing infection.  Recommend MRI.      Electronically signed by: Shraddha Chanel  Date:    06/30/2025  Time:    17:22               Narrative:    EXAMINATION:  XR FOOT COMPLETE 3 VIEW LEFT    CLINICAL HISTORY:  .  Gangrene, not elsewhere classified    TECHNIQUE:  AP, lateral and oblique views of the left foot were performed.    COMPARISON:  06/12/2025    FINDINGS:  See below                                       X-Ray Chest AP Portable (Final result)  Result time 06/30/25 15:55:35      Final result by Adam Mullen MD (06/30/25 15:55:35)                   Impression:      As above.      Electronically signed by: Adam Mullen MD  Date:    06/30/2025  Time:    15:55               Narrative:    EXAMINATION:  XR CHEST AP PORTABLE    CLINICAL HISTORY:  Tachycardia, unspecified    TECHNIQUE:  Single frontal view of the chest was performed.    FINDINGS:  There are bilateral perihilar opacities with interstitial prominence suspicious for edema.  There is no focal consolidative change.  There is no pneumothorax or pleural fluid identified.  There is elevation of the left hemidiaphragm.  The cardiac silhouette appears enlarged.  There is calcification of the aorta and patient is status post sternotomy.  The osseous  structures demonstrate degenerative change.                                    CT abdomen with IV contrast    Impression:     1. Distended gallbladder with an associated gallstone.  No pericholecystic inflammatory change.  Recommend correlation for symptoms of right upper quadrant pain and further characterization with a gallbladder ultrasound if clinically warranted.  2. Colonic diverticulosis.  3. Multichamber cardiac enlargement.  4. Pulmonary edema with small bilateral dependent pleural effusions.  5. Atrophic native kidneys.  Partially visualized left arm AV fistula.  6. Severe aortic atherosclerosis with suspected high-grade stenosis at the origin of the celiac, superior mesenteric, bilateral renal and inferior mesenteric arteries.  7. Occluded bilateral superficial femoral artery stents.  8. Age indeterminate wedge compression fracture of the T11 vertebral body with mild associated height loss.  9. Additional findings as detailed in the body of the report.

## 2025-07-02 NOTE — HPI
89yo Bengali speaking female with ESRD on HD, HTN, sepsis, chronic diastolic heart failure, new onset afib with RVR, DMII, CAD s/p CABG, HLP, dementia and PAD who presented to the ER from HD with elevated HR. She was seen on AM NP rounds while receiving HD- 4th floor RN provided translation however patient not overly engaging in conversation. Family at bedside- granddaughter and brother therefore her HPI was obtained from them. They report noting her not eating and drinking a lot lately and not being as active She went to HD and was noted to have elevated HR in the 120s-130s therefore she was sent to the ER. Her family reports she never complained of chest pain, SOB or palpitations and has no history of afib but did have a CABG approximately 10 years ago. Labs from yesterday CBC with H&H 9.0&27.7 BMP with K+ 4.5 BUN 21 creatinine 4.8 BP 100s-110s HR 80s-100s afib. Admitted to [facility] and cardiology consulted for evaluation of new onset afib

## 2025-07-02 NOTE — ASSESSMENT & PLAN NOTE
- presented with elevated HR from HD; new onset afib  - initial EKG afib with ; HR 80s-90s per Epic; telemetry reviewed with continued afib with periods of RVR with HR up to 120s-130s  - on Toprol XL 25 BID currently; not on full dose AC likely related to anemia  - continued RVR related to multiple etiologies agitation, poor po intake, infectious etiology   - CHADSVAS score 5 (CHF, female, age >75, PAD) with indication for chronic OAC- recommend if benefit outweighs risk    - overall goal is rate control currently

## 2025-07-02 NOTE — ASSESSMENT & PLAN NOTE
Patient's most recent potassium results are listed below.   Recent Labs     06/30/25  1511 07/01/25  1002 07/02/25  1231   K 4.3 4.5 3.1*     Plan  - Replete potassium per protocol  - Monitor potassium Daily  - Patient's hypokalemia is monitored closely- to be monitored

## 2025-07-02 NOTE — SUBJECTIVE & OBJECTIVE
Past Medical History:   Diagnosis Date    CHF (congestive heart failure)     Colon polyps 06/03/2013    Coronary artery disease     Diabetes mellitus     Encounter for blood transfusion     ESRD (end stage renal disease) 03/2014    dialysis M-F    GERD (gastroesophageal reflux disease)     High cholesterol     Hypertension        Past Surgical History:   Procedure Laterality Date    AV FISTULA PLACEMENT Left 9/2014    bilateral fem-pop      CENTRAL VENOUS CATHETER TUNNELED INSERTION DOUBLE LUMEN Right 2/2014    CORONARY ARTERY BYPASS GRAFT  2/14/2014     x 3 vessels    LEFT HEART CATHETERIZATION N/A 5/31/2021    Procedure: Left heart cath;  Surgeon: Brian Sanchez MD;  Location: [facility] CATH LAB/EP;  Service: Cardiology;  Laterality: N/A;    PERCUTANEOUS TRANSLUMINAL ANGIOPLASTY OF ARTERIOVENOUS FISTULA Left 5/5/2020    Procedure: PTA, Repair left upper arm anuerysm AV FISTULA;  Surgeon: Doris Mary MD;  Location: [facility] OR;  Service: General;  Laterality: Left;    VASCULAR SURGERY         Review of patient's allergies indicates:   Allergen Reactions    Aspirin Nausea Only and Swelling     Able to tolerated in small doses         No current facility-administered medications on file prior to encounter.     Current Outpatient Medications on File Prior to Encounter   Medication Sig    atorvastatin (LIPITOR) 40 MG tablet TAKE 1 TABLET BY MOUTH ONCE DAILY IN THE EVENING    clopidogreL (PLAVIX) 75 mg tablet Take 1 tablet (75 mg total) by mouth once daily.    furosemide (LASIX) 40 MG tablet Take 1 tablet (40 mg total) by mouth once daily.    hydrALAZINE (APRESOLINE) 25 MG tablet Take 25 mg by mouth every 12 (twelve) hours.    isosorbide mononitrate (IMDUR) 30 MG 24 hr tablet Take 1 tablet (30 mg total) by mouth once daily.    metoprolol succinate (TOPROL-XL) 25 MG 24 hr tablet Take 1 tablet by mouth once daily (Patient taking differently: Take 25 mg by mouth once daily.)    pantoprazole (PROTONIX) 40 MG tablet Take  40 mg by mouth before breakfast.    vitamin renal formula, B-complex-vitamin c-folic acid, (TRIPHROCAPS) 1 mg Cap Take 1 capsule by mouth once daily.    aspirin (ECOTRIN) 81 MG EC tablet TAKE ONE TABLET BY MOUTH ONCE DAILY (Patient not taking: Reported on 6/17/2025)    nitroGLYCERIN (NITROSTAT) 0.4 MG SL tablet Place 0.4 mg under the tongue every 5 (five) minutes as needed for Chest pain.    povidone-iodine (BETADINE) 10 % external solution Apply topically as needed for Wound Care.     Family History       Problem Relation (Age of Onset)    Heart attack Mother    Heart disease Mother    Hypertension Mother          Tobacco Use    Smoking status: Never    Smokeless tobacco: Never   Substance and Sexual Activity    Alcohol use: No    Drug use: No    Sexual activity: Never     Review of Systems   Unable to perform ROS: Other     Objective:     Vital Signs (Most Recent):  Temp: 97.5 °F (36.4 °C) (07/02/25 0720)  Pulse: 83 (07/02/25 0720)  Resp: 16 (07/02/25 0720)  BP: 104/64 (07/02/25 0720)  SpO2: 97 % (07/02/25 0720) Vital Signs (24h Range):  Temp:  [97 °F (36.1 °C)-98.1 °F (36.7 °C)] 97.5 °F (36.4 °C)  Pulse:  [] 83  Resp:  [16-22] 16  SpO2:  [91 %-97 %] 97 %  BP: (102-115)/(46-64) 104/64     Weight: 45.8 kg (100 lb 15.5 oz)  Body mass index is 18.47 kg/m².    SpO2: 97 %       No intake or output data in the 24 hours ending 07/02/25 1026    Lines/Drains/Airways       Drain  Duration                  Hemodialysis AV Graft Left upper arm -- days              Peripheral Intravenous Line  Duration             Peripheral IV 07/01/25 1745 22 G Posterior;Right Forearm <1 day                     Physical Exam  Constitutional:       General: She is not in acute distress.     Appearance: She is well-developed.   Cardiovascular:      Rate and Rhythm: Tachycardia present. Rhythm irregularly irregular.      Heart sounds: No murmur heard.     No gallop.   Pulmonary:      Effort: Pulmonary effort is normal. No respiratory  distress.      Breath sounds: Normal breath sounds. No wheezing.   Abdominal:      General: Bowel sounds are normal. There is no distension.      Palpations: Abdomen is soft.      Tenderness: There is no abdominal tenderness.   Skin:     General: Skin is warm and dry.   Neurological:      Mental Status: She is alert.              Significant Imaging: Echocardiogram: Transthoracic echo (TTE) complete (Cupid Only):   Results for orders placed or performed during the hospital encounter of 06/12/25   Echo   Result Value Ref Range    BSA 1.48 m2    Patton's Biplane MOD Ejection Fraction 58 %    A2C EF 56 %    A4C EF 59 %    LVOT stroke volume 56.0 cm3    LVIDd 3.6 3.5 - 6.0 cm    LV Systolic Volume 25 mL    LV Systolic Volume Index 16.8 mL/m2    LVIDs 2.6 2.1 - 4.0 cm    LV ESV A2C 51.58 mL    LV Diastolic Volume 54 mL    LV ESV A4C 62.17 mL    LV Diastolic Volume Index 36.24 mL/m2    LV EDV A2C [id number] mL    LV EDV A4C 32.56 mL    Left Ventricular End Systolic Volume by Teichholz Method 25.20 mL    Left Ventricular End Diastolic Volume by Teichholz Method 54.44 mL    IVS 1.1 0.6 - 1.1 cm    LVOT diameter 1.8 cm    LVOT area 2.5 cm2    FS 27.8 (A) 28 - 44 %    Left Ventricle Relative Wall Thickness 0.50 cm    PW 0.9 0.6 - 1.1 cm    LV mass 107.9 g    LV Mass Index 72.4 g/m2    MV Peak E Kev 1.74 m/s    TDI LATERAL 0.06 m/s    TDI SEPTAL 0.05 m/s    E/E' ratio 32 m/s    MV Peak A Kev 0.51 m/s    TR Max Kev 3.5 m/s    E/A ratio 3.41     E wave deceleration time 155 msec    LV SEPTAL E/E' RATIO 34.8 m/s    LV LATERAL E/E' RATIO 29.0 m/s    LVOT peak kev 0.9 m/s    Left Ventricular Outflow Tract Mean Velocity 0.62 cm/s    Left Ventricular Outflow Tract Mean Gradient 1.69 mmHg    RV- linn basal diam 3.0 cm    RV S' 5.34 cm/s    TAPSE 0.7 cm    RV/LV Ratio 0.83 cm    LA Vol (MOD) 57 mL    GABBY (MOD) 38 mL/m2    RA area length vol 34.43 mL    RA Area 15.2 cm2    RA vol index 23.11 mL/m2    RA Vol 33.12 mL    AV mean  gradient 4 mmHg    AV peak gradient 8 mmHg    Ao peak freida 1.4 m/s    Ao VTI 32.5 cm    LVOT peak VTI 22.0 cm    AV valve area 1.7 cm²    AV Velocity Ratio 0.64     AV index (prosthetic) 0.68     ZITA by Velocity Ratio 1.6 cm²    Mr max freida 4.91 m/s    MV mean gradient 5 mmHg    MV peak gradient 14 mmHg    MV stenosis pressure 1/2 time 44.94 ms    MV valve area p 1/2 method 4.90 cm2    MV valve area by continuity eq 1.63 cm2    MV VTI 34.4 cm    Triscuspid Valve Regurgitation Peak Gradient 50 mmHg    PV PEAK VELOCITY 1.10 m/s    PV peak gradient 5 mmHg    Sinus 3.14 cm    ASI 2.1 cm/m2    STJ 2.8 cm    Ascending aorta 3.6 cm    ASI 2.4 cm/m2    IVC diameter 1.60 cm    Mean e' 0.06 m/s    ZLVIDS -0.43     ZLVIDD -2.06     LA area A4C 20.67 cm2    LA area A2C 18.93 cm2    TV resting pulmonary artery pressure 52 mmHg    RV TB RVSP 7 mmHg    Est. RA pres 3 mmHg    Mitral Valve Heart Rate 87 bpm    Narrative      Left Ventricle: The left ventricle is normal in size. Mildly increased   wall thickness. Normal wall motion. There is normal systolic function.   Quantitated ejection fraction is 58%. Diastolic function cannot be   reliably determined in the presence of mitral annular calcification.    Right Ventricle: The right ventricle is normal in size measuring 3.0   cm. Systolic function is normal.    Left Atrium: The left atrium is mildly dilated measuring 38 mL/m2.    Aortic Valve: The aortic valve is a trileaflet valve. There is aortic   valve sclerosis.    Mitral Valve: There is mild stenosis. The mean pressure gradient across   the mitral valve is 5 mmHg at a heart rate of 87 bpm. There is mild   regurgitation.    Tricuspid Valve: There is mild regurgitation.    Pulmonary Artery: There is moderate pulmonary hypertension. The   estimated pulmonary artery systolic pressure is 52 mmHg.

## 2025-07-02 NOTE — CONSULTS
"  [facility] Consult Note    Primary Attending Physician: Dr. Dominique    Consultant Attending: Dr. Evans    Reason for Consult:     "Left 3rd toe osteomyelitis"    Assessment and Recommendations:      Erin Clark is a 89 y.o. female with history significant for T2DM (last A1c 5.3%), ESRD (HD on MWF thru L arm AVF), HFrecEF (LVEF 58%), CAD s/p CABG in 2014, HTN, PAD (complete occulusions of R SFA, L SFA, PTA) and HLD presents after dialysis due to being tachycardic. Noted to have gangrenous L 3rd toe w/ XR demonstrating soft tissue swelling and tissue gas. Arterial lower extremity ultrasound demonstrating complete bilateral native SFA occlusions unchanged from prior US. CTAP w/ distended gallbladder and associated gallstone, no pericholecystic inflammatory change.     Acute Osteomyelitis of Left 3rd Toe  Continue Vancomycin, pharmacy to dose  Continue Pip-Tazo 4.5g q12h  Gram stain with GPR, GNR, few GPC. Bcx negative at 24 hours  Pending wound culture  Recommend definitive treatment with Podiatry    Mikhail Zhang MD  [facility]    Staff attestation to follow.    Subjective:      History of Present Illness:  Erin Clark is a 89 y.o. female with history significant for T2DM (last A1c 5.3%), ESRD (HD on MWF thru L arm AVF), HFrecEF (LVEF 58%), CAD s/p CABG in 2014, HTN, PAD (complete occulusions of R SFA, L SFA, PTA) and HLD presents after dialysis due to being tachycardic. Previously admitted on 6/12 with complaints of left 3rd toe pain. Patient was hemodynamically stable with labs not supportive of infection and antibiotics were not indicated. Patient remained admitted for delirious behavior requiring Zyprexa and Haldol PRNs. Later discharged on 6/14 w/ follow up to wound care clinic.    On this admission, patient presenting from dialysis after feeling tachycardic. Noted to have a lactate 2.6, .9, and WBC 19.6. EKG showing Afib w/ RVR. XR foot with soft " tissue swelling and soft tissue gas representing infection. Started on Vancomyocin and Zosyn given concerns for sepsis. Cultures pending. Gram stain w/ GPR, GNR, and few GPC.    Interview with patient and family using the .  Per patient's family, they report that she has dementia at baseline and will minimally respond to questions. They note that her 3rd left toe has become increasingly necrotic and is associated with a foul smelling odor and pain while ambulating. Patient uses a walker/cane at baseline to ambulate. Patient and family denies any nausea, vomiting, chest pain, shortness of breath, abdominal pain, or subjective fevers. They state that she has had chills but that is baseline for her. Denies any recent trauma to her L foot.    Past Medical History:  Past Medical History:   Diagnosis Date    CHF (congestive heart failure)     Colon polyps 06/03/2013    Coronary artery disease     Diabetes mellitus     Encounter for blood transfusion     ESRD (end stage renal disease) 03/2014    dialysis M-F    GERD (gastroesophageal reflux disease)     High cholesterol     Hypertension      Past Surgical History:  Past Surgical History:   Procedure Laterality Date    AV FISTULA PLACEMENT Left 9/2014    bilateral fem-pop      CENTRAL VENOUS CATHETER TUNNELED INSERTION DOUBLE LUMEN Right 2/2014    CORONARY ARTERY BYPASS GRAFT  2/14/2014     x 3 vessels    LEFT HEART CATHETERIZATION N/A 5/31/2021    Procedure: Left heart cath;  Surgeon: Brian Sanchez MD;  Location: [facility] CATH LAB/EP;  Service: Cardiology;  Laterality: N/A;    PERCUTANEOUS TRANSLUMINAL ANGIOPLASTY OF ARTERIOVENOUS FISTULA Left 5/5/2020    Procedure: PTA, Repair left upper arm anuerysm AV FISTULA;  Surgeon: Doris Mary MD;  Location: [facility] OR;  Service: General;  Laterality: Left;    VASCULAR SURGERY       Allergies:  Review of patient's allergies indicates:   Allergen Reactions    Aspirin Nausea Only and Swelling     Able to  tolerated in small doses       Medications:   In-Hospital Scheduled Medications:   acetaminophen  650 mg Oral Once    atorvastatin  40 mg Oral QHS    clopidogreL  75 mg Oral Daily    epoetin roberto-epbx  10,000 Units Subcutaneous Every Mon, Wed, Fri    furosemide  40 mg Oral Daily    heparin (porcine)  5,000 Units Subcutaneous Q8H    isosorbide mononitrate  30 mg Oral Daily    metoprolol succinate  25 mg Oral BID    mupirocin   Nasal BID    pantoprazole  40 mg Oral Before breakfast    piperacillin-tazobactam (Zosyn) IV (PEDS and ADULTS) (extended infusion is not appropriate)  4.5 g Intravenous Q12H    polyethylene glycol  17 g Oral Daily    vitamin renal formula (B-complex-vitamin c-folic acid)  1 capsule Oral Daily      In-Hospital PRN Medications:    Current Facility-Administered Medications:     dextrose 50%, 12.5 g, Intravenous, PRN    dextrose 50%, 25 g, Intravenous, PRN    glucagon (human recombinant), 1 mg, Intramuscular, PRN    glucose, 16 g, Oral, PRN    glucose, 24 g, Oral, PRN    HYDROmorphone, 1 mg, Intravenous, Q6H PRN    insulin aspart U-100, 0-5 Units, Subcutaneous, QID (AC + HS) PRN    melatonin, 6 mg, Oral, Nightly PRN    sodium chloride 0.9%, 10 mL, Intravenous, Q12H PRN    Pharmacy to dose Vancomycin consult, , , Once **AND** vancomycin - pharmacy to dose, , Intravenous, pharmacy to manage frequency   In-Hospital IV Infusion Medications:     Home Medications:  Current Outpatient Medications   Medication Instructions    aspirin (ECOTRIN) 81 MG EC tablet TAKE ONE TABLET BY MOUTH ONCE DAILY    atorvastatin (LIPITOR) 40 mg, Oral, Nightly    clopidogreL (PLAVIX) 75 mg, Oral, Daily    furosemide (LASIX) 40 mg, Oral, Daily    hydrALAZINE (APRESOLINE) 25 mg, Every 12 hours    isosorbide mononitrate (IMDUR) 30 mg, Oral, Daily    metoprolol succinate (TOPROL-XL) 25 mg, Oral    nitroGLYCERIN (NITROSTAT) 0.4 mg, Every 5 min PRN    pantoprazole (PROTONIX) 40 mg, Before breakfast    povidone-iodine (BETADINE) 10  % external solution Topical (Top), As needed (PRN)    vitamin renal formula, B-complex-vitamin c-folic acid, (TRIPHROCAPS) 1 mg Cap 1 capsule, Oral, Daily     Family History:  Family History   Problem Relation Name Age of Onset    Hypertension Mother      Heart disease Mother      Heart attack Mother       Social History:  Social History[1]    Review of Systems   Constitutional:  Positive for chills. Negative for fever and weight loss.        Baseline chills   Respiratory:  Negative for cough and shortness of breath.    Cardiovascular:  Negative for chest pain.   Gastrointestinal:  Negative for abdominal pain, nausea and vomiting.   Genitourinary: Negative.    Skin: Negative.    Psychiatric/Behavioral:          Dementia at baseline      Objective:   Last 24 Hour Vital Signs:  BP  Min: 102/52  Max: 115/46  Temp  Av.7 °F (36.5 °C)  Min: 97 °F (36.1 °C)  Max: 98.1 °F (36.7 °C)  Pulse  Av.6  Min: 83  Max: 126  Resp  Av.1  Min: 16  Max: 22  SpO2  Av %  Min: 91 %  Max: 97 %  I/O last 3 completed shifts:  In: 44.6 [IV Piggyback:44.6]  Out: 0     General: awake, ill-appearing, appears uncomfortable  Head: Normocephalic, atraumatic, poor dentition  Lungs: clear to auscultation bilaterally, no accessory muscle use appreciated, on RA  Heart: tachycardic, irregular rhythm. No appreciable murmurs or gallops. L upper extremity AVF w/ bruit and thrill.  Abdomen: soft, mild abdominal tenderness, non-distended  Extremities: upper extremities normal, Left leg 3rd toe w/ gangrene. Foul smelling, no purulent fluid noted. Wrapped with betadine solution.  Skin: warm and dry, no rashes.  Neuro: alert, tracks with eyes, will occasionally answer questions    Laboratory Results:  Most Recent Data:  CBC:   Lab Results   Component Value Date    WBC 17.58 (H) 2025    HGB 9.0 (L) 2025    HCT 27.7 (L) 2025     2025    MCV 96 2025    RDW 15.0 (H) 2025     BMP:   Lab Results    Component Value Date     (L) 07/01/2025    K 4.5 07/01/2025    CL 95 07/01/2025    CO2 26 07/01/2025    BUN 21 07/01/2025    GLU 87 07/01/2025    CALCIUM 8.3 (L) 07/01/2025    MG 1.7 06/30/2025    PHOS 3.2 07/01/2025     Trended Lab Data:  Recent Labs   Lab 06/30/25  1511 07/01/25  1002   WBC 19.61* 17.58*   HGB 9.7* 9.0*   HCT 29.4* 27.7*    277   MCV 95 96   RDW 15.0* 15.0*   * 133*   K 4.3 4.5   CL 95 95   CO2 27 26   BUN 15 21   * 87   PROT 7.9 6.8   ALBUMIN 2.7* 2.3*   BILITOT 0.7 0.8   AST 29 26   ALKPHOS 101 85   ALT 12 11     Microbiology Data:  Bcx 6/30: No growth at 24 hours x2  Gram stain 6/30: GPR, GNR, few GPC  Wound cultures pending    Antimicrobials:  Vancomyocin: 6/30-present  Zosyn: 6/30-present    Other Results:  Radiology:  CT Abdomen Pelvis With IV Contrast NO Oral Contrast  Result Date: 7/2/2025  EXAMINATION: CT ABDOMEN PELVIS WITH IV CONTRAST:  1. Distended gallbladder with an associated gallstone.  No pericholecystic inflammatory change.  Recommend correlation for symptoms of right upper quadrant pain and further characterization with a gallbladder ultrasound if clinically warranted. 2. Colonic diverticulosis. 3. Multichamber cardiac enlargement. 4. Pulmonary edema with small bilateral dependent pleural effusions. 5. Atrophic native kidneys.  Partially visualized left arm AV fistula. 6. Severe aortic atherosclerosis with suspected high-grade stenosis at the origin of the celiac, superior mesenteric, bilateral renal and inferior mesenteric arteries. 7. Occluded bilateral superficial femoral artery stents. 8. Age indeterminate wedge compression fracture of the T11 vertebral body with mild associated height loss. 9. Additional findings as detailed in the body of the report.     US Lower Extremity Arteries Bilateral  Result Date: 7/1/2025  FINDINGS: There is redemonstration of bilateral native superficial femoral artery occlusions.  Bilateral femoropopliteal bypass  grafts are also occluded.  There is visualization of collateral vessels in the right and left lower extremity. On the patient's right side there are collateral vessels in the popliteal fossa with antegrade flow.  There is flow identified within the proximal anterior tibial artery with monophasic morphology and distally there is tardus parvus morphology.  The anterior tibial artery appears occluded at its midportion.  The posterior tibial and peroneal arteries are not visualized. The left popliteal artery reconstitutes with antegrade flow in its proximal portion noting monophasic waveforms.  Distally there is tardus parvus morphology.  Very slow forward flow is seen in the runoff vessels noting occlusion of the mid anterior tibial artery.     1. Severe peripheral vascular disease not really changed from the prior exam performed 2 weeks ago noting bilateral native SFA occlusions and bilateral femoropopliteal bypass graft occlusions. Minimal forward flow with multifocal stenosis involving the runoff vessels as discussed above.    X-Ray Foot Complete Left  Result Date: 6/30/2025  Osteopenia. Partial progressive interval healing of the known 4th toe proximal phalangeal base fracture. Ulceration at the tip of the 3rd toe distal phalanx.  Questionable slight erosive irregularity 3rd toe distal phalanx representing osteomyelitis otherwise.  Regional soft tissue swelling and soft tissue gas representing infection.  Recommend MRI.         [1]   Social History  Tobacco Use    Smoking status: Never    Smokeless tobacco: Never   Substance Use Topics    Alcohol use: No    Drug use: No

## 2025-07-02 NOTE — CONSULTS
[facility] - Telemetry  Cardiology  Consult Note    Patient Name: Erin Clark  MRN: [id number]  Admission Date: 6/30/2025  Hospital Length of Stay: 2 days  Code Status: Full Code   Attending Provider: Chung Kennedy MD   Consulting Provider: AUBREE Brock ANP  Primary Care Physician: Bayron Golden MD  Principal Problem:Acute osteomyelitis of toe of left foot    Patient information was obtained from relative(s), past medical records, and ER records.     Inpatient consult to Cardiology-[facility]  Consult performed by: Neda Singletary APRN, ANP  Consult ordered by: Chung Kennedy MD  Reason for consult: new onset afib with RVR        Subjective:     Chief Complaint:  elevated HR      HPI:   89yo Amharic speaking female with ESRD on HD, HTN, sepsis, chronic diastolic heart failure, new onset afib with RVR, DMII, CAD s/p CABG, HLP, dementia and PAD who presented to the ER from HD with elevated HR. She was seen on AM NP rounds while receiving HD- 4th floor RN provided translation however patient not overly engaging in conversation. Family at bedside- granddaughter and brother therefore her HPI was obtained from them. They report noting her not eating and drinking a lot lately and not being as active She went to HD and was noted to have elevated HR in the 120s-130s therefore she was sent to the ER. Her family reports she never complained of chest pain, SOB or palpitations and has no history of afib but did have a CABG approximately 10 years ago. Labs from yesterday CBC with H&H 9.0&27.7 BMP with K+ 4.5 BUN 21 creatinine 4.8 BP 100s-110s HR 80s-100s afib. Admitted to [facility] Medicine and cardiology consulted for evaluation of new onset afib    Past Medical History:   Diagnosis Date    CHF (congestive heart failure)     Colon polyps 06/03/2013    Coronary artery disease     Diabetes mellitus     Encounter for blood transfusion     ESRD (end stage renal disease) 03/2014    dialysis M-F    GERD  (gastroesophageal reflux disease)     High cholesterol     Hypertension        Past Surgical History:   Procedure Laterality Date    AV FISTULA PLACEMENT Left 9/2014    bilateral fem-pop      CENTRAL VENOUS CATHETER TUNNELED INSERTION DOUBLE LUMEN Right 2/2014    CORONARY ARTERY BYPASS GRAFT  2/14/2014     x 3 vessels    LEFT HEART CATHETERIZATION N/A 5/31/2021    Procedure: Left heart cath;  Surgeon: Brian Sanchez MD;  Location: [facility] CATH LAB/EP;  Service: Cardiology;  Laterality: N/A;    PERCUTANEOUS TRANSLUMINAL ANGIOPLASTY OF ARTERIOVENOUS FISTULA Left 5/5/2020    Procedure: PTA, Repair left upper arm anuerysm AV FISTULA;  Surgeon: Doris Mary MD;  Location: [facility] OR;  Service: General;  Laterality: Left;    VASCULAR SURGERY         Review of patient's allergies indicates:   Allergen Reactions    Aspirin Nausea Only and Swelling     Able to tolerated in small doses         No current facility-administered medications on file prior to encounter.     Current Outpatient Medications on File Prior to Encounter   Medication Sig    atorvastatin (LIPITOR) 40 MG tablet TAKE 1 TABLET BY MOUTH ONCE DAILY IN THE EVENING    clopidogreL (PLAVIX) 75 mg tablet Take 1 tablet (75 mg total) by mouth once daily.    furosemide (LASIX) 40 MG tablet Take 1 tablet (40 mg total) by mouth once daily.    hydrALAZINE (APRESOLINE) 25 MG tablet Take 25 mg by mouth every 12 (twelve) hours.    isosorbide mononitrate (IMDUR) 30 MG 24 hr tablet Take 1 tablet (30 mg total) by mouth once daily.    metoprolol succinate (TOPROL-XL) 25 MG 24 hr tablet Take 1 tablet by mouth once daily (Patient taking differently: Take 25 mg by mouth once daily.)    pantoprazole (PROTONIX) 40 MG tablet Take 40 mg by mouth before breakfast.    vitamin renal formula, B-complex-vitamin c-folic acid, (TRIPHROCAPS) 1 mg Cap Take 1 capsule by mouth once daily.    aspirin (ECOTRIN) 81 MG EC tablet TAKE ONE TABLET BY MOUTH ONCE DAILY (Patient not taking: Reported  on 6/17/2025)    nitroGLYCERIN (NITROSTAT) 0.4 MG SL tablet Place 0.4 mg under the tongue every 5 (five) minutes as needed for Chest pain.    povidone-iodine (BETADINE) 10 % external solution Apply topically as needed for Wound Care.     Family History       Problem Relation (Age of Onset)    Heart attack Mother    Heart disease Mother    Hypertension Mother          Tobacco Use    Smoking status: Never    Smokeless tobacco: Never   Substance and Sexual Activity    Alcohol use: No    Drug use: No    Sexual activity: Never     Review of Systems   Unable to perform ROS: Other     Objective:     Vital Signs (Most Recent):  Temp: 97.5 °F (36.4 °C) (07/02/25 0720)  Pulse: 83 (07/02/25 0720)  Resp: 16 (07/02/25 0720)  BP: 104/64 (07/02/25 0720)  SpO2: 97 % (07/02/25 0720) Vital Signs (24h Range):  Temp:  [97 °F (36.1 °C)-98.1 °F (36.7 °C)] 97.5 °F (36.4 °C)  Pulse:  [] 83  Resp:  [16-22] 16  SpO2:  [91 %-97 %] 97 %  BP: (102-115)/(46-64) 104/64     Weight: 45.8 kg (100 lb 15.5 oz)  Body mass index is 18.47 kg/m².    SpO2: 97 %       No intake or output data in the 24 hours ending 07/02/25 1026    Lines/Drains/Airways       Drain  Duration                  Hemodialysis AV Graft Left upper arm -- days              Peripheral Intravenous Line  Duration             Peripheral IV 07/01/25 1745 22 G Posterior;Right Forearm <1 day                     Physical Exam  Constitutional:       General: She is not in acute distress.     Appearance: She is well-developed.   Cardiovascular:      Rate and Rhythm: Tachycardia present. Rhythm irregularly irregular.      Heart sounds: No murmur heard.     No gallop.   Pulmonary:      Effort: Pulmonary effort is normal. No respiratory distress.      Breath sounds: Normal breath sounds. No wheezing.   Abdominal:      General: Bowel sounds are normal. There is no distension.      Palpations: Abdomen is soft.      Tenderness: There is no abdominal tenderness.   Skin:     General: Skin is  warm and dry.   Neurological:      Mental Status: She is alert.              Significant Imaging: Echocardiogram: Transthoracic echo (TTE) complete ([facility] Only):   Results for orders placed or performed during the hospital encounter of 06/12/25   Echo   Result Value Ref Range    BSA 1.48 m2    Patton's Biplane MOD Ejection Fraction 58 %    A2C EF 56 %    A4C EF 59 %    LVOT stroke volume 56.0 cm3    LVIDd 3.6 3.5 - 6.0 cm    LV Systolic Volume 25 mL    LV Systolic Volume Index 16.8 mL/m2    LVIDs 2.6 2.1 - 4.0 cm    LV ESV A2C 51.58 mL    LV Diastolic Volume 54 mL    LV ESV A4C 62.17 mL    LV Diastolic Volume Index 36.24 mL/m2    LV EDV A2C 35.805526674550532 mL    LV EDV A4C 32.56 mL    Left Ventricular End Systolic Volume by Teichholz Method 25.20 mL    Left Ventricular End Diastolic Volume by Teichholz Method 54.44 mL    IVS 1.1 0.6 - 1.1 cm    LVOT diameter 1.8 cm    LVOT area 2.5 cm2    FS 27.8 (A) 28 - 44 %    Left Ventricle Relative Wall Thickness 0.50 cm    PW 0.9 0.6 - 1.1 cm    LV mass 107.9 g    LV Mass Index 72.4 g/m2    MV Peak E Kev 1.74 m/s    TDI LATERAL 0.06 m/s    TDI SEPTAL 0.05 m/s    E/E' ratio 32 m/s    MV Peak A Kev 0.51 m/s    TR Max Kev 3.5 m/s    E/A ratio 3.41     E wave deceleration time 155 msec    LV SEPTAL E/E' RATIO 34.8 m/s    LV LATERAL E/E' RATIO 29.0 m/s    LVOT peak kev 0.9 m/s    Left Ventricular Outflow Tract Mean Velocity 0.62 cm/s    Left Ventricular Outflow Tract Mean Gradient 1.69 mmHg    RV- linn basal diam 3.0 cm    RV S' 5.34 cm/s    TAPSE 0.7 cm    RV/LV Ratio 0.83 cm    LA Vol (MOD) 57 mL    GABBY (MOD) 38 mL/m2    RA area length vol 34.43 mL    RA Area 15.2 cm2    RA vol index 23.11 mL/m2    RA Vol 33.12 mL    AV mean gradient 4 mmHg    AV peak gradient 8 mmHg    Ao peak kev 1.4 m/s    Ao VTI 32.5 cm    LVOT peak VTI 22.0 cm    AV valve area 1.7 cm²    AV Velocity Ratio 0.64     AV index (prosthetic) 0.68     ZITA by Velocity Ratio 1.6 cm²    Mr max kev 4.91 m/s    MV  mean gradient 5 mmHg    MV peak gradient 14 mmHg    MV stenosis pressure 1/2 time 44.94 ms    MV valve area p 1/2 method 4.90 cm2    MV valve area by continuity eq 1.63 cm2    MV VTI 34.4 cm    Triscuspid Valve Regurgitation Peak Gradient 50 mmHg    PV PEAK VELOCITY 1.10 m/s    PV peak gradient 5 mmHg    Sinus 3.14 cm    ASI 2.1 cm/m2    STJ 2.8 cm    Ascending aorta 3.6 cm    ASI 2.4 cm/m2    IVC diameter 1.60 cm    Mean e' 0.06 m/s    ZLVIDS -0.43     ZLVIDD -2.06     LA area A4C 20.67 cm2    LA area A2C 18.93 cm2    TV resting pulmonary artery pressure 52 mmHg    RV TB RVSP 7 mmHg    Est. RA pres 3 mmHg    Mitral Valve Heart Rate 87 bpm    Narrative      Left Ventricle: The left ventricle is normal in size. Mildly increased   wall thickness. Normal wall motion. There is normal systolic function.   Quantitated ejection fraction is 58%. Diastolic function cannot be   reliably determined in the presence of mitral annular calcification.    Right Ventricle: The right ventricle is normal in size measuring 3.0   cm. Systolic function is normal.    Left Atrium: The left atrium is mildly dilated measuring 38 mL/m2.    Aortic Valve: The aortic valve is a trileaflet valve. There is aortic   valve sclerosis.    Mitral Valve: There is mild stenosis. The mean pressure gradient across   the mitral valve is 5 mmHg at a heart rate of 87 bpm. There is mild   regurgitation.    Tricuspid Valve: There is mild regurgitation.    Pulmonary Artery: There is moderate pulmonary hypertension. The   estimated pulmonary artery systolic pressure is 52 mmHg.       Assessment and Plan:     New onset a-fib  - presented with elevated HR from HD; new onset afib  - initial EKG afib with ; HR 80s-90s per Epic; telemetry reviewed with continued afib with periods of RVR with HR up to 120s-130s  - on Toprol XL 25 BID currently; not on full dose AC likely related to anemia  - continued RVR related to multiple etiologies agitation, poor po intake,  infectious etiology   - CHADSVAS score 5 (CHF, female, age >75, PAD) with indication for chronic OAC- recommend if benefit outweighs risk    - overall goal is rate control currently     Hypertension  - SBP 100s-110s overnight  - on Hydralazine, Imdur and Toprol XL as an outpatient   - continued on Imdur and Toprol XL  - goal BP less than 130/80  - BP well controlled; continue current regimen and monitor BP         VTE Risk Mitigation (From admission, onward)           Ordered     heparin (porcine) injection 5,000 Units  Every 8 hours         07/01/25 1335     Place sequential compression device  Until discontinued         06/30/25 1830                    Thank you for your consult. I will follow-up with patient. Please contact us if you have any additional questions.    AUBREE Brock, ANP  Cardiology   [facility] - Telemetry

## 2025-07-02 NOTE — ASSESSMENT & PLAN NOTE
Detected by imaging CT abdomen with IV contrast    And Plavix and aspirin cardiology is on board

## 2025-07-02 NOTE — NURSING
Patient's daughter refused midnight vitals. She wanted pt to rest and not be bothered during night.

## 2025-07-02 NOTE — ASSESSMENT & PLAN NOTE
Patient has Diastolic (HFpEF) heart failure that is Chronic. On presentation their CHF was well compensated. Most recent BNP and echo results are listed below.  Recent Labs     06/30/25  1511   *     Latest ECHO  Results for orders placed during the hospital encounter of 06/12/25    Echo    Interpretation Summary    Left Ventricle: The left ventricle is normal in size. Mildly increased wall thickness. Normal wall motion. There is normal systolic function. Quantitated ejection fraction is 58%. Diastolic function cannot be reliably determined in the presence of mitral annular calcification.    Right Ventricle: The right ventricle is normal in size measuring 3.0 cm. Systolic function is normal.    Left Atrium: The left atrium is mildly dilated measuring 38 mL/m2.    Aortic Valve: The aortic valve is a trileaflet valve. There is aortic valve sclerosis.    Mitral Valve: There is mild stenosis. The mean pressure gradient across the mitral valve is 5 mmHg at a heart rate of 87 bpm. There is mild regurgitation.    Tricuspid Valve: There is mild regurgitation.    Pulmonary Artery: There is moderate pulmonary hypertension. The estimated pulmonary artery systolic pressure is 52 mmHg.    Current Heart Failure Medications  furosemide tablet 40 mg, Daily, Oral  metoprolol succinate (TOPROL-XL) 24 hr tablet 25 mg, 2 times daily, Oral    Plan  - Monitor strict I&Os and daily weights.    - Place on telemetry  - Low sodium diet  - Place on fluid restriction of 1 L.   - Cardiology has been consulted  - The patient's volume status is stable but not at their baseline as indicated by

## 2025-07-02 NOTE — ASSESSMENT & PLAN NOTE
"This patient does have evidence of infective focus  My overall impression is sepsis with Encephalopathy.  Source: Skin and Soft Tissue Infection: gangrene  Antibiotics given-   Antibiotics (72h ago, onward)      Start     Stop Route Frequency Ordered    07/01/25 2115  mupirocin 2 % ointment         07/06/25 2059 Nasl 2 times daily 07/01/25 2011 07/01/25 1400  piperacillin-tazobactam (ZOSYN) 4.5 g in D5W 100 mL IVPB (MB+)         -- IV Every 12 hours (non-standard times) 07/01/25 1335    06/30/25 1621  vancomycin - pharmacy to dose  (vancomycin IVPB (PEDS and ADULTS))        Placed in "And" Linked Group    -- IV pharmacy to manage frequency 06/30/25 1521          Latest lactate reviewed-  Recent Labs   Lab 06/30/25  1839   LACTATE 1.3     Organ dysfunction indicated by Encephalopathy    Fluid challenge Contraindicated- Fluid bolus is contraindicated in this patient due to End Stage Renal Disease     Post- resuscitation assessment Yes - I attest a sepsis perfusion exam was performed within 6 hours of sepsis, severe sepsis, or septic shock presentation, following fluid resuscitation.      Will Not start Pressors- Levophed for MAP of 65  Source control achieved by: Antibiotic therapy  "

## 2025-07-02 NOTE — PROGRESS NOTES
Pharmacokinetic Assessment Follow Up: IV Vancomycin    Vancomycin serum concentration assessment(s):    The random level was drawn correctly and can be used to guide therapy at this time. The measurement is above the desired definitive target range of 15 to 20 mcg/mL.    Vancomycin Regimen Plan:  Re-dose when the random level is less than 20 mcg/mL, next level to be drawn at 0400 on 07/04/25 prior to second dialysis session.      Drug levels (last 3 results):  Recent Labs   Lab Result Units 07/01/25  1759   Vancomycin Random ug/ml 28.2       Pharmacy will continue to follow and monitor vancomycin.    Please contact pharmacy at extension [phone] for questions regarding this assessment.    Thank you for the consult,   Shy Chowdhury       Patient brief summary:  Erin Clark is a 89 y.o. female initiated on antimicrobial therapy with IV Vancomycin for treatment of sepsis    The patient's current regimen pulse dosing    Drug Allergies:   Review of patient's allergies indicates:   Allergen Reactions    Aspirin Nausea Only and Swelling     Able to tolerated in small doses         Actual Body Weight:   45.8 kg    Renal Function:   Estimated Creatinine Clearance: 5.9 mL/min (A) (based on SCr of 4.8 mg/dL (H)).,     Dialysis Method (if applicable):  intermittent HD    CBC (last 72 hours):  Recent Labs   Lab Result Units 06/30/25  1511 07/01/25  1002   WBC K/uL 19.61* 17.58*   HGB gm/dL 9.7* 9.0*   HCT % 29.4* 27.7*   Platelet Count K/uL 298 277   Lymph % % 5.0* 3.2*   Mono % % 5.7 6.0   Eos % % 0.3 0.7   Basophil % % 0.3 0.3       Metabolic Panel (last 72 hours):  Recent Labs   Lab Result Units 06/30/25  1511 07/01/25  1002   Sodium mmol/L 135* 133*   Potassium mmol/L 4.3 4.5   Chloride mmol/L 95 95   CO2 mmol/L 27 26   Glucose mg/dL 122* 87   BUN mg/dL 15 21   Creatinine mg/dL 3.8* 4.8*   Albumin g/dL 2.7* 2.3*   Bilirubin Total mg/dL 0.7 0.8   ALP unit/L 101 85   AST unit/L 29 26   ALT unit/L 12 11   Magnesium   mg/dL 1.7  --    Phosphorus Level mg/dL  --  3.2       Vancomycin Administrations:  vancomycin given in the last 96 hours                     vancomycin 1,500 mg in 0.9% NaCl 250 mL IVPB (admixture device) (mg) 1,500 mg New Bag 06/30/25 1714                    Microbiologic Results:  Microbiology Results (last 7 days)       Procedure Component Value Units Date/Time    AFB Culture & Smear [[id number]] Collected: 07/01/25 1716    Order Status: Sent Specimen: Wound from Toe, Left Foot Updated: 07/01/25 1729    Fungus culture [[id number]] Collected: 07/01/25 1716    Order Status: Sent Specimen: Wound from Toe, Left Foot Updated: 07/01/25 1726    Gram stain [[id number]] Collected: 07/01/25 1716    Order Status: Sent Specimen: Wound from Toe, Left Foot Updated: 07/01/25 1726    Culture, Anaerobic [[id number]] Collected: 07/01/25 1715    Order Status: Sent Specimen: Wound from Toe, Left Foot Updated: 07/01/25 1726    Aerobic culture [[id number]] Collected: 07/01/25 1715    Order Status: Sent Specimen: Wound from Toe, Left Foot Updated: 07/01/25 1726    Blood Culture #1 **CANNOT BE ORDERED STAT** [[id number]]  (Normal) Collected: 06/30/25 1532    Order Status: Completed Specimen: Blood from Peripheral, Forearm, Right Updated: 07/01/25 0700     Blood Culture No Growth After 6 Hours    Blood Culture #2 **CANNOT BE ORDERED STAT** [[id number]]  (Normal) Collected: 06/30/25 1537    Order Status: Completed Specimen: Blood Updated: 07/01/25 0700     Blood Culture No Growth After 6 Hours

## 2025-07-02 NOTE — ASSESSMENT & PLAN NOTE
Patient has paroxysmal (<7 days) atrial fibrillation. Patient is currently in atrial fibrillation. [id number] Score: 5. The patients heart rate in the last 24 hours is as follows:  Pulse  Min: 83  Max: 126     Antiarrhythmics  metoprolol succinate (TOPROL-XL) 24 hr tablet 25 mg, 2 times daily, Oral    Anticoagulants  heparin (porcine) injection 5,000 Units, Every 8 hours, Subcutaneous    Plan  - Replete lytes with a goal of K>4, Mg >2  - Patient is anticoagulated, see medications listed above.  - Patient's afib is currently controlled  - Plan to start heparin

## 2025-07-02 NOTE — PROGRESS NOTES
[facility] Medicine  Progress Note    Patient Name: Erin Clark  MRN: [id number]  Patient Class: IP- Inpatient   Admission Date: 6/30/2025  Length of Stay: 2 days  Attending Physician: Chung Kennedy MD  Primary Care Provider: Bayron Golden MD        Subjective     Principal Problem:Acute osteomyelitis of toe of left foot        HPI:  Erin Clark is a chronically-ill 89-year-old female that presented to the ED at [facility] this afternoon for tachycardia. The patient presented to the ED from a local dialysis center today after the completion of her HD treatment. Son served as historian; interpretation services provided by ([place] [id number]). Apparently, the patient experienced tachycardia throughout the entire HD session.   The nursing staff advised the patient's son of the issues and he transported the patient to the ED for further evaluation.     Overview/Hospital Course:  No notes on file    Interval History:  Patient had HD today nephrology is on board, ID recommended current antibiotic regimen, CT abdomen showed picture of gallbladder enlargement and gallstone, pending ultrasound of the abdomen, cultures are negative podiatry collected wound cultures pending results    Review of Systems   Unable to perform ROS: Dementia     Objective:     Vital Signs (Most Recent):  Temp: 97.9 °F (36.6 °C) (07/02/25 1329)  Pulse: (!) 116 (07/02/25 1329)  Resp: 16 (07/02/25 0720)  BP: (!) 114/52 (07/02/25 1329)  SpO2: 97 % (07/02/25 0720) Vital Signs (24h Range):  Temp:  [97 °F (36.1 °C)-98.1 °F (36.7 °C)] 97.9 °F (36.6 °C)  Pulse:  [] 116  Resp:  [16-22] 16  SpO2:  [91 %-97 %] 97 %  BP: (102-115)/(46-64) 114/52     Weight: 45.8 kg (100 lb 15.5 oz)  Body mass index is 18.47 kg/m².  No intake or output data in the 24 hours ending 07/02/25 1348      Physical Exam  Constitutional:       Appearance: She is ill-appearing and toxic-appearing.   HENT:      Head: Normocephalic and atraumatic.       Right Ear: Tympanic membrane normal.      Left Ear: Tympanic membrane normal.      Nose: Nose normal.      Mouth/Throat:      Mouth: Mucous membranes are moist.   Eyes:      Extraocular Movements: Extraocular movements intact.      Pupils: Pupils are equal, round, and reactive to light.   Cardiovascular:      Rate and Rhythm: Rhythm irregular.      Comments: AVF to left upper extremity; + bruit/thrill  Pulmonary:      Effort: Pulmonary effort is normal.   Abdominal:      General: Abdomen is flat.      Tenderness: There is abdominal tenderness.   Musculoskeletal:         General: Normal range of motion.      Cervical back: Normal range of motion and neck supple.   Skin:     Capillary Refill: Capillary refill takes less than 2 seconds.      Comments: Gangrene to left third toe   Neurological:      Mental Status: She is alert and oriented to person, place, and time.   Psychiatric:         Mood and Affect: Mood normal.               Significant Labs: All pertinent labs within the past 24 hours have been reviewed.  Recent Lab Results  (Last 5 results in the past 24 hours)        07/02/25  1231   07/02/25  1030   07/02/25  0640   07/01/25  2112   07/01/25  1759        Albumin 2.3               ALP 94               ALT 12               Anion Gap 9               AST 30               Baso # 0.05               Basophil % 0.3               BILIRUBIN TOTAL 0.8  Comment: For infants and newborns, interpretation of results should be based   on gestational age, weight and in agreement with clinical   observations.    Premature Infant recommended reference ranges:   0-24 hours:  <8.0 mg/dL   24-48 hours: <12.0 mg/dL   3-5 days:    <15.0 mg/dL   6-29 days:   <15.0 mg/dL               BUN 7               Calcium 8.4               Chloride 97               CO2 32               Creatinine 1.6               eGFR 31  Comment: Estimated GFR calculated using the CKD-EPI creatinine (2021) equation.               Eos # 0.10               Eos  % 0.5               Glucose 95               Gran # (ANC) 16.57               Hematocrit 27.5               Hemoglobin 9.3               Immature Grans (Abs) 0.09  Comment: Mild elevation in immature granulocytes is non specific and can be seen in a variety of conditions including stress response, acute inflammation, trauma and pregnancy. Correlation with other laboratory and clinical findings is essential.               Immature Granulocytes 0.5               Lymph # 1.07               Lymph % 5.7               MCH 31.2               MCHC 33.8               MCV 92               Mono # 0.92               Mono % 4.9               MPV 9.8               Neut % 88.1               nRBC 0               Phosphorus Level 1.4               Platelet Count 325               POCT Glucose   93   89   129         Potassium 3.1               PROTEIN TOTAL 7.4               RBC 2.98               RDW 15.1               Sodium 138               Vancomycin, Random         28.2       WBC 18.80                                      Significant Imaging: I have reviewed all pertinent imaging results/findings within the past 24 hours.    Imaging Results              X-Ray Foot Complete Left (Final result)  Result time 06/30/25 17:22:22      Final result by Shraddha Chanel MD (06/30/25 17:22:22)                   Impression:      Osteopenia.    Partial progressive interval healing of the known 4th toe proximal phalangeal base fracture.    Ulceration at the tip of the 3rd toe distal phalanx.  Questionable slight erosive irregularity 3rd toe distal phalanx representing osteomyelitis otherwise.  Regional soft tissue swelling and soft tissue gas representing infection.  Recommend MRI.      Electronically signed by: Shraddha Chanel  Date:    06/30/2025  Time:    17:22               Narrative:    EXAMINATION:  XR FOOT COMPLETE 3 VIEW LEFT    CLINICAL HISTORY:  .  Gangrene, not elsewhere classified    TECHNIQUE:  AP, lateral and oblique  views of the left foot were performed.    COMPARISON:  06/12/2025    FINDINGS:  See below                                       X-Ray Chest AP Portable (Final result)  Result time 06/30/25 15:55:35      Final result by Adam Mullen MD (06/30/25 15:55:35)                   Impression:      As above.      Electronically signed by: Adam Mullen MD  Date:    06/30/2025  Time:    15:55               Narrative:    EXAMINATION:  XR CHEST AP PORTABLE    CLINICAL HISTORY:  Tachycardia, unspecified    TECHNIQUE:  Single frontal view of the chest was performed.    FINDINGS:  There are bilateral perihilar opacities with interstitial prominence suspicious for edema.  There is no focal consolidative change.  There is no pneumothorax or pleural fluid identified.  There is elevation of the left hemidiaphragm.  The cardiac silhouette appears enlarged.  There is calcification of the aorta and patient is status post sternotomy.  The osseous structures demonstrate degenerative change.                                    CT abdomen with IV contrast    Impression:     1. Distended gallbladder with an associated gallstone.  No pericholecystic inflammatory change.  Recommend correlation for symptoms of right upper quadrant pain and further characterization with a gallbladder ultrasound if clinically warranted.  2. Colonic diverticulosis.  3. Multichamber cardiac enlargement.  4. Pulmonary edema with small bilateral dependent pleural effusions.  5. Atrophic native kidneys.  Partially visualized left arm AV fistula.  6. Severe aortic atherosclerosis with suspected high-grade stenosis at the origin of the celiac, superior mesenteric, bilateral renal and inferior mesenteric arteries.  7. Occluded bilateral superficial femoral artery stents.  8. Age indeterminate wedge compression fracture of the T11 vertebral body with mild associated height loss.  9. Additional findings as detailed in the body of the report.            Assessment &  "Plan  Sepsis  This patient does have evidence of infective focus  My overall impression is sepsis with Encephalopathy.  Source: Skin and Soft Tissue Infection: gangrene  Antibiotics given-   Antibiotics (72h ago, onward)      Start     Stop Route Frequency Ordered    07/01/25 2115  mupirocin 2 % ointment         07/06/25 2059 Nasl 2 times daily 07/01/25 2011 07/01/25 1400  piperacillin-tazobactam (ZOSYN) 4.5 g in D5W 100 mL IVPB (MB+)         -- IV Every 12 hours (non-standard times) 07/01/25 1335    06/30/25 1621  vancomycin - pharmacy to dose  (vancomycin IVPB (PEDS and ADULTS))        Placed in "And" Linked Group    -- IV pharmacy to manage frequency 06/30/25 1521          Latest lactate reviewed-  Recent Labs   Lab 06/30/25  1839   LACTATE 1.3     Organ dysfunction indicated by Encephalopathy    Fluid challenge Contraindicated- Fluid bolus is contraindicated in this patient due to End Stage Renal Disease     Post- resuscitation assessment Yes - I attest a sepsis perfusion exam was performed within 6 hours of sepsis, severe sepsis, or septic shock presentation, following fluid resuscitation.      Will Not start Pressors- Levophed for MAP of 65  Source control achieved by: Antibiotic therapy  End-stage renal disease on hemodialysis  Creatine stable for now. BMP reviewed- noted Estimated Creatinine Clearance: 17.6 mL/min (A) (based on SCr of 1.6 mg/dL (H)). according to latest data. Based on current GFR, CKD stage is end stage.  Monitor UOP and serial BMP and adjust therapy as needed. Renally dose meds. Avoid nephrotoxic medications and procedures.  Nephrology is consulted to continue on her dialysis  Essential hypertension  Patient's blood pressure range in the last 24 hours was: BP  Min: 102/52  Max: 115/46.The patient's inpatient anti-hypertensive regimen is listed below:  Current Antihypertensives  isosorbide mononitrate 24 hr tablet 30 mg, Daily, Oral  furosemide tablet 40 mg, Daily, Oral  metoprolol " succinate (TOPROL-XL) 24 hr tablet 25 mg, 2 times daily, Oral    Plan  - BP is controlled, no changes needed to their regimen    Chronic diastolic congestive heart failure  Patient has Diastolic (HFpEF) heart failure that is Chronic. On presentation their CHF was well compensated. Most recent BNP and echo results are listed below.  Recent Labs     06/30/25  1511   *     Latest ECHO  Results for orders placed during the hospital encounter of 06/12/25    Echo    Interpretation Summary    Left Ventricle: The left ventricle is normal in size. Mildly increased wall thickness. Normal wall motion. There is normal systolic function. Quantitated ejection fraction is 58%. Diastolic function cannot be reliably determined in the presence of mitral annular calcification.    Right Ventricle: The right ventricle is normal in size measuring 3.0 cm. Systolic function is normal.    Left Atrium: The left atrium is mildly dilated measuring 38 mL/m2.    Aortic Valve: The aortic valve is a trileaflet valve. There is aortic valve sclerosis.    Mitral Valve: There is mild stenosis. The mean pressure gradient across the mitral valve is 5 mmHg at a heart rate of 87 bpm. There is mild regurgitation.    Tricuspid Valve: There is mild regurgitation.    Pulmonary Artery: There is moderate pulmonary hypertension. The estimated pulmonary artery systolic pressure is 52 mmHg.    Current Heart Failure Medications  furosemide tablet 40 mg, Daily, Oral  metoprolol succinate (TOPROL-XL) 24 hr tablet 25 mg, 2 times daily, Oral    Plan  - Monitor strict I&Os and daily weights.    - Place on telemetry  - Low sodium diet  - Place on fluid restriction of 1 L.   - Cardiology has been consulted  - The patient's volume status is stable but not at their baseline as indicated by              New onset a-fib  Patient has paroxysmal (<7 days) atrial fibrillation. Patient is currently in atrial fibrillation. [id number] Score: 5. The patients heart rate in the  last 24 hours is as follows:  Pulse  Min: 83  Max: 126     Antiarrhythmics  metoprolol succinate (TOPROL-XL) 24 hr tablet 25 mg, 2 times daily, Oral    Anticoagulants  heparin (porcine) injection 5,000 Units, Every 8 hours, Subcutaneous    Plan  - Replete lytes with a goal of K>4, Mg >2  - Patient is anticoagulated, see medications listed above.  - Patient's afib is currently controlled  - Plan to start heparin       Type 2 diabetes mellitus with diabetic nephropathy, without long-term current use of insulin  -Start accuchecks with Novolog correction    Hypertension  Patient's blood pressure range in the last 24 hours was: BP  Min: 102/52  Max: 115/46.The patient's inpatient anti-hypertensive regimen is listed below:  Current Antihypertensives  isosorbide mononitrate 24 hr tablet 30 mg, Daily, Oral  furosemide tablet 40 mg, Daily, Oral  metoprolol succinate (TOPROL-XL) 24 hr tablet 25 mg, 2 times daily, Oral    Plan  - BP is controlled, no changes needed to their regimen    Frailty  Chronic/patient has a advanced dementia   PT OT ordered    Senile dementia  Patient with dementia 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 Dementia is moderate. The patient does have signs of behavioral disturbance. Home dementia medications are Held or Continued: continued.. Continue non-pharmacologic interventions to prevent delirium (No VS between 11PM-5AM, activity during day, opening blinds, providing glasses/hearing aids, and up in chair during daytime). Will avoid narcotics and benzos unless absolutely necessary. PRN anti-psychotics are not prescribed to avoid self harm behaviors.  Frequent reorientation, lowering the bed levels, lifting a bed rails, have video monitor camera in place, continuous redirection  Acute osteomyelitis of toe of left foot  Detected by x-ray  Podiatry consulted  ID consulted  Arterial Doppler of lower limbs ordered  Currently on board spectrum antibiotics  Cultures are so far negative    PVD  (peripheral vascular disease)  Patient has documented history of peripheral vascular disease  Nonhealing left 3rd toe gangrene  Cardiology is on board  Doppler of lower limb showed severe DVT  With severe aortic atherosclerosis and suspected high-grade stenosis at the origin of the celiac, superior mesenteric, bilateral renal and inferior mesenteric arteries, with occlusion of bilateral superficial femoral artery stents.  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  On statin and Plavix ,cardiology is on board  Ovarian mass, left    Detected by imaging no intra-abdominal lymphadenopathy no fat stranding were detected  Patient will probably need follow-up as outpatient  Celiac artery stenosis    Detected by imaging CT abdomen with IV contrast    And Plavix and aspirin cardiology is on board  Hypokalemia  Patient's most recent potassium results are listed below.   Recent Labs     06/30/25  1511 07/01/25  1002 07/02/25  1231   K 4.3 4.5 3.1*     Plan  - Replete potassium per protocol  - Monitor potassium Daily  - Patient's hypokalemia is monitored closely- to be monitored  VTE Risk Mitigation (From admission, onward)           Ordered     heparin (porcine) injection 5,000 Units  Every 8 hours         07/01/25 1335     Place sequential compression device  Until discontinued         06/30/25 1830                    Discharge Planning   KATIE: 7/4/2025     Code Status: Full Code   Medical Readiness for Discharge Date:    Discharge Plan A:  (TBD)                        Chung Dominique MD  Department of Hospital Medicine   [facility]

## 2025-07-02 NOTE — ASSESSMENT & PLAN NOTE
Severe aortic atherosclerosis with suspected high-grade stenosis at the origin of the celiac/superior mesenteric/bilateral renal and inferior mesenteric arteries with a occlusion of bilateral superficial femoral artery stents.  On statin and Plavix ,cardiology is on board

## 2025-07-02 NOTE — ASSESSMENT & PLAN NOTE
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

## 2025-07-02 NOTE — PROGRESS NOTES
Pharmacokinetic Assessment Follow Up: IV Vancomycin    Vancomycin serum concentration assessment(s):    The random level was drawn correctly and can be used to guide therapy at this time. The measurement is above the desired definitive target range of 15 to 20 mcg/mL.    Vancomycin Regimen Plan:    Re-dose when the random level is less than 25 mcg/mL, next level to be drawn at 0400 on 7/4    Drug levels (last 3 results):  Recent Labs   Lab Result Units 07/01/25  1759   Vancomycin Random ug/ml 28.2       Pharmacy will continue to follow and monitor vancomycin.    Please contact pharmacy at extension [phone] for questions regarding this assessment.    Thank you for the consult,   Juan Fuller       Patient brief summary:  Erin Clark is a 89 y.o. female initiated on antimicrobial therapy with IV Vancomycin for treatment of bone/joint infection    The patient's current regimen is dose by level    Drug Allergies:   Review of patient's allergies indicates:   Allergen Reactions    Aspirin Nausea Only and Swelling     Able to tolerated in small doses         Actual Body Weight:       Renal Function:   Estimated Creatinine Clearance: 5.9 mL/min (A) (based on SCr of 4.8 mg/dL (H)).,     Dialysis Method (if applicable):  intermittent HD    CBC (last 72 hours):  Recent Labs   Lab Result Units 06/30/25  1511 07/01/25  1002   WBC K/uL 19.61* 17.58*   HGB gm/dL 9.7* 9.0*   HCT % 29.4* 27.7*   Platelet Count K/uL 298 277   Lymph % % 5.0* 3.2*   Mono % % 5.7 6.0   Eos % % 0.3 0.7   Basophil % % 0.3 0.3       Metabolic Panel (last 72 hours):  Recent Labs   Lab Result Units 06/30/25  1511 07/01/25  1002   Sodium mmol/L 135* 133*   Potassium mmol/L 4.3 4.5   Chloride mmol/L 95 95   CO2 mmol/L 27 26   Glucose mg/dL 122* 87   BUN mg/dL 15 21   Creatinine mg/dL 3.8* 4.8*   Albumin g/dL 2.7* 2.3*   Bilirubin Total mg/dL 0.7 0.8   ALP unit/L 101 85   AST unit/L 29 26   ALT unit/L 12 11   Magnesium  mg/dL 1.7  --    Phosphorus Level mg/dL   --  3.2       Vancomycin Administrations:  vancomycin given in the last 96 hours                     vancomycin 1,500 mg in 0.9% NaCl 250 mL IVPB (admixture device) (mg) 1,500 mg New Bag 06/30/25 1714                    Microbiologic Results:  Microbiology Results (last 7 days)       Procedure Component Value Units Date/Time    Gram stain [[id number]] Collected: 07/01/25 1716    Order Status: Completed Specimen: Wound from Toe, Left Foot Updated: 07/02/25 0155     GRAM STAIN Moderate Gram Positive Rods      Moderate Gram Negative Rods      Few Gram positive cocci      Rare WBC seen    Blood Culture #1 **CANNOT BE ORDERED STAT** [[id number]]  (Normal) Collected: 06/30/25 1532    Order Status: Completed Specimen: Blood from Peripheral, Forearm, Right Updated: 07/02/25 0103     Blood Culture No Growth After 24 Hours    Blood Culture #2 **CANNOT BE ORDERED STAT** [[id number]]  (Normal) Collected: 06/30/25 1537    Order Status: Completed Specimen: Blood Updated: 07/02/25 0103     Blood Culture No Growth After 24 Hours    Afb Culture Stain [[id number]] Collected: 07/01/25 1716    Order Status: Sent Specimen: Wound from Toe, Left Foot Updated: 07/01/25 2236    AFB Culture & Smear [[id number]] Collected: 07/01/25 1716    Order Status: Resulted Specimen: Wound from Toe, Left Foot Updated: 07/01/25 1729    Fungus culture [[id number]] Collected: 07/01/25 1716    Order Status: Sent Specimen: Wound from Toe, Left Foot Updated: 07/01/25 1726    Culture, Anaerobic [[id number]] Collected: 07/01/25 1715    Order Status: Sent Specimen: Wound from Toe, Left Foot Updated: 07/01/25 1726    Aerobic culture [[id number]] Collected: 07/01/25 1715    Order Status: Sent Specimen: Wound from Toe, Left Foot Updated: 07/01/25 1726

## 2025-07-02 NOTE — ASSESSMENT & PLAN NOTE
- SBP 100s-110s overnight  - on Hydralazine, Imdur and Toprol XL as an outpatient   - continued on Imdur and Toprol XL  - goal BP less than 130/80  - BP well controlled; continue current regimen and monitor BP

## 2025-07-02 NOTE — ASSESSMENT & PLAN NOTE
Detected by imaging no intra-abdominal lymphadenopathy no fat stranding were detected  Patient will probably need follow-up as outpatient

## 2025-07-02 NOTE — PLAN OF CARE
Problem: Infection  Goal: Absence of Infection Signs and Symptoms  Outcome: Progressing     Problem: Adult Inpatient Plan of Care  Goal: Plan of Care Review  Outcome: Progressing  Goal: Patient-Specific Goal (Individualized)  Outcome: Progressing     Problem: Diabetes Comorbidity  Goal: Blood Glucose Level Within Targeted Range  Outcome: Progressing     Problem: Sepsis/Septic Shock  Goal: Absence of Bleeding  Outcome: Progressing

## 2025-07-02 NOTE — ASSESSMENT & PLAN NOTE
Creatine stable for now. BMP reviewed- noted Estimated Creatinine Clearance: 17.6 mL/min (A) (based on SCr of 1.6 mg/dL (H)). according to latest data. Based on current GFR, CKD stage is end stage.  Monitor UOP and serial BMP and adjust therapy as needed. Renally dose meds. Avoid nephrotoxic medications and procedures.  Nephrology is consulted to continue on her dialysis

## 2025-07-03 LAB
ABSOLUTE EOSINOPHIL (OHS): 0.17 K/UL
ABSOLUTE MONOCYTE (OHS): 0.84 K/UL (ref 0.3–1)
ABSOLUTE NEUTROPHIL COUNT (OHS): 13.81 K/UL (ref 1.8–7.7)
ALBUMIN SERPL BCP-MCNC: 2 G/DL (ref 3.5–5.2)
ALP SERPL-CCNC: 87 UNIT/L (ref 40–150)
ALT SERPL W/O P-5'-P-CCNC: 12 UNIT/L (ref 10–44)
ANION GAP (OHS): 15 MMOL/L (ref 8–16)
AST SERPL-CCNC: 30 UNIT/L (ref 11–45)
BASOPHILS # BLD AUTO: 0.04 K/UL
BASOPHILS NFR BLD AUTO: 0.2 %
BILIRUB SERPL-MCNC: 0.7 MG/DL (ref 0.1–1)
BUN SERPL-MCNC: 20 MG/DL (ref 8–23)
CALCIUM SERPL-MCNC: 8.2 MG/DL (ref 8.7–10.5)
CHLORIDE SERPL-SCNC: 95 MMOL/L (ref 95–110)
CO2 SERPL-SCNC: 24 MMOL/L (ref 23–29)
CREAT SERPL-MCNC: 4.3 MG/DL (ref 0.5–1.4)
ERYTHROCYTE [DISTWIDTH] IN BLOOD BY AUTOMATED COUNT: 15.1 % (ref 11.5–14.5)
GFR SERPLBLD CREATININE-BSD FMLA CKD-EPI: 9 ML/MIN/1.73/M2
GLUCOSE SERPL-MCNC: 146 MG/DL (ref 70–110)
HCT VFR BLD AUTO: 23.4 % (ref 37–48.5)
HGB BLD-MCNC: 7.6 GM/DL (ref 12–16)
IMM GRANULOCYTES # BLD AUTO: 0.09 K/UL (ref 0–0.04)
IMM GRANULOCYTES NFR BLD AUTO: 0.6 % (ref 0–0.5)
LYMPHOCYTES # BLD AUTO: 1.08 K/UL (ref 1–4.8)
MCH RBC QN AUTO: 31.1 PG (ref 27–31)
MCHC RBC AUTO-ENTMCNC: 32.5 G/DL (ref 32–36)
MCV RBC AUTO: 96 FL (ref 82–98)
NUCLEATED RBC (/100WBC) (OHS): 0 /100 WBC
OHS QRS DURATION: 64 MS
OHS QRS DURATION: 72 MS
OHS QTC CALCULATION: 423 MS
OHS QTC CALCULATION: 475 MS
PHOSPHATE SERPL-MCNC: 2.9 MG/DL (ref 2.7–4.5)
PLATELET # BLD AUTO: 262 K/UL (ref 150–450)
PMV BLD AUTO: 9.9 FL (ref 9.2–12.9)
POCT GLUCOSE: 101 MG/DL (ref 70–110)
POCT GLUCOSE: 116 MG/DL (ref 70–110)
POCT GLUCOSE: 126 MG/DL (ref 70–110)
POCT GLUCOSE: 143 MG/DL (ref 70–110)
POCT GLUCOSE: 71 MG/DL (ref 70–110)
POTASSIUM SERPL-SCNC: 4.1 MMOL/L (ref 3.5–5.1)
PROT SERPL-MCNC: 6.4 GM/DL (ref 6–8.4)
RBC # BLD AUTO: 2.44 M/UL (ref 4–5.4)
RELATIVE EOSINOPHIL (OHS): 1.1 %
RELATIVE LYMPHOCYTE (OHS): 6.7 % (ref 18–48)
RELATIVE MONOCYTE (OHS): 5.2 % (ref 4–15)
RELATIVE NEUTROPHIL (OHS): 86.2 % (ref 38–73)
SODIUM SERPL-SCNC: 134 MMOL/L (ref 136–145)
WBC # BLD AUTO: 16.03 K/UL (ref 3.9–12.7)

## 2025-07-03 PROCEDURE — 25000003 PHARM REV CODE 250: Performed by: INTERNAL MEDICINE

## 2025-07-03 PROCEDURE — 99497 ADVNCD CARE PLAN 30 MIN: CPT | Mod: 25,,, | Performed by: STUDENT IN AN ORGANIZED HEALTH CARE EDUCATION/TRAINING PROGRAM

## 2025-07-03 PROCEDURE — 25000003 PHARM REV CODE 250

## 2025-07-03 PROCEDURE — 80053 COMPREHEN METABOLIC PANEL: CPT | Performed by: NURSE PRACTITIONER

## 2025-07-03 PROCEDURE — 63600175 PHARM REV CODE 636 W HCPCS

## 2025-07-03 PROCEDURE — 85025 COMPLETE CBC W/AUTO DIFF WBC: CPT | Performed by: NURSE PRACTITIONER

## 2025-07-03 PROCEDURE — 99223 1ST HOSP IP/OBS HIGH 75: CPT | Mod: 25,,, | Performed by: STUDENT IN AN ORGANIZED HEALTH CARE EDUCATION/TRAINING PROGRAM

## 2025-07-03 PROCEDURE — 1158F ADVNC CARE PLAN TLK DOCD: CPT | Mod: CPTII,,, | Performed by: STUDENT IN AN ORGANIZED HEALTH CARE EDUCATION/TRAINING PROGRAM

## 2025-07-03 PROCEDURE — 36415 COLL VENOUS BLD VENIPUNCTURE: CPT | Performed by: NURSE PRACTITIONER

## 2025-07-03 PROCEDURE — 99498 ADVNCD CARE PLAN ADDL 30 MIN: CPT | Mod: ,,, | Performed by: STUDENT IN AN ORGANIZED HEALTH CARE EDUCATION/TRAINING PROGRAM

## 2025-07-03 PROCEDURE — 84100 ASSAY OF PHOSPHORUS: CPT | Performed by: NURSE PRACTITIONER

## 2025-07-03 PROCEDURE — 1153F DOC ADVNCD DIS CMFRT NOT 1ST: CPT | Mod: CPTII,,, | Performed by: STUDENT IN AN ORGANIZED HEALTH CARE EDUCATION/TRAINING PROGRAM

## 2025-07-03 PROCEDURE — 21400001 HC TELEMETRY ROOM

## 2025-07-03 PROCEDURE — 25000003 PHARM REV CODE 250: Performed by: NURSE PRACTITIONER

## 2025-07-03 RX ORDER — DEXTROMETHORPHAN/PSEUDOEPHED 2.5-7.5/.8
40 DROPS ORAL ONCE
Status: COMPLETED | OUTPATIENT
Start: 2025-07-03 | End: 2025-07-03

## 2025-07-03 RX ADMIN — HEPARIN SODIUM 5000 UNITS: 5000 INJECTION INTRAVENOUS; SUBCUTANEOUS at 05:07

## 2025-07-03 RX ADMIN — MUPIROCIN: 20 OINTMENT TOPICAL at 08:07

## 2025-07-03 RX ADMIN — SIMETHICONE 40 MG: 20 SUSPENSION/ DROPS ORAL at 04:07

## 2025-07-03 RX ADMIN — PIPERACILLIN SODIUM AND TAZOBACTAM SODIUM 4.5 G: 4; .5 INJECTION, POWDER, LYOPHILIZED, FOR SOLUTION INTRAVENOUS at 01:07

## 2025-07-03 RX ADMIN — FUROSEMIDE 40 MG: 40 TABLET ORAL at 08:07

## 2025-07-03 RX ADMIN — HEPARIN SODIUM 5000 UNITS: 5000 INJECTION INTRAVENOUS; SUBCUTANEOUS at 09:07

## 2025-07-03 RX ADMIN — METOPROLOL SUCCINATE 25 MG: 25 TABLET, EXTENDED RELEASE ORAL at 08:07

## 2025-07-03 RX ADMIN — POLYETHYLENE GLYCOL 3350 17 G: 17 POWDER, FOR SOLUTION ORAL at 08:07

## 2025-07-03 RX ADMIN — Medication 1 CAPSULE: at 08:07

## 2025-07-03 RX ADMIN — HEPARIN SODIUM 5000 UNITS: 5000 INJECTION INTRAVENOUS; SUBCUTANEOUS at 01:07

## 2025-07-03 RX ADMIN — CLOPIDOGREL 75 MG: 75 TABLET ORAL at 08:07

## 2025-07-03 RX ADMIN — HYDROMORPHONE HYDROCHLORIDE 1 MG: 1 INJECTION, SOLUTION INTRAMUSCULAR; INTRAVENOUS; SUBCUTANEOUS at 09:07

## 2025-07-03 RX ADMIN — HYDROMORPHONE HYDROCHLORIDE 1 MG: 1 INJECTION, SOLUTION INTRAMUSCULAR; INTRAVENOUS; SUBCUTANEOUS at 01:07

## 2025-07-03 NOTE — PROGRESS NOTES
[facility] Medicine  Progress Note    Patient Name: Erin Clark  MRN: [id number]  Patient Class: IP- Inpatient   Admission Date: 6/30/2025  Length of Stay: 3 days  Attending Physician: Chung Kennedy MD  Primary Care Provider: Bayron Golden MD        Subjective     Principal Problem:Acute osteomyelitis of toe of left foot        HPI:  Erin Clark is a chronically-ill 89-year-old female that presented to the ED at [facility] this afternoon for tachycardia. The patient presented to the ED from a local dialysis center today after the completion of her HD treatment. Son served as historian; interpretation services provided by ([place] [id number]). Apparently, the patient experienced tachycardia throughout the entire HD session.   The nursing staff advised the patient's son of the issues and he transported the patient to the ED for further evaluation.     Overview/Hospital Course:  No notes on file    Interval History:  Patient was seen in the morning looked like at her baseline, cardiology is deciding on conservative management, podiatry deciding on no surgical intervention due to poor outcome, palliative Care was consulted planning to have hospice company meeting with the family decide about home hospice..      Review of Systems   Unable to perform ROS: Dementia     Objective:     Vital Signs (Most Recent):  Temp: 97.8 °F (36.6 °C) (07/03/25 1108)  Pulse: 91 (07/03/25 1108)  Resp: 17 (07/03/25 1108)  BP: (!) 144/63 (07/03/25 1108)  SpO2: (!) 92 % (07/03/25 1108) Vital Signs (24h Range):  Temp:  [97 °F (36.1 °C)-100.1 °F (37.8 °C)] 97.8 °F (36.6 °C)  Pulse:  [] 91  Resp:  [16-24] 17  SpO2:  [92 %-99 %] 92 %  BP: ()/(51-74) 144/63     Weight: 45.8 kg (100 lb 15.5 oz)  Body mass index is 18.47 kg/m².  No intake or output data in the 24 hours ending 07/03/25 1508      Physical Exam  Constitutional:       Appearance: She is ill-appearing and toxic-appearing.   HENT:      Head:  Normocephalic and atraumatic.      Right Ear: Tympanic membrane normal.      Left Ear: Tympanic membrane normal.      Nose: Nose normal.      Mouth/Throat:      Mouth: Mucous membranes are moist.   Eyes:      Extraocular Movements: Extraocular movements intact.      Pupils: Pupils are equal, round, and reactive to light.   Cardiovascular:      Rate and Rhythm: Rhythm irregular.      Comments: AVF to left upper extremity; + bruit/thrill  Pulmonary:      Effort: Pulmonary effort is normal.   Abdominal:      General: Abdomen is flat.      Tenderness: There is abdominal tenderness.   Musculoskeletal:         General: Normal range of motion.      Cervical back: Normal range of motion and neck supple.   Skin:     Capillary Refill: Capillary refill takes less than 2 seconds.      Comments: Gangrene to left third toe   Neurological:      Mental Status: She is alert and oriented to person, place, and time.   Psychiatric:         Mood and Affect: Mood normal.               Significant Labs: All pertinent labs within the past 24 hours have been reviewed.  Recent Lab Results  (Last 5 results in the past 24 hours)        07/03/25  1134   07/03/25  1102   07/03/25  0758   07/03/25  0522   07/02/25  2123        Albumin 2.0               ALP 87               ALT 12               Anion Gap 15               AST 30               Baso #       0.04         Basophil %       0.2         BILIRUBIN TOTAL 0.7  Comment: For infants and newborns, interpretation of results should be based   on gestational age, weight and in agreement with clinical   observations.    Premature Infant recommended reference ranges:   0-24 hours:  <8.0 mg/dL   24-48 hours: <12.0 mg/dL   3-5 days:    <15.0 mg/dL   6-29 days:   <15.0 mg/dL               BUN 20               Calcium 8.2               Chloride 95               CO2 24               Creatinine 4.3               eGFR 9  Comment: Estimated GFR calculated using the CKD-EPI creatinine (2021) equation.                Eos #       0.17         Eos %       1.1         Glucose 146               Gran # (ANC)       13.81         Hematocrit       23.4         Hemoglobin       7.6         Immature Grans (Abs)       0.09  Comment: Mild elevation in immature granulocytes is non specific and can be seen in a variety of conditions including stress response, acute inflammation, trauma and pregnancy. Correlation with other laboratory and clinical findings is essential.         Immature Granulocytes       0.6         Lymph #       1.08         Lymph %       6.7         MCH       31.1         MCHC       32.5         MCV       96         Mono #       0.84         Mono %       5.2         MPV       9.9         Neut %       86.2         nRBC       0         Phosphorus Level       2.9         Platelet Count       262         POCT Glucose   126   71     101       Potassium 4.1               PROTEIN TOTAL 6.4               RBC       2.44         RDW       15.1         Sodium 134               WBC       16.03                                Significant Imaging: I have reviewed all pertinent imaging results/findings within the past 24 hours.    Imaging Results              X-Ray Foot Complete Left (Final result)  Result time 06/30/25 17:22:22      Final result by Shraddha Chanel MD (06/30/25 17:22:22)                   Impression:      Osteopenia.    Partial progressive interval healing of the known 4th toe proximal phalangeal base fracture.    Ulceration at the tip of the 3rd toe distal phalanx.  Questionable slight erosive irregularity 3rd toe distal phalanx representing osteomyelitis otherwise.  Regional soft tissue swelling and soft tissue gas representing infection.  Recommend MRI.      Electronically signed by: Shraddha Chanel  Date:    06/30/2025  Time:    17:22               Narrative:    EXAMINATION:  XR FOOT COMPLETE 3 VIEW LEFT    CLINICAL HISTORY:  .  Gangrene, not elsewhere classified    TECHNIQUE:  AP, lateral and oblique  views of the left foot were performed.    COMPARISON:  06/12/2025    FINDINGS:  See below                                       X-Ray Chest AP Portable (Final result)  Result time 06/30/25 15:55:35      Final result by Adam Mullen MD (06/30/25 15:55:35)                   Impression:      As above.      Electronically signed by: Adam Mullen MD  Date:    06/30/2025  Time:    15:55               Narrative:    EXAMINATION:  XR CHEST AP PORTABLE    CLINICAL HISTORY:  Tachycardia, unspecified    TECHNIQUE:  Single frontal view of the chest was performed.    FINDINGS:  There are bilateral perihilar opacities with interstitial prominence suspicious for edema.  There is no focal consolidative change.  There is no pneumothorax or pleural fluid identified.  There is elevation of the left hemidiaphragm.  The cardiac silhouette appears enlarged.  There is calcification of the aorta and patient is status post sternotomy.  The osseous structures demonstrate degenerative change.                                    CT abdomen with IV contrast    Impression:     1. Distended gallbladder with an associated gallstone.  No pericholecystic inflammatory change.  Recommend correlation for symptoms of right upper quadrant pain and further characterization with a gallbladder ultrasound if clinically warranted.  2. Colonic diverticulosis.  3. Multichamber cardiac enlargement.  4. Pulmonary edema with small bilateral dependent pleural effusions.  5. Atrophic native kidneys.  Partially visualized left arm AV fistula.  6. Severe aortic atherosclerosis with suspected high-grade stenosis at the origin of the celiac, superior mesenteric, bilateral renal and inferior mesenteric arteries.  7. Occluded bilateral superficial femoral artery stents.  8. Age indeterminate wedge compression fracture of the T11 vertebral body with mild associated height loss.  9. Additional findings as detailed in the body of the report.            Assessment &  Plan  Sepsis  This patient does have evidence of infective focus  My overall impression is sepsis with Encephalopathy.  Source: Skin and Soft Tissue Infection: gangrene  Antibiotics given-   Antibiotics (72h ago, onward)      Start     Stop Route Frequency Ordered    07/01/25 2115  mupirocin 2 % ointment         07/06/25 2059 Nasl 2 times daily 07/01/25 2011 07/01/25 1400  piperacillin-tazobactam (ZOSYN) 4.5 g in D5W 100 mL IVPB (MB+)         -- IV Every 12 hours (non-standard times) 07/01/25 1335          Latest lactate reviewed-  Recent Labs   Lab 06/30/25  1839   LACTATE 1.3     Organ dysfunction indicated by Encephalopathy    Fluid challenge Contraindicated- Fluid bolus is contraindicated in this patient due to End Stage Renal Disease     Post- resuscitation assessment Yes - I attest a sepsis perfusion exam was performed within 6 hours of sepsis, severe sepsis, or septic shock presentation, following fluid resuscitation.      Will Not start Pressors- Levophed for MAP of 65  Source control achieved by: Antibiotic therapy  End-stage renal disease on hemodialysis  Creatine stable for now. BMP reviewed- noted Estimated Creatinine Clearance: 6.5 mL/min (A) (based on SCr of 4.3 mg/dL (H)). according to latest data. Based on current GFR, CKD stage is end stage.  Monitor UOP and serial BMP and adjust therapy as needed. Renally dose meds. Avoid nephrotoxic medications and procedures.  Nephrology is consulted to continue on her dialysis  Essential hypertension  Patient's blood pressure range in the last 24 hours was: BP  Min: 97/55  Max: 144/63.The patient's inpatient anti-hypertensive regimen is listed below:  Current Antihypertensives  isosorbide mononitrate 24 hr tablet 30 mg, Daily, Oral  furosemide tablet 40 mg, Daily, Oral  metoprolol succinate (TOPROL-XL) 24 hr tablet 25 mg, 2 times daily, Oral    Plan  - BP is controlled, no changes needed to their regimen    Chronic diastolic congestive heart failure  Patient  "has Diastolic (HFpEF) heart failure that is Chronic. On presentation their CHF was well compensated. Most recent BNP and echo results are listed below.  No results for input(s): "BNP" in the last 72 hours.    Latest ECHO  Results for orders placed during the hospital encounter of 06/12/25    Echo    Interpretation Summary    Left Ventricle: The left ventricle is normal in size. Mildly increased wall thickness. Normal wall motion. There is normal systolic function. Quantitated ejection fraction is 58%. Diastolic function cannot be reliably determined in the presence of mitral annular calcification.    Right Ventricle: The right ventricle is normal in size measuring 3.0 cm. Systolic function is normal.    Left Atrium: The left atrium is mildly dilated measuring 38 mL/m2.    Aortic Valve: The aortic valve is a trileaflet valve. There is aortic valve sclerosis.    Mitral Valve: There is mild stenosis. The mean pressure gradient across the mitral valve is 5 mmHg at a heart rate of 87 bpm. There is mild regurgitation.    Tricuspid Valve: There is mild regurgitation.    Pulmonary Artery: There is moderate pulmonary hypertension. The estimated pulmonary artery systolic pressure is 52 mmHg.    Current Heart Failure Medications  furosemide tablet 40 mg, Daily, Oral  metoprolol succinate (TOPROL-XL) 24 hr tablet 25 mg, 2 times daily, Oral    Plan  - Monitor strict I&Os and daily weights.    - Place on telemetry  - Low sodium diet  - Place on fluid restriction of 1 L.   - Cardiology has been consulted  - The patient's volume status is stable but not at their baseline as indicated by              New onset a-fib  Patient has paroxysmal (<7 days) atrial fibrillation. Patient is currently in atrial fibrillation. [id number] Score: 5. The patients heart rate in the last 24 hours is as follows:  Pulse  Min: 80  Max: 120     Antiarrhythmics  metoprolol succinate (TOPROL-XL) 24 hr tablet 25 mg, 2 times daily, " Oral    Anticoagulants  heparin (porcine) injection 5,000 Units, Every 8 hours, Subcutaneous    Plan  - Replete lytes with a goal of K>4, Mg >2  - Patient is anticoagulated, see medications listed above.  - Patient's afib is currently controlled  - Plan to start heparin       Type 2 diabetes mellitus with diabetic nephropathy, without long-term current use of insulin  -Start accuchecks with Novolog correction    Hypertension  Patient's blood pressure range in the last 24 hours was: BP  Min: 97/55  Max: 144/63.The patient's inpatient anti-hypertensive regimen is listed below:  Current Antihypertensives  isosorbide mononitrate 24 hr tablet 30 mg, Daily, Oral  furosemide tablet 40 mg, Daily, Oral  metoprolol succinate (TOPROL-XL) 24 hr tablet 25 mg, 2 times daily, Oral    Plan  - BP is controlled, no changes needed to their regimen    Frailty  Chronic/patient has a advanced dementia   PT OT ordered    Senile dementia  Patient with dementia 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 Dementia is moderate. The patient does have signs of behavioral disturbance. Home dementia medications are Held or Continued: continued.. Continue non-pharmacologic interventions to prevent delirium (No VS between 11PM-5AM, activity during day, opening blinds, providing glasses/hearing aids, and up in chair during daytime). Will avoid narcotics and benzos unless absolutely necessary. PRN anti-psychotics are not prescribed to avoid self harm behaviors.  Frequent reorientation, lowering the bed levels, lifting a bed rails, have video monitor camera in place, continuous redirection  Palliative care team was consulted family is agreeable meet with home hospice companies to decide  Acute osteomyelitis of toe of left foot  Detected by x-ray  Podiatry consulted  ID consulted  Arterial Doppler of lower limbs ordered  Currently on board spectrum antibiotics  Cultures are so far negative  7/3 podiatry planning on no intervention  -cardiology is  planning on conservative management  -ID is recommending broad-spectrum antibiotics  PVD (peripheral vascular disease)  Patient has documented history of peripheral vascular disease  Nonhealing left 3rd toe gangrene  Cardiology is on board  Doppler of lower limb showed severe DVT  With severe aortic atherosclerosis and suspected high-grade stenosis at the origin of the celiac, superior mesenteric, bilateral renal and inferior mesenteric arteries, with occlusion of bilateral superficial femoral artery stents.  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  On statin and Plavix ,cardiology is on board  Ovarian mass, left    Detected by imaging no intra-abdominal lymphadenopathy no fat stranding were detected  Patient will probably need follow-up as outpatient  Celiac artery stenosis    Detected by imaging CT abdomen with IV contrast    And Plavix and aspirin cardiology is on board  Hypokalemia  Patient's most recent potassium results are listed below.   Recent Labs     07/01/25  1002 07/02/25  1231 07/03/25  1134   K 4.5 3.1* 4.1     Plan  - Replete potassium per protocol  - Monitor potassium Daily  - Patient's hypokalemia is monitored closely- to be monitored  VTE Risk Mitigation (From admission, onward)            Ordered     heparin (porcine) injection 5,000 Units  Every 8 hours         07/01/25 1335     Place sequential compression device  Until discontinued         06/30/25 1830                    Discharge Planning   KATIE: 7/7/2025     Code Status: Full Code   Medical Readiness for Discharge Date:   Discharge Plan A: Home with family   Discharge Delays: None known at this time                    Chung Dominique MD  Department of Hospital Medicine   [facility]

## 2025-07-03 NOTE — PLAN OF CARE
Problem: Infection  Goal: Absence of Infection Signs and Symptoms  Outcome: Progressing     Problem: Adult Inpatient Plan of Care  Goal: Plan of Care Review  Outcome: Progressing  Goal: Patient-Specific Goal (Individualized)  Outcome: Progressing  Goal: Absence of Hospital-Acquired Illness or Injury  Outcome: Progressing  Goal: Optimal Comfort and Wellbeing  Outcome: Progressing  Goal: Readiness for Transition of Care  Outcome: Progressing     Problem: Diabetes Comorbidity  Goal: Blood Glucose Level Within Targeted Range  Outcome: Progressing     Problem: Sepsis/Septic Shock  Goal: Optimal Coping  Outcome: Progressing  Goal: Absence of Bleeding  Outcome: Progressing  Goal: Blood Glucose Level Within Targeted Range  Outcome: Progressing  Goal: Absence of Infection Signs and Symptoms  Outcome: Progressing  Goal: Optimal Nutrition Intake  Outcome: Progressing

## 2025-07-03 NOTE — PHYSICIAN QUERY
Due to the conflicting clinical picture, please clinically validate the encephalopathy. If validated, please provide additional clinical support for the encephalopathy.   Other clarification (please specify): 2/2 to advanced vascular dementia

## 2025-07-03 NOTE — PLAN OF CARE
"[facility]  Department of Hospital Medicine  [address] [place] [address]  [phone] after hours  [phone] fax    HOSPICE  ORDERS    07/03/2025    [facility]  Diagnoses:   Active Hospital Problems    Diagnosis  POA    *Acute osteomyelitis of toe of left foot [M86.172]  Yes    Wedge compression fracture of eleventh thoracic vertebra [S22.080A]  Yes     Chronic    Diverticulosis [K57.90]  Yes     Chronic    Aortic atherosclerosis [I70.0]  Yes     Chronic    Ovarian mass, left [N83.8]  Yes     Chronic    Celiac artery stenosis [I77.4]  Yes     Chronic    Hypokalemia [E87.6]  Yes    PVD (peripheral vascular disease) [I73.9]  Yes     Chronic    Sepsis [A41.9]  Yes    Type 2 diabetes mellitus with diabetic nephropathy, without long-term current use of insulin [E11.21]  Yes    New onset a-fib [I48.91]  Yes    Chronic diastolic congestive heart failure [I50.32]  Yes    Senile dementia [F03.90]  Yes    Frailty [R54]  Yes    Essential hypertension [I10]  Yes    End-stage renal disease on hemodialysis [N18.6, Z99.2]  Not Applicable    Hypertension [I10]  Yes      Resolved Hospital Problems   No resolved problems to display.       Hospice Qualifying Diagnoses:        Patient has a life expectancy < 6 months due to:  Primary Hospice Diagnosis:  ESRD  Comorbid Conditions Contributing to Decline:  Osteomyelitis of the 3rd left toe, severe peripheral vascular disease, ESRD on HD, advanced dementia, malnourishment.    Vital Signs: Routine per Hospice Protocol.    Code Status:  Full code    Allergies:   Review of patient's allergies indicates:   Allergen Reactions    Aspirin Nausea Only and Swelling     Able to tolerated in small doses         Diet:  Renal diet     Activities: As tolerated    Goals of Care Treatment Preferences:  Code Status: Full Code    Health care agent: Tor "Eduardo" Clark (spouse)  Health care agent number: [phone]          What " is most important right now is to focus on avoiding the hospital, remaining as independent as possible, symptom/pain control.   .      Nursing: Per Hospice Routine.   Routine Skin for Bedridden Patients: Apply moisture barrier cream to all skin folds and   wet areas in perineal area daily and after baths and all bowel movements.          Oxygen:  N/a  Other Miscellaneous Care:  Not applicable  Medications:          Medication List        ASK your doctor about these medications      aspirin 81 MG EC tablet  Commonly known as: ECOTRIN  TAKE ONE TABLET BY MOUTH ONCE DAILY     atorvastatin 40 MG tablet  Commonly known as: LIPITOR  TAKE 1 TABLET BY MOUTH ONCE DAILY IN THE EVENING     clopidogreL 75 mg tablet  Commonly known as: PLAVIX  [place] tableta (75 mg en total) por vía oral diariamente.  (Take 1 tablet (75 mg total) by mouth once daily.)     furosemide 40 MG tablet  Commonly known as: LASIX  Take 1 tablet (40 mg total) by mouth once daily.     hydrALAZINE 25 MG tablet  Commonly known as: APRESOLINE  Take 25 mg by mouth every 12 (twelve) hours.     isosorbide mononitrate 30 MG 24 hr tablet  Commonly known as: IMDUR  Take 1 tablet (30 mg total) by mouth once daily.     metoprolol succinate 25 MG 24 hr tablet  Commonly known as: TOPROL-XL  Take 1 tablet by mouth once daily     nitroGLYCERIN 0.4 MG SL tablet  Commonly known as: NITROSTAT  Place 0.4 mg under the tongue every 5 (five) minutes as needed for Chest pain.     pantoprazole 40 MG tablet  Commonly known as: PROTONIX  Take 40 mg by mouth before breakfast.     povidone-iodine 10 % external solution  Commonly known as: BETADINE  Apply topically as needed for Wound Care.     TRIPHROCAPS 1 mg Cap  Generic drug: vitamin renal formula (B-complex-vitamin c-folic acid)  Take 1 capsule by mouth once daily.                DIABETES CARE:      Fingerstick blood sugar a.m. and p.m.          Fingerstick blood sugar every 6 hours if patient is unable to eat    Report CBG <  60 or > 350 to physician.         Insulin Sliding Scale         Glucose  Novolog Insulin Subcutaneous        0 - 60   Orange juice or glucose tablet      No insulin   201-250  2 units   251-300  4 units   301-350  6 units   351-400  8 units   >400   10 units then call physician      Future Orders:  Hospice Medical Director may dictate new orders for comfortable care measures & sign death certificate.        _________________________________  Chung Dominique MD  07/03/2025

## 2025-07-03 NOTE — ASSESSMENT & PLAN NOTE
This patient does have evidence of infective focus  My overall impression is sepsis with Encephalopathy.  Source: Skin and Soft Tissue Infection: gangrene  Antibiotics given-   Antibiotics (72h ago, onward)      Start     Stop Route Frequency Ordered    07/01/25 2115  mupirocin 2 % ointment         07/06/25 2059 Nasl 2 times daily 07/01/25 2011 07/01/25 1400  piperacillin-tazobactam (ZOSYN) 4.5 g in D5W 100 mL IVPB (MB+)         -- IV Every 12 hours (non-standard times) 07/01/25 1335          Latest lactate reviewed-  Recent Labs   Lab 06/30/25  1839   LACTATE 1.3     Organ dysfunction indicated by Encephalopathy    Fluid challenge Contraindicated- Fluid bolus is contraindicated in this patient due to End Stage Renal Disease     Post- resuscitation assessment Yes - I attest a sepsis perfusion exam was performed within 6 hours of sepsis, severe sepsis, or septic shock presentation, following fluid resuscitation.      Will Not start Pressors- Levophed for MAP of 65  Source control achieved by: Antibiotic therapy

## 2025-07-03 NOTE — PROGRESS NOTES
"  [facility] Surg  Infectious Disease  Progress Note       Assessment/Plan:     Erin Clark is a 89 y.o. female with history significant for T2DM (last A1c 5.3%), ESRD (HD on MWF thru L arm AVF), HFrecEF (LVEF 58%), CAD s/p CABG in 2014, HTN, PAD (complete occulusions of R SFA, L SFA, PTA) and HLD presents after dialysis due to being tachycardic. Noted to have gangrenous L 3rd toe w/ XR demonstrating soft tissue swelling and tissue gas. Arterial lower extremity ultrasound demonstrating complete bilateral native SFA occlusions unchanged from prior US.     Acute Osteomyelitis of Left 3rd Toe  On ID rounds today, a long discussion was made with patient and family members on goals of care. Given the patient's lack of adequate circulation to the site of infection, it was emphasized that antibiotic use will not cure her left foot infection and that continuing long-term IV antibiotics could potentially lead to more harm. Family reports that patient has previously pulled at IV lines in the past and frequently gets agitated at home given her dementia. Family expressed understanding that if this happens it may lead to a more severe and life threatening infection. While having conversation with patient, Dr. Jones was present to discuss further goals of care. At this time, would recommend against IV antibiotics in fear of patient causing more harm to herself with little benefit. If family is to pursue antibiotics, can consider oral options.   Stop Vancomycin, as wound culture is growing GNR.  Can continue Zosyn while admitted for now while discussing further goals of care  Appreciate Palliative recommendations    Mikhail Zhang MD  [facility] [place] Infectious [facility]    Staff attestation to follow.    Subjective:      NAEON.  used with patient and family member present in room. Family member reports that the patient has been more "verbal" this morning. Family member states that pt has been talking " more about the afterlife lately but is otherwise at mental baseline. Notes to have tolerated HD well yesterday. Denies any chest pain, heart palpitations, shortness of breath, nausea, vomiting, abdominal pain.    Objective:     Last 24 Hour Vital Signs:  BP  Min: 93/57  Max: 134/66  Temp  Av.2 °F (36.8 °C)  Min: 97 °F (36.1 °C)  Max: 100.1 °F (37.8 °C)  Pulse  Av.1  Min: 60  Max: 120  Resp  Av  Min: 16  Max: 24  SpO2  Av.8 %  Min: 93 %  Max: 99 %  I/O last 3 completed shifts:  In: 500 [Other:500]  Out: 1000 [Other:1000]    Physical Examination:  General: awake, cooperative, responding to questions appropriately, uncomfortable looking  Head: Normocephalic, atraumatic, poor dentition, continually biting down  Lungs: clear to auscultation bilaterally, respirations unlabored  Heart: regular rate and rhythm, no murmurs or gallops appreciated. L AVF noted, +bruit/thrill  Abdomen: soft, non-tender to palpation, no guarding, non-distended  Extremities: upper extremities normal. Left foot appropriately bandaged, tender to touch.  Skin: warm and dry, no rashes appreciated  Neuro: alert, tracks with eyes, follows commands     Laboratory:  Laboratory Data Reviewed: yes    Radiology Data Reviewed: yes    Microbiology Data Reviewed: yes  Gram stain w/ GNR, GPR, few GPC  Aerobic Wound culture w/ GNR, pending identification and susceptibility  Bcx no growth at 48 hours    Antibiotics and Day Number of Therapy:  Vancomyocin: -present  Zosyn: -present    Current Medications:     Infusions:       Scheduled:   atorvastatin  40 mg Oral QHS    clopidogreL  75 mg Oral Daily    epoetin roberto-epbx  10,000 Units Subcutaneous Every Mon, Wed, Fri    furosemide  40 mg Oral Daily    heparin (porcine)  5,000 Units Subcutaneous Q8H    isosorbide mononitrate  30 mg Oral Daily    metoprolol succinate  25 mg Oral BID    mupirocin   Nasal BID    pantoprazole  40 mg Oral Before breakfast    piperacillin-tazobactam (Zosyn) IV  (PEDS and ADULTS) (extended infusion is not appropriate)  4.5 g Intravenous Q12H    polyethylene glycol  17 g Oral Daily    vitamin renal formula (B-complex-vitamin c-folic acid)  1 capsule Oral Daily        PRN:    Current Facility-Administered Medications:     acetaminophen, 650 mg, Rectal, Q6H PRN    acetaminophen, 650 mg, Oral, Q6H PRN    dextrose 50%, 12.5 g, Intravenous, PRN    dextrose 50%, 25 g, Intravenous, PRN    glucagon (human recombinant), 1 mg, Intramuscular, PRN    glucose, 16 g, Oral, PRN    glucose, 24 g, Oral, PRN    HYDROmorphone, 1 mg, Intravenous, Q6H PRN    insulin aspart U-100, 0-5 Units, Subcutaneous, QID (AC + HS) PRN    melatonin, 6 mg, Oral, Nightly PRN    sodium chloride 0.9%, 10 mL, Intravenous, Q12H PRN    Pharmacy to dose Vancomycin consult, , , Once **AND** vancomycin - pharmacy to dose, , Intravenous, pharmacy to manage frequency      Assessment:     Erin Clakr is a 89 y.o.female with  Patient Active Problem List    Diagnosis Date Noted    Wedge compression fracture of eleventh thoracic vertebra 07/02/2025    Diverticulosis 07/02/2025    Aortic atherosclerosis 07/02/2025    Ovarian mass, left 07/02/2025    Celiac artery stenosis 07/02/2025    Hypokalemia 07/02/2025    Acute osteomyelitis of toe of left foot 07/01/2025    PVD (peripheral vascular disease) 07/01/2025    Sepsis 06/30/2025    Type 2 diabetes mellitus with diabetic nephropathy, without long-term current use of insulin 06/30/2025    Closed nondisplaced fracture of proximal phalanx of left great toe 06/13/2025    Gangrene 06/13/2025    New onset a-fib 06/13/2025    Senile dementia 05/06/2025    Chronic diastolic congestive heart failure 05/06/2025    Secondary hyperparathyroidism of renal origin 03/16/2023    Dependence on renal dialysis 03/16/2023    Frailty 06/08/2021    Malfunction of device 09/03/2020    Sleep apnea 05/07/2020    Essential hypertension 05/05/2020    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

## 2025-07-03 NOTE — SUBJECTIVE & OBJECTIVE
Interval History:  Patient was seen in the morning looked like at her baseline, cardiology is deciding on conservative management, podiatry deciding on no surgical intervention due to poor outcome, palliative Care was consulted planning to have hospice company meeting with the family decide about home hospice..      Review of Systems   Unable to perform ROS: Dementia     Objective:     Vital Signs (Most Recent):  Temp: 97.8 °F (36.6 °C) (07/03/25 1108)  Pulse: 91 (07/03/25 1108)  Resp: 17 (07/03/25 1108)  BP: (!) 144/63 (07/03/25 1108)  SpO2: (!) 92 % (07/03/25 1108) Vital Signs (24h Range):  Temp:  [97 °F (36.1 °C)-100.1 °F (37.8 °C)] 97.8 °F (36.6 °C)  Pulse:  [] 91  Resp:  [16-24] 17  SpO2:  [92 %-99 %] 92 %  BP: ()/(51-74) 144/63     Weight: 45.8 kg (100 lb 15.5 oz)  Body mass index is 18.47 kg/m².  No intake or output data in the 24 hours ending 07/03/25 1508      Physical Exam  Constitutional:       Appearance: She is ill-appearing and toxic-appearing.   HENT:      Head: Normocephalic and atraumatic.      Right Ear: Tympanic membrane normal.      Left Ear: Tympanic membrane normal.      Nose: Nose normal.      Mouth/Throat:      Mouth: Mucous membranes are moist.   Eyes:      Extraocular Movements: Extraocular movements intact.      Pupils: Pupils are equal, round, and reactive to light.   Cardiovascular:      Rate and Rhythm: Rhythm irregular.      Comments: AVF to left upper extremity; + bruit/thrill  Pulmonary:      Effort: Pulmonary effort is normal.   Abdominal:      General: Abdomen is flat.      Tenderness: There is abdominal tenderness.   Musculoskeletal:         General: Normal range of motion.      Cervical back: Normal range of motion and neck supple.   Skin:     Capillary Refill: Capillary refill takes less than 2 seconds.      Comments: Gangrene to left third toe   Neurological:      Mental Status: She is alert and oriented to person, place, and time.   Psychiatric:         Mood and  Affect: Mood normal.               Significant Labs: All pertinent labs within the past 24 hours have been reviewed.  Recent Lab Results  (Last 5 results in the past 24 hours)        07/03/25  1134   07/03/25  1102   07/03/25  0758   07/03/25  0522   07/02/25  2123        Albumin 2.0               ALP 87               ALT 12               Anion Gap 15               AST 30               Baso #       0.04         Basophil %       0.2         BILIRUBIN TOTAL 0.7  Comment: For infants and newborns, interpretation of results should be based   on gestational age, weight and in agreement with clinical   observations.    Premature Infant recommended reference ranges:   0-24 hours:  <8.0 mg/dL   24-48 hours: <12.0 mg/dL   3-5 days:    <15.0 mg/dL   6-29 days:   <15.0 mg/dL               BUN 20               Calcium 8.2               Chloride 95               CO2 24               Creatinine 4.3               eGFR 9  Comment: Estimated GFR calculated using the CKD-EPI creatinine (2021) equation.               Eos #       0.17         Eos %       1.1         Glucose 146               Gran # (ANC)       13.81         Hematocrit       23.4         Hemoglobin       7.6         Immature Grans (Abs)       0.09  Comment: Mild elevation in immature granulocytes is non specific and can be seen in a variety of conditions including stress response, acute inflammation, trauma and pregnancy. Correlation with other laboratory and clinical findings is essential.         Immature Granulocytes       0.6         Lymph #       1.08         Lymph %       6.7         MCH       31.1         MCHC       32.5         MCV       96         Mono #       0.84         Mono %       5.2         MPV       9.9         Neut %       86.2         nRBC       0         Phosphorus Level       2.9         Platelet Count       262         POCT Glucose   126   71     101       Potassium 4.1               PROTEIN TOTAL 6.4               RBC       2.44         RDW        15.1         Sodium 134               WBC       16.03                                Significant Imaging: I have reviewed all pertinent imaging results/findings within the past 24 hours.    Imaging Results              X-Ray Foot Complete Left (Final result)  Result time 06/30/25 17:22:22      Final result by Shraddha Chanel MD (06/30/25 17:22:22)                   Impression:      Osteopenia.    Partial progressive interval healing of the known 4th toe proximal phalangeal base fracture.    Ulceration at the tip of the 3rd toe distal phalanx.  Questionable slight erosive irregularity 3rd toe distal phalanx representing osteomyelitis otherwise.  Regional soft tissue swelling and soft tissue gas representing infection.  Recommend MRI.      Electronically signed by: Shraddha Chanel  Date:    06/30/2025  Time:    17:22               Narrative:    EXAMINATION:  XR FOOT COMPLETE 3 VIEW LEFT    CLINICAL HISTORY:  .  Gangrene, not elsewhere classified    TECHNIQUE:  AP, lateral and oblique views of the left foot were performed.    COMPARISON:  06/12/2025    FINDINGS:  See below                                       X-Ray Chest AP Portable (Final result)  Result time 06/30/25 15:55:35      Final result by Adam Mullen MD (06/30/25 15:55:35)                   Impression:      As above.      Electronically signed by: Adam Mullen MD  Date:    06/30/2025  Time:    15:55               Narrative:    EXAMINATION:  XR CHEST AP PORTABLE    CLINICAL HISTORY:  Tachycardia, unspecified    TECHNIQUE:  Single frontal view of the chest was performed.    FINDINGS:  There are bilateral perihilar opacities with interstitial prominence suspicious for edema.  There is no focal consolidative change.  There is no pneumothorax or pleural fluid identified.  There is elevation of the left hemidiaphragm.  The cardiac silhouette appears enlarged.  There is calcification of the aorta and patient is status post sternotomy.  The osseous  structures demonstrate degenerative change.                                    CT abdomen with IV contrast    Impression:     1. Distended gallbladder with an associated gallstone.  No pericholecystic inflammatory change.  Recommend correlation for symptoms of right upper quadrant pain and further characterization with a gallbladder ultrasound if clinically warranted.  2. Colonic diverticulosis.  3. Multichamber cardiac enlargement.  4. Pulmonary edema with small bilateral dependent pleural effusions.  5. Atrophic native kidneys.  Partially visualized left arm AV fistula.  6. Severe aortic atherosclerosis with suspected high-grade stenosis at the origin of the celiac, superior mesenteric, bilateral renal and inferior mesenteric arteries.  7. Occluded bilateral superficial femoral artery stents.  8. Age indeterminate wedge compression fracture of the T11 vertebral body with mild associated height loss.  9. Additional findings as detailed in the body of the report.

## 2025-07-03 NOTE — ASSESSMENT & PLAN NOTE
"Patient has Diastolic (HFpEF) heart failure that is Chronic. On presentation their CHF was well compensated. Most recent BNP and echo results are listed below.  No results for input(s): "BNP" in the last 72 hours.    Latest ECHO  Results for orders placed during the hospital encounter of 06/12/25    Echo    Interpretation Summary    Left Ventricle: The left ventricle is normal in size. Mildly increased wall thickness. Normal wall motion. There is normal systolic function. Quantitated ejection fraction is 58%. Diastolic function cannot be reliably determined in the presence of mitral annular calcification.    Right Ventricle: The right ventricle is normal in size measuring 3.0 cm. Systolic function is normal.    Left Atrium: The left atrium is mildly dilated measuring 38 mL/m2.    Aortic Valve: The aortic valve is a trileaflet valve. There is aortic valve sclerosis.    Mitral Valve: There is mild stenosis. The mean pressure gradient across the mitral valve is 5 mmHg at a heart rate of 87 bpm. There is mild regurgitation.    Tricuspid Valve: There is mild regurgitation.    Pulmonary Artery: There is moderate pulmonary hypertension. The estimated pulmonary artery systolic pressure is 52 mmHg.    Current Heart Failure Medications  furosemide tablet 40 mg, Daily, Oral  metoprolol succinate (TOPROL-XL) 24 hr tablet 25 mg, 2 times daily, Oral    Plan  - Monitor strict I&Os and daily weights.    - Place on telemetry  - Low sodium diet  - Place on fluid restriction of 1 L.   - Cardiology has been consulted  - The patient's volume status is stable but not at their baseline as indicated by              "

## 2025-07-03 NOTE — ASSESSMENT & PLAN NOTE
Patient has paroxysmal (<7 days) atrial fibrillation. Patient is currently in atrial fibrillation. [id number] Score: 5. The patients heart rate in the last 24 hours is as follows:  Pulse  Min: 80  Max: 120     Antiarrhythmics  metoprolol succinate (TOPROL-XL) 24 hr tablet 25 mg, 2 times daily, Oral    Anticoagulants  heparin (porcine) injection 5,000 Units, Every 8 hours, Subcutaneous    Plan  - Replete lytes with a goal of K>4, Mg >2  - Patient is anticoagulated, see medications listed above.  - Patient's afib is currently controlled  - Plan to start heparin

## 2025-07-03 NOTE — ASSESSMENT & PLAN NOTE
Detected by x-ray  Podiatry consulted  ID consulted  Arterial Doppler of lower limbs ordered  Currently on board spectrum antibiotics  Cultures are so far negative  7/3 podiatry planning on no intervention  -cardiology is planning on conservative management  -ID is recommending broad-spectrum antibiotics

## 2025-07-03 NOTE — CONSULTS
Palliative Medicine  Consult Note     Patient Name: Erin Clark   MRN: [id number]   Admission Date: 6/30/2025   Hospital Length of Stay: 3   Attending Provider: Chung Kennedy MD   Consulting Provider: Grayson Jones Jr, MD  Primary Care Physician: Bayron Golden MD   Principal Problem: Acute osteomyelitis of toe of left foot     Patient information was obtained from past medical records and primary team.      Inpatient consult to Palliative Care  Consult performed by: Grayson Jones Jr., MD  Consult ordered by: Chung Kennedy MD             Assessment/Plan:      Palliative care consulted for family support and transitions in care.    Palliative Care Encounter:  Impression:    89F with PMH of multisystem vascular dz including HTN, HLD, T2DM, PAD s/p bilateral femoropopliteal bypass grafts, CAD s/p CABG c/b combined systolic/diastolic CHF, prior GIB while on AC, vascular dementia, and ESRD on HD MWF (access LUE AVF, slot at [facility]) x 11 years due to ischemic ATN I/s/o STEMI with cardiogenic shock on 2/10/2014, treated with IABP -> CABG, leading to a 6 week admission. Pt has had no renal recovery since and is anuric at baseline.    Pt has developed dry gangrene of the 3rd left toe over the past month with imaging evidence of osteomyelitis, known end stage PAD per repeat limb Doppler, unlikely accessible to systemic abx and very poor amputation candidate given occlusion of the LLE fempop bypass which will severely impair wound healing and cannot be intervened upon. Now admitted for AF/RVR during HD likely secondary to ischemic limb pain.    Overall prognosis appears 1-2 months on current course and pt is verging on HD intolerance in any event. Prognosis off HD will be 10-14 days for this pt and family has not had any anticipatory guidance regarding pt's poor prognosis after 11+ years on HD.    No concrete limitations on care identified today.     Family agrees to meet with [facility] to  "discuss transitioning to home hospice care; timing TBD given multiple additional relatives requesting involvement.    No palliative coverage tomorrow 7/4 or over the weekend. Available to f/u next week if no family decision is reached over the weekend.    Advance Care Planning   Advance Directives:   Living Will: No    LaPOST: No    Do Not Resuscitate Status: No    Medical Power of : Yes    Agent's Name:  Tor Clark (Pinto) (spouse)   Agent's Contact Number:  [phone]    Decision Making:  Family answered questions and Patient unable to communicate due to disease severity/cognitive impairment  Goals of Care: The family endorses that what is most important right now is to focus on avoiding the hospital, remaining as independent as possible, and symptom/pain control    Accordingly, we have decided that the best plan to meet the patient's goals includes continuing with treatment today and planning for a family meeting with [facility] ASAP.       - Prior experience with serious illness: yes  -The patient has not previously engaged in advance care planning or GOC discussions  - Insight/Understanding of illness: poor, pt has advanced vascular dementia    Life Limiting Diagnosis:  ESRD on HD    -Prognosis-Time and potential for recovery:   10-14 days in absence of further HD  1-2 months on current poor course    -Functional status: poor.  Pt was not able to manage ADLs  -Dementia diagnosis yes      Symptom Management:  -Pain: denies but appears uncomfortable      -Dyspnea no      -Anxiety/Depression no      -Constipation: no      -Anorexia:yes      Summary of recommendations and follow up plan:  -Most important goals at this time: comfort, independence, time with loved ones   -Code status: Full Code   -Disposition: dispo is home, need family support for spouse to determine whether pt returns with new home hospice svcs vs resumed outpt intermittent HD    The above recommendations communicated directly to " primary team on 7/3/25.    Thank you for your consult. We will follow up with pt after the holiday weekend if she remains admitted. Please contact us if you have any additional questions.       Subjective:     Chief Complaint:   Chief Complaint   Patient presents with    Tachycardia     Patient was in diaylsis and received entire treatment per son when she became tachy. Patient was [facility]. No complaints of pain. Patient is biting down grinding teeth and EKG performed in triage rate was 98.       89F with PMH of multisystem vascular dz including HTN, HLD, T2DM, PAD s/p bilateral femoropopliteal bypass grafts, CAD s/p CABG c/b combined systolic/diastolic CHF, prior GIB while on AC, vascular dementia, and ESRD on HD MWF (access LUE AVF, slot at [facility]) x 11 years due to ischemic ATN I/s/o STEMI with cardiogenic shock on 2/10/2014, treated with IABP -> CABG, leading to a 6 week admission. Pt has had no renal recovery since and is anuric at baseline.    Pertinent Recent Hx    6/12/25: Admitted to [facility] for dry gangrene of the left third toe 2/2 PAD with complete occlusion of the R SFA, L SFA, L PTA (grafts occluded with minimal forward flow). Treated with heparin gtt -> plavix + statin and referred to outpt cards. Afib noted that admit, not an AC candidate due to hx GIB and falls.    6/30/25: Admitted to [facility] following tachycardia throughout her out HD session with stereotyped teeth grinding/clicking. Sepsis code called. XR of MOISE revealed probable osteomyelitis at the 3rd toe distal phalanx with edema and subcutaneous gas.        Interval Hospital Course    7/1:   Family inquiring about home HD. Pt developed severe abdominal pain.  US abdomen: Distended gallbladder with gallstones, no gross cholecystitis, ?sonographic [place]  Podiatry consulted: recs demarcation of grossly necrotic 3rd digit and outpt monitoring, potential non-urgent amp; Wound care+abx for now. Wound culture  sent.    7/2:   - ID consulted, notes questionable utility of even indefinite abx for a grossly necrotic and devascularized digit  - Cards consulted for new Afib, recs rate control    7/3: Palliative has been consulted for goals of care and family support.    At time of interview pt is awake, alert, oriented to name, makes eye contact appropriately, but responses to questions are incomprehensible in her native [place].    Pt is accompanied by her brother Efrain and her spouse Eduardo.    Met with family alongside ID consultant Dr. Evans and we advised the family that pt's toe appears unsalvageable and given the gross lack of perfusion there is little expectation that even prolonged systemic abx will lead to any meaningful improvement and would carry high risks of iatrogenesis e.g. cefepime encephalopathy and CDI.    Efrain and Eduardo are in agreement with ID recs for conservative mgmt with home wound care and no further abx for this indication. Likewise pt is a very poor candidate for amputation given anesthesia risks, postop pain and high likelihood of poor wound healing.    Pt appears to have nonverbal indicators of pain at rest, incessantly grinding her left sided teeth audibly from outside the room, frequently turning and whimpering. Advised family that further HD sessions are overwhelmingly likely to provoke worsening ischemic symptoms in the affected toe and likely other occult sites that pt cannot localize due to her dementia.    Educated family as to the inexorable and global nature of her vascular dz which led to a devastating STEMI and caused pt's kidneys to fail via similar mechanisms as pt's current toe necrosis. HD has replaced the vital function of pt's kidneys however it does not slow the progression of pt's atherosclerosis which has caused prior grafts to fail and is now leading inexorably to end-organ damage we have no viable means to reverse.    Family agrees that getting pt to HD now requires heroic  efforts and her discomfort is worsening with each session. They had never discussed or contemplated discontinuing HD before.    Advised Efrain that pt's prognosis off HD is likely on the order of 10-14 days but the symptoms of progressive uremia are generally subjectively tolerable (somnolence, anorexia, less commonly pruritus) and very much in keeping with a transition to comfort focused care.     Efrain agrees that pt will benefit significantly from pain control. He admits that this dire prognosis is new information and states that the family has worked tirelessly to avoid any missed HD sessions.    He recognizes that pt is nearing end of life and is aware that the family will need to make a binary decision between home hospice and ongoing intermittent HD. He has multiple siblings who will wish to be involved in determining next steps and supporting Eduardo.    Referral sent to RANDOLPH Graham with [facility] for a bedside family meeting. Timing TBD depending on availability of other relatives, likely over the weekend given  holiday tomorrow.    No changes to care plan in the interim, please continue regular HD unless/until family wishes to transition to home hospice.    Did not address code status today as non-urgent; revisit once family makes a decision re: hospice vs HD.    Review of Symptoms      Symptom Assessment (ESAS 0-10 Scale)  Unable to complete assessment due to Patient nonverbal         Pain Assessment in Advanced Demential Scale (PAINAD)   Breathing - Independent of vocalization:  1  Negative vocalization:  1  Facial expression:  0  Body language:  1  Consolability:  1  Total:  4    ECOG Performance Status thGthrthathdtheth:th th5th Living Arrangements:  Lives in home and Lives with family    Psychosocial/Cultural:   See Palliative Psychosocial Note: No  Social Issues Identified: Coping deficit pt/family  Bereavement Risk: Yes: Close or dependent relationship to the  person and Identified:  work/home, financial, intimacy, and caregiver concerns   Caregiver Needs Discussed. Caregiver Distress: Yes: Need for respite, Issues of guilt, Intensity of family caregiving, Caregiver Burnout Risk, and Caregiver support and community resources discussed.    Cultural: No specific concerns.   **Primary  to Follow**  Palliative Care  Consult: No    Spiritual:  F - Vanessa and Belief:  Taoism  I - Importance:  Unable to assess  C - Community:  None active  A - Address in Care:  Sacramental care PRN     Time-Based Charting:  Yes  Chart Review: 23 minutes  Face to Face: 13 minutes  Symptom Assessment: 8 minutes  Coordination of Care: 11 minutes  Discharge Plannin minutes  Advance Care Plannin minutes  Goals of Care: 38 minutes    Total Time Spent: 120 minutes          ROS:  Review of Systems   Unable to perform ROS: Dementia         Past Medical History:   Diagnosis Date    CHF (congestive heart failure)     Colon polyps 2013    Coronary artery disease     Diabetes mellitus     Encounter for blood transfusion     ESRD (end stage renal disease) 2014    dialysis M-F    GERD (gastroesophageal reflux disease)     High cholesterol     Hypertension      Past Surgical History:   Procedure Laterality Date    AV FISTULA PLACEMENT Left 2014    bilateral fem-pop      CENTRAL VENOUS CATHETER TUNNELED INSERTION DOUBLE LUMEN Right 2014    CORONARY ARTERY BYPASS GRAFT  2/14/2014     x 3 vessels    LEFT HEART CATHETERIZATION N/A 2021    Procedure: Left heart cath;  Surgeon: Brian Sanchez MD;  Location: [facility] CATH LAB/EP;  Service: Cardiology;  Laterality: N/A;    PERCUTANEOUS TRANSLUMINAL ANGIOPLASTY OF ARTERIOVENOUS FISTULA Left 2020    Procedure: PTA, Repair left upper arm anuerysm AV FISTULA;  Surgeon: Doris Mary MD;  Location: [facility] OR;  Service: General;  Laterality: Left;    VASCULAR SURGERY       Family History   Problem Relation Name Age of Onset     Hypertension Mother      Heart disease Mother      Heart attack Mother           Review of patient's allergies indicates:   Allergen Reactions    Aspirin Nausea Only and Swelling     Able to tolerated in small doses         Medications:  Current Medications[1]         Objective:      Physical Exam:  Vitals: Temp: 97.8 °F (36.6 °C) (07/03/25 1108)  Pulse: 91 (07/03/25 1108)  Resp: 17 (07/03/25 1108)  BP: (!) 144/63 (07/03/25 1108)  SpO2: (!) 92 % (07/03/25 1108)    Physical Exam  Constitutional:       General: She is not in acute distress.     Appearance: She is cachectic. She is ill-appearing (chronically). She is not toxic-appearing.   HENT:      Head: Normocephalic and atraumatic.      Mouth/Throat:      Mouth: Mucous membranes are moist.      Pharynx: Oropharynx is clear.   Eyes:      Extraocular Movements: Extraocular movements intact.      Pupils: Pupils are equal, round, and reactive to light.   Cardiovascular:      Rate and Rhythm: Normal rate and regular rhythm.      Pulses: Normal pulses.      Heart sounds: No murmur heard.  Pulmonary:      Effort: Tachypnea present. No accessory muscle usage, prolonged expiration, respiratory distress or retractions.      Breath sounds: Normal breath sounds and air entry.   Abdominal:      General: Abdomen is flat. There is no distension.      Palpations: Abdomen is soft.      Tenderness: There is no abdominal tenderness.   Musculoskeletal:         General: Tenderness (LLE severely tender to any palpation) present. Normal range of motion.      Right lower leg: No edema.      Left lower leg: No edema.      Comments: Marked stasis dermatitis of bilateral legs   Skin:     General: Skin is warm and dry.      Capillary Refill: Capillary refill takes less than 2 seconds.      Coloration: Skin is mottled and sallow.   Neurological:      Mental Status: Mental status is at baseline. She is disoriented and confused.      GCS: GCS eye subscore is 4. GCS verbal subscore is 2. GCS  motor subscore is 4.      Cranial Nerves: Dysarthria present.      Motor: Weakness and atrophy present. No tremor or abnormal muscle tone.   Psychiatric:         Attention and Perception: Attention normal.         Mood and Affect: Mood and affect normal.         Speech: Speech is delayed and slurred (incomprehensible).         Behavior: Behavior is slowed. Behavior is not agitated, aggressive, withdrawn or combative. Behavior is cooperative.         Cognition and Memory: Cognition is impaired. Memory is impaired.          Labs:   Creatinine   Date Value Ref Range Status   07/02/2025 1.6 (H) 0.5 - 1.4 mg/dL Final      Hemoglobin   Date Value Ref Range Status   07/06/2024 9.6 (L) 12.0 - 16.0 gm/dL Final   01/25/2024 11.3 (L) 12.0 - 16.0 g/dL Final     HGB   Date Value Ref Range Status   07/03/2025 7.6 (L) 12.0 - 16.0 gm/dL Final      Albumin   Date Value Ref Range Status   07/02/2025 2.3 (L) 3.5 - 5.2 g/dL Final   07/01/2025 2.3 (L) 3.5 - 5.2 g/dL Final   06/30/2025 2.7 (L) 3.5 - 5.2 g/dL Final   07/06/2024 3.2 (L) 3.4 - 5.0 g/dL Final   01/25/2024 3.2 (L) 3.5 - 5.2 g/dL Final   08/25/2023 3.2 (L) 3.5 - 5.2 g/dL Final   08/24/2023 3.7 3.5 - 5.2 g/dL Final          Imaging:   Imaging Results              X-Ray Foot Complete Left (Final result)  Result time 06/30/25 17:22:22      Final result by Shraddha Chanel MD (06/30/25 17:22:22)                   Impression:      Osteopenia.    Partial progressive interval healing of the known 4th toe proximal phalangeal base fracture.    Ulceration at the tip of the 3rd toe distal phalanx.  Questionable slight erosive irregularity 3rd toe distal phalanx representing osteomyelitis otherwise.  Regional soft tissue swelling and soft tissue gas representing infection.  Recommend MRI.      Electronically signed by: Shraddha Chanel  Date:    06/30/2025  Time:    17:22               Narrative:    EXAMINATION:  XR FOOT COMPLETE 3 VIEW LEFT    CLINICAL HISTORY:  .  Gangrene, not  elsewhere classified    TECHNIQUE:  AP, lateral and oblique views of the left foot were performed.    COMPARISON:  06/12/2025    FINDINGS:  See below                                       X-Ray Chest AP Portable (Final result)  Result time 06/30/25 15:55:35      Final result by Adam Mullen MD (06/30/25 15:55:35)                   Impression:      As above.      Electronically signed by: Adam Mullen MD  Date:    06/30/2025  Time:    15:55               Narrative:    EXAMINATION:  XR CHEST AP PORTABLE    CLINICAL HISTORY:  Tachycardia, unspecified    TECHNIQUE:  Single frontal view of the chest was performed.    FINDINGS:  There are bilateral perihilar opacities with interstitial prominence suspicious for edema.  There is no focal consolidative change.  There is no pneumothorax or pleural fluid identified.  There is elevation of the left hemidiaphragm.  The cardiac silhouette appears enlarged.  There is calcification of the aorta and patient is status post sternotomy.  The osseous structures demonstrate degenerative change.                                            I spent a total of 71 minutes on the day of the visit. This includes face to face time in discussion of goals of care, symptom assessment, coordination of care and emotional support.  This also includes non-face to face time preparing to see the patient (eg, review of tests/imaging), obtaining and/or reviewing separately obtained history, documenting clinical information in the electronic or other health record, independently interpreting results and communicating results to the patient/family/caregiver, or care coordinator.     Additional 49 min time spent on a voluntary advance care planning and /or goals of care discussion, providing emotional support, formulating, and communicating prognosis and exploring burdens vs benefits of various approaches to treatment.      Thank you for the opportunity to care for this patient and family.       Grayson DE JESUS  Robert Henderson MD         [1]   Current Facility-Administered Medications:     acetaminophen suppository 650 mg, 650 mg, Rectal, Q6H PRN, Chung Kennedy MD    acetaminophen tablet 650 mg, 650 mg, Oral, Q6H PRN, Chung Kennedy MD    atorvastatin tablet 40 mg, 40 mg, Oral, QHS, Sherrell Bowers, NP, 40 mg at 07/02/25 2028    clopidogreL tablet 75 mg, 75 mg, Oral, Daily, Sherrell Bowers NP, 75 mg at 07/03/25 0808    dextrose 50% injection 12.5 g, 12.5 g, Intravenous, PRN, Sherrell Bowers NP    dextrose 50% injection 25 g, 25 g, Intravenous, PRN, Sherrell Bowers NP    epoetin roberto-epbx injection 10,000 Units, 10,000 Units, Subcutaneous, Every Mon, Wed, Fri, Sherrell Bowers NP    furosemide tablet 40 mg, 40 mg, Oral, Daily, Sherrell Bowers NP, 40 mg at 07/03/25 0808    glucagon (human recombinant) injection 1 mg, 1 mg, Intramuscular, PRN, Sherrell Bowers NP    glucose chewable tablet 16 g, 16 g, Oral, PRN, Sherrell Bowers NP    glucose chewable tablet 24 g, 24 g, Oral, PRN, Sherrell Bowers NP    heparin (porcine) injection 5,000 Units, 5,000 Units, Subcutaneous, Q8H, Chung Kennedy MD, 5,000 Units at 07/03/25 0550    HYDROmorphone injection 1 mg, 1 mg, Intravenous, Q6H PRN, Chung Kennedy MD, 1 mg at 07/03/25 0102    insulin aspart U-100 pen 0-5 Units, 0-5 Units, Subcutaneous, QID (AC + HS) PRN, Sherrell Bowers NP    isosorbide mononitrate 24 hr tablet 30 mg, 30 mg, Oral, Daily, Sherrell Bowers NP, 30 mg at 07/02/25 0838    melatonin tablet 6 mg, 6 mg, Oral, Nightly PRN, Sherrell Bowers NP, 6 mg at 06/30/25 2218    metoprolol succinate (TOPROL-XL) 24 hr tablet 25 mg, 25 mg, Oral, BID, Frank Harris MD, 25 mg at 07/03/25 0808    mupirocin 2 % ointment, , Nasal, BID, Chung Kennedy MD, Given at 07/03/25 0807    pantoprazole EC tablet 40 mg, 40 mg, Oral, Before breakfast, Sherrell Bowers NP, 40 mg at 07/01/25 0952    piperacillin-tazobactam (ZOSYN) 4.5 g in D5W 100 mL  IVPB (MB+), 4.5 g, Intravenous, Q12H, Al Chung Dominique MD, Stopped at 07/03/25 0531    polyethylene glycol packet 17 g, 17 g, Oral, Daily, Sherrell Bowers NP, 17 g at 07/03/25 0819    sodium chloride 0.9% flush 10 mL, 10 mL, Intravenous, Q12H PRN, Sherrell Bowers NP    Pharmacy to dose Vancomycin consult, , , Once **AND** vancomycin - pharmacy to dose, , Intravenous, pharmacy to manage frequency, Sherrell Bowers NP    vitamin renal formula (B-complex-vitamin c-folic acid) 1 mg per capsule 1 capsule, 1 capsule, Oral, Daily, Sherrell Bowers NP, 1 capsule at 07/03/25 0808

## 2025-07-03 NOTE — PROGRESS NOTES
Progress Note  Nephrology      Consult Requested By: Chung Kennedy MD      SUBJECTIVE:     Overnight events  Patient is a 89 y.o. female     Problem List[1]  Past Medical History:   Diagnosis Date    CHF (congestive heart failure)     Colon polyps 06/03/2013    Coronary artery disease     Diabetes mellitus     Encounter for blood transfusion     ESRD (end stage renal disease) 03/2014    dialysis M-F    GERD (gastroesophageal reflux disease)     High cholesterol     Hypertension               OBJECTIVE:     Vitals:    07/03/25 0608 07/03/25 0709 07/03/25 0740 07/03/25 1108   BP: (!) 98/51  (!) 109/51 (!) 144/63   BP Location: Left arm      Patient Position: Lying      Pulse: 83 (!) 111 81 91   Resp: 16  17 17   Temp: 97 °F (36.1 °C)  98 °F (36.7 °C) 97.8 °F (36.6 °C)   TempSrc: Oral      SpO2: 99%  98% (!) 92%   Weight:       Height:           Temp: 97.8 °F (36.6 °C) (07/03/25 1108)  Pulse: 91 (07/03/25 1108)  Resp: 17 (07/03/25 1108)  BP: (!) 144/63 (07/03/25 1108)  SpO2: (!) 92 % (07/03/25 1108)              Medications:   clopidogreL  75 mg Oral Daily    epoetin roberto-epbx  10,000 Units Subcutaneous Every Mon, Wed, Fri    furosemide  40 mg Oral Daily    heparin (porcine)  5,000 Units Subcutaneous Q8H    isosorbide mononitrate  30 mg Oral Daily    metoprolol succinate  25 mg Oral BID    mupirocin   Nasal BID    piperacillin-tazobactam (Zosyn) IV (PEDS and ADULTS) (extended infusion is not appropriate)  4.5 g Intravenous Q12H    polyethylene glycol  17 g Oral Daily    simethicone  40 mg Oral Once    vitamin renal formula (B-complex-vitamin c-folic acid)  1 capsule Oral Daily                 Physical Exam:  General appearance:  Lungs: RR 17  Pulse oximeter 92 % O2  Heart: Pulse 91  Abdomen: soft  Extremities: no edema    Laboratory:  ABG  Labs reviewed  No results found for this or any previous visit (from the past 2 weeks).  Recent Results (from the past 2 weeks)   CBC with Differential    Collection Time:  "07/03/25  5:22 AM   Result Value Ref Range    WBC 16.03 (H) 3.90 - 12.70 K/uL    HGB 7.6 (L) 12.0 - 16.0 gm/dL    HCT 23.4 (L) 37.0 - 48.5 %    Platelet Count 262 150 - 450 K/uL   CBC with Differential    Collection Time: 07/02/25 12:31 PM   Result Value Ref Range    WBC 18.80 (H) 3.90 - 12.70 K/uL    HGB 9.3 (L) 12.0 - 16.0 gm/dL    HCT 27.5 (L) 37.0 - 48.5 %    Platelet Count 325 150 - 450 K/uL   CBC with Differential    Collection Time: 07/01/25 10:02 AM   Result Value Ref Range    WBC 17.58 (H) 3.90 - 12.70 K/uL    HGB 9.0 (L) 12.0 - 16.0 gm/dL    HCT 27.7 (L) 37.0 - 48.5 %    Platelet Count 277 150 - 450 K/uL     Urinalysis  No results for input(s): "COLORU", "CLARITYU", "SPECGRAV", "PHUR", "PROTEINUA", "GLUCOSEU", "BILIRUBINCON", "BLOODU", "WBCU", "RBCU", "BACTERIA", "MUCUS", "NITRITE", "LEUKOCYTESUR", "UROBILINOGEN", "HYALINECASTS" in the last 24 hours.    Diagnostic Results:  X-Ray: Reviewed  US: Reviewed  Echo: Reviewed  ACCESS    ASSESSMENT/PLAN:       ESRD  Metabolic bone disease  Anemia  Poor nutrition  Renal diet   Supplements  Dialysis yesterday  Dialysis in am       [1]   Patient Active Problem List  Diagnosis    Type 2 diabetes mellitus with chronic kidney disease on chronic dialysis, with long-term current use of insulin    Peripheral arterial occlusive disease    Hypertension    CAD (coronary artery disease)    Anemia    S/P CABG x 1    SOB (shortness of breath)    End-stage renal disease on hemodialysis    Hyperlipidemia LDL goal <70    Essential hypertension    Aneurysm of arteriovenous dialysis fistula    Sleep apnea    Malfunction of device    Frailty    Secondary hyperparathyroidism of renal origin    Dependence on renal dialysis    Senile dementia    Chronic diastolic congestive heart failure    Closed nondisplaced fracture of proximal phalanx of left great toe    Gangrene    New onset a-fib    Sepsis    Type 2 diabetes mellitus with diabetic nephropathy, without long-term current use of " insulin    Acute osteomyelitis of toe of left foot    PVD (peripheral vascular disease)    Wedge compression fracture of eleventh thoracic vertebra    Diverticulosis    Aortic atherosclerosis    Ovarian mass, left    Celiac artery stenosis    Hypokalemia

## 2025-07-03 NOTE — PROGRESS NOTES
[facility]  Pharmacy    Pharmacy Vancomycin/Aminoglycoside Sign-Off    Therapy with vancomycin has been completed and/or the consult has been discontinued by the provider. The pharmacy will now sign off. Please re-consult as needed.    Thank you for allowing us to participate in this patients care.    Forrest Castañeda, PharmD     [phone]    Malnutrition...

## 2025-07-03 NOTE — ASSESSMENT & PLAN NOTE
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 Dementia is moderate. The patient does have signs of behavioral disturbance. Home dementia medications are Held or Continued: continued.. Continue non-pharmacologic interventions to prevent delirium (No VS between 11PM-5AM, activity during day, opening blinds, providing glasses/hearing aids, and up in chair during daytime). Will avoid narcotics and benzos unless absolutely necessary. PRN anti-psychotics are not prescribed to avoid self harm behaviors.  Frequent reorientation, lowering the bed levels, lifting a bed rails, have video monitor camera in place, continuous redirection  Palliative care team was consulted family is agreeable meet with home hospice companies to decide

## 2025-07-03 NOTE — ASSESSMENT & PLAN NOTE
Patient has documented history of peripheral vascular disease  Nonhealing left 3rd toe gangrene  Cardiology is on board  Doppler of lower limb showed severe DVT  With severe aortic atherosclerosis and suspected high-grade stenosis at the origin of the celiac, superior mesenteric, bilateral renal and inferior mesenteric arteries, with occlusion of bilateral superficial femoral artery stents.  On Plavix and statin  7/3 cardiology decided on No intervention due to poor outcome and multiple comorbidities, patient was seen on her previous admission by our Cardiology team and they discussed conservative management at that time

## 2025-07-03 NOTE — ASSESSMENT & PLAN NOTE
Creatine stable for now. BMP reviewed- noted Estimated Creatinine Clearance: 6.5 mL/min (A) (based on SCr of 4.3 mg/dL (H)). according to latest data. Based on current GFR, CKD stage is end stage.  Monitor UOP and serial BMP and adjust therapy as needed. Renally dose meds. Avoid nephrotoxic medications and procedures.  Nephrology is consulted to continue on her dialysis

## 2025-07-03 NOTE — ASSESSMENT & PLAN NOTE
Patient's blood pressure range in the last 24 hours was: BP  Min: 97/55  Max: 144/63.The patient's inpatient anti-hypertensive regimen is listed below:  Current Antihypertensives  isosorbide mononitrate 24 hr tablet 30 mg, Daily, Oral  furosemide tablet 40 mg, Daily, Oral  metoprolol succinate (TOPROL-XL) 24 hr tablet 25 mg, 2 times daily, Oral    Plan  - BP is controlled, no changes needed to their regimen

## 2025-07-03 NOTE — ASSESSMENT & PLAN NOTE
Patient's most recent potassium results are listed below.   Recent Labs     07/01/25  1002 07/02/25  1231 07/03/25  1134   K 4.5 3.1* 4.1     Plan  - Replete potassium per protocol  - Monitor potassium Daily  - Patient's hypokalemia is monitored closely- to be monitored

## 2025-07-04 LAB
ABSOLUTE EOSINOPHIL (OHS): 0.31 K/UL
ABSOLUTE MONOCYTE (OHS): 0.73 K/UL (ref 0.3–1)
ABSOLUTE NEUTROPHIL COUNT (OHS): 16.3 K/UL (ref 1.8–7.7)
BACTERIA SPEC AEROBE CULT: ABNORMAL
BASOPHILS # BLD AUTO: 0.04 K/UL
BASOPHILS NFR BLD AUTO: 0.2 %
ERYTHROCYTE [DISTWIDTH] IN BLOOD BY AUTOMATED COUNT: 14.9 % (ref 11.5–14.5)
HCT VFR BLD AUTO: 26.2 % (ref 37–48.5)
HGB BLD-MCNC: 8.9 GM/DL (ref 12–16)
IMM GRANULOCYTES # BLD AUTO: 0.22 K/UL (ref 0–0.04)
IMM GRANULOCYTES NFR BLD AUTO: 1.2 % (ref 0–0.5)
LYMPHOCYTES # BLD AUTO: 0.96 K/UL (ref 1–4.8)
MCH RBC QN AUTO: 31.2 PG (ref 27–31)
MCHC RBC AUTO-ENTMCNC: 34 G/DL (ref 32–36)
MCV RBC AUTO: 92 FL (ref 82–98)
NUCLEATED RBC (/100WBC) (OHS): 0 /100 WBC
PLATELET # BLD AUTO: 301 K/UL (ref 150–450)
PMV BLD AUTO: 9.8 FL (ref 9.2–12.9)
POCT GLUCOSE: 103 MG/DL (ref 70–110)
POCT GLUCOSE: 114 MG/DL (ref 70–110)
POCT GLUCOSE: 78 MG/DL (ref 70–110)
POCT GLUCOSE: 86 MG/DL (ref 70–110)
RBC # BLD AUTO: 2.85 M/UL (ref 4–5.4)
RELATIVE EOSINOPHIL (OHS): 1.7 %
RELATIVE LYMPHOCYTE (OHS): 5.2 % (ref 18–48)
RELATIVE MONOCYTE (OHS): 3.9 % (ref 4–15)
RELATIVE NEUTROPHIL (OHS): 87.8 % (ref 38–73)
WBC # BLD AUTO: 18.56 K/UL (ref 3.9–12.7)

## 2025-07-04 PROCEDURE — 25000003 PHARM REV CODE 250: Performed by: INTERNAL MEDICINE

## 2025-07-04 PROCEDURE — 80100016 HC MAINTENANCE HEMODIALYSIS

## 2025-07-04 PROCEDURE — 21400001 HC TELEMETRY ROOM

## 2025-07-04 PROCEDURE — 25000003 PHARM REV CODE 250

## 2025-07-04 PROCEDURE — 25000003 PHARM REV CODE 250: Performed by: NURSE PRACTITIONER

## 2025-07-04 PROCEDURE — 63600175 PHARM REV CODE 636 W HCPCS: Mod: JZ,TB | Performed by: NURSE PRACTITIONER

## 2025-07-04 PROCEDURE — 85025 COMPLETE CBC W/AUTO DIFF WBC: CPT | Performed by: NURSE PRACTITIONER

## 2025-07-04 PROCEDURE — 63600175 PHARM REV CODE 636 W HCPCS

## 2025-07-04 PROCEDURE — 36415 COLL VENOUS BLD VENIPUNCTURE: CPT | Performed by: NURSE PRACTITIONER

## 2025-07-04 RX ADMIN — MUPIROCIN: 20 OINTMENT TOPICAL at 08:07

## 2025-07-04 RX ADMIN — FUROSEMIDE 40 MG: 40 TABLET ORAL at 04:07

## 2025-07-04 RX ADMIN — HEPARIN SODIUM 5000 UNITS: 5000 INJECTION INTRAVENOUS; SUBCUTANEOUS at 05:07

## 2025-07-04 RX ADMIN — HEPARIN SODIUM 5000 UNITS: 5000 INJECTION INTRAVENOUS; SUBCUTANEOUS at 09:07

## 2025-07-04 RX ADMIN — EPOETIN ALFA-EPBX 10000 UNITS: 10000 INJECTION, SOLUTION INTRAVENOUS; SUBCUTANEOUS at 01:07

## 2025-07-04 RX ADMIN — PIPERACILLIN SODIUM AND TAZOBACTAM SODIUM 4.5 G: 4; .5 INJECTION, POWDER, LYOPHILIZED, FOR SOLUTION INTRAVENOUS at 04:07

## 2025-07-04 RX ADMIN — Medication 6 MG: at 01:07

## 2025-07-04 RX ADMIN — CLOPIDOGREL 75 MG: 75 TABLET ORAL at 04:07

## 2025-07-04 RX ADMIN — ISOSORBIDE MONONITRATE 30 MG: 30 TABLET, EXTENDED RELEASE ORAL at 04:07

## 2025-07-04 RX ADMIN — METOPROLOL SUCCINATE 25 MG: 25 TABLET, EXTENDED RELEASE ORAL at 08:07

## 2025-07-04 RX ADMIN — ACETAMINOPHEN 650 MG: 325 TABLET ORAL at 08:07

## 2025-07-04 RX ADMIN — PIPERACILLIN SODIUM AND TAZOBACTAM SODIUM 4.5 G: 4; .5 INJECTION, POWDER, LYOPHILIZED, FOR SOLUTION INTRAVENOUS at 01:07

## 2025-07-04 RX ADMIN — Medication 1 CAPSULE: at 04:07

## 2025-07-04 RX ADMIN — HYDROMORPHONE HYDROCHLORIDE 1 MG: 1 INJECTION, SOLUTION INTRAMUSCULAR; INTRAVENOUS; SUBCUTANEOUS at 05:07

## 2025-07-04 RX ADMIN — HYDROMORPHONE HYDROCHLORIDE 1 MG: 1 INJECTION, SOLUTION INTRAMUSCULAR; INTRAVENOUS; SUBCUTANEOUS at 11:07

## 2025-07-04 NOTE — ASSESSMENT & PLAN NOTE
"This patient does have evidence of infective focus  My overall impression is sepsis with Encephalopathy.  Source: Skin and Soft Tissue Infection: gangrene  Antibiotics given-   Antibiotics (72h ago, onward)      Start     Stop Route Frequency Ordered    07/01/25 2115  mupirocin 2 % ointment         07/06/25 2059 Nasl 2 times daily 07/01/25 2011 07/01/25 1400  piperacillin-tazobactam (ZOSYN) 4.5 g in D5W 100 mL IVPB (MB+)         -- IV Every 12 hours (non-standard times) 07/01/25 1335          Latest lactate reviewed-  No results for input(s): "LACTATE", "POCLAC" in the last 72 hours.    Organ dysfunction indicated by Encephalopathy    Fluid challenge Contraindicated- Fluid bolus is contraindicated in this patient due to End Stage Renal Disease     Post- resuscitation assessment Yes - I attest a sepsis perfusion exam was performed within 6 hours of sepsis, severe sepsis, or septic shock presentation, following fluid resuscitation.      Will Not start Pressors- Levophed for MAP of 65  Source control achieved by: Antibiotic therapy  "

## 2025-07-04 NOTE — ASSESSMENT & PLAN NOTE
Patient has paroxysmal (<7 days) atrial fibrillation. Patient is currently in atrial fibrillation. [id number] Score: 5. The patients heart rate in the last 24 hours is as follows:  Pulse  Min: 46  Max: 102     Antiarrhythmics  metoprolol succinate (TOPROL-XL) 24 hr tablet 25 mg, 2 times daily, Oral    Anticoagulants  heparin (porcine) injection 5,000 Units, Every 8 hours, Subcutaneous    Plan  - Replete lytes with a goal of K>4, Mg >2  - Patient is anticoagulated, see medications listed above.  - Patient's afib is currently controlled  - Plan to start heparin

## 2025-07-04 NOTE — SUBJECTIVE & OBJECTIVE
Interval History:  Patient was seen in the morning looked like at her baseline, cardiology is deciding on conservative management, podiatry deciding on no surgical intervention due to poor outcome, palliative Care was consulted planning to have hospice company meeting with the family decide about home hospice..      Review of Systems   Unable to perform ROS: Dementia     Objective:     Vital Signs (Most Recent):  Temp: 98.9 °F (37.2 °C) (07/04/25 0738)  Pulse: 80 (07/04/25 0738)  Resp: 18 (07/04/25 0738)  BP: 118/70 (07/04/25 0738)  SpO2: 95 % (07/04/25 0738) Vital Signs (24h Range):  Temp:  [97.6 °F (36.4 °C)-98.9 °F (37.2 °C)] 98.9 °F (37.2 °C)  Pulse:  [] 80  Resp:  [16-20] 18  SpO2:  [92 %-97 %] 95 %  BP: (118-136)/(64-71) 118/70     Weight: 45.8 kg (100 lb 15.5 oz)  Body mass index is 18.47 kg/m².  No intake or output data in the 24 hours ending 07/04/25 1448      Physical Exam  Constitutional:       Appearance: She is ill-appearing and toxic-appearing.   HENT:      Head: Normocephalic and atraumatic.      Right Ear: Tympanic membrane normal.      Left Ear: Tympanic membrane normal.      Nose: Nose normal.      Mouth/Throat:      Mouth: Mucous membranes are moist.   Eyes:      Extraocular Movements: Extraocular movements intact.      Pupils: Pupils are equal, round, and reactive to light.   Cardiovascular:      Rate and Rhythm: Rhythm irregular.      Comments: AVF to left upper extremity; + bruit/thrill  Pulmonary:      Effort: Pulmonary effort is normal.   Abdominal:      General: Abdomen is flat.      Tenderness: There is abdominal tenderness.   Musculoskeletal:         General: Normal range of motion.      Cervical back: Normal range of motion and neck supple.   Skin:     Capillary Refill: Capillary refill takes less than 2 seconds.      Comments: Gangrene to left third toe   Neurological:      Mental Status: She is alert and oriented to person, place, and time.   Psychiatric:         Mood and Affect:  Mood normal.               Significant Labs: All pertinent labs within the past 24 hours have been reviewed.  Recent Lab Results  (Last 5 results in the past 24 hours)        07/04/25  1349   07/04/25  1337   07/04/25  0616   07/03/25  2106   07/03/25  1607        Baso #   0.04             Basophil %   0.2             Eos #   0.31             Eos %   1.7             Gran # (ANC)   16.30             Hematocrit   26.2             Hemoglobin   8.9             Immature Grans (Abs)   0.22  Comment: Mild elevation in immature granulocytes is non specific and can be seen in a variety of conditions including stress response, acute inflammation, trauma and pregnancy. Correlation with other laboratory and clinical findings is essential.             Immature Granulocytes   1.2             Lymph #   0.96             Lymph %   5.2             MCH   31.2             MCHC   34.0             MCV   92             Mono #   0.73             Mono %   3.9             MPV   9.8             Neut %   87.8             nRBC   0             Platelet Count   301             POCT Glucose 78     103   143   116       RBC   2.85             RDW   14.9             WBC   18.56                                    Significant Imaging: I have reviewed all pertinent imaging results/findings within the past 24 hours.    Imaging Results              X-Ray Foot Complete Left (Final result)  Result time 06/30/25 17:22:22      Final result by Shraddha Chanel MD (06/30/25 17:22:22)                   Impression:      Osteopenia.    Partial progressive interval healing of the known 4th toe proximal phalangeal base fracture.    Ulceration at the tip of the 3rd toe distal phalanx.  Questionable slight erosive irregularity 3rd toe distal phalanx representing osteomyelitis otherwise.  Regional soft tissue swelling and soft tissue gas representing infection.  Recommend MRI.      Electronically signed by: Shraddha Chanel  Date:    06/30/2025  Time:    17:22                Narrative:    EXAMINATION:  XR FOOT COMPLETE 3 VIEW LEFT    CLINICAL HISTORY:  .  Gangrene, not elsewhere classified    TECHNIQUE:  AP, lateral and oblique views of the left foot were performed.    COMPARISON:  06/12/2025    FINDINGS:  See below                                       X-Ray Chest AP Portable (Final result)  Result time 06/30/25 15:55:35      Final result by Adam Mullen MD (06/30/25 15:55:35)                   Impression:      As above.      Electronically signed by: Adam Mullen MD  Date:    06/30/2025  Time:    15:55               Narrative:    EXAMINATION:  XR CHEST AP PORTABLE    CLINICAL HISTORY:  Tachycardia, unspecified    TECHNIQUE:  Single frontal view of the chest was performed.    FINDINGS:  There are bilateral perihilar opacities with interstitial prominence suspicious for edema.  There is no focal consolidative change.  There is no pneumothorax or pleural fluid identified.  There is elevation of the left hemidiaphragm.  The cardiac silhouette appears enlarged.  There is calcification of the aorta and patient is status post sternotomy.  The osseous structures demonstrate degenerative change.                                    CT abdomen with IV contrast    Impression:     1. Distended gallbladder with an associated gallstone.  No pericholecystic inflammatory change.  Recommend correlation for symptoms of right upper quadrant pain and further characterization with a gallbladder ultrasound if clinically warranted.  2. Colonic diverticulosis.  3. Multichamber cardiac enlargement.  4. Pulmonary edema with small bilateral dependent pleural effusions.  5. Atrophic native kidneys.  Partially visualized left arm AV fistula.  6. Severe aortic atherosclerosis with suspected high-grade stenosis at the origin of the celiac, superior mesenteric, bilateral renal and inferior mesenteric arteries.  7. Occluded bilateral superficial femoral artery stents.  8. Age indeterminate wedge  compression fracture of the T11 vertebral body with mild associated height loss.  9. Additional findings as detailed in the body of the report.

## 2025-07-04 NOTE — ASSESSMENT & PLAN NOTE
Patient's blood pressure range in the last 24 hours was: BP  Min: 118/70  Max: 136/71.The patient's inpatient anti-hypertensive regimen is listed below:  Current Antihypertensives  isosorbide mononitrate 24 hr tablet 30 mg, Daily, Oral  furosemide tablet 40 mg, Daily, Oral  metoprolol succinate (TOPROL-XL) 24 hr tablet 25 mg, 2 times daily, Oral    Plan  - BP is controlled, no changes needed to their regimen

## 2025-07-04 NOTE — PROGRESS NOTES
[facility]  Infectious Disease  Progress Note       Assessment/Plan:     Erin Clark is a 89 y.o. female with history significant for T2DM (last A1c 5.3%), ESRD (HD on MWF thru L arm AVF), HFrecEF (LVEF 58%), CAD s/p CABG in 2014, HTN, PAD (complete occulusions of R SFA, L SFA, PTA) and HLD presents after dialysis due to being tachycardic. Noted to have gangrenous L 3rd toe w/ XR demonstrating soft tissue swelling and tissue gas. Arterial lower extremity ultrasound demonstrating complete bilateral native SFA occlusions unchanged from prior US. Seen by Podiatry, patient is not a surgical candidate given existing comorbidities and likely to have further complications post-operatively.    Long discussion was made with patient and family members on goals of care on 7/3. Given the patient's lack of adequate circulation to the site of infection, it was emphasized that antibiotic use will not cure her left foot infection and that continuing long-term IV antibiotics could potentially lead to more harm. Family reports that patient has previously pulled at IV lines in the past and frequently gets agitated at home given her dementia. Family expressed understanding that if this happens it may lead to a more severe and life threatening infection. While having conversation with patient, Dr. Jones was present to discuss further goals of care. See palliative care note on 7/3/25.     Acute Osteomyelitis of Left 3rd Toe  Final recommendations  Patient and family to meet with [facility] to discuss transitioning to home hospital care  Can continue Pip-Tazo while admitted for now while discussing further goals of care.   Would not recommend outpatient IV antibiotics in this patient, it is unclear how beneficial systemic antibiotics may be given the patient's history of severe PAD. Additionally, family reports that patient has a history of combativeness and pulling out IVs which may ultimately lead to more harm.  PO  Abx recommendations to follow, pending susceptibilities.    Mikhail Zhang MD  [facility]  [facility]    Staff attestation to follow.    Subjective:      No adverse events over night. Family in room with patient. Family reporting that the patient has looked more comfortable this morning, states that she is biting her jaw less. Family discussed understanding that outpatient parenteral antibiotics are likely to lead to more harm and agrees to not pursuing them. Family also requesting fewer blood draws as R arm looks swollen. Family states that patient is having regular bowel movements.    Objective:     Last 24 Hour Vital Signs:  BP  Min: 118/70  Max: 144/63  Temp  Av.1 °F (36.7 °C)  Min: 97.6 °F (36.4 °C)  Max: 98.9 °F (37.2 °C)  Pulse  Av.5  Min: 46  Max: 102  Resp  Av.9  Min: 16  Max: 20  SpO2  Av %  Min: 92 %  Max: 97 %  No intake/output data recorded.    Physical Examination:  General: awake, cooperative, resting comfortably in bed  Head: Normocephalic, atraumatic, poor dentition  Lungs: anterior lung fields clear to auscultation, no accessory muscle use noted  Heart: regular rate and rhythm, no murmurs or gallops appreciated. L AVF noted, +bruit/thrill  Abdomen: soft, non-tender to palpation, no guarding, non-distended  Extremities: upper extremities normal. Left foot appropriately bandaged, tender to touch.  Skin: warm and dry, no rashes appreciated  Neuro: alert, tracks with eyes, follows commands     Laboratory:  Laboratory Data Reviewed: yes    Radiology Data Reviewed: yes    Microbiology Data Reviewed: yes  Gram stain w/ GNR, GPR, few GPC  Aerobic Wound culture w/ GNR, pending identification and susceptibility  Bcx no growth at 48 hours    Antibiotics and Day Number of Therapy:  Vancomyocin: -7/3  Zosyn: -present    Current Medications:     Infusions:       Scheduled:   clopidogreL  75 mg Oral Daily    epoetin roberto-epbx  10,000 Units Subcutaneous Every Mon,  Wed, Fri    furosemide  40 mg Oral Daily    heparin (porcine)  5,000 Units Subcutaneous Q8H    isosorbide mononitrate  30 mg Oral Daily    metoprolol succinate  25 mg Oral BID    mupirocin   Nasal BID    piperacillin-tazobactam (Zosyn) IV (PEDS and ADULTS) (extended infusion is not appropriate)  4.5 g Intravenous Q12H    polyethylene glycol  17 g Oral Daily    vitamin renal formula (B-complex-vitamin c-folic acid)  1 capsule Oral Daily        PRN:    Current Facility-Administered Medications:     acetaminophen, 650 mg, Rectal, Q6H PRN    acetaminophen, 650 mg, Oral, Q6H PRN    dextrose 50%, 12.5 g, Intravenous, PRN    dextrose 50%, 25 g, Intravenous, PRN    glucagon (human recombinant), 1 mg, Intramuscular, PRN    glucose, 16 g, Oral, PRN    glucose, 24 g, Oral, PRN    HYDROmorphone, 1 mg, Intravenous, Q6H PRN    insulin aspart U-100, 0-5 Units, Subcutaneous, QID (AC + HS) PRN    melatonin, 6 mg, Oral, Nightly PRN    sodium chloride 0.9%, 10 mL, Intravenous, Q12H PRN      Assessment:     Erin Clark is a 89 y.o.female with  Patient Active Problem List    Diagnosis Date Noted    Wedge compression fracture of eleventh thoracic vertebra 07/02/2025    Diverticulosis 07/02/2025    Aortic atherosclerosis 07/02/2025    Ovarian mass, left 07/02/2025    Celiac artery stenosis 07/02/2025    Hypokalemia 07/02/2025    Acute osteomyelitis of toe of left foot 07/01/2025    PVD (peripheral vascular disease) 07/01/2025    Sepsis 06/30/2025    Type 2 diabetes mellitus with diabetic nephropathy, without long-term current use of insulin 06/30/2025    Closed nondisplaced fracture of proximal phalanx of left great toe 06/13/2025    Gangrene 06/13/2025    New onset a-fib 06/13/2025    Senile dementia 05/06/2025    Chronic diastolic congestive heart failure 05/06/2025    Secondary hyperparathyroidism of renal origin 03/16/2023    Dependence on renal dialysis 03/16/2023    Frailty 06/08/2021    Malfunction of device 09/03/2020    Sleep  apnea 05/07/2020    Essential hypertension 05/05/2020    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    End-stage renal disease on hemodialysis 12/10/2014    Hyperlipidemia LDL goal <70 12/10/2014    SOB (shortness of breath) 05/05/2014    S/P CABG x 1 04/08/2014    Anemia 03/11/2014    Peripheral arterial occlusive disease 02/10/2014    Hypertension 02/10/2014    CAD (coronary artery disease) 02/10/2014    Type 2 diabetes mellitus with chronic kidney disease on chronic dialysis, with long-term current use of insulin

## 2025-07-04 NOTE — PLAN OF CARE
Problem: Infection  Goal: Absence of Infection Signs and Symptoms  Outcome: Progressing     Problem: Adult Inpatient Plan of Care  Goal: Plan of Care Review  Outcome: Progressing  Goal: Patient-Specific Goal (Individualized)  Outcome: Progressing  Goal: Absence of Hospital-Acquired Illness or Injury  Outcome: Progressing  Goal: Optimal Comfort and Wellbeing  Outcome: Progressing  Goal: Readiness for Transition of Care  Outcome: Progressing     Problem: Sepsis/Septic Shock  Goal: Optimal Coping  Outcome: Progressing  Goal: Absence of Bleeding  Outcome: Progressing

## 2025-07-04 NOTE — ASSESSMENT & PLAN NOTE
Patient's most recent potassium results are listed below.   Recent Labs     07/02/25  1231 07/03/25  1134   K 3.1* 4.1     Plan  - Replete potassium per protocol  - Monitor potassium Daily  - Patient's hypokalemia is monitored closely- to be monitored

## 2025-07-04 NOTE — PROGRESS NOTES
[facility] Medicine  Progress Note    Patient Name: Erin Clark  MRN: [id number]  Patient Class: IP- Inpatient   Admission Date: 6/30/2025  Length of Stay: 4 days  Attending Physician: Chung Kennedy MD  Primary Care Provider: Bayron Golden MD        Subjective     Principal Problem:Acute osteomyelitis of toe of left foot        HPI:  Erin Clark is a chronically-ill 89-year-old female that presented to the ED at [facility] this afternoon for tachycardia. The patient presented to the ED from a local dialysis center today after the completion of her HD treatment. Son served as historian; interpretation services provided by ([place] [id number]). Apparently, the patient experienced tachycardia throughout the entire HD session.   The nursing staff advised the patient's son of the issues and he transported the patient to the ED for further evaluation.     Overview/Hospital Course:  No notes on file    Interval History:  Patient was seen in the morning looked like at her baseline, cardiology is deciding on conservative management, podiatry deciding on no surgical intervention due to poor outcome, palliative Care was consulted planning to have hospice company meeting with the family decide about home hospice..      Review of Systems   Unable to perform ROS: Dementia     Objective:     Vital Signs (Most Recent):  Temp: 98.9 °F (37.2 °C) (07/04/25 0738)  Pulse: 80 (07/04/25 0738)  Resp: 18 (07/04/25 0738)  BP: 118/70 (07/04/25 0738)  SpO2: 95 % (07/04/25 0738) Vital Signs (24h Range):  Temp:  [97.6 °F (36.4 °C)-98.9 °F (37.2 °C)] 98.9 °F (37.2 °C)  Pulse:  [] 80  Resp:  [16-20] 18  SpO2:  [92 %-97 %] 95 %  BP: (118-136)/(64-71) 118/70     Weight: 45.8 kg (100 lb 15.5 oz)  Body mass index is 18.47 kg/m².  No intake or output data in the 24 hours ending 07/04/25 1448      Physical Exam  Constitutional:       Appearance: She is ill-appearing and toxic-appearing.   HENT:      Head: Normocephalic  and atraumatic.      Right Ear: Tympanic membrane normal.      Left Ear: Tympanic membrane normal.      Nose: Nose normal.      Mouth/Throat:      Mouth: Mucous membranes are moist.   Eyes:      Extraocular Movements: Extraocular movements intact.      Pupils: Pupils are equal, round, and reactive to light.   Cardiovascular:      Rate and Rhythm: Rhythm irregular.      Comments: AVF to left upper extremity; + bruit/thrill  Pulmonary:      Effort: Pulmonary effort is normal.   Abdominal:      General: Abdomen is flat.      Tenderness: There is abdominal tenderness.   Musculoskeletal:         General: Normal range of motion.      Cervical back: Normal range of motion and neck supple.   Skin:     Capillary Refill: Capillary refill takes less than 2 seconds.      Comments: Gangrene to left third toe   Neurological:      Mental Status: She is alert and oriented to person, place, and time.   Psychiatric:         Mood and Affect: Mood normal.               Significant Labs: All pertinent labs within the past 24 hours have been reviewed.  Recent Lab Results  (Last 5 results in the past 24 hours)        07/04/25  1349   07/04/25  1337   07/04/25  0616   07/03/25  2106   07/03/25  1607        Baso #   0.04             Basophil %   0.2             Eos #   0.31             Eos %   1.7             Gran # (ANC)   16.30             Hematocrit   26.2             Hemoglobin   8.9             Immature Grans (Abs)   0.22  Comment: Mild elevation in immature granulocytes is non specific and can be seen in a variety of conditions including stress response, acute inflammation, trauma and pregnancy. Correlation with other laboratory and clinical findings is essential.             Immature Granulocytes   1.2             Lymph #   0.96             Lymph %   5.2             MCH   31.2             MCHC   34.0             MCV   92             Mono #   0.73             Mono %   3.9             MPV   9.8             Neut %   87.8              nRBC   0             Platelet Count   301             POCT Glucose 78     103   143   116       RBC   2.85             RDW   14.9             WBC   18.56                                    Significant Imaging: I have reviewed all pertinent imaging results/findings within the past 24 hours.    Imaging Results              X-Ray Foot Complete Left (Final result)  Result time 06/30/25 17:22:22      Final result by Shraddha Chanel MD (06/30/25 17:22:22)                   Impression:      Osteopenia.    Partial progressive interval healing of the known 4th toe proximal phalangeal base fracture.    Ulceration at the tip of the 3rd toe distal phalanx.  Questionable slight erosive irregularity 3rd toe distal phalanx representing osteomyelitis otherwise.  Regional soft tissue swelling and soft tissue gas representing infection.  Recommend MRI.      Electronically signed by: Shraddha Chanel  Date:    06/30/2025  Time:    17:22               Narrative:    EXAMINATION:  XR FOOT COMPLETE 3 VIEW LEFT    CLINICAL HISTORY:  .  Gangrene, not elsewhere classified    TECHNIQUE:  AP, lateral and oblique views of the left foot were performed.    COMPARISON:  06/12/2025    FINDINGS:  See below                                       X-Ray Chest AP Portable (Final result)  Result time 06/30/25 15:55:35      Final result by Adam Mullen MD (06/30/25 15:55:35)                   Impression:      As above.      Electronically signed by: Adam Mullen MD  Date:    06/30/2025  Time:    15:55               Narrative:    EXAMINATION:  XR CHEST AP PORTABLE    CLINICAL HISTORY:  Tachycardia, unspecified    TECHNIQUE:  Single frontal view of the chest was performed.    FINDINGS:  There are bilateral perihilar opacities with interstitial prominence suspicious for edema.  There is no focal consolidative change.  There is no pneumothorax or pleural fluid identified.  There is elevation of the left hemidiaphragm.  The cardiac silhouette appears  "enlarged.  There is calcification of the aorta and patient is status post sternotomy.  The osseous structures demonstrate degenerative change.                                    CT abdomen with IV contrast    Impression:     1. Distended gallbladder with an associated gallstone.  No pericholecystic inflammatory change.  Recommend correlation for symptoms of right upper quadrant pain and further characterization with a gallbladder ultrasound if clinically warranted.  2. Colonic diverticulosis.  3. Multichamber cardiac enlargement.  4. Pulmonary edema with small bilateral dependent pleural effusions.  5. Atrophic native kidneys.  Partially visualized left arm AV fistula.  6. Severe aortic atherosclerosis with suspected high-grade stenosis at the origin of the celiac, superior mesenteric, bilateral renal and inferior mesenteric arteries.  7. Occluded bilateral superficial femoral artery stents.  8. Age indeterminate wedge compression fracture of the T11 vertebral body with mild associated height loss.  9. Additional findings as detailed in the body of the report.            Assessment & Plan  Sepsis  This patient does have evidence of infective focus  My overall impression is sepsis with Encephalopathy.  Source: Skin and Soft Tissue Infection: gangrene  Antibiotics given-   Antibiotics (72h ago, onward)      Start     Stop Route Frequency Ordered    07/01/25 2115  mupirocin 2 % ointment         07/06/25 2059 Nasl 2 times daily 07/01/25 2011 07/01/25 1400  piperacillin-tazobactam (ZOSYN) 4.5 g in D5W 100 mL IVPB (MB+)         -- IV Every 12 hours (non-standard times) 07/01/25 1335          Latest lactate reviewed-  No results for input(s): "LACTATE", "POCLAC" in the last 72 hours.    Organ dysfunction indicated by Encephalopathy    Fluid challenge Contraindicated- Fluid bolus is contraindicated in this patient due to End Stage Renal Disease     Post- resuscitation assessment Yes - I attest a sepsis perfusion exam was " "performed within 6 hours of sepsis, severe sepsis, or septic shock presentation, following fluid resuscitation.      Will Not start Pressors- Levophed for MAP of 65  Source control achieved by: Antibiotic therapy  End-stage renal disease on hemodialysis  Creatine stable for now. BMP reviewed- noted Estimated Creatinine Clearance: 6.5 mL/min (A) (based on SCr of 4.3 mg/dL (H)). according to latest data. Based on current GFR, CKD stage is end stage.  Monitor UOP and serial BMP and adjust therapy as needed. Renally dose meds. Avoid nephrotoxic medications and procedures.  Nephrology is consulted to continue on her dialysis  Essential hypertension  Patient's blood pressure range in the last 24 hours was: BP  Min: 118/70  Max: 136/71.The patient's inpatient anti-hypertensive regimen is listed below:  Current Antihypertensives  isosorbide mononitrate 24 hr tablet 30 mg, Daily, Oral  furosemide tablet 40 mg, Daily, Oral  metoprolol succinate (TOPROL-XL) 24 hr tablet 25 mg, 2 times daily, Oral    Plan  - BP is controlled, no changes needed to their regimen    Chronic diastolic congestive heart failure  Patient has Diastolic (HFpEF) heart failure that is Chronic. On presentation their CHF was well compensated. Most recent BNP and echo results are listed below.  No results for input(s): "BNP" in the last 72 hours.    Latest ECHO  Results for orders placed during the hospital encounter of 06/12/25    Echo    Interpretation Summary    Left Ventricle: The left ventricle is normal in size. Mildly increased wall thickness. Normal wall motion. There is normal systolic function. Quantitated ejection fraction is 58%. Diastolic function cannot be reliably determined in the presence of mitral annular calcification.    Right Ventricle: The right ventricle is normal in size measuring 3.0 cm. Systolic function is normal.    Left Atrium: The left atrium is mildly dilated measuring 38 mL/m2.    Aortic Valve: The aortic valve is a trileaflet " valve. There is aortic valve sclerosis.    Mitral Valve: There is mild stenosis. The mean pressure gradient across the mitral valve is 5 mmHg at a heart rate of 87 bpm. There is mild regurgitation.    Tricuspid Valve: There is mild regurgitation.    Pulmonary Artery: There is moderate pulmonary hypertension. The estimated pulmonary artery systolic pressure is 52 mmHg.    Current Heart Failure Medications  furosemide tablet 40 mg, Daily, Oral  metoprolol succinate (TOPROL-XL) 24 hr tablet 25 mg, 2 times daily, Oral    Plan  - Monitor strict I&Os and daily weights.    - Place on telemetry  - Low sodium diet  - Place on fluid restriction of 1 L.   - Cardiology has been consulted  - The patient's volume status is stable but not at their baseline as indicated by              New onset a-fib  Patient has paroxysmal (<7 days) atrial fibrillation. Patient is currently in atrial fibrillation. [id number] Score: 5. The patients heart rate in the last 24 hours is as follows:  Pulse  Min: 46  Max: 102     Antiarrhythmics  metoprolol succinate (TOPROL-XL) 24 hr tablet 25 mg, 2 times daily, Oral    Anticoagulants  heparin (porcine) injection 5,000 Units, Every 8 hours, Subcutaneous    Plan  - Replete lytes with a goal of K>4, Mg >2  - Patient is anticoagulated, see medications listed above.  - Patient's afib is currently controlled  - Plan to start heparin       Type 2 diabetes mellitus with diabetic nephropathy, without long-term current use of insulin  -Start accuchecks with Novolog correction    Hypertension  Patient's blood pressure range in the last 24 hours was: BP  Min: 118/70  Max: 136/71.The patient's inpatient anti-hypertensive regimen is listed below:  Current Antihypertensives  isosorbide mononitrate 24 hr tablet 30 mg, Daily, Oral  furosemide tablet 40 mg, Daily, Oral  metoprolol succinate (TOPROL-XL) 24 hr tablet 25 mg, 2 times daily, Oral    Plan  - BP is controlled, no changes needed to their  regimen    Frailty  Chronic/patient has a advanced dementia   PT OT ordered    Senile dementia  Patient with dementia 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 Dementia is moderate. The patient does have signs of behavioral disturbance. Home dementia medications are Held or Continued: continued.. Continue non-pharmacologic interventions to prevent delirium (No VS between 11PM-5AM, activity during day, opening blinds, providing glasses/hearing aids, and up in chair during daytime). Will avoid narcotics and benzos unless absolutely necessary. PRN anti-psychotics are not prescribed to avoid self harm behaviors.  Frequent reorientation, lowering the bed levels, lifting a bed rails, have video monitor camera in place, continuous redirection  Palliative care team was consulted family is agreeable meet with home hospice companies to decide  Acute osteomyelitis of toe of left foot  Detected by x-ray  Podiatry consulted  ID consulted  Arterial Doppler of lower limbs ordered  Currently on board spectrum antibiotics  Cultures are so far negative  7/3 podiatry planning on no intervention  -cardiology is planning on conservative management  -ID is recommending broad-spectrum antibiotics  PVD (peripheral vascular disease)  Patient has documented history of peripheral vascular disease  Nonhealing left 3rd toe gangrene  Cardiology is on board  Doppler of lower limb showed severe DVT  With severe aortic atherosclerosis and suspected high-grade stenosis at the origin of the celiac, superior mesenteric, bilateral renal and inferior mesenteric arteries, with occlusion of bilateral superficial femoral artery stents.  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  On statin and Plavix ,cardiology is on board  Ovarian mass, left    Detected by imaging no intra-abdominal lymphadenopathy no fat stranding were detected  Patient will probably need follow-up as outpatient  Celiac artery stenosis    Detected by imaging CT abdomen with IV contrast    And Plavix and aspirin cardiology is on board  Hypokalemia  Patient's most recent potassium results are listed below.   Recent Labs     07/02/25  1231 07/03/25  1134   K 3.1* 4.1     Plan  - Replete potassium per protocol  - Monitor potassium Daily  - Patient's hypokalemia is monitored closely- to be monitored  VTE Risk Mitigation (From admission, onward)           Ordered     heparin (porcine) injection 5,000 Units  Every 8 hours         07/01/25 1335     Place sequential compression device  Until discontinued         06/30/25 1830                    Discharge Planning   KATIE: 7/7/2025     Code Status: Full Code   Medical Readiness for Discharge Date:   Discharge Plan A: Home with family   Discharge Delays: None known at this time                    Chung Dominique MD  Department of Hospital Medicine   [place] [facility]

## 2025-07-05 LAB
POCT GLUCOSE: 115 MG/DL (ref 70–110)
POCT GLUCOSE: 96 MG/DL (ref 70–110)

## 2025-07-05 PROCEDURE — 25000003 PHARM REV CODE 250: Performed by: NURSE PRACTITIONER

## 2025-07-05 PROCEDURE — 63600175 PHARM REV CODE 636 W HCPCS

## 2025-07-05 PROCEDURE — 21400001 HC TELEMETRY ROOM

## 2025-07-05 PROCEDURE — 25000003 PHARM REV CODE 250

## 2025-07-05 RX ORDER — METOPROLOL TARTRATE 25 MG/1
25 TABLET, FILM COATED ORAL 2 TIMES DAILY
Status: DISCONTINUED | OUTPATIENT
Start: 2025-07-05 | End: 2025-07-07 | Stop reason: HOSPADM

## 2025-07-05 RX ADMIN — HYDROMORPHONE HYDROCHLORIDE 1 MG: 1 INJECTION, SOLUTION INTRAMUSCULAR; INTRAVENOUS; SUBCUTANEOUS at 08:07

## 2025-07-05 RX ADMIN — ACETAMINOPHEN 650 MG: 325 TABLET ORAL at 08:07

## 2025-07-05 RX ADMIN — FUROSEMIDE 40 MG: 40 TABLET ORAL at 09:07

## 2025-07-05 RX ADMIN — METOPROLOL TARTRATE 25 MG: 25 TABLET, FILM COATED ORAL at 08:07

## 2025-07-05 RX ADMIN — CLOPIDOGREL 75 MG: 75 TABLET ORAL at 09:07

## 2025-07-05 RX ADMIN — PIPERACILLIN SODIUM AND TAZOBACTAM SODIUM 4.5 G: 4; .5 INJECTION, POWDER, LYOPHILIZED, FOR SOLUTION INTRAVENOUS at 03:07

## 2025-07-05 RX ADMIN — METOPROLOL TARTRATE 25 MG: 25 TABLET, FILM COATED ORAL at 09:07

## 2025-07-05 RX ADMIN — HEPARIN SODIUM 5000 UNITS: 5000 INJECTION INTRAVENOUS; SUBCUTANEOUS at 05:07

## 2025-07-05 RX ADMIN — Medication 1 CAPSULE: at 09:07

## 2025-07-05 RX ADMIN — HEPARIN SODIUM 5000 UNITS: 5000 INJECTION INTRAVENOUS; SUBCUTANEOUS at 02:07

## 2025-07-05 RX ADMIN — MUPIROCIN: 20 OINTMENT TOPICAL at 08:07

## 2025-07-05 RX ADMIN — PIPERACILLIN SODIUM AND TAZOBACTAM SODIUM 4.5 G: 4; .5 INJECTION, POWDER, LYOPHILIZED, FOR SOLUTION INTRAVENOUS at 02:07

## 2025-07-05 RX ADMIN — HEPARIN SODIUM 5000 UNITS: 5000 INJECTION INTRAVENOUS; SUBCUTANEOUS at 09:07

## 2025-07-05 RX ADMIN — MUPIROCIN: 20 OINTMENT TOPICAL at 09:07

## 2025-07-05 RX ADMIN — HYDROMORPHONE HYDROCHLORIDE 1 MG: 1 INJECTION, SOLUTION INTRAMUSCULAR; INTRAVENOUS; SUBCUTANEOUS at 05:07

## 2025-07-05 NOTE — PROGRESS NOTES
[facility] Medicine  Progress Note    Patient Name: Erin Clark  MRN: [id number]  Patient Class: IP- Inpatient   Admission Date: 6/30/2025  Length of Stay: 5 days  Attending Physician: Chung Kennedy MD  Primary Care Provider: Bayron Golden MD        Subjective     Principal Problem:Acute osteomyelitis of toe of left foot        HPI:  Erin Clark is a chronically-ill 89-year-old female that presented to the ED at [facility] this afternoon for tachycardia. The patient presented to the ED from a local dialysis center today after the completion of her HD treatment. Son served as historian; interpretation services provided by ([place] [id number]). Apparently, the patient experienced tachycardia throughout the entire HD session.   The nursing staff advised the patient's son of the issues and he transported the patient to the ED for further evaluation.     Overview/Hospital Course:  No notes on file    Interval History:  Patient was seen in the morning looked like at her baseline, cardiology is deciding on conservative management, podiatry deciding on no surgical intervention due to poor outcome, palliative Care was consulted planning to have hospice company meeting with the family decide about home hospice..      Review of Systems   Unable to perform ROS: Dementia     Objective:     Vital Signs (Most Recent):  Temp: 97.2 °F (36.2 °C) (07/05/25 0932)  Pulse: 87 (07/05/25 0932)  Resp: 19 (07/05/25 0932)  BP: 125/67 (07/05/25 0932)  SpO2: 95 % (07/05/25 0932) Vital Signs (24h Range):  Temp:  [97.2 °F (36.2 °C)-98.3 °F (36.8 °C)] 97.2 °F (36.2 °C)  Pulse:  [] 87  Resp:  [17-19] 19  SpO2:  [95 %-97 %] 95 %  BP: (109-127)/(53-67) 125/67     Weight: 45.8 kg (100 lb 15.5 oz)  Body mass index is 18.47 kg/m².    Intake/Output Summary (Last 24 hours) at 7/5/2025 1316  Last data filed at 7/4/2025 1600  Gross per 24 hour   Intake 620 ml   Output 1000 ml   Net -380 ml         Physical  Exam  Constitutional:       Appearance: She is ill-appearing and toxic-appearing.   HENT:      Head: Normocephalic and atraumatic.      Right Ear: Tympanic membrane normal.      Left Ear: Tympanic membrane normal.      Nose: Nose normal.      Mouth/Throat:      Mouth: Mucous membranes are moist.   Eyes:      Extraocular Movements: Extraocular movements intact.      Pupils: Pupils are equal, round, and reactive to light.   Cardiovascular:      Rate and Rhythm: Rhythm irregular.      Comments: AVF to left upper extremity; + bruit/thrill  Pulmonary:      Effort: Pulmonary effort is normal.   Abdominal:      General: Abdomen is flat.      Tenderness: There is abdominal tenderness.   Musculoskeletal:         General: Normal range of motion.      Cervical back: Normal range of motion and neck supple.   Skin:     Capillary Refill: Capillary refill takes less than 2 seconds.      Comments: Gangrene to left third toe   Neurological:      Mental Status: She is alert and oriented to person, place, and time.   Psychiatric:         Mood and Affect: Mood normal.               Significant Labs: All pertinent labs within the past 24 hours have been reviewed.  Recent Lab Results  (Last 5 results in the past 24 hours)        07/05/25  0604   07/04/25  2105   07/04/25  1625   07/04/25  1349   07/04/25  1337        Baso #         0.04       Basophil %         0.2       Eos #         0.31       Eos %         1.7       Gran # (ANC)         16.30       Hematocrit         26.2       Hemoglobin         8.9       Immature Grans (Abs)         0.22  Comment: Mild elevation in immature granulocytes is non specific and can be seen in a variety of conditions including stress response, acute inflammation, trauma and pregnancy. Correlation with other laboratory and clinical findings is essential.       Immature Granulocytes         1.2       Lymph #         0.96       Lymph %         5.2       MCH         31.2       MCHC         34.0       MCV          92       Mono #         0.73       Mono %         3.9       MPV         9.8       Neut %         87.8       nRBC         0       Platelet Count         301       POCT Glucose 96   114   86   78         RBC         2.85       RDW         14.9       WBC         18.56                              Significant Imaging: I have reviewed all pertinent imaging results/findings within the past 24 hours.    Imaging Results              X-Ray Foot Complete Left (Final result)  Result time 06/30/25 17:22:22      Final result by Shraddha Chanel MD (06/30/25 17:22:22)                   Impression:      Osteopenia.    Partial progressive interval healing of the known 4th toe proximal phalangeal base fracture.    Ulceration at the tip of the 3rd toe distal phalanx.  Questionable slight erosive irregularity 3rd toe distal phalanx representing osteomyelitis otherwise.  Regional soft tissue swelling and soft tissue gas representing infection.  Recommend MRI.      Electronically signed by: Shraddha Chanel  Date:    06/30/2025  Time:    17:22               Narrative:    EXAMINATION:  XR FOOT COMPLETE 3 VIEW LEFT    CLINICAL HISTORY:  .  Gangrene, not elsewhere classified    TECHNIQUE:  AP, lateral and oblique views of the left foot were performed.    COMPARISON:  06/12/2025    FINDINGS:  See below                                       X-Ray Chest AP Portable (Final result)  Result time 06/30/25 15:55:35      Final result by Adam Mullen MD (06/30/25 15:55:35)                   Impression:      As above.      Electronically signed by: Adam Mullen MD  Date:    06/30/2025  Time:    15:55               Narrative:    EXAMINATION:  XR CHEST AP PORTABLE    CLINICAL HISTORY:  Tachycardia, unspecified    TECHNIQUE:  Single frontal view of the chest was performed.    FINDINGS:  There are bilateral perihilar opacities with interstitial prominence suspicious for edema.  There is no focal consolidative change.  There is no  "pneumothorax or pleural fluid identified.  There is elevation of the left hemidiaphragm.  The cardiac silhouette appears enlarged.  There is calcification of the aorta and patient is status post sternotomy.  The osseous structures demonstrate degenerative change.                                    CT abdomen with IV contrast    Impression:     1. Distended gallbladder with an associated gallstone.  No pericholecystic inflammatory change.  Recommend correlation for symptoms of right upper quadrant pain and further characterization with a gallbladder ultrasound if clinically warranted.  2. Colonic diverticulosis.  3. Multichamber cardiac enlargement.  4. Pulmonary edema with small bilateral dependent pleural effusions.  5. Atrophic native kidneys.  Partially visualized left arm AV fistula.  6. Severe aortic atherosclerosis with suspected high-grade stenosis at the origin of the celiac, superior mesenteric, bilateral renal and inferior mesenteric arteries.  7. Occluded bilateral superficial femoral artery stents.  8. Age indeterminate wedge compression fracture of the T11 vertebral body with mild associated height loss.  9. Additional findings as detailed in the body of the report.            Assessment & Plan  Sepsis  This patient does have evidence of infective focus  My overall impression is sepsis with Encephalopathy.  Source: Skin and Soft Tissue Infection: gangrene  Antibiotics given-   Antibiotics (72h ago, onward)      Start     Stop Route Frequency Ordered    07/01/25 2115  mupirocin 2 % ointment         07/06/25 2059 Nasl 2 times daily 07/01/25 2011 07/01/25 1400  piperacillin-tazobactam (ZOSYN) 4.5 g in D5W 100 mL IVPB (MB+)         -- IV Every 12 hours (non-standard times) 07/01/25 1335          Latest lactate reviewed-  No results for input(s): "LACTATE", "POCLAC" in the last 72 hours.    Organ dysfunction indicated by Encephalopathy    Fluid challenge Contraindicated- Fluid bolus is contraindicated in " "this patient due to End Stage Renal Disease     Post- resuscitation assessment Yes - I attest a sepsis perfusion exam was performed within 6 hours of sepsis, severe sepsis, or septic shock presentation, following fluid resuscitation.      Will Not start Pressors- Levophed for MAP of 65  Source control achieved by: Antibiotic therapy  End-stage renal disease on hemodialysis  Creatine stable for now. BMP reviewed- noted Estimated Creatinine Clearance: 6.5 mL/min (A) (based on SCr of 4.3 mg/dL (H)). according to latest data. Based on current GFR, CKD stage is end stage.  Monitor UOP and serial BMP and adjust therapy as needed. Renally dose meds. Avoid nephrotoxic medications and procedures.  Nephrology is consulted to continue on her dialysis  Essential hypertension  Patient's blood pressure range in the last 24 hours was: BP  Min: 109/66  Max: 127/54.The patient's inpatient anti-hypertensive regimen is listed below:  Current Antihypertensives  isosorbide mononitrate 24 hr tablet 30 mg, Daily, Oral  furosemide tablet 40 mg, Daily, Oral  metoprolol tartrate (LOPRESSOR) tablet 25 mg, 2 times daily, Oral    Plan  - BP is controlled, no changes needed to their regimen    Chronic diastolic congestive heart failure  Patient has Diastolic (HFpEF) heart failure that is Chronic. On presentation their CHF was well compensated. Most recent BNP and echo results are listed below.  No results for input(s): "BNP" in the last 72 hours.    Latest ECHO  Results for orders placed during the hospital encounter of 06/12/25    Echo    Interpretation Summary    Left Ventricle: The left ventricle is normal in size. Mildly increased wall thickness. Normal wall motion. There is normal systolic function. Quantitated ejection fraction is 58%. Diastolic function cannot be reliably determined in the presence of mitral annular calcification.    Right Ventricle: The right ventricle is normal in size measuring 3.0 cm. Systolic function is normal.    " Left Atrium: The left atrium is mildly dilated measuring 38 mL/m2.    Aortic Valve: The aortic valve is a trileaflet valve. There is aortic valve sclerosis.    Mitral Valve: There is mild stenosis. The mean pressure gradient across the mitral valve is 5 mmHg at a heart rate of 87 bpm. There is mild regurgitation.    Tricuspid Valve: There is mild regurgitation.    Pulmonary Artery: There is moderate pulmonary hypertension. The estimated pulmonary artery systolic pressure is 52 mmHg.    Current Heart Failure Medications  furosemide tablet 40 mg, Daily, Oral    Plan  - Monitor strict I&Os and daily weights.    - Place on telemetry  - Low sodium diet  - Place on fluid restriction of 1 L.   - Cardiology has been consulted  - The patient's volume status is stable but not at their baseline as indicated by              New onset a-fib  Patient has paroxysmal (<7 days) atrial fibrillation. Patient is currently in atrial fibrillation. [id number] Score: 5. The patients heart rate in the last 24 hours is as follows:  Pulse  Min: 69  Max: 103     Antiarrhythmics  metoprolol tartrate (LOPRESSOR) tablet 25 mg, 2 times daily, Oral    Anticoagulants  heparin (porcine) injection 5,000 Units, Every 8 hours, Subcutaneous    Plan  - Replete lytes with a goal of K>4, Mg >2  - Patient is anticoagulated, see medications listed above.  - Patient's afib is currently controlled  - Plan to start heparin       Type 2 diabetes mellitus with diabetic nephropathy, without long-term current use of insulin  -Start accuchecks with Novolog correction    Hypertension  Patient's blood pressure range in the last 24 hours was: BP  Min: 109/66  Max: 127/54.The patient's inpatient anti-hypertensive regimen is listed below:  Current Antihypertensives  isosorbide mononitrate 24 hr tablet 30 mg, Daily, Oral  furosemide tablet 40 mg, Daily, Oral  metoprolol tartrate (LOPRESSOR) tablet 25 mg, 2 times daily, Oral    Plan  - BP is controlled, no changes needed to  their regimen    Frailty  Chronic/patient has a advanced dementia   PT OT ordered    Senile dementia  Patient with dementia 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 Dementia is moderate. The patient does have signs of behavioral disturbance. Home dementia medications are Held or Continued: continued.. Continue non-pharmacologic interventions to prevent delirium (No VS between 11PM-5AM, activity during day, opening blinds, providing glasses/hearing aids, and up in chair during daytime). Will avoid narcotics and benzos unless absolutely necessary. PRN anti-psychotics are not prescribed to avoid self harm behaviors.  Frequent reorientation, lowering the bed levels, lifting a bed rails, have video monitor camera in place, continuous redirection  Palliative care team was consulted family is agreeable meet with home hospice companies to decide  Acute osteomyelitis of toe of left foot  Detected by x-ray  Podiatry consulted  ID consulted  Arterial Doppler of lower limbs ordered  Currently on board spectrum antibiotics  Cultures are so far negative  7/3 podiatry planning on no intervention  -cardiology is planning on conservative management  -ID is recommending broad-spectrum antibiotics  PVD (peripheral vascular disease)  Patient has documented history of peripheral vascular disease  Nonhealing left 3rd toe gangrene  Cardiology is on board  Doppler of lower limb showed severe DVT  With severe aortic atherosclerosis and suspected high-grade stenosis at the origin of the celiac, superior mesenteric, bilateral renal and inferior mesenteric arteries, with occlusion of bilateral superficial femoral artery stents.  On Plavix and statin  7/3 cardiology decided on No intervention due to poor outcome and multiple comorbidities, patient was seen on her previous admission by our Cardiology team and they discussed conservative management at that time  Wedge compression fracture of eleventh thoracic vertebra  Chronic/age-related  and secondary to vitamin-D deficiency secondary to ESRD  PT OT ordered  Patient is currently stable    Diverticulosis  Detected by CT abdomen no picture of inflammation  Bowel regimen is on board    Aortic atherosclerosis    Severe aortic atherosclerosis with suspected high-grade stenosis at the origin of the celiac/superior mesenteric/bilateral renal and inferior mesenteric arteries with a occlusion of bilateral superficial femoral artery stents.  On statin and Plavix ,cardiology is on board  Ovarian mass, left    Detected by imaging no intra-abdominal lymphadenopathy no fat stranding were detected  Patient will probably need follow-up as outpatient  Celiac artery stenosis    Detected by imaging CT abdomen with IV contrast    And Plavix and aspirin cardiology is on board  Hypokalemia  Patient's most recent potassium results are listed below.   Recent Labs     07/03/25  1134   K 4.1     Plan  - Replete potassium per protocol  - Monitor potassium Daily  - Patient's hypokalemia is monitored closely- to be monitored  Gangrene of toe of left foot      VTE Risk Mitigation (From admission, onward)           Ordered     heparin (porcine) injection 5,000 Units  Every 8 hours         07/01/25 1335     Place sequential compression device  Until discontinued         06/30/25 1830                    Discharge Planning   KATIE: 7/7/2025     Code Status: Full Code   Medical Readiness for Discharge Date:   Discharge Plan A: Home with family   Discharge Delays: None known at this time                    Chung Dominique MD  Department of Hospital Medicine   [place] [facility]

## 2025-07-05 NOTE — ASSESSMENT & PLAN NOTE
Patient's most recent potassium results are listed below.   Recent Labs     07/03/25  1134   K 4.1     Plan  - Replete potassium per protocol  - Monitor potassium Daily  - Patient's hypokalemia is monitored closely- to be monitored

## 2025-07-05 NOTE — PLAN OF CARE
Problem: Infection  Goal: Absence of Infection Signs and Symptoms  Outcome: Progressing     Problem: Diabetes Comorbidity  Goal: Blood Glucose Level Within Targeted Range  Outcome: Progressing     Problem: Adult Inpatient Plan of Care  Goal: Plan of Care Review  Outcome: Progressing  Goal: Patient-Specific Goal (Individualized)  Outcome: Progressing  Goal: Absence of Hospital-Acquired Illness or Injury  Outcome: Progressing  Goal: Optimal Comfort and Wellbeing  Outcome: Progressing  Goal: Readiness for Transition of Care  Outcome: Progressing     Problem: Wound  Goal: Optimal Coping  Outcome: Progressing  Goal: Optimal Functional Ability  Outcome: Progressing  Goal: Absence of Infection Signs and Symptoms  Outcome: Progressing  Goal: Improved Oral Intake  Outcome: Progressing  Goal: Optimal Pain Control and Function  Outcome: Progressing  Goal: Skin Health and Integrity  Outcome: Progressing  Goal: Optimal Wound Healing  Outcome: Progressing   Patient family request that staff limit the amount of visits to room. MD aware and nursing communications placed.

## 2025-07-05 NOTE — ASSESSMENT & PLAN NOTE
Patient has paroxysmal (<7 days) atrial fibrillation. Patient is currently in atrial fibrillation. [id number] Score: 5. The patients heart rate in the last 24 hours is as follows:  Pulse  Min: 69  Max: 103     Antiarrhythmics  metoprolol tartrate (LOPRESSOR) tablet 25 mg, 2 times daily, Oral    Anticoagulants  heparin (porcine) injection 5,000 Units, Every 8 hours, Subcutaneous    Plan  - Replete lytes with a goal of K>4, Mg >2  - Patient is anticoagulated, see medications listed above.  - Patient's afib is currently controlled  - Plan to start heparin

## 2025-07-05 NOTE — PROGRESS NOTES
Progress Note  Nephrology      Consult Requested By: Chung Kennedy MD      SUBJECTIVE:     Overnight events  Patient is a 89 y.o. female     Problem List[1]  Past Medical History:   Diagnosis Date    CHF (congestive heart failure)     Colon polyps 06/03/2013    Coronary artery disease     Diabetes mellitus     Encounter for blood transfusion     ESRD (end stage renal disease) 03/2014    dialysis M-F    GERD (gastroesophageal reflux disease)     High cholesterol     Hypertension               OBJECTIVE:     Vitals:    07/05/25 0346 07/05/25 0418 07/05/25 0512 07/05/25 0932   BP:  109/66  125/67   BP Location:    Right arm   Patient Position:  Lying     Pulse: 69 80  87   Resp:  17 18 19   Temp:  97.4 °F (36.3 °C)  97.2 °F (36.2 °C)   TempSrc:  Axillary  Axillary   SpO2:  97%  95%   Weight:       Height:           Temp: 97.2 °F (36.2 °C) (07/05/25 0932)  Pulse: 87 (07/05/25 0932)  Resp: 19 (07/05/25 0932)  BP: 125/67 (07/05/25 0932)  SpO2: 95 % (07/05/25 0932)              Medications:   clopidogreL  75 mg Oral Daily    epoetin roberto-epbx  10,000 Units Subcutaneous Every Mon, Wed, Fri    furosemide  40 mg Oral Daily    heparin (porcine)  5,000 Units Subcutaneous Q8H    isosorbide mononitrate  30 mg Oral Daily    metoprolol tartrate  25 mg Oral BID    mupirocin   Nasal BID    piperacillin-tazobactam (Zosyn) IV (PEDS and ADULTS) (extended infusion is not appropriate)  4.5 g Intravenous Q12H    polyethylene glycol  17 g Oral Daily    vitamin renal formula (B-complex-vitamin c-folic acid)  1 capsule Oral Daily                 Physical Exam:  General appearance:  Lungs: RR 19  Pulse oximeter 95 % O2  Heart: pulse 87  Abdomen: soft  Extremities: no edema  Skin: dry      Laboratory:  ABG  Labs reviewed  No results found for this or any previous visit (from the past 2 weeks).  Recent Results (from the past 2 weeks)   CBC with Differential    Collection Time: 07/04/25  1:37 PM   Result Value Ref Range    WBC 18.56 (H) 3.90  "- 12.70 K/uL    HGB 8.9 (L) 12.0 - 16.0 gm/dL    HCT 26.2 (L) 37.0 - 48.5 %    Platelet Count 301 150 - 450 K/uL   CBC with Differential    Collection Time: 07/03/25  5:22 AM   Result Value Ref Range    WBC 16.03 (H) 3.90 - 12.70 K/uL    HGB 7.6 (L) 12.0 - 16.0 gm/dL    HCT 23.4 (L) 37.0 - 48.5 %    Platelet Count 262 150 - 450 K/uL   CBC with Differential    Collection Time: 07/02/25 12:31 PM   Result Value Ref Range    WBC 18.80 (H) 3.90 - 12.70 K/uL    HGB 9.3 (L) 12.0 - 16.0 gm/dL    HCT 27.5 (L) 37.0 - 48.5 %    Platelet Count 325 150 - 450 K/uL     Urinalysis  No results for input(s): "COLORU", "CLARITYU", "SPECGRAV", "PHUR", "PROTEINUA", "GLUCOSEU", "BILIRUBINCON", "BLOODU", "WBCU", "RBCU", "BACTERIA", "MUCUS", "NITRITE", "LEUKOCYTESUR", "UROBILINOGEN", "HYALINECASTS" in the last 24 hours.    Diagnostic Results:  X-Ray: Reviewed  US: Reviewed  Echo: Reviewed  ACCESS    ASSESSMENT/PLAN:       ESRD  Metabolic bone disease  Anemia  Poor nutrition  Dialysis yesterday       [1]   Patient Active Problem List  Diagnosis    Type 2 diabetes mellitus with chronic kidney disease on chronic dialysis, with long-term current use of insulin    Peripheral arterial occlusive disease    Hypertension    CAD (coronary artery disease)    Anemia    S/P CABG x 1    SOB (shortness of breath)    End-stage renal disease on hemodialysis    Hyperlipidemia LDL goal <70    Essential hypertension    Aneurysm of arteriovenous dialysis fistula    Sleep apnea    Malfunction of device    Frailty    Secondary hyperparathyroidism of renal origin    Dependence on renal dialysis    Senile dementia    Chronic diastolic congestive heart failure    Closed nondisplaced fracture of proximal phalanx of left great toe    Gangrene of toe of left foot    New onset a-fib    Sepsis    Type 2 diabetes mellitus with diabetic nephropathy, without long-term current use of insulin    Acute osteomyelitis of toe of left foot    PVD (peripheral vascular disease)    " Wedge compression fracture of eleventh thoracic vertebra    Diverticulosis    Aortic atherosclerosis    Ovarian mass, left    Celiac artery stenosis    Hypokalemia

## 2025-07-05 NOTE — NURSING
Patient family refused am vitals,stating patient has been up all night.  notified. Will continue to monitor.

## 2025-07-05 NOTE — ASSESSMENT & PLAN NOTE
Patient's blood pressure range in the last 24 hours was: BP  Min: 109/66  Max: 127/54.The patient's inpatient anti-hypertensive regimen is listed below:  Current Antihypertensives  isosorbide mononitrate 24 hr tablet 30 mg, Daily, Oral  furosemide tablet 40 mg, Daily, Oral  metoprolol tartrate (LOPRESSOR) tablet 25 mg, 2 times daily, Oral    Plan  - BP is controlled, no changes needed to their regimen

## 2025-07-05 NOTE — SUBJECTIVE & OBJECTIVE
Interval History:  Patient was seen in the morning looked like at her baseline, cardiology is deciding on conservative management, podiatry deciding on no surgical intervention due to poor outcome, palliative Care was consulted planning to have hospice company meeting with the family decide about home hospice..      Review of Systems   Unable to perform ROS: Dementia     Objective:     Vital Signs (Most Recent):  Temp: 97.2 °F (36.2 °C) (07/05/25 0932)  Pulse: 87 (07/05/25 0932)  Resp: 19 (07/05/25 0932)  BP: 125/67 (07/05/25 0932)  SpO2: 95 % (07/05/25 0932) Vital Signs (24h Range):  Temp:  [97.2 °F (36.2 °C)-98.3 °F (36.8 °C)] 97.2 °F (36.2 °C)  Pulse:  [] 87  Resp:  [17-19] 19  SpO2:  [95 %-97 %] 95 %  BP: (109-127)/(53-67) 125/67     Weight: 45.8 kg (100 lb 15.5 oz)  Body mass index is 18.47 kg/m².    Intake/Output Summary (Last 24 hours) at 7/5/2025 1316  Last data filed at 7/4/2025 1600  Gross per 24 hour   Intake 620 ml   Output 1000 ml   Net -380 ml         Physical Exam  Constitutional:       Appearance: She is ill-appearing and toxic-appearing.   HENT:      Head: Normocephalic and atraumatic.      Right Ear: Tympanic membrane normal.      Left Ear: Tympanic membrane normal.      Nose: Nose normal.      Mouth/Throat:      Mouth: Mucous membranes are moist.   Eyes:      Extraocular Movements: Extraocular movements intact.      Pupils: Pupils are equal, round, and reactive to light.   Cardiovascular:      Rate and Rhythm: Rhythm irregular.      Comments: AVF to left upper extremity; + bruit/thrill  Pulmonary:      Effort: Pulmonary effort is normal.   Abdominal:      General: Abdomen is flat.      Tenderness: There is abdominal tenderness.   Musculoskeletal:         General: Normal range of motion.      Cervical back: Normal range of motion and neck supple.   Skin:     Capillary Refill: Capillary refill takes less than 2 seconds.      Comments: Gangrene to left third toe   Neurological:      Mental  Status: She is alert and oriented to person, place, and time.   Psychiatric:         Mood and Affect: Mood normal.               Significant Labs: All pertinent labs within the past 24 hours have been reviewed.  Recent Lab Results  (Last 5 results in the past 24 hours)        07/05/25  0604   07/04/25  2105   07/04/25  1625   07/04/25  1349   07/04/25  1337        Baso #         0.04       Basophil %         0.2       Eos #         0.31       Eos %         1.7       Gran # (ANC)         16.30       Hematocrit         26.2       Hemoglobin         8.9       Immature Grans (Abs)         0.22  Comment: Mild elevation in immature granulocytes is non specific and can be seen in a variety of conditions including stress response, acute inflammation, trauma and pregnancy. Correlation with other laboratory and clinical findings is essential.       Immature Granulocytes         1.2       Lymph #         0.96       Lymph %         5.2       MCH         31.2       MCHC         34.0       MCV         92       Mono #         0.73       Mono %         3.9       MPV         9.8       Neut %         87.8       nRBC         0       Platelet Count         301       POCT Glucose 96   114   86   78         RBC         2.85       RDW         14.9       WBC         18.56                              Significant Imaging: I have reviewed all pertinent imaging results/findings within the past 24 hours.    Imaging Results              X-Ray Foot Complete Left (Final result)  Result time 06/30/25 17:22:22      Final result by Shraddha Chanel MD (06/30/25 17:22:22)                   Impression:      Osteopenia.    Partial progressive interval healing of the known 4th toe proximal phalangeal base fracture.    Ulceration at the tip of the 3rd toe distal phalanx.  Questionable slight erosive irregularity 3rd toe distal phalanx representing osteomyelitis otherwise.  Regional soft tissue swelling and soft tissue gas representing infection.   Recommend MRI.      Electronically signed by: Shraddha Chanel  Date:    06/30/2025  Time:    17:22               Narrative:    EXAMINATION:  XR FOOT COMPLETE 3 VIEW LEFT    CLINICAL HISTORY:  .  Gangrene, not elsewhere classified    TECHNIQUE:  AP, lateral and oblique views of the left foot were performed.    COMPARISON:  06/12/2025    FINDINGS:  See below                                       X-Ray Chest AP Portable (Final result)  Result time 06/30/25 15:55:35      Final result by Adam Mullen MD (06/30/25 15:55:35)                   Impression:      As above.      Electronically signed by: Adam Mullen MD  Date:    06/30/2025  Time:    15:55               Narrative:    EXAMINATION:  XR CHEST AP PORTABLE    CLINICAL HISTORY:  Tachycardia, unspecified    TECHNIQUE:  Single frontal view of the chest was performed.    FINDINGS:  There are bilateral perihilar opacities with interstitial prominence suspicious for edema.  There is no focal consolidative change.  There is no pneumothorax or pleural fluid identified.  There is elevation of the left hemidiaphragm.  The cardiac silhouette appears enlarged.  There is calcification of the aorta and patient is status post sternotomy.  The osseous structures demonstrate degenerative change.                                    CT abdomen with IV contrast    Impression:     1. Distended gallbladder with an associated gallstone.  No pericholecystic inflammatory change.  Recommend correlation for symptoms of right upper quadrant pain and further characterization with a gallbladder ultrasound if clinically warranted.  2. Colonic diverticulosis.  3. Multichamber cardiac enlargement.  4. Pulmonary edema with small bilateral dependent pleural effusions.  5. Atrophic native kidneys.  Partially visualized left arm AV fistula.  6. Severe aortic atherosclerosis with suspected high-grade stenosis at the origin of the celiac, superior mesenteric, bilateral renal and inferior mesenteric  arteries.  7. Occluded bilateral superficial femoral artery stents.  8. Age indeterminate wedge compression fracture of the T11 vertebral body with mild associated height loss.  9. Additional findings as detailed in the body of the report.

## 2025-07-05 NOTE — ASSESSMENT & PLAN NOTE
"Patient has Diastolic (HFpEF) heart failure that is Chronic. On presentation their CHF was well compensated. Most recent BNP and echo results are listed below.  No results for input(s): "BNP" in the last 72 hours.    Latest ECHO  Results for orders placed during the hospital encounter of 06/12/25    Echo    Interpretation Summary    Left Ventricle: The left ventricle is normal in size. Mildly increased wall thickness. Normal wall motion. There is normal systolic function. Quantitated ejection fraction is 58%. Diastolic function cannot be reliably determined in the presence of mitral annular calcification.    Right Ventricle: The right ventricle is normal in size measuring 3.0 cm. Systolic function is normal.    Left Atrium: The left atrium is mildly dilated measuring 38 mL/m2.    Aortic Valve: The aortic valve is a trileaflet valve. There is aortic valve sclerosis.    Mitral Valve: There is mild stenosis. The mean pressure gradient across the mitral valve is 5 mmHg at a heart rate of 87 bpm. There is mild regurgitation.    Tricuspid Valve: There is mild regurgitation.    Pulmonary Artery: There is moderate pulmonary hypertension. The estimated pulmonary artery systolic pressure is 52 mmHg.    Current Heart Failure Medications  furosemide tablet 40 mg, Daily, Oral    Plan  - Monitor strict I&Os and daily weights.    - Place on telemetry  - Low sodium diet  - Place on fluid restriction of 1 L.   - Cardiology has been consulted  - The patient's volume status is stable but not at their baseline as indicated by              "

## 2025-07-05 NOTE — PROGRESS NOTES
07/04/25 1500   Post-Hemodialysis Assessment   Rinseback Volume (mL) 250 mL   Blood Volume Processed (Liters) 54.2 L   Dialyzer Clearance Lightly streaked   Duration of Treatment 180 minutes   Additional Fluid Intake (mL) 500 mL   Total UF (mL) 1000 mL   Net Fluid Removal 500   Patient Response to Treatment tolerated well   Post-Hemodialysis Comments Pt reassessed. NDN. VSS. Blodd returned. Lines flushed. Needles pulled. Pressure held until hemostasis. Gauze dressing CDI. Report given to RN

## 2025-07-06 LAB
BACTERIA BLD CULT: NORMAL
BACTERIA BLD CULT: NORMAL
POCT GLUCOSE: 117 MG/DL (ref 70–110)
POCT GLUCOSE: 134 MG/DL (ref 70–110)
POCT GLUCOSE: 99 MG/DL (ref 70–110)

## 2025-07-06 PROCEDURE — 21400001 HC TELEMETRY ROOM

## 2025-07-06 PROCEDURE — 25000003 PHARM REV CODE 250: Performed by: NURSE PRACTITIONER

## 2025-07-06 PROCEDURE — 63600175 PHARM REV CODE 636 W HCPCS

## 2025-07-06 PROCEDURE — 25000003 PHARM REV CODE 250: Performed by: INTERNAL MEDICINE

## 2025-07-06 PROCEDURE — 25000003 PHARM REV CODE 250

## 2025-07-06 RX ADMIN — HYDROMORPHONE HYDROCHLORIDE 1 MG: 1 INJECTION, SOLUTION INTRAMUSCULAR; INTRAVENOUS; SUBCUTANEOUS at 06:07

## 2025-07-06 RX ADMIN — HEPARIN SODIUM 5000 UNITS: 5000 INJECTION INTRAVENOUS; SUBCUTANEOUS at 02:07

## 2025-07-06 RX ADMIN — HYDROMORPHONE HYDROCHLORIDE 1 MG: 1 INJECTION, SOLUTION INTRAMUSCULAR; INTRAVENOUS; SUBCUTANEOUS at 09:07

## 2025-07-06 RX ADMIN — ISOSORBIDE MONONITRATE 30 MG: 30 TABLET, EXTENDED RELEASE ORAL at 09:07

## 2025-07-06 RX ADMIN — CLOPIDOGREL 75 MG: 75 TABLET ORAL at 09:07

## 2025-07-06 RX ADMIN — METOPROLOL TARTRATE 25 MG: 25 TABLET, FILM COATED ORAL at 09:07

## 2025-07-06 RX ADMIN — PIPERACILLIN SODIUM AND TAZOBACTAM SODIUM 4.5 G: 4; .5 INJECTION, POWDER, LYOPHILIZED, FOR SOLUTION INTRAVENOUS at 02:07

## 2025-07-06 RX ADMIN — LACTOBACILLUS TAB 1 TABLET: TAB at 04:07

## 2025-07-06 RX ADMIN — HEPARIN SODIUM 5000 UNITS: 5000 INJECTION INTRAVENOUS; SUBCUTANEOUS at 05:07

## 2025-07-06 RX ADMIN — Medication 1 CAPSULE: at 09:07

## 2025-07-06 RX ADMIN — FUROSEMIDE 40 MG: 40 TABLET ORAL at 09:07

## 2025-07-06 RX ADMIN — HYDROMORPHONE HYDROCHLORIDE 1 MG: 1 INJECTION, SOLUTION INTRAMUSCULAR; INTRAVENOUS; SUBCUTANEOUS at 02:07

## 2025-07-06 RX ADMIN — METOPROLOL TARTRATE 25 MG: 25 TABLET, FILM COATED ORAL at 08:07

## 2025-07-06 RX ADMIN — MUPIROCIN: 20 OINTMENT TOPICAL at 09:07

## 2025-07-06 RX ADMIN — HEPARIN SODIUM 5000 UNITS: 5000 INJECTION INTRAVENOUS; SUBCUTANEOUS at 10:07

## 2025-07-06 NOTE — SUBJECTIVE & OBJECTIVE
Interval History:  Patient was seen in the morning looked like at her baseline, cardiology is deciding on conservative management, podiatry deciding on no surgical intervention due to poor outcome, palliative Care was consulted planning to have hospice company meeting with the family decide about home hospice..      Review of Systems   Unable to perform ROS: Dementia     Objective:     Vital Signs (Most Recent):  Temp: 98.4 °F (36.9 °C) (07/06/25 0839)  Pulse: 71 (07/06/25 1213)  Resp: 18 (07/06/25 0905)  BP: 109/63 (07/06/25 0839)  SpO2: (!) 92 % (07/06/25 0839) Vital Signs (24h Range):  Temp:  [97.6 °F (36.4 °C)-98.4 °F (36.9 °C)] 98.4 °F (36.9 °C)  Pulse:  [] 71  Resp:  [16-20] 18  SpO2:  [92 %-94 %] 92 %  BP: (109-136)/(59-63) 109/63     Weight: 45.8 kg (100 lb 15.5 oz)  Body mass index is 18.47 kg/m².    Intake/Output Summary (Last 24 hours) at 7/6/2025 1258  Last data filed at 7/6/2025 0800  Gross per 24 hour   Intake 0 ml   Output --   Net 0 ml         Physical Exam  Constitutional:       Appearance: She is ill-appearing and toxic-appearing.   HENT:      Head: Normocephalic and atraumatic.      Right Ear: Tympanic membrane normal.      Left Ear: Tympanic membrane normal.      Nose: Nose normal.      Mouth/Throat:      Mouth: Mucous membranes are moist.   Eyes:      Extraocular Movements: Extraocular movements intact.      Pupils: Pupils are equal, round, and reactive to light.   Cardiovascular:      Rate and Rhythm: Rhythm irregular.      Comments: AVF to left upper extremity; + bruit/thrill  Pulmonary:      Effort: Pulmonary effort is normal.   Abdominal:      General: Abdomen is flat.      Tenderness: There is abdominal tenderness.   Musculoskeletal:         General: Normal range of motion.      Cervical back: Normal range of motion and neck supple.   Skin:     Capillary Refill: Capillary refill takes less than 2 seconds.      Comments: Gangrene to left third toe   Neurological:      Mental Status:  She is alert and oriented to person, place, and time.   Psychiatric:         Mood and Affect: Mood normal.               Significant Labs: All pertinent labs within the past 24 hours have been reviewed.  Recent Lab Results         07/06/25  0635   07/05/25  2111        POCT Glucose 99   115               Significant Imaging: I have reviewed all pertinent imaging results/findings within the past 24 hours.    Imaging Results              X-Ray Foot Complete Left (Final result)  Result time 06/30/25 17:22:22      Final result by Shraddha Chanel MD (06/30/25 17:22:22)                   Impression:      Osteopenia.    Partial progressive interval healing of the known 4th toe proximal phalangeal base fracture.    Ulceration at the tip of the 3rd toe distal phalanx.  Questionable slight erosive irregularity 3rd toe distal phalanx representing osteomyelitis otherwise.  Regional soft tissue swelling and soft tissue gas representing infection.  Recommend MRI.      Electronically signed by: Shraddha Chanel  Date:    06/30/2025  Time:    17:22               Narrative:    EXAMINATION:  XR FOOT COMPLETE 3 VIEW LEFT    CLINICAL HISTORY:  .  Gangrene, not elsewhere classified    TECHNIQUE:  AP, lateral and oblique views of the left foot were performed.    COMPARISON:  06/12/2025    FINDINGS:  See below                                       X-Ray Chest AP Portable (Final result)  Result time 06/30/25 15:55:35      Final result by Adam Mullen MD (06/30/25 15:55:35)                   Impression:      As above.      Electronically signed by: Adam Mullen MD  Date:    06/30/2025  Time:    15:55               Narrative:    EXAMINATION:  XR CHEST AP PORTABLE    CLINICAL HISTORY:  Tachycardia, unspecified    TECHNIQUE:  Single frontal view of the chest was performed.    FINDINGS:  There are bilateral perihilar opacities with interstitial prominence suspicious for edema.  There is no focal consolidative change.  There is no  pneumothorax or pleural fluid identified.  There is elevation of the left hemidiaphragm.  The cardiac silhouette appears enlarged.  There is calcification of the aorta and patient is status post sternotomy.  The osseous structures demonstrate degenerative change.                                    CT abdomen with IV contrast    Impression:     1. Distended gallbladder with an associated gallstone.  No pericholecystic inflammatory change.  Recommend correlation for symptoms of right upper quadrant pain and further characterization with a gallbladder ultrasound if clinically warranted.  2. Colonic diverticulosis.  3. Multichamber cardiac enlargement.  4. Pulmonary edema with small bilateral dependent pleural effusions.  5. Atrophic native kidneys.  Partially visualized left arm AV fistula.  6. Severe aortic atherosclerosis with suspected high-grade stenosis at the origin of the celiac, superior mesenteric, bilateral renal and inferior mesenteric arteries.  7. Occluded bilateral superficial femoral artery stents.  8. Age indeterminate wedge compression fracture of the T11 vertebral body with mild associated height loss.  9. Additional findings as detailed in the body of the report.

## 2025-07-06 NOTE — ASSESSMENT & PLAN NOTE
Patient has paroxysmal (<7 days) atrial fibrillation. Patient is currently in atrial fibrillation. [id number] Score: 5. The patients heart rate in the last 24 hours is as follows:  Pulse  Min: 70  Max: 101     Antiarrhythmics  metoprolol tartrate (LOPRESSOR) tablet 25 mg, 2 times daily, Oral    Anticoagulants  heparin (porcine) injection 5,000 Units, Every 8 hours, Subcutaneous    Plan  - Replete lytes with a goal of K>4, Mg >2  - Patient is anticoagulated, see medications listed above.  - Patient's afib is currently controlled  - Plan to start heparin

## 2025-07-06 NOTE — PROGRESS NOTES
[facility] Medicine  Progress Note    Patient Name: Erin Clark  MRN: [id number]  Patient Class: IP- Inpatient   Admission Date: 6/30/2025  Length of Stay: 6 days  Attending Physician: Chung Kennedy MD  Primary Care Provider: Bayron Golden MD        Subjective     Principal Problem:Acute osteomyelitis of toe of left foot        HPI:  Erin Clark is a chronically-ill 89-year-old female that presented to the ED at [facility] this afternoon for tachycardia. The patient presented to the ED from a local dialysis center today after the completion of her HD treatment. Son served as historian; interpretation services provided by ([place] [id number]). Apparently, the patient experienced tachycardia throughout the entire HD session.   The nursing staff advised the patient's son of the issues and he transported the patient to the ED for further evaluation.     Overview/Hospital Course:  No notes on file    Interval History:  Patient was seen in the morning looked like at her baseline, cardiology is deciding on conservative management, podiatry deciding on no surgical intervention due to poor outcome, palliative Care was consulted planning to have hospice company meeting with the family decide about home hospice..      Review of Systems   Unable to perform ROS: Dementia     Objective:     Vital Signs (Most Recent):  Temp: 98.4 °F (36.9 °C) (07/06/25 0839)  Pulse: 71 (07/06/25 1213)  Resp: 18 (07/06/25 0905)  BP: 109/63 (07/06/25 0839)  SpO2: (!) 92 % (07/06/25 0839) Vital Signs (24h Range):  Temp:  [97.6 °F (36.4 °C)-98.4 °F (36.9 °C)] 98.4 °F (36.9 °C)  Pulse:  [] 71  Resp:  [16-20] 18  SpO2:  [92 %-94 %] 92 %  BP: (109-136)/(59-63) 109/63     Weight: 45.8 kg (100 lb 15.5 oz)  Body mass index is 18.47 kg/m².    Intake/Output Summary (Last 24 hours) at 7/6/2025 1258  Last data filed at 7/6/2025 0800  Gross per 24 hour   Intake 0 ml   Output --   Net 0 ml         Physical Exam  Constitutional:        Appearance: She is ill-appearing and toxic-appearing.   HENT:      Head: Normocephalic and atraumatic.      Right Ear: Tympanic membrane normal.      Left Ear: Tympanic membrane normal.      Nose: Nose normal.      Mouth/Throat:      Mouth: Mucous membranes are moist.   Eyes:      Extraocular Movements: Extraocular movements intact.      Pupils: Pupils are equal, round, and reactive to light.   Cardiovascular:      Rate and Rhythm: Rhythm irregular.      Comments: AVF to left upper extremity; + bruit/thrill  Pulmonary:      Effort: Pulmonary effort is normal.   Abdominal:      General: Abdomen is flat.      Tenderness: There is abdominal tenderness.   Musculoskeletal:         General: Normal range of motion.      Cervical back: Normal range of motion and neck supple.   Skin:     Capillary Refill: Capillary refill takes less than 2 seconds.      Comments: Gangrene to left third toe   Neurological:      Mental Status: She is alert and oriented to person, place, and time.   Psychiatric:         Mood and Affect: Mood normal.               Significant Labs: All pertinent labs within the past 24 hours have been reviewed.  Recent Lab Results         07/06/25  0635   07/05/25  2111        POCT Glucose 99   115               Significant Imaging: I have reviewed all pertinent imaging results/findings within the past 24 hours.    Imaging Results              X-Ray Foot Complete Left (Final result)  Result time 06/30/25 17:22:22      Final result by Shraddha Chanel MD (06/30/25 17:22:22)                   Impression:      Osteopenia.    Partial progressive interval healing of the known 4th toe proximal phalangeal base fracture.    Ulceration at the tip of the 3rd toe distal phalanx.  Questionable slight erosive irregularity 3rd toe distal phalanx representing osteomyelitis otherwise.  Regional soft tissue swelling and soft tissue gas representing infection.  Recommend MRI.      Electronically signed by: Shraddha  Yanique  Date:    06/30/2025  Time:    17:22               Narrative:    EXAMINATION:  XR FOOT COMPLETE 3 VIEW LEFT    CLINICAL HISTORY:  .  Gangrene, not elsewhere classified    TECHNIQUE:  AP, lateral and oblique views of the left foot were performed.    COMPARISON:  06/12/2025    FINDINGS:  See below                                       X-Ray Chest AP Portable (Final result)  Result time 06/30/25 15:55:35      Final result by Adam Mullen MD (06/30/25 15:55:35)                   Impression:      As above.      Electronically signed by: Adam Mullen MD  Date:    06/30/2025  Time:    15:55               Narrative:    EXAMINATION:  XR CHEST AP PORTABLE    CLINICAL HISTORY:  Tachycardia, unspecified    TECHNIQUE:  Single frontal view of the chest was performed.    FINDINGS:  There are bilateral perihilar opacities with interstitial prominence suspicious for edema.  There is no focal consolidative change.  There is no pneumothorax or pleural fluid identified.  There is elevation of the left hemidiaphragm.  The cardiac silhouette appears enlarged.  There is calcification of the aorta and patient is status post sternotomy.  The osseous structures demonstrate degenerative change.                                    CT abdomen with IV contrast    Impression:     1. Distended gallbladder with an associated gallstone.  No pericholecystic inflammatory change.  Recommend correlation for symptoms of right upper quadrant pain and further characterization with a gallbladder ultrasound if clinically warranted.  2. Colonic diverticulosis.  3. Multichamber cardiac enlargement.  4. Pulmonary edema with small bilateral dependent pleural effusions.  5. Atrophic native kidneys.  Partially visualized left arm AV fistula.  6. Severe aortic atherosclerosis with suspected high-grade stenosis at the origin of the celiac, superior mesenteric, bilateral renal and inferior mesenteric arteries.  7. Occluded bilateral superficial femoral  "artery stents.  8. Age indeterminate wedge compression fracture of the T11 vertebral body with mild associated height loss.  9. Additional findings as detailed in the body of the report.            Assessment & Plan  Sepsis  This patient does have evidence of infective focus  My overall impression is sepsis with Encephalopathy.  Source: Skin and Soft Tissue Infection: gangrene  Antibiotics given-   Antibiotics (72h ago, onward)      Start     Stop Route Frequency Ordered    07/01/25 2115  mupirocin 2 % ointment         07/06/25 2059 Nasl 2 times daily 07/01/25 2011 07/01/25 1400  piperacillin-tazobactam (ZOSYN) 4.5 g in D5W 100 mL IVPB (MB+)         -- IV Every 12 hours (non-standard times) 07/01/25 1335          Latest lactate reviewed-  No results for input(s): "LACTATE", "POCLAC" in the last 72 hours.    Organ dysfunction indicated by Encephalopathy    Fluid challenge Contraindicated- Fluid bolus is contraindicated in this patient due to End Stage Renal Disease     Post- resuscitation assessment Yes - I attest a sepsis perfusion exam was performed within 6 hours of sepsis, severe sepsis, or septic shock presentation, following fluid resuscitation.      Will Not start Pressors- Levophed for MAP of 65  Source control achieved by: Antibiotic therapy  End-stage renal disease on hemodialysis  Creatine stable for now. BMP reviewed- noted Estimated Creatinine Clearance: 6.5 mL/min (A) (based on SCr of 4.3 mg/dL (H)). according to latest data. Based on current GFR, CKD stage is end stage.  Monitor UOP and serial BMP and adjust therapy as needed. Renally dose meds. Avoid nephrotoxic medications and procedures.  Nephrology is consulted to continue on her dialysis  Essential hypertension  Patient's blood pressure range in the last 24 hours was: BP  Min: 109/63  Max: 136/59.The patient's inpatient anti-hypertensive regimen is listed below:  Current Antihypertensives  isosorbide mononitrate 24 hr tablet 30 mg, Daily, " "Oral  furosemide tablet 40 mg, Daily, Oral  metoprolol tartrate (LOPRESSOR) tablet 25 mg, 2 times daily, Oral    Plan  - BP is controlled, no changes needed to their regimen    Chronic diastolic congestive heart failure  Patient has Diastolic (HFpEF) heart failure that is Chronic. On presentation their CHF was well compensated. Most recent BNP and echo results are listed below.  No results for input(s): "BNP" in the last 72 hours.    Latest ECHO  Results for orders placed during the hospital encounter of 06/12/25    Echo    Interpretation Summary    Left Ventricle: The left ventricle is normal in size. Mildly increased wall thickness. Normal wall motion. There is normal systolic function. Quantitated ejection fraction is 58%. Diastolic function cannot be reliably determined in the presence of mitral annular calcification.    Right Ventricle: The right ventricle is normal in size measuring 3.0 cm. Systolic function is normal.    Left Atrium: The left atrium is mildly dilated measuring 38 mL/m2.    Aortic Valve: The aortic valve is a trileaflet valve. There is aortic valve sclerosis.    Mitral Valve: There is mild stenosis. The mean pressure gradient across the mitral valve is 5 mmHg at a heart rate of 87 bpm. There is mild regurgitation.    Tricuspid Valve: There is mild regurgitation.    Pulmonary Artery: There is moderate pulmonary hypertension. The estimated pulmonary artery systolic pressure is 52 mmHg.    Current Heart Failure Medications  furosemide tablet 40 mg, Daily, Oral    Plan  - Monitor strict I&Os and daily weights.    - Place on telemetry  - Low sodium diet  - Place on fluid restriction of 1 L.   - Cardiology has been consulted  - The patient's volume status is stable but not at their baseline as indicated by              New onset a-fib  Patient has paroxysmal (<7 days) atrial fibrillation. Patient is currently in atrial fibrillation. [id number] Score: 5. The patients heart rate in the last 24 hours " is as follows:  Pulse  Min: 70  Max: 101     Antiarrhythmics  metoprolol tartrate (LOPRESSOR) tablet 25 mg, 2 times daily, Oral    Anticoagulants  heparin (porcine) injection 5,000 Units, Every 8 hours, Subcutaneous    Plan  - Replete lytes with a goal of K>4, Mg >2  - Patient is anticoagulated, see medications listed above.  - Patient's afib is currently controlled  - Plan to start heparin       Type 2 diabetes mellitus with diabetic nephropathy, without long-term current use of insulin  -Start accuchecks with Novolog correction    Hypertension  Patient's blood pressure range in the last 24 hours was: BP  Min: 109/63  Max: 136/59.The patient's inpatient anti-hypertensive regimen is listed below:  Current Antihypertensives  isosorbide mononitrate 24 hr tablet 30 mg, Daily, Oral  furosemide tablet 40 mg, Daily, Oral  metoprolol tartrate (LOPRESSOR) tablet 25 mg, 2 times daily, Oral    Plan  - BP is controlled, no changes needed to their regimen    Frailty  Chronic/patient has a advanced dementia   PT OT ordered    Senile dementia  Patient with dementia 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 Dementia is moderate. The patient does have signs of behavioral disturbance. Home dementia medications are Held or Continued: continued.. Continue non-pharmacologic interventions to prevent delirium (No VS between 11PM-5AM, activity during day, opening blinds, providing glasses/hearing aids, and up in chair during daytime). Will avoid narcotics and benzos unless absolutely necessary. PRN anti-psychotics are not prescribed to avoid self harm behaviors.  Frequent reorientation, lowering the bed levels, lifting a bed rails, have video monitor camera in place, continuous redirection  Palliative care team was consulted family is agreeable meet with home hospice companies to decide  Acute osteomyelitis of toe of left foot  Detected by x-ray  Podiatry consulted  ID consulted  Arterial Doppler of lower limbs ordered  Currently on  "board spectrum antibiotics  Cultures are so far negative  7/3 podiatry planning on no intervention  -cardiology is planning on conservative management  -ID is recommending broad-spectrum antibiotics  PVD (peripheral vascular disease)  Patient has documented history of peripheral vascular disease  Nonhealing left 3rd toe gangrene  Cardiology is on board  Doppler of lower limb showed severe DVT  With severe aortic atherosclerosis and suspected high-grade stenosis at the origin of the celiac, superior mesenteric, bilateral renal and inferior mesenteric arteries, with occlusion of bilateral superficial femoral artery stents.  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  On statin and Plavix ,cardiology is on board  Ovarian mass, left    Detected by imaging no intra-abdominal lymphadenopathy no fat stranding were detected  Patient will probably need follow-up as outpatient  Celiac artery stenosis    Detected by imaging CT abdomen with IV contrast    And Plavix and aspirin cardiology is on board  Hypokalemia  Patient's most recent potassium results are listed below.   No results for input(s): "K" in the last 72 hours.    Plan  - Replete potassium per protocol  - Monitor potassium Daily  - Patient's hypokalemia is " monitored closely- to be monitored  Gangrene of toe of left foot      VTE Risk Mitigation (From admission, onward)           Ordered     heparin (porcine) injection 5,000 Units  Every 8 hours         07/01/25 1335     Place sequential compression device  Until discontinued         06/30/25 1830                    Discharge Planning   KATIE: 7/7/2025     Code Status: Full Code   Medical Readiness for Discharge Date:   Discharge Plan A: Home with family   Discharge Delays: None known at this time                    Chung Dominique MD  Department of Hospital Medicine   [place] [facility]

## 2025-07-06 NOTE — ASSESSMENT & PLAN NOTE
"Patient's most recent potassium results are listed below.   No results for input(s): "K" in the last 72 hours.    Plan  - Replete potassium per protocol  - Monitor potassium Daily  - Patient's hypokalemia is monitored closely- to be monitored  "

## 2025-07-06 NOTE — ASSESSMENT & PLAN NOTE
Patient's blood pressure range in the last 24 hours was: BP  Min: 109/63  Max: 136/59.The patient's inpatient anti-hypertensive regimen is listed below:  Current Antihypertensives  isosorbide mononitrate 24 hr tablet 30 mg, Daily, Oral  furosemide tablet 40 mg, Daily, Oral  metoprolol tartrate (LOPRESSOR) tablet 25 mg, 2 times daily, Oral    Plan  - BP is controlled, no changes needed to their regimen

## 2025-07-06 NOTE — PLAN OF CARE
Problem: Infection  Goal: Absence of Infection Signs and Symptoms  Outcome: Ongoing     Problem: Adult Inpatient Plan of Care  Goal: Plan of Care Review  Outcome: Ongoing  Goal: Patient-Specific Goal (Individualized)  Outcome: Ongoing  Goal: Absence of Hospital-Acquired Illness or Injury  Outcome: Ongoing  Goal: Optimal Comfort and Wellbeing  Outcome: Ongoing  Goal: Readiness for Transition of Care  Outcome: Ongoing     Problem: Diabetes Comorbidity  Goal: Blood Glucose Level Within Targeted Range  Outcome: Ongoing

## 2025-07-06 NOTE — PROGRESS NOTES
Progress Note  Nephrology      Consult Requested By: Chung Kennedy MD      SUBJECTIVE:     Overnight events  Patient is a 89 y.o. female     Problem List[1]  Past Medical History:   Diagnosis Date    CHF (congestive heart failure)     Colon polyps 06/03/2013    Coronary artery disease     Diabetes mellitus     Encounter for blood transfusion     ESRD (end stage renal disease) 03/2014    dialysis M-F    GERD (gastroesophageal reflux disease)     High cholesterol     Hypertension               OBJECTIVE:     Vitals:    07/06/25 0632 07/06/25 0839 07/06/25 0905 07/06/25 1213   BP: 129/61 109/63     BP Location: Right arm Right arm     Patient Position: Lying Lying     Pulse: 80 82  71   Resp: 20 18 18    Temp: 98.4 °F (36.9 °C) 98.4 °F (36.9 °C)     TempSrc: Oral Oral     SpO2: (!) 94% (!) 92%     Weight:       Height:           Temp: 98.4 °F (36.9 °C) (07/06/25 0839)  Pulse: 71 (07/06/25 1213)  Resp: 18 (07/06/25 0905)  BP: 109/63 (07/06/25 0839)  SpO2: (!) 92 % (07/06/25 0839)    Date 07/06/25 0700 - 07/07/25 0659   Shift 7868-4009 6859-6706 3481-4969 24 Hour Total   INTAKE   P.O. 0   0   Shift Total(mL/kg) 0(0)   0(0)   OUTPUT   Shift Total(mL/kg)       Weight (kg) 45.8 45.8 45.8 45.8             Medications:   clopidogreL  75 mg Oral Daily    epoetin roberto-epbx  10,000 Units Subcutaneous Every Mon, Wed, Fri    furosemide  40 mg Oral Daily    heparin (porcine)  5,000 Units Subcutaneous Q8H    isosorbide mononitrate  30 mg Oral Daily    metoprolol tartrate  25 mg Oral BID    mupirocin   Nasal BID    piperacillin-tazobactam (Zosyn) IV (PEDS and ADULTS) (extended infusion is not appropriate)  4.5 g Intravenous Q12H    polyethylene glycol  17 g Oral Daily    vitamin renal formula (B-complex-vitamin c-folic acid)  1 capsule Oral Daily     Physical Exam:  General appearance:  Lungs: RR 18  Pulse oximeter 92 % O2  Heart: pulse 71  Abdomen: soft  Extremities: no edema  Skin: dry  Laboratory:  ABG  Labs reviewed  No  "results found for this or any previous visit (from the past 2 weeks).  Recent Results (from the past 2 weeks)   CBC with Differential    Collection Time: 07/04/25  1:37 PM   Result Value Ref Range    WBC 18.56 (H) 3.90 - 12.70 K/uL    HGB 8.9 (L) 12.0 - 16.0 gm/dL    HCT 26.2 (L) 37.0 - 48.5 %    Platelet Count 301 150 - 450 K/uL   CBC with Differential    Collection Time: 07/03/25  5:22 AM   Result Value Ref Range    WBC 16.03 (H) 3.90 - 12.70 K/uL    HGB 7.6 (L) 12.0 - 16.0 gm/dL    HCT 23.4 (L) 37.0 - 48.5 %    Platelet Count 262 150 - 450 K/uL   CBC with Differential    Collection Time: 07/02/25 12:31 PM   Result Value Ref Range    WBC 18.80 (H) 3.90 - 12.70 K/uL    HGB 9.3 (L) 12.0 - 16.0 gm/dL    HCT 27.5 (L) 37.0 - 48.5 %    Platelet Count 325 150 - 450 K/uL     Urinalysis  No results for input(s): "COLORU", "CLARITYU", "SPECGRAV", "PHUR", "PROTEINUA", "GLUCOSEU", "BILIRUBINCON", "BLOODU", "WBCU", "RBCU", "BACTERIA", "MUCUS", "NITRITE", "LEUKOCYTESUR", "UROBILINOGEN", "HYALINECASTS" in the last 24 hours.    Diagnostic Results:  X-Ray: Reviewed  US: Reviewed  Echo: Reviewed  ACCESS    ASSESSMENT/PLAN:     ESRD  Metabolic bone disease  Anemia  Poor nutrition  Renal diet   Supplements  Dialysis in am                  [1]   Patient Active Problem List  Diagnosis    Type 2 diabetes mellitus with chronic kidney disease on chronic dialysis, with long-term current use of insulin    Peripheral arterial occlusive disease    Hypertension    CAD (coronary artery disease)    Anemia    S/P CABG x 1    SOB (shortness of breath)    End-stage renal disease on hemodialysis    Hyperlipidemia LDL goal <70    Essential hypertension    Aneurysm of arteriovenous dialysis fistula    Sleep apnea    Malfunction of device    Frailty    Secondary hyperparathyroidism of renal origin    Dependence on renal dialysis    Senile dementia    Chronic diastolic congestive heart failure    Closed nondisplaced fracture of proximal phalanx of left " great toe    Gangrene of toe of left foot    New onset a-fib    Sepsis    Type 2 diabetes mellitus with diabetic nephropathy, without long-term current use of insulin    Acute osteomyelitis of toe of left foot    PVD (peripheral vascular disease)    Wedge compression fracture of eleventh thoracic vertebra    Diverticulosis    Aortic atherosclerosis    Ovarian mass, left    Celiac artery stenosis    Hypokalemia

## 2025-07-07 VITALS
SYSTOLIC BLOOD PRESSURE: 121 MMHG | HEART RATE: 111 BPM | OXYGEN SATURATION: 94 % | BODY MASS INDEX: 18.58 KG/M2 | RESPIRATION RATE: 16 BRPM | HEIGHT: 62 IN | TEMPERATURE: 97 F | WEIGHT: 101 LBS | DIASTOLIC BLOOD PRESSURE: 97 MMHG

## 2025-07-07 LAB
BACTERIA SPEC ANAEROBE CULT: ABNORMAL
FUNGUS SPEC CULT: NORMAL
POCT GLUCOSE: 101 MG/DL (ref 70–110)

## 2025-07-07 PROCEDURE — 63600175 PHARM REV CODE 636 W HCPCS

## 2025-07-07 PROCEDURE — 25000003 PHARM REV CODE 250

## 2025-07-07 PROCEDURE — 25000003 PHARM REV CODE 250: Performed by: NURSE PRACTITIONER

## 2025-07-07 PROCEDURE — 80100016 HC MAINTENANCE HEMODIALYSIS

## 2025-07-07 PROCEDURE — 63600175 PHARM REV CODE 636 W HCPCS: Mod: JZ,TB | Performed by: NURSE PRACTITIONER

## 2025-07-07 PROCEDURE — 25000003 PHARM REV CODE 250: Performed by: INTERNAL MEDICINE

## 2025-07-07 RX ORDER — TALC
6 POWDER (GRAM) TOPICAL NIGHTLY PRN
Qty: 30 TABLET | Refills: 0 | Status: SHIPPED | OUTPATIENT
Start: 2025-07-07 | End: 2025-07-18

## 2025-07-07 RX ORDER — CIPROFLOXACIN 500 MG/1
500 TABLET, FILM COATED ORAL DAILY
Status: DISCONTINUED | OUTPATIENT
Start: 2025-07-07 | End: 2025-07-07 | Stop reason: HOSPADM

## 2025-07-07 RX ORDER — POLYETHYLENE GLYCOL 3350 17 G/17G
17 POWDER, FOR SOLUTION ORAL DAILY PRN
Qty: 238 G | Refills: 0 | Status: SHIPPED | OUTPATIENT
Start: 2025-07-07 | End: 2025-07-18

## 2025-07-07 RX ORDER — CIPROFLOXACIN 500 MG/1
500 TABLET, FILM COATED ORAL EVERY OTHER DAY
Status: DISCONTINUED | OUTPATIENT
Start: 2025-07-08 | End: 2025-07-07

## 2025-07-07 RX ORDER — CIPROFLOXACIN 500 MG/1
500 TABLET, FILM COATED ORAL EVERY OTHER DAY
Qty: 15 TABLET | Refills: 1 | Status: SHIPPED | OUTPATIENT
Start: 2025-07-08 | End: 2025-07-18

## 2025-07-07 RX ADMIN — HYDROMORPHONE HYDROCHLORIDE 1 MG: 1 INJECTION, SOLUTION INTRAMUSCULAR; INTRAVENOUS; SUBCUTANEOUS at 12:07

## 2025-07-07 RX ADMIN — HEPARIN SODIUM 5000 UNITS: 5000 INJECTION INTRAVENOUS; SUBCUTANEOUS at 05:07

## 2025-07-07 RX ADMIN — Medication 1 CAPSULE: at 09:07

## 2025-07-07 RX ADMIN — CLOPIDOGREL 75 MG: 75 TABLET ORAL at 09:07

## 2025-07-07 RX ADMIN — LACTOBACILLUS TAB 1 TABLET: TAB at 09:07

## 2025-07-07 RX ADMIN — ISOSORBIDE MONONITRATE 30 MG: 30 TABLET, EXTENDED RELEASE ORAL at 09:07

## 2025-07-07 RX ADMIN — EPOETIN ALFA-EPBX 10000 UNITS: 10000 INJECTION, SOLUTION INTRAVENOUS; SUBCUTANEOUS at 10:07

## 2025-07-07 RX ADMIN — HYDROMORPHONE HYDROCHLORIDE 1 MG: 1 INJECTION, SOLUTION INTRAMUSCULAR; INTRAVENOUS; SUBCUTANEOUS at 02:07

## 2025-07-07 RX ADMIN — HEPARIN SODIUM 5000 UNITS: 5000 INJECTION INTRAVENOUS; SUBCUTANEOUS at 02:07

## 2025-07-07 RX ADMIN — CIPROFLOXACIN 500 MG: 500 TABLET ORAL at 03:07

## 2025-07-07 RX ADMIN — PIPERACILLIN SODIUM AND TAZOBACTAM SODIUM 4.5 G: 4; .5 INJECTION, POWDER, LYOPHILIZED, FOR SOLUTION INTRAVENOUS at 03:07

## 2025-07-07 NOTE — ASSESSMENT & PLAN NOTE
Patient's blood pressure range in the last 24 hours was: BP  Min: 95/43  Max: 121/59.The patient's inpatient anti-hypertensive regimen is listed below:  Current Antihypertensives  isosorbide mononitrate 24 hr tablet 30 mg, Daily, Oral  furosemide tablet 40 mg, Daily, Oral  metoprolol tartrate (LOPRESSOR) tablet 25 mg, 2 times daily, Oral    Plan  - BP is controlled, no changes needed to their regimen

## 2025-07-07 NOTE — DISCHARGE SUMMARY
"[facility] Medicine  Discharge Summary      Patient Name: Erin Clark  MRN: [id number]  MITCHELL: [id number]  Patient Class: IP- Inpatient  Admission Date: 6/30/2025  Hospital Length of Stay: 7 days  Discharge Date and Time: 07/07/2025 1:06 PM  Attending Physician: Chung Kennedy MD   Discharging Provider: Chung Dominique MD  Primary Care Provider: Bayron Golden MD    Primary Care Team: Networked reference to record PCT     HPI:   Erin Clark is a chronically-ill 89-year-old female that presented to the ED at [facility] this afternoon for tachycardia. The patient presented to the ED from a local dialysis center today after the completion of her HD treatment. Son served as historian; interpretation services provided by ([place] [id number]). Apparently, the patient experienced tachycardia throughout the entire HD session.   The nursing staff advised the patient's son of the issues and he transported the patient to the ED for further evaluation.     * No surgery found *      Hospital Course:   Patient's saw the admitted for new onset AFib RVR, with positive blood cultures patient was septic initially on admission patient was found to have osteomyelitis of the left 3rd toe, podiatry and Cardiology were both consulted, given her severe peripheral vascular disease neurology recommended medical management for her PVD, podiatry also recommended continuing antibiotics per ID recommendations, patient to finish total of 6 weeks of antibiotics, family opted into hospice placement and discharge.     Goals of Care Treatment Preferences:  Code Status: Full Code    Health care agent: Tor Clark (Pinto) (spouse)  Health care agent number: [phone]                    Consults:   Consults (From admission, onward)          Status Ordering Provider     Inpatient consult to Palliative Care  Once        Provider:  (Not yet assigned)    Completed CHUNG KENNEDY     Inpatient consult to Nephrology-Kidney " "Consultants (Annabelle Angel, Sesar)  Once        Provider:  (Not yet assigned)    Completed AL ALLAWI, IVY     Inpatient consult to Infectious Diseases  Once        Provider:  (Not yet assigned)    Completed AL ALLAWI, IVY     Inpatient consult to Podiatry  Once        Provider:  (Not yet assigned)    Completed AL ALLAWI, IVY     Inpatient consult to Cardiology-[facility]  Once        Provider:  (Not yet assigned)    Completed AL ALLAWI, IVY     Inpatient consult to Social Work  Once        Provider:  (Not yet assigned)    Completed KIRBY PEACE            Assessment & Plan  Sepsis  This patient does have evidence of infective focus  My overall impression is sepsis with Encephalopathy.  Source: Skin and Soft Tissue Infection: gangrene  Antibiotics given-   Antibiotics (72h ago, onward)      Start     Stop Route Frequency Ordered    07/07/25 1600  ciprofloxacin HCl tablet 500 mg         10/27/25 1559 Oral Daily 07/07/25 0812    07/01/25 2115  mupirocin 2 % ointment         07/06/25 2059 Nasl 2 times daily 07/01/25 2011          Latest lactate reviewed-  No results for input(s): "LACTATE", "POCLAC" in the last 72 hours.    Organ dysfunction indicated by Encephalopathy    Fluid challenge Contraindicated- Fluid bolus is contraindicated in this patient due to End Stage Renal Disease     Post- resuscitation assessment Yes - I attest a sepsis perfusion exam was performed within 6 hours of sepsis, severe sepsis, or septic shock presentation, following fluid resuscitation.      Will Not start Pressors- Levophed for MAP of 65  Source control achieved by: Antibiotic therapy  End-stage renal disease on hemodialysis  Creatine stable for now. BMP reviewed- noted Estimated Creatinine Clearance: 6.5 mL/min (A) (based on SCr of 4.3 mg/dL (H)). according to latest data. Based on current GFR, CKD stage is end stage.  Monitor UOP and serial BMP and adjust therapy as needed. Renally dose meds. Avoid nephrotoxic medications " "and procedures.  Nephrology is consulted to continue on her dialysis  Essential hypertension  Patient's blood pressure range in the last 24 hours was: BP  Min: 95/43  Max: 121/59.The patient's inpatient anti-hypertensive regimen is listed below:  Current Antihypertensives  isosorbide mononitrate 24 hr tablet 30 mg, Daily, Oral  furosemide tablet 40 mg, Daily, Oral  metoprolol tartrate (LOPRESSOR) tablet 25 mg, 2 times daily, Oral    Plan  - BP is controlled, no changes needed to their regimen    Chronic diastolic congestive heart failure  Patient has Diastolic (HFpEF) heart failure that is Chronic. On presentation their CHF was well compensated. Most recent BNP and echo results are listed below.  No results for input(s): "BNP" in the last 72 hours.    Latest ECHO  Results for orders placed during the hospital encounter of 06/12/25    Echo    Interpretation Summary    Left Ventricle: The left ventricle is normal in size. Mildly increased wall thickness. Normal wall motion. There is normal systolic function. Quantitated ejection fraction is 58%. Diastolic function cannot be reliably determined in the presence of mitral annular calcification.    Right Ventricle: The right ventricle is normal in size measuring 3.0 cm. Systolic function is normal.    Left Atrium: The left atrium is mildly dilated measuring 38 mL/m2.    Aortic Valve: The aortic valve is a trileaflet valve. There is aortic valve sclerosis.    Mitral Valve: There is mild stenosis. The mean pressure gradient across the mitral valve is 5 mmHg at a heart rate of 87 bpm. There is mild regurgitation.    Tricuspid Valve: There is mild regurgitation.    Pulmonary Artery: There is moderate pulmonary hypertension. The estimated pulmonary artery systolic pressure is 52 mmHg.    Current Heart Failure Medications  furosemide tablet 40 mg, Daily, Oral    Plan  - Monitor strict I&Os and daily weights.    - Place on telemetry  - Low sodium diet  - Place on fluid " restriction of 1 L.   - Cardiology has been consulted  - The patient's volume status is stable but not at their baseline as indicated by              New onset a-fib  Patient has paroxysmal (<7 days) atrial fibrillation. Patient is currently in atrial fibrillation. [id number] Score: 5. The patients heart rate in the last 24 hours is as follows:  Pulse  Min: 66  Max: 127     Antiarrhythmics  metoprolol tartrate (LOPRESSOR) tablet 25 mg, 2 times daily, Oral    Anticoagulants  heparin (porcine) injection 5,000 Units, Every 8 hours, Subcutaneous    Plan  - Replete lytes with a goal of K>4, Mg >2  - Patient is anticoagulated, see medications listed above.  - Patient's afib is currently controlled  - Plan to start heparin       Type 2 diabetes mellitus with diabetic nephropathy, without long-term current use of insulin  -Start accuchecks with Novolog correction    Hypertension  Patient's blood pressure range in the last 24 hours was: BP  Min: 95/43  Max: 121/59.The patient's inpatient anti-hypertensive regimen is listed below:  Current Antihypertensives  isosorbide mononitrate 24 hr tablet 30 mg, Daily, Oral  furosemide tablet 40 mg, Daily, Oral  metoprolol tartrate (LOPRESSOR) tablet 25 mg, 2 times daily, Oral    Plan  - BP is controlled, no changes needed to their regimen    Frailty  Chronic/patient has a advanced dementia   PT OT ordered    Senile dementia  Patient with dementia 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 Dementia is moderate. The patient does have signs of behavioral disturbance. Home dementia medications are Held or Continued: continued.. Continue non-pharmacologic interventions to prevent delirium (No VS between 11PM-5AM, activity during day, opening blinds, providing glasses/hearing aids, and up in chair during daytime). Will avoid narcotics and benzos unless absolutely necessary. PRN anti-psychotics are not prescribed to avoid self harm behaviors.  Frequent reorientation, lowering the bed  levels, lifting a bed rails, have video monitor camera in place, continuous redirection  Palliative care team was consulted family is agreeable meet with home hospice companies to decide  Acute osteomyelitis of toe of left foot  Detected by x-ray  Podiatry consulted  ID consulted  Arterial Doppler of lower limbs ordered  Currently on board spectrum antibiotics  Cultures are so far negative  7/3 podiatry planning on no intervention  -cardiology is planning on conservative management  -ID is recommending broad-spectrum antibiotics  PVD (peripheral vascular disease)  Patient has documented history of peripheral vascular disease  Nonhealing left 3rd toe gangrene  Cardiology is on board  Doppler of lower limb showed severe DVT  With severe aortic atherosclerosis and suspected high-grade stenosis at the origin of the celiac, superior mesenteric, bilateral renal and inferior mesenteric arteries, with occlusion of bilateral superficial femoral artery stents.  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  On statin and Plavix ,cardiology is on board  Ovarian mass, left    Detected by imaging no intra-abdominal lymphadenopathy no fat stranding were detected  Patient will probably need follow-up as outpatient  Celiac  "artery stenosis    Detected by imaging CT abdomen with IV contrast    And Plavix and aspirin cardiology is on board  Hypokalemia  Patient's most recent potassium results are listed below.   No results for input(s): "K" in the last 72 hours.    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] 07/02/2025 Yes     Chronic    Hypokalemia [E87.6] 07/02/2025 Yes    PVD (peripheral vascular disease) [I73.9] 07/01/2025 Yes     Chronic    Sepsis [A41.9] 06/30/2025 Yes    Type 2 diabetes mellitus with diabetic nephropathy, without long-term current use of insulin [E11.21] 06/30/2025 Yes    New onset a-fib [I48.91] 06/13/2025 Yes    Gangrene of toe of left foot [I96] 06/13/2025 Yes    Chronic diastolic congestive heart failure [I50.32] 05/06/2025 Yes    Senile dementia [F03.90] 05/06/2025 Yes    Frailty [R54] 06/08/2021 Yes    Essential hypertension [I10] 05/05/2020 Yes    End-stage renal disease on hemodialysis [N18.6, Z99.2] 12/10/2014 Not Applicable    Hypertension [I10] 02/10/2014 Yes      Problems Resolved During this Admission:       Discharged Condition: poor    Disposition: Hospice/Medical Facility    Follow Up:   Follow-up Information       [facility] Follow up.    Specialties: Hospice, Home Hospice, Inpatient Hospice  [facility]: [facility]  Contact information:  [address]  [place] [id number] [phone]     "                     Patient Instructions:      Ambulatory referral/consult to Outpatient Case Management   Referral Priority: Routine Referral Type: Consultation   Referral Reason: Specialty Services Required   Number of Visits Requested: 1       Significant Diagnostic Studies: Labs: All labs within the past 24 hours have been reviewed    Pending Diagnostic Studies:       None           Medications:  Reconciled Home Medications:      Medication List        START taking these medications      ciprofloxacin HCl 500 MG tablet  Commonly known as: CIPRO  Take 1 tablet (500 mg total) by mouth every other day.  Start taking on: July 8, 2025     melatonin 3 mg tablet  Commonly known as: MELATIN  [place] 2 tabletas (6 mg en total) por vía oral cada noche según sea necesario para el insomnio.  (Take 2 tablets (6 mg total) by mouth nightly as needed for Insomnia.)     polyethylene glycol 17 gram/dose powder  Commonly known as: GLYCOLAX  Take 17 g by mouth daily as needed (Estrenimiento (constipation)).            CONTINUE taking these medications      atorvastatin 40 MG tablet  Commonly known as: LIPITOR  TAKE 1 TABLET BY MOUTH ONCE DAILY IN THE EVENING     clopidogreL 75 mg tablet  Commonly known as: PLAVIX  [place] willow tableta (75 mg en total) por vía oral diariamente.  (Take 1 tablet (75 mg total) by mouth once daily.)     furosemide 40 MG tablet  Commonly known as: LASIX  Take 1 tablet (40 mg total) by mouth once daily.     hydrALAZINE 25 MG tablet  Commonly known as: APRESOLINE  Take 25 mg by mouth every 12 (twelve) hours.     isosorbide mononitrate 30 MG 24 hr tablet  Commonly known as: IMDUR  Take 1 tablet (30 mg total) by mouth once daily.     metoprolol succinate 25 MG 24 hr tablet  Commonly known as: TOPROL-XL  Take 1 tablet by mouth once daily     nitroGLYCERIN 0.4 MG SL tablet  Commonly known as: NITROSTAT  Place 0.4 mg under the tongue every 5 (five) minutes as needed for Chest pain.     pantoprazole 40 MG  tablet  Commonly known as: PROTONIX  Take 40 mg by mouth before breakfast.     povidone-iodine 10 % external solution  Commonly known as: BETADINE  Apply topically as needed for Wound Care.     TRIPHROCAPS 1 mg Cap  Generic drug: vitamin renal formula (B-complex-vitamin c-folic acid)  Take 1 capsule by mouth once daily.            STOP taking these medications      aspirin 81 MG EC tablet  Commonly known as: ECOTRIN              Indwelling Lines/Drains at time of discharge:   Lines/Drains/Airways       Drain  Duration                  Hemodialysis AV Graft Left upper arm -- days                        Time spent on the discharge of patient: 45 minutes         Chung Dominique MD  Department of Hospital Medicine  [place] [facility]

## 2025-07-07 NOTE — PROGRESS NOTES
07/07/25 1258   Post-Hemodialysis Assessment   Rinseback Volume (mL) 250 mL   Blood Volume Processed (Liters) 54 L   Dialyzer Clearance Clear   Duration of Treatment 180 minutes   Additional Fluid Intake (mL) 500 mL   Total UF (mL) 500 mL   Net Fluid Removal 0   Patient Response to Treatment tolerated   Post-Hemodialysis Comments pt blood returned, vss, no sob noted, no bleeding noted

## 2025-07-07 NOTE — ASSESSMENT & PLAN NOTE
Patient has paroxysmal (<7 days) atrial fibrillation. Patient is currently in atrial fibrillation. [id number] Score: 5. The patients heart rate in the last 24 hours is as follows:  Pulse  Min: 66  Max: 127     Antiarrhythmics  metoprolol tartrate (LOPRESSOR) tablet 25 mg, 2 times daily, Oral    Anticoagulants  heparin (porcine) injection 5,000 Units, Every 8 hours, Subcutaneous    Plan  - Replete lytes with a goal of K>4, Mg >2  - Patient is anticoagulated, see medications listed above.  - Patient's afib is currently controlled  - Plan to start heparin

## 2025-07-07 NOTE — PROCEDURES
Patient seen and examined on dialysis. Weak BP low no UF pending discharge with Hospice today   Vitals:    07/07/25 0745 07/07/25 0952 07/07/25 1000 07/07/25 1030   BP: 111/65 (!) 95/43 (!) 121/59 (!) 107/48   BP Location: Right arm      Patient Position: Lying      Pulse: 87 96 86 80   Resp:       Temp: 98 °F (36.7 °C) 97.3 °F (36.3 °C)     TempSrc:       SpO2: (!) 94%      Weight:       Height:           With any question please call  [phone]  KRISTI Angel MD    [facility]     CANDY Alanis MD,   MD KRISTI Baugh MD E. V. Harmon, NP I.Goldvarg-Abud, NP    [address]  [place], [address]

## 2025-07-07 NOTE — PROGRESS NOTES
Pharmacist Renal Dose Adjustment Note    Erin Clark is a 89 y.o. female being treated with the medication cipro    Patient Data:    Vital Signs (Most Recent):  Temp: 98 °F (36.7 °C) (07/07/25 0745)  Pulse: 87 (07/07/25 0745)  Resp: 18 (07/07/25 0027)  BP: 111/65 (07/07/25 0745)  SpO2: (!) 94 % (07/07/25 0745) Vital Signs (72h Range):  Temp:  [97.2 °F (36.2 °C)-98.4 °F (36.9 °C)]   Pulse:  []   Resp:  [16-20]   BP: (109-136)/(53-67)   SpO2:  [92 %-97 %]      Recent Labs   Lab 07/01/25  1002 07/02/25  1231 07/03/25  1134   CREATININE 4.8* 1.6* 4.3*     Serum creatinine: 4.3 mg/dL (H) 07/03/25 1134  Estimated creatinine clearance: 6.5 mL/min (A)    Medication:cipro dose: 500 mg frequency every other day will be changed to medication:cipro dose:500 mg frequency:daily. (After dialysis)    Pharmacist's Name: Shy Chowdhury  Pharmacist's Extension: [phone]

## 2025-07-07 NOTE — PLAN OF CARE
Discharge orders noted.  spoke with Pily with [facility]. She spoke with daughter this morning and family agreed on home hospice. She is working on getting DME to the home today.  to reach out to daughter to confirm delivery of DME and if she would need transport set up. Nursing staff updated.  will continue to follow patient through transitions of care and assist with any discharge needs.     0917--Spoke with daughter Rosa and she is agreeable with the discharge plan. She will likely have family transport when ready. I will call her later this afternoon to ensure DME delivery.    1000-- met with family in room. Patient off the the floor for last HD session. All questions answered.    1345--Spoke with daughter regarding discharge. She reports they are just waiting on a few items. She is also asking for ambulance transport due to patient unable to get into car. She reports she has not walked in a while and unable to safely transport in vehicle. I confirmed address as well. Will arrange a time once patient gets back from HD.    1443--Spoke with daughter and she is coming to the hospital. She will let me know on a transport time to set up.    Daughter requested ambulance transport for now. Pily with [facility] updated. She is coming to the hospital now to sign consents.    I notified Maciej with transport center patient is going home with [facility].    Emergency Contacts    Name Relation Home Work Mobile   Rosa Bailey Daughter   [phone]      Other Contacts    Name Relation Home Work Mobile   Tor Clark Spouse [phone]     Leif Clark Son [phone]   MackenzieEfrain Relative   [phone]     Future Appointments   Date Time Provider Department Center   7/8/2025  3:00 PM Christie Baez DPM [facility] WOUND [facility]   7/17/2025  3:00 PM Ada Gregory MD [facility]PR[facility]   8/12/2025  3:00 PM Wilfred  Leif Rojas MD [facility] CARDIO [facility]   11/6/2025  4:00 PM Bayron Golden MD [facility]  Hospice, Home Hospice, Inpatient Hospice [phone] [address] [facility] [address]      Next Steps: Follow up  Instructions: [facility]        07/07/25 0914   Final Note   Assessment Type Final Discharge Note   Anticipated Discharge Disposition [facility]ospiceHome   Post-Acute Status   Post-Acute Authorization Hospice   Hospice Status Pending equipment/medication delivery   Discharge Delays (!) Home Medical Equipment (Insurance, Delivery)     Marlee Parker RN    [phone]

## 2025-07-07 NOTE — PLAN OF CARE
[facility]  Department of Hospital Medicine  [address] [place] [address]  [phone] after hours  [phone] fax    HOSPICE  ORDERS    07/07/2025    Admit to Hospice:  [facility]  Diagnoses:   Active Hospital Problems    Diagnosis  POA    *Acute osteomyelitis of toe of left foot [M86.172]  Yes    Wedge compression fracture of eleventh thoracic vertebra [S22.080A]  Yes     Chronic    Diverticulosis [K57.90]  Yes     Chronic    Aortic atherosclerosis [I70.0]  Yes     Chronic    Ovarian mass, left [N83.8]  Yes     Chronic    Celiac artery stenosis [I77.4]  Yes     Chronic    Hypokalemia [E87.6]  Yes    PVD (peripheral vascular disease) [I73.9]  Yes     Chronic    Sepsis [A41.9]  Yes    Type 2 diabetes mellitus with diabetic nephropathy, without long-term current use of insulin [E11.21]  Yes    New onset a-fib [I48.91]  Yes    Gangrene of toe of left foot [I96]  Yes    Chronic diastolic congestive heart failure [I50.32]  Yes    Senile dementia [F03.90]  Yes    Frailty [R54]  Yes    Essential hypertension [I10]  Yes    End-stage renal disease on hemodialysis [N18.6, Z99.2]  Not Applicable    Hypertension [I10]  Yes      Resolved Hospital Problems   No resolved problems to display.       Hospice Qualifying Diagnoses:        Patient has a life expectancy < 6 months due to:  Primary Hospice Diagnosis:  ESRD on HD, severe malnutrition, severe peripheral vascular disease   Comorbid Conditions Contributing to Decline:  Diastolic heart failure, osteomyelitis of the 3rd left toe with gangrene, ovarian mass, celiac artery stenosis, aortic atherosclerosis, which compression fracture of the 11 thoracic vertebrae, senile dementia with behavioral issues, ALT, type 2 diabetes, hypertension, Afib.   Vital Signs: Routine per Hospice Protocol.    Code Status:  Full code    Allergies:   Review of patient's allergies indicates:   Allergen Reactions    Aspirin Nausea Only and  "Swelling     Able to tolerated in small doses         Diet:  Renal diet  Activities: As tolerated    Goals of Care Treatment Preferences:  Code Status: Full Code    Health care agent: Tor Clark (Pinto) (spouse)  Health care agent number: [phone]          What is most important right now is to focus on avoiding the hospital, remaining as independent as possible, symptom/pain control.     Nursing: Per Hospice Routine.    Routine Skin for Bedridden Patients: Apply moisture barrier cream to all skin folds and   wet areas in perineal area daily and after baths and all bowel movements.              Oxygen:  N/a  Other Miscellaneous Care:      N/a  Medications:           Medication List        START taking these medications      ciprofloxacin HCl 500 MG tablet  Commonly known as: CIPRO  Take 1 tablet (500 mg total) by mouth every other day.  Start taking on: July 8, 2025     melatonin 3 mg tablet  Commonly known as: MELATIN  Take 2 tablets (6 mg total) by mouth nightly as needed for Insomnia.     polyethylene glycol 17 gram/dose powder  Commonly known as: GLYCOLAX  Take 17 g by mouth daily as needed (Estrenimiento (constipation)).            CONTINUE taking these medications      atorvastatin 40 MG tablet  Commonly known as: LIPITOR  TAKE 1 TABLET BY MOUTH ONCE DAILY IN THE EVENING     clopidogreL 75 mg tablet  Commonly known as: PLAVIX  Vickery willow tableta (75 mg en total) por vía oral diariamente.  (Take 1 tablet (75 mg total) by mouth once daily.)     furosemide 40 MG tablet  Commonly known as: LASIX  Take 1 tablet (40 mg total) by mouth once daily.     hydrALAZINE 25 MG tablet  Commonly known as: APRESOLINE  Take 25 mg by mouth every 12 (twelve) hours.     isosorbide mononitrate 30 MG 24 hr tablet  Commonly known as: IMDUR  Take 1 tablet (30 mg total) by mouth once daily.     metoprolol succinate 25 MG 24 hr tablet  Commonly known as: TOPROL-XL  Take 1 tablet by mouth once daily     nitroGLYCERIN 0.4 MG SL " tablet  Commonly known as: NITROSTAT  Place 0.4 mg under the tongue every 5 (five) minutes as needed for Chest pain.     pantoprazole 40 MG tablet  Commonly known as: PROTONIX  Take 40 mg by mouth before breakfast.     povidone-iodine 10 % external solution  Commonly known as: BETADINE  Apply topically as needed for Wound Care.     TRIPHROCAPS 1 mg Cap  Generic drug: vitamin renal formula (B-complex-vitamin c-folic acid)  Take 1 capsule by mouth once daily.            STOP taking these medications      aspirin 81 MG EC tablet  Commonly known as: ECOTRIN                DIABETES CARE:  Not applicable     Future Orders:  Hospice Medical Director may dictate new orders for comfortable care measures & sign death certificate.        _________________________________  Chung Dominique MD  07/07/2025

## 2025-07-07 NOTE — PLAN OF CARE
Discharge orders noted. Additional clinical references attached.    Patient's discharge instructions given by bedside RN and reviewed via this VN with patient's family.    Education provided on new medication, diagnosis, and follow-up appointments.    All questions answered. Teach back method used. Patient verbalized understanding.    Transportation  arranged to home per . Floor nurse notified.      07/07/25 7110   AVS Confirmation   Discharge instructions and AVS provided to and reviewed with patient and/or significant other. Yes

## 2025-07-07 NOTE — HOSPITAL COURSE
Patient's saw the admitted for new onset AFib RVR, with positive blood cultures patient was septic initially on admission patient was found to have osteomyelitis of the left 3rd toe, podiatry and Cardiology were both consulted, given her severe peripheral vascular disease neurology recommended medical management for her PVD, podiatry also recommended continuing antibiotics per ID recommendations, patient to finish total of 6 weeks of antibiotics, family opted into hospice placement and discharge.

## 2025-07-07 NOTE — ASSESSMENT & PLAN NOTE
"This patient does have evidence of infective focus  My overall impression is sepsis with Encephalopathy.  Source: Skin and Soft Tissue Infection: gangrene  Antibiotics given-   Antibiotics (72h ago, onward)      Start     Stop Route Frequency Ordered    07/07/25 1600  ciprofloxacin HCl tablet 500 mg         10/27/25 1559 Oral Daily 07/07/25 0812    07/01/25 2115  mupirocin 2 % ointment         07/06/25 2059 Nasl 2 times daily 07/01/25 2011          Latest lactate reviewed-  No results for input(s): "LACTATE", "POCLAC" in the last 72 hours.    Organ dysfunction indicated by Encephalopathy    Fluid challenge Contraindicated- Fluid bolus is contraindicated in this patient due to End Stage Renal Disease     Post- resuscitation assessment Yes - I attest a sepsis perfusion exam was performed within 6 hours of sepsis, severe sepsis, or septic shock presentation, following fluid resuscitation.      Will Not start Pressors- Levophed for MAP of 65  Source control achieved by: Antibiotic therapy  "

## 2025-07-15 LAB — MYCOBACTERIUM SPEC QL CULT: NORMAL

## 2025-07-21 ENCOUNTER — PATIENT OUTREACH (OUTPATIENT)
Dept: ADMINISTRATIVE | Facility: OTHER | Age: 89
End: 2025-07-21
Payer: MEDICARE

## (undated) DEVICE — MANIFOLD 4 PORT

## (undated) DEVICE — SEE MEDLINE ITEM 157173

## (undated) DEVICE — GLIDESHEATH SLENDER SS 5FR10CM

## (undated) DEVICE — ADAPTER CATH SYRINGE

## (undated) DEVICE — STOCKINET TUBULAR 1 PLY 6X60IN

## (undated) DEVICE — PAD PREP 50/CA

## (undated) DEVICE — SET DECANTER MEDICHOICE

## (undated) DEVICE — GUIDEWIRE EMERALD .035IN 260CM

## (undated) DEVICE — Device

## (undated) DEVICE — PACK BASIC

## (undated) DEVICE — DEVICE CLOSURE MYNX GRIP 5FR

## (undated) DEVICE — SUT 4-0 ETHILON 18 PS-2

## (undated) DEVICE — APPLICATOR CHLORAPREP ORN 26ML

## (undated) DEVICE — SET MICRO PUNCT 4FR/MPIS-401

## (undated) DEVICE — SUT PROLENE 5-0 36IN C-1

## (undated) DEVICE — SPONGE DERMACEA GAUZE 4X4

## (undated) DEVICE — SUT PDSII 4-0 PS-2 CLEAR MO

## (undated) DEVICE — JELLY LUBRICANT STERILE 4 OZ

## (undated) DEVICE — SEE MEDLINE ITEM 157131

## (undated) DEVICE — TUBING HPCIL ROT M/F ADPT 48IN

## (undated) DEVICE — KIT LEFT HEART MANIFOLD CUSTOM

## (undated) DEVICE — GAUZE SPONGE 4X4 12PLY

## (undated) DEVICE — TIE VAS SIL RUBBER MAXI BLU ST

## (undated) DEVICE — CATH IMA INFINITI 4FRX100CM

## (undated) DEVICE — SYR 30CC LUER LOCK

## (undated) DEVICE — CATH 5FR MP 125CM 5/BX

## (undated) DEVICE — GUIDEWIRE EMERALD 150CM PTFE

## (undated) DEVICE — SEE MEDLINE ITEM 157117

## (undated) DEVICE — INSTRUMENT SUCTION FRAZIER 12F

## (undated) DEVICE — CATH FOGARTY EMB 4FR 40CM

## (undated) DEVICE — COVER BAND BAG 40 X 40

## (undated) DEVICE — SHEATH INTRODUCER 6FR 11CM

## (undated) DEVICE — CATH IMPULSE FL4 5FR 100CM

## (undated) DEVICE — BLADE SURG CARBON STEEL SZ11

## (undated) DEVICE — SEE MEDLINE ITEM 157116

## (undated) DEVICE — SHEATH INTRODUCER 5FR 10CM

## (undated) DEVICE — CATH IMPULSE 5F 100CM FR4

## (undated) DEVICE — DEFIBRILLATOR STAT PADZ II

## (undated) DEVICE — CONTRAST VISIPAQUE 150ML

## (undated) DEVICE — CATH IMPULSE 5FR PIGTAIL 125CM

## (undated) DEVICE — ELECTRODE REM PLYHSV RETURN 9

## (undated) DEVICE — SYR LUER LOCK 1CC

## (undated) DEVICE — COVER OVERHEAD SURG LT BLUE

## (undated) DEVICE — SYR B-D DISP CONTROL 10CC100/C

## (undated) DEVICE — SEE MEDLINE ITEM 152622

## (undated) DEVICE — BOOT SUTURE AID

## (undated) DEVICE — SEE MEDLINE ITEM 157187

## (undated) DEVICE — SEE MEDLINE ITEM 156955

## (undated) DEVICE — CATH JACKY RADIAL 3.5 110CM

## (undated) DEVICE — GLOVE BIOGEL ECLIPSE SZ 7.5

## (undated) DEVICE — NDL 22GA X1 1/2 REG BEVEL

## (undated) DEVICE — SUT VICRYL 3-0 27 SH

## (undated) DEVICE — COVER PROBE US 5.5X58L NON LTX

## (undated) DEVICE — DRESSING XEROFORM FOIL PK 1X8